# Patient Record
Sex: FEMALE | Race: WHITE | NOT HISPANIC OR LATINO | Employment: PART TIME | URBAN - METROPOLITAN AREA
[De-identification: names, ages, dates, MRNs, and addresses within clinical notes are randomized per-mention and may not be internally consistent; named-entity substitution may affect disease eponyms.]

---

## 2017-02-02 ENCOUNTER — GENERIC CONVERSION - ENCOUNTER (OUTPATIENT)
Dept: OTHER | Facility: OTHER | Age: 57
End: 2017-02-02

## 2017-02-10 ENCOUNTER — GENERIC CONVERSION - ENCOUNTER (OUTPATIENT)
Dept: OTHER | Facility: OTHER | Age: 57
End: 2017-02-10

## 2017-02-21 ENCOUNTER — ALLSCRIPTS OFFICE VISIT (OUTPATIENT)
Dept: OTHER | Facility: OTHER | Age: 57
End: 2017-02-21

## 2017-02-26 ENCOUNTER — HOSPITAL ENCOUNTER (EMERGENCY)
Facility: HOSPITAL | Age: 57
Discharge: HOME | End: 2017-02-26
Attending: EMERGENCY MEDICINE
Payer: COMMERCIAL

## 2017-02-26 VITALS
BODY MASS INDEX: 28.35 KG/M2 | HEART RATE: 74 BPM | HEIGHT: 63 IN | RESPIRATION RATE: 16 BRPM | WEIGHT: 160 LBS | SYSTOLIC BLOOD PRESSURE: 116 MMHG | TEMPERATURE: 98.1 F | DIASTOLIC BLOOD PRESSURE: 70 MMHG

## 2017-02-26 DIAGNOSIS — S39.012A STRAIN OF LUMBAR REGION, INITIAL ENCOUNTER: Primary | ICD-10-CM

## 2017-02-26 DIAGNOSIS — M62.838 SPASM OF RIGHT PIRIFORMIS MUSCLE: ICD-10-CM

## 2017-02-26 PROCEDURE — 99283 EMERGENCY DEPT VISIT LOW MDM: CPT

## 2017-02-26 PROCEDURE — 96374 THER/PROPH/DIAG INJ IV PUSH: CPT

## 2017-02-26 RX ORDER — METAXALONE 800 MG/1
800 TABLET ORAL 3 TIMES DAILY
Status: DISCONTINUED | OUTPATIENT
Start: 2017-02-26 | End: 2017-02-26 | Stop reason: HOSPADM

## 2017-02-26 RX ORDER — LANOLIN ALCOHOL/MO/W.PET/CERES
CREAM (GRAM) TOPICAL
COMMUNITY
End: 2021-07-02

## 2017-02-26 RX ORDER — NICOTINE POLACRILEX 2 MG
GUM BUCCAL
COMMUNITY
End: 2018-02-01 | Stop reason: CLARIF

## 2017-02-26 RX ORDER — KETOROLAC TROMETHAMINE 30 MG/ML
60 INJECTION, SOLUTION INTRAMUSCULAR; INTRAVENOUS ONCE
Status: COMPLETED | OUTPATIENT
Start: 2017-02-26 | End: 2017-02-26

## 2017-02-26 RX ORDER — METAXALONE 800 MG/1
800 TABLET ORAL 3 TIMES DAILY
Qty: 20 TABLET | Refills: 0 | Status: SHIPPED | OUTPATIENT
Start: 2017-02-26 | End: 2018-02-01 | Stop reason: CLARIF

## 2017-02-26 RX ORDER — CYCLOBENZAPRINE HCL 10 MG
TABLET ORAL
COMMUNITY
End: 2018-02-01 | Stop reason: CLARIF

## 2017-02-26 RX ORDER — TRAMADOL HYDROCHLORIDE 50 MG/1
50 TABLET ORAL EVERY 6 HOURS PRN
Qty: 30 TABLET | Refills: 0 | Status: SHIPPED | OUTPATIENT
Start: 2017-02-26 | End: 2017-03-08

## 2017-02-26 RX ORDER — TRAMADOL HYDROCHLORIDE 50 MG/1
TABLET ORAL
COMMUNITY
End: 2018-02-01 | Stop reason: CLARIF

## 2017-02-26 RX ORDER — LEVOTHYROXINE SODIUM 0.2 MG/1
TABLET ORAL
COMMUNITY
End: 2018-02-01 | Stop reason: CLARIF

## 2017-02-26 RX ADMIN — KETOROLAC TROMETHAMINE 60 MG: 30 INJECTION, SOLUTION INTRAMUSCULAR at 13:04

## 2017-02-26 RX ADMIN — METAXALONE 800 MG: 800 TABLET ORAL at 13:06

## 2017-05-15 ENCOUNTER — GENERIC CONVERSION - ENCOUNTER (OUTPATIENT)
Dept: OTHER | Facility: OTHER | Age: 57
End: 2017-05-15

## 2017-06-06 ENCOUNTER — ALLSCRIPTS OFFICE VISIT (OUTPATIENT)
Dept: OTHER | Facility: OTHER | Age: 57
End: 2017-06-06

## 2017-07-25 ENCOUNTER — ALLSCRIPTS OFFICE VISIT (OUTPATIENT)
Dept: OTHER | Facility: OTHER | Age: 57
End: 2017-07-25

## 2017-10-07 ENCOUNTER — GENERIC CONVERSION - ENCOUNTER (OUTPATIENT)
Dept: OTHER | Facility: OTHER | Age: 57
End: 2017-10-07

## 2017-10-08 LAB
A/G RATIO (HISTORICAL): 1.8 (ref 1.2–2.2)
ALBUMIN SERPL BCP-MCNC: 4.6 G/DL (ref 3.5–5.5)
ALP SERPL-CCNC: 59 IU/L (ref 39–117)
ALT SERPL W P-5'-P-CCNC: 21 IU/L (ref 0–32)
AST SERPL W P-5'-P-CCNC: 21 IU/L (ref 0–40)
BILIRUB SERPL-MCNC: 0.9 MG/DL (ref 0–1.2)
BUN SERPL-MCNC: 23 MG/DL (ref 6–24)
BUN/CREA RATIO (HISTORICAL): 33 (ref 9–23)
CALCIUM SERPL-MCNC: 9.9 MG/DL (ref 8.7–10.2)
CHLORIDE SERPL-SCNC: 101 MMOL/L (ref 96–106)
CHOLEST SERPL-MCNC: 218 MG/DL (ref 100–199)
CO2 SERPL-SCNC: 27 MMOL/L (ref 18–29)
CREAT SERPL-MCNC: 0.69 MG/DL (ref 0.57–1)
DEPRECATED RDW RBC AUTO: 13.3 % (ref 12.3–15.4)
EGFR AFRICAN AMERICAN (HISTORICAL): 110 ML/MIN/1.73
EGFR-AMERICAN CALC (HISTORICAL): 96 ML/MIN/1.73
FERRITIN SERPL-MCNC: 192 NG/ML (ref 15–150)
FOLATE SERPL-MCNC: >20 NG/ML
GLUCOSE SERPL-MCNC: 85 MG/DL (ref 65–99)
HCT VFR BLD AUTO: 41.6 % (ref 34–46.6)
HDLC SERPL-MCNC: 62 MG/DL
HGB BLD-MCNC: 14.2 G/DL (ref 11.1–15.9)
IRON SATN MFR SERPL: 39 % (ref 15–55)
IRON SERPL-MCNC: 128 UG/DL (ref 27–159)
LDLC SERPL CALC-MCNC: 122 MG/DL (ref 0–99)
MCH RBC QN AUTO: 29.1 PG (ref 26.6–33)
MCHC RBC AUTO-ENTMCNC: 34.1 G/DL (ref 31.5–35.7)
MCV RBC AUTO: 85 FL (ref 79–97)
PLATELET # BLD AUTO: 265 X10E3/UL (ref 150–379)
POTASSIUM SERPL-SCNC: 4.7 MMOL/L (ref 3.5–5.2)
PTH-INTACT SERPL-MCNC: 33 PG/ML (ref 15–65)
RBC (HISTORICAL): 4.88 X10E6/UL (ref 3.77–5.28)
SODIUM SERPL-SCNC: 143 MMOL/L (ref 134–144)
TIBC SERPL-MCNC: 326 UG/DL (ref 250–450)
TOT. GLOBULIN, SERUM (HISTORICAL): 2.5 G/DL (ref 1.5–4.5)
TOTAL PROTEIN (HISTORICAL): 7.1 G/DL (ref 6–8.5)
TRIGL SERPL-MCNC: 171 MG/DL (ref 0–149)
UIBC (HISTORICAL): 198 UG/DL (ref 131–425)
VIT B12 SERPL-MCNC: 1953 PG/ML (ref 211–946)
VLDLC SERPL CALC-MCNC: 34 MG/DL (ref 5–40)
WBC # BLD AUTO: 6.3 X10E3/UL (ref 3.4–10.8)

## 2017-10-09 LAB — 25(OH)D3 SERPL-MCNC: 61.2 NG/ML (ref 30–100)

## 2017-10-10 LAB — VITAMIN A LEVEL (HISTORICAL): 76 UG/DL (ref 20–65)

## 2017-10-11 LAB — VITAMIN B1, WHOLE BLOOD (HISTORICAL): 165.8 NMOL/L (ref 66.5–200)

## 2017-10-13 ENCOUNTER — TRANSCRIBE ORDERS (OUTPATIENT)
Dept: ADMINISTRATIVE | Facility: HOSPITAL | Age: 57
End: 2017-10-13

## 2017-10-13 DIAGNOSIS — Z12.31 ENCOUNTER FOR MAMMOGRAM TO ESTABLISH BASELINE MAMMOGRAM: Primary | ICD-10-CM

## 2017-10-19 ENCOUNTER — HOSPITAL ENCOUNTER (OUTPATIENT)
Dept: BONE DENSITY | Facility: IMAGING CENTER | Age: 57
Discharge: HOME | End: 2017-10-19
Payer: COMMERCIAL

## 2017-10-19 DIAGNOSIS — Z12.31 ENCOUNTER FOR MAMMOGRAM TO ESTABLISH BASELINE MAMMOGRAM: ICD-10-CM

## 2017-10-19 PROCEDURE — G0202 SCR MAMMO BI INCL CAD: HCPCS

## 2017-10-23 ENCOUNTER — HOSPITAL ENCOUNTER (OUTPATIENT)
Dept: RADIOLOGY | Facility: HOSPITAL | Age: 57
Discharge: HOME | End: 2017-10-23
Payer: COMMERCIAL

## 2017-10-23 ENCOUNTER — TRANSCRIBE ORDERS (OUTPATIENT)
Dept: ADMINISTRATIVE | Facility: HOSPITAL | Age: 57
End: 2017-10-23

## 2017-10-23 DIAGNOSIS — M79.671 RIGHT FOOT PAIN: Primary | ICD-10-CM

## 2017-10-23 DIAGNOSIS — M79.671 RIGHT FOOT PAIN: ICD-10-CM

## 2017-10-23 PROCEDURE — 73630 X-RAY EXAM OF FOOT: CPT

## 2017-10-26 ENCOUNTER — ALLSCRIPTS OFFICE VISIT (OUTPATIENT)
Dept: OTHER | Facility: OTHER | Age: 57
End: 2017-10-26

## 2017-10-26 ENCOUNTER — TRANSCRIBE ORDERS (OUTPATIENT)
Dept: ADMINISTRATIVE | Facility: HOSPITAL | Age: 57
End: 2017-10-26

## 2017-10-26 DIAGNOSIS — M79.671 FOOT PAIN, RIGHT: Primary | ICD-10-CM

## 2017-10-27 ENCOUNTER — GENERIC CONVERSION - ENCOUNTER (OUTPATIENT)
Dept: OTHER | Facility: OTHER | Age: 57
End: 2017-10-27

## 2017-11-03 ENCOUNTER — HOSPITAL ENCOUNTER (OUTPATIENT)
Dept: MRI IMAGING | Facility: HOSPITAL | Age: 57
Discharge: HOME | End: 2017-11-03
Attending: PODIATRIST
Payer: COMMERCIAL

## 2017-11-03 DIAGNOSIS — M79.671 FOOT PAIN, RIGHT: ICD-10-CM

## 2017-11-03 PROCEDURE — 73718 MRI LOWER EXTREMITY W/O DYE: CPT

## 2017-11-16 ENCOUNTER — GENERIC CONVERSION - ENCOUNTER (OUTPATIENT)
Dept: OTHER | Facility: OTHER | Age: 57
End: 2017-11-16

## 2017-12-07 ENCOUNTER — ALLSCRIPTS OFFICE VISIT (OUTPATIENT)
Dept: OTHER | Facility: OTHER | Age: 57
End: 2017-12-07

## 2017-12-08 ENCOUNTER — ALLSCRIPTS OFFICE VISIT (OUTPATIENT)
Dept: OTHER | Facility: OTHER | Age: 57
End: 2017-12-08

## 2017-12-08 NOTE — PROGRESS NOTES
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 407)    Plan  Anxiety, Depression    · Venlafaxine HCl  MG Oral Capsule Extended Release 24 Hour; TAKE ONE CAPSULEBY MOUTH DAILY    Discussion/Summary  Discussion Summary:   Anx/Dep - better with Effexor XR and able to tolerate med, not yet at goal so will increase to 150 mg 1 tab PO q day and re-eval in 6-8 wks, urged to call with SE or with worsening mood, urged not stopping med on own or missing doses  post tibialis tear - s/p CAM boot and now doing well with Orthotics, is following with podiatry and doing much better  on BW  on mammo and PAP  on [place]  Counseling Documentation With Imm: The patient was counseled regarding instructions for management,-- prognosis,-- patient and family education,-- impressions,-- risks and benefits of treatment options,-- importance of compliance with treatment  Medication SE Review and Pt Understands Tx: Possible side effects of new medications were reviewed with the patient/guardian today  The treatment plan was reviewed with the patient/guardian  The patient/guardian understands and agrees with the treatment plan   Self Referrals:   Self Referrals: No      Chief Complaint  Chief Complaint Chronic Condition [facility]: Patient is here today for follow up of chronic conditions described in HPI  History of Present Illness  HPI: Pt here for follow up depression/anxiety  Last visit she was noting uncontrolled mood and had no benefit with Sertraline or Wellbutrin  Her insurance would not cover Branded Zoloft and we tried a trial of Effexor last visit  She has been taking the med w/o noted SE  She has noted mood is better  She notes less tearfulness and is less irritable and is yelling at her  less  She has not felt anxious and is not biting her nails or grinding her teeth   She notes still having stress as her and her  are contemplating divorce and work has been crazy  She was out of work for 3 wks with foot issues  She does note a 50:50 good to bad day   had her MRI since last appt  She was noted to have torn post tibialis insertion at the navicular bone  She was using a boot for about 6 wks  She is now using orthotics and pain has improved  She is not having to take any additional pain meds other then her Tramadol which she really only uses when her back goes out/pain  Review of Systems  Complete-Female:  Constitutional: recent weight loss, but-- no fever-- and-- no chills  Eyes: no eyesight problems  ENT: no sore throat-- and-- no nasal discharge  Cardiovascular: no chest pain-- and-- no palpitations  Respiratory: no shortness of breath,-- no cough-- and-- no wheezing  Gastrointestinal: no abdominal pain,-- no constipation-- and-- no diarrhea  Genitourinary: no dysuria  Musculoskeletal: joint stiffness, but-- as noted in HPI-- and-- no joint swelling  Integumentary: no rashes  Neurological: no headache-- and-- no dizziness  Psychiatric: as noted in HPI  Endocrine: no muscle weakness  Hematologic/Lymphatic: no tendency for easy bleeding  Active Problems  1  Anxiety (300 00) (F41 9)   2  Arthritis (716 90) (M19 90)   3  Atrophy of vagina (627 3) (N95 2)   4  Breast disorder (611 9) (N64 9)   5  Colon cancer screening (V76 51) (Z12 11)   6  Depression (311) (F32 9)   7  Disc degeneration, lumbar (722 52) (M51 36)   8  Disturbance of memory (780 93) (R41 3)   9  Dyslipidemia (272 4) (E78 5)   10  Encounter for cervical Pap smear with pelvic exam (V76 2,V72 31) (Z01 419)   11  Encounter for screening mammogram for malignant neoplasm of breast (V76 12) (Z12 31)   12  Globus sensation (306 4) (F45 8)   13  History of folliculitis (H30 8) (N83 7)   14  History of self breast exam   15  Hypothyroidism (244 9) (E03 9)   16  Lumbar pain (724 2) (M54 5)   17  Lumbar radiculopathy (724 4) (M54 16)   18  Multiple joint pain (719 49) (M25 50)   19  Osteoarthritis (715 90) (M19 90)   20  Postgastrectomy malabsorption (579 3) (K91 2,Z90 3)   21  Sacroiliitis (720 2) (M46 1)   22  Status post bariatric surgery (V45 86) (P32 49)    Past Medical History  1  History of Acute laryngitis (464 00) (J04 0)   2  History of Adenomyosis (617 0) (N80 0)   3  Denied: History of Alcohol abuse   4  History of Atypical chest pain (786 59) (R07 89)   5  Depression (311) (F32 9)   6  Disturbance of memory (780 93) (R41 3)   7  History of Encounter for cervical Pap smear with pelvic exam (V76 2,V72 31) (Z01 419)   8  History of  8   9  History of Hirsutism (704 1) (L68 0)   10  History of acute sinusitis (V12 69) (Z87 09)   11  History of endometriosis (V13 29) (Z87 42)   12  History of folliculitis (B28 8) (P63 0)   13  History of fracture of foot (V15 51) (Z87 81)   14  History of spontaneous  (V13 29) (Z87 59)   15  Denied: History of substance abuse   16  History of upper respiratory infection (V12 09) (Z87 09)   17  History of uterine leiomyoma (V13 29) (Z86 018)   18  History of Menometrorrhagia (626 2) (N92 1)   19  History of Morbid obesity (278 01) (E66 01)   20  History of Preop cardiovascular exam (V72 81) (Z01 810)   21  History of Sclerosing adenosis of breast (610 2) (N60 29)   22  History of Symptomatic menopausal or female climacteric states (627 2) (N95 1)  Active Problems And Past Medical History Reviewed: The active problems and past medical history were reviewed and updated today  Surgical History    1  History of Appendectomy   2  History of Biopsy Breast Percutaneous Needle Core   3  History of Cervix Cryosurgery   4  History of Complete Colonoscopy   5  History of Dilation And Curettage   6  History of Gastric Surgery For Morbid Obesity Laparoscopic Longitudinal Gastrectomy   7  History of Hysterectomy   8  History of Laparoscopy With Total Hysterectomy   9   History of Oral Surgery Tooth Extraction   10  History of Salpingo-oophorectomy Left Side   11  History of Salpingo-oophorectomy Right Side   12  History of Surgical Treatment For    13  History of Tonsillectomy With Adenoidectomy  Surgical History Reviewed: The surgical history was reviewed and updated today  Family History  Mother    1  Denied: Family history of Alcohol abuse   2  Family history of    3  Family history of Alzheimer's disease (V17 2) (Z82 0)   4  Family history of cerebrovascular accident (V17 1) (Z82 3)   5  Family history of dementia (V17 2) (Z81 8)   6  Denied: Family history of depression   7  Denied: Family history of substance abuse  Father    8  Denied: Family history of Alcohol abuse   9  Denied: Family history of depression   10  Family history of diabetes mellitus (V18 0) (Z83 3)   11  Denied: Family history of substance abuse   12  Family history of Prostate cancer  Sibling    15  Denied: Family history of Alcohol abuse   14  Denied: Family history of depression   15  Denied: Family history of substance abuse  Sister    12  Family history of colonic polyps (V18 51) (Z83 71)  Family History Reviewed: The family history was reviewed and updated today  Social History     · Always uses seat belt   · Caffeine use (V49 89) (F15 90)   · [employer]   · Denied: History of Drug use   · Former smoker (T83 34) (A68 807)   ·    · No alcohol use   · One child  Social History Reviewed: The social history was reviewed and updated today  Current Meds   1  Biotin 5000 MCG Oral Capsule; Therapy: (Recorded:2015) to Recorded   2  Calcium Citrate CAPS; Therapy: (Recorded:2015) to Recorded   3  Centrum Silver TABS; Therapy: (Recorded:2015) to Recorded   4  Levothyroxine Sodium 150 MCG Oral Tablet; TAKE 1 TABLET DAILY except 1 5 tab PO on  ONLY as directed by endo; Therapy: (Recorded:2017) to Recorded   5   Metaxalone 800 MG Oral Tablet; take 1 tablet by mouth every 8 hours if needed for muscle spasm; Therapy: 65GTL8736 to (Evaluate:13Gat1952)  Requested for: 70DCN9133; Last Rx:93Vck1200 Ordered   6  Omeprazole 20 MG Oral Capsule Delayed Release; take 1 capsule by mouth once daily; Therapy: 77UNN2735 to (Froilan Rushford)  Requested for: 37Iub4913 Recorded   7  Tramadol-Acetaminophen 37 5-325 MG Oral Tablet; Take 1 tablet every 6 hours as needed for pain; Therapy: 25EOY3465 to (Florence Caicedo)  Requested for: 23Oct2017; Last SI:10STS8738 Ordered   8  Venlafaxine HCl ER 75 MG Oral Capsule Extended Release 24 Hour; One tablet po daily; Therapy: 65EBS1416 to (DZTQYDWU:01KKD8548)  Requested for: 26Oct2017; Last Rx:26Oct2017 Ordered   9  Vitamin B-12 SUBL; Therapy: (Recorded:13Mar2015) to Recorded   10  Vitamin D3 2000 UNIT Oral Tablet; Therapy: (Recorded:13Mar2015) to Recorded  Medication List Reviewed: The medication list was reviewed and updated today  Allergies  1  No Known Drug Allergies    Vitals  Vital Signs    Recorded: 07Dec2017 09:52AM   Temperature 98 F   Heart Rate 64   Respiration 12   Systolic 853   Diastolic 62   Height 5 ft 3 in   Weight 174 lb 12 8 oz   BMI Calculated 30 96   BSA Calculated 1 83       Physical Exam   Constitutional  General appearance: No acute distress, well appearing and well nourished  Pulmonary  Respiratory effort: No increased work of breathing or signs of respiratory distress  Auscultation of lungs: Clear to auscultation  Cardiovascular  Auscultation of heart: Normal rate and rhythm, normal S1 and S2, without murmurs  Examination of extremities for edema and/or varicosities: Normal    Abdomen  Abdomen: Non-tender, no masses     Musculoskeletal  Gait and station: Normal    Psychiatric  Mood and affect: Normal          Results/Data  PHQ-9 Adult Depression Screening 01Fof0058 09:52AM User, Ahs     Test Name Result Flag Reference   PHQ-9 Adult Depression Score 5       Over the last two weeks, how often have you been bothered by any of the following problems? Little interest or pleasure in doing things: Not at all - 0 Feeling down, depressed, or hopeless: Not at all - 0 Trouble falling or staying asleep, or sleeping too much: Several days - 1 Feeling tired or having little energy: Nearly every day - 3 Poor appetite or over eating: Several days - 1 Feeling bad about yourself - or that you are a failure or have let yourself or your family down: Not at all - 0 Trouble concentrating on things, such as reading the newspaper or watching television: Not at all - 0 Moving or speaking so slowly that other people could have noticed  Or the opposite -  being so fidgety or restless that you have been moving around a lot more than usual: Not at all - 0 Thoughts that you would be better off dead, or of hurting yourself in some way: Not at all - 0   PHQ-9 Adult Depression Screening Negative     PHQ-9 Difficulty Level Not difficult at all     PHQ-9 Severity Mild Depression         Future Appointments    Date/Time Provider Specialty Site   12/08/2017 10:30 AM Sharyle Grieves, M D   General Surgery [facility]   02/06/2018 11:00 AM Wesley Sawyer DO Internal Medicine [facility] MD       Signatures   Electronically signed by : John Paul Andino DO; Dec  7 2017  9:56AM EST                       (Author)    Electronically signed by : John Paul Andino DO; Dec  7 2017 10:08AM EST                       (Author)    Electronically signed by : John Paul Andino DO; Dec  7 2017 10:13AM EST                       (Author)

## 2017-12-09 NOTE — PROGRESS NOTES
Assessment    1  Status post bariatric surgery (V45 86) (Z98 84)    Discussion/Summary    Patient is a 70-year-old woman 3 years status post laparoscopic sleeve gastrectomy with an initial excess weight loss in the high 80s and has since bounced back to 61%  She presents for annual follow-up today with no complaints  Is tolerating a regular diet  Is compliant with all of her medications  She does desire to lose a little bit more weight and become a bit more active but has been recently hampered by mild chronic back pain and a sprained ankle that required a boot for 2 months  On review of her bariatric labs, her lipid panel is slightly elevated ( she is following up with her primary care doctor regarding this), and her B12, ferritin and folic acid are slightly elevated as well  overall she is doing very well  We will refer her to see the dietitian and possibly the medical weight management MD in the future  She will follow in bariatric surgery clinic again in 1 year  Self Referrals: No      Chief Complaint  Patient is in the office for an annual visit and for a review of blood work  Patient admits to be compliant with daily exercise, vitamin, and protein intake  Post-Op  Post-Op Bariatric Surgery:  [facility] is status post lap sleeve procedure--   3 years ago; EWL 61%  HPI: The patient reports decreased hunger,-- no loss of appetite,-- not experiencing hunger,-- no nausea,-- no vomiting,-- no dyspnea,-- no fever,-- no constipation,-- no diarrhea,-- no chest pain,-- no abdominal pain,-- no excessive pain,-- no swelling-- and-- no abnormal bleeding  Diet and Exercise: Diet history reviewed and discussed with the patient  Weight loss/gains to date discussed with the patient  Supplements: multivitamins  PE:  Abdominal exam: soft,-- nontender,-- normal bowel sounds,-- no rebound tenderness,-- no guarding,-- no incisional hernia-- and-- no palpable mass   The surgical incision site is clean, dry and intact,-- not warm,-- not indurated,-- not erythematous,-- not ecchymotic,-- not swollen-- and-- not tender  Assessment:  Post-op, the patient is doing well,-- has excellent pain control-- and-- is showing no signs of infection  Plan: Activity restrictions: None  Instructions / Recommendations: dietary counseling recommended,-- recommended a daily protein intake of  grams,-- vitamin supplement(s) recommended,-- mineral supplement(s) recommended-- and-- recommended exercising at least 150 minutes per week  The patient was instructed to follow up in 12 months,-- will see dietician today  Active Problems    1  Anxiety (300 00) (F41 9)   2  Arthritis (716 90) (M19 90)   3  Atrophy of vagina (627 3) (N95 2)   4  Breast disorder (611 9) (N64 9)   5  Colon cancer screening (V76 51) (Z12 11)   6  Depression (311) (F32 9)   7  Disc degeneration, lumbar (722 52) (M51 36)   8  Disturbance of memory (780 93) (R41 3)   9  Dyslipidemia (272 4) (E78 5)   10  Encounter for cervical Pap smear with pelvic exam (V76 2,V72 31) (Z01 419)   11  Encounter for screening mammogram for malignant neoplasm of breast (V76 12)  (Z12 31)   12  Globus sensation (306 4) (F45 8)   13  History of folliculitis (R68 0) (I65 6)   14  History of self breast exam   15  Hypothyroidism (244 9) (E03 9)   16  Lumbar pain (724 2) (M54 5)   17  Lumbar radiculopathy (724 4) (M54 16)   18  Multiple joint pain (719 49) (M25 50)   19  Nontraumatic rupture of right posterior tibial tendon (727 68) (M66 871)   20  Osteoarthritis (715 90) (M19 90)   21  Postgastrectomy malabsorption (579 3) (K91 2,Z90 3)   22  Sacroiliitis (720 2) (M46 1)   23  Status post bariatric surgery (V45 86) (Z98 84)    Social History     · Always uses seat belt   · Caffeine use (V49 89) (F15 90)   · [employer]   · Denied: History of Drug use   · Former smoker (K08 82) (A04 572)   ·    · No alcohol use   · One child    Current Meds   1  Biotin 5000 MCG Oral Capsule; Therapy: (Recorded:13Mar2015) to Recorded   2  Calcium Citrate CAPS; Therapy: (Recorded:13Mar2015) to Recorded   3  Centrum Silver TABS; Therapy: (Recorded:13Mar2015) to Recorded   4  Levothyroxine Sodium 150 MCG Oral Tablet; TAKE 1 TABLET DAILY except 1 5 tab PO on Sunday ONLY as directed by endo; Therapy: (Recorded:06Jun2017) to Recorded   5  Metaxalone 800 MG Oral Tablet; take 1 tablet by mouth every 8 hours if needed for muscle spasm; Therapy: 52CCU6087 to (Evaluate:12Ltk9033)  Requested for: 48HKD8160; Last Rx:33Ufo2594 Ordered   6  Omeprazole 20 MG Oral Capsule Delayed Release; take 1 capsule by mouth once daily; Therapy: 03AMU1639 to (Daryn Holley)  Requested for: 45Olx5270 Recorded   7  Tramadol-Acetaminophen 37 5-325 MG Oral Tablet; Take 1 tablet every 6 hours as needed for pain; Therapy: 58FGJ2999 to (Brigida Thornton)  Requested for: 23Oct2017; Last FZ:68YUT3654 Ordered   8  Venlafaxine HCl  MG Oral Capsule Extended Release 24 Hour; TAKE ONE CAPSULE BY MOUTH DAILY; Therapy: 83UMH9112 to (Josieapurva Naranjo)  Requested for: 28YUD2059; Last Rx:98Muj7787 Ordered   9  Vitamin B-12 SUBL; Therapy: (Recorded:13Mar2015) to Recorded   10  Vitamin D3 2000 UNIT Oral Tablet; Therapy: (Recorded:13Mar2015) to Recorded    Allergies  1  No Known Drug Allergies    Vitals   Recorded: 52KWZ1603 10:45AM   Temperature 98 4 F   Heart Rate 64   Respiration 18   Systolic 508   Diastolic 80   Height 5 ft 3 5 in   Weight 174 lb    BMI Calculated 30 34   BSA Calculated 1 83     Future Appointments    Date/Time Provider Specialty Site   01/18/2018 10:00 AM Yuliana Middleton DO Internal Medicine [facility] MD   02/06/2018 11:00 AM Yuliana Middleton DO Internal Medicine [facility] MD       Signatures   Electronically signed by :  ANGELA Montaño ; Dec  8 2017 12:54PM EST                       (Author)

## 2018-01-09 NOTE — MISCELLANEOUS
Message   Recorded as Task   Date: 02/01/2016 12:36 PM, Created By: Akira Hand   Task Name: Go to Result   Assigned To: Agustinamiranda Jeremy   Regarding Patient: Aria Barnhart, Status: Active   CommentArbreann Goncalves - 01 Feb 2016 12:36 PM     TASK CREATED  Pt needs follow up breast US in 6 months as rec by radiology    Slip mailed  Active Problems    1  Arthritis (716 90) (M19 90)   2  Atrophy of vagina (627 3) (N95 2)   3  Breast disorder (611 9) (N64 9)   4  Colon cancer screening (V76 51) (Z12 11)   5  Depression (311) (F32 9)   6  Disc degeneration, lumbar (722 52) (M51 36)   7  Dyslipidemia (272 4) (E78 5)   8  Encounter for cervical Pap smear with pelvic exam (V76 2,V72 31) (Z12 4)   9  Encounter for screening mammogram for malignant neoplasm of breast (V76 12)   (Z12 31)   10  Globus sensation (306 4) (F45 8)   11  History of self breast exam   12  Hypothyroidism (244 9) (E03 9)   13  Lumbar pain (724 2) (M54 5)   14  Lumbar radiculopathy (724 4) (M54 16)   15  Multiple joint pain (719 49) (M25 50)   16  Osteoarthritis (715 90) (M19 90)   17  Postgastrectomy malabsorption (579 3) (K91 2)   18  Sacroiliitis (720 2) (M46 1)   19  Status post bariatric surgery (V45 86) (Z98 84)    Current Meds   1  Biotin 5000 MCG Oral Capsule; Therapy: (Recorded:13Mar2015) to Recorded   2  Calcium Citrate CAPS; Therapy: (Recorded:13Mar2015) to Recorded   3  Centrum Silver TABS; Therapy: (Recorded:13Mar2015) to Recorded   4  Cyclobenzaprine HCl - 10 MG Oral Tablet; TAKE 1/2 OR 1 TABLET EVERY 8 HOURS IF   NEEDED FOR MUSCLE SPASM; Therapy: 21ZPM5200 to (Evaluate:85Sfa9089)  Requested for: 98Fmh2043; Last   Rx:79Qog0781 Ordered   5  Levothyroxine Sodium 150 MCG Oral Tablet; TAKE 1 TABLET DAILY as directed by   endo; Therapy: (Recorded:01Feb2016) to Recorded   6  Omeprazole 20 MG Oral Capsule Delayed Release; TAKE 1 CAPSULE DAILY; Therapy: 56UIV9367 to (Majo Lopez)  Requested for: 00ONI7751;  Last Rx:17Eci4893 Ordered   7  Sertraline HCl - 100 MG Oral Tablet (Zoloft); Take 1 1/2  tablet daily  Requested for:   60ZVD4984; Last Rx:20Nap5877 Ordered   8  Tramadol-Acetaminophen 37 5-325 MG Oral Tablet; Take 1 tablet every 6 hours as   needed for pain; Therapy: 74BPH8745 to (Evaluate:27Mvi1125)  Requested for: 05Xjb1787; Last   Rx:61Ntb8712 Ordered   9  Vitamin B-12 SUBL; Therapy: (Recorded:13Mar2015) to Recorded   10  Vitamin D3 2000 UNIT Oral Tablet; Therapy: (Recorded:13Mar2015) to Recorded    Allergies    1  No Known Drug Allergies    Plan  Breast disorder    · US BREAST LEFT LIMITED; Status:Hold For - Scheduling,Retrospective Authorization;   Requested for:95Ine4623;     Signatures   Electronically signed by : Amy Espinoza, ; Feb 2 2016  7:50AM EST                       (Author)

## 2018-01-11 NOTE — PROGRESS NOTES
Assessment    1  Depression (311) (F32 9)   2  Anxiety (300 00) (F41 9)    Plan  Anxiety, Depression    · Venlafaxine HCl ER 75 MG Oral Capsule Extended Release 24 Hour; One tablet po  daily    Discussion/Summary    Dep/Anx - no benefit with Sertraline/Wellbutrin/Lexapro, has benefit with branded Zoloft but cannot afford it, will try trial of Effexor and re-eval in 4-6 wks, call with SE, do not stop med abruptly on her own    Having MRI of R foot for poss ligament/tendon injury as per podiatry  The patient was counseled regarding instructions for management, prognosis, patient and family education, impressions, risks and benefits of treatment options, importance of compliance with treatment  Possible side effects of new medications were reviewed with the patient/guardian today  The treatment plan was reviewed with the patient/guardian  The patient/guardian understands and agrees with the treatment plan     Self Referrals: No      Chief Complaint  continued depression      History of Present Illness  HPI: Pt here with continued depression symptoms  She has tried Sertraline, Wellbutrin, Lexapro and they did not work  She notes the branded Zoloft works but she cannot afford it as it is over $300  She was started on Trintellix and it did help a little but she still does have anxiety and depression  She notes no SI/HI/panic attacks  She is not sleeping well d/t her foot pain - tripped on  and poss tendon/ligament injury - having MRI coming up  Review of Systems    Constitutional: no fever and no chills  Cardiovascular: no chest pain and no palpitations  Respiratory: no shortness of breath and no cough  Musculoskeletal: arthralgias and joint swelling, but as noted in HPI  Neurological: no headache and no dizziness  Active Problems    1  Arthritis (716 90) (M19 90)   2  Atrophy of vagina (627 3) (N95 2)   3  Breast disorder (611 9) (N64 9)   4  Colon cancer screening (V76 51) (Z12 11)   5  Depression (311) (F32 9)   6  Disc degeneration, lumbar (722 52) (M51 36)   7  Disturbance of memory (780 93) (R41 3)   8  Dyslipidemia (272 4) (E78 5)   9  Encounter for cervical Pap smear with pelvic exam (V76 2,V72 31) (Z01 419)   10  Encounter for screening mammogram for malignant neoplasm of breast (V76 12)    (Z12 31)   11  Globus sensation (306 4) (F45 8)   12  History of folliculitis (N96 8) (N22 7)   13  History of self breast exam   14  Hypothyroidism (244 9) (E03 9)   15  Lumbar pain (724 2) (M54 5)   16  Lumbar radiculopathy (724 4) (M54 16)   17  Multiple joint pain (719 49) (M25 50)   18  Osteoarthritis (715 90) (M19 90)   19  Postgastrectomy malabsorption (579 3) (K91 2,Z90 3)   20  Sacroiliitis (720 2) (M46 1)   21  Status post bariatric surgery (V45 86) (Z98 84)    Past Medical History  Active Problems And Past Medical History Reviewed: The active problems and past medical history were reviewed and updated today  Surgical History  Surgical History Reviewed: The surgical history was reviewed and updated today  Social History    · Always uses seat belt   · Caffeine use (V49 89) (F15 90)   · [employer]   · Denied: History of Drug use   · Former smoker (K04 73) (Y10 041)   ·    · No alcohol use   · One child  The social history was reviewed and updated today  Family History  Family History Reviewed: The family history was reviewed and updated today  Current Meds   1  Biotin 5000 MCG Oral Capsule; Therapy: (Recorded:13Mar2015) to Recorded   2  Calcium Citrate CAPS; Therapy: (Recorded:13Mar2015) to Recorded   3  Centrum Silver TABS; Therapy: (Recorded:13Mar2015) to Recorded   4  Levothyroxine Sodium 150 MCG Oral Tablet; TAKE 1 TABLET DAILY except 1 5 tab PO on   Sunday ONLY as directed by endo; Therapy: (Recorded:06Jun2017) to Recorded   5   Metaxalone 800 MG Oral Tablet; take 1 tablet by mouth every 8 hours if needed for   muscle spasm; Therapy: 36QQY0033 to (Evaluate:14Twr2464)  Requested for: 45IHG2104; Last   Rx:58Qgr3636 Ordered   6  Omeprazole 20 MG Oral Capsule Delayed Release; take 1 capsule by mouth once daily; Therapy: 92VGD9956 to (53 [phone])  Requested for: 08Axr5899 Recorded   7  Tramadol-Acetaminophen 37 5-325 MG Oral Tablet; Take 1 tablet every 6 hours as   needed for pain; Therapy: 26FXS4076 to (Jennie Revel)  Requested for: 23Oct2017; Last   CX:98CLS7713 Ordered   8  Vitamin B-12 SUBL; Therapy: (Recorded:13Mar2015) to Recorded   9  Vitamin D3 2000 UNIT Oral Tablet; Therapy: (Recorded:13Mar2015) to Recorded    The medication list was reviewed and updated today  Allergies    1  No Known Drug Allergies    Vitals   Recorded: 05RTK7422 02:18PM   Temperature 97 6 F   Heart Rate 70   Systolic 063   Diastolic 68   Height 5 ft 3 in   Weight 178 lb    BMI Calculated 31 53   BSA Calculated 1 84     Physical Exam    Constitutional   General appearance: No acute distress, well appearing and well nourished  Psychiatric   Mood and affect: Normal          Future Appointments    Date/Time Provider Specialty Site   12/08/2017 10:30 AM ANGELA Duque   General Surgery [facility]   02/06/2018 11:00 AM Zacarias Parham DO Internal Medicine Madi Zamorano MD     Signatures   Electronically signed by : Nilay Patel DO; Oct 26 2017  2:37PM EST                       (Author)

## 2018-01-12 VITALS
DIASTOLIC BLOOD PRESSURE: 68 MMHG | TEMPERATURE: 98.1 F | SYSTOLIC BLOOD PRESSURE: 110 MMHG | BODY MASS INDEX: 31.54 KG/M2 | WEIGHT: 178 LBS | HEIGHT: 63 IN | HEART RATE: 70 BPM

## 2018-01-12 NOTE — MISCELLANEOUS
Message   Recorded as Task   Date: 08/01/2016 12:43 PM, Created By: Noé Platt   Task Name: Go to Result   Assigned To: Tova Range   Regarding Patient: Merlin Fraise, Status: Active   CommentMilizeth Dasen - 01 Aug 2016 12:43 PM     TASK CREATED  Mammogram normal but significant breast density noted along with Meghann Alas risk of greater than 30%  Recommend follow-up with breast specialist to consider options  Pt informed  Will call Dr Brian Whitley  Active Problems    1  Arthritis (716 90) (M19 90)   2  Atrophy of vagina (627 3) (N95 2)   3  Atypical chest pain (786 59) (R07 89)   4  Breast disorder (611 9) (N64 9)   5  Colon cancer screening (V76 51) (Z12 11)   6  Depression (311) (F32 9)   7  Disc degeneration, lumbar (722 52) (M51 36)   8  Disturbance of memory (780 93) (R41 3)   9  Dyslipidemia (272 4) (E78 5)   10  Encounter for cervical Pap smear with pelvic exam (V76 2,V72 31) (Z01 419)   11  Encounter for screening mammogram for malignant neoplasm of breast (V76 12)    (Z12 31)   12  Folliculitis (753 9) (R43 9)   13  Globus sensation (306 4) (F45 8)   14  History of self breast exam   15  Hypothyroidism (244 9) (E03 9)   16  Lumbar pain (724 2) (M54 5)   17  Lumbar radiculopathy (724 4) (M54 16)   18  Multiple joint pain (719 49) (M25 50)   19  Osteoarthritis (715 90) (M19 90)   20  Postgastrectomy malabsorption (579 3) (K91 2,Z90 3)   21  Sacroiliitis (720 2) (M46 1)   22  Status post bariatric surgery (V45 86) (Z98 84)    Current Meds   1  Biotin 5000 MCG Oral Capsule; Therapy: (Recorded:13Mar2015) to Recorded   2  Calcium Citrate CAPS; Therapy: (Recorded:13Mar2015) to Recorded   3  Centrum Silver TABS; Therapy: (Recorded:13Mar2015) to Recorded   4  Cyclobenzaprine HCl - 10 MG Oral Tablet; TAKE 1/2 OR 1 TABLET EVERY 8 HOURS IF   NEEDED FOR MUSCLE SPASM; Therapy: 85YBV2860 to (Evaluate:98Mbd3063)  Requested for: 21Dec2014; Last   Rx:21Dec2014 Ordered   5   Levothyroxine Sodium 150 MCG Oral Tablet; TAKE 1 TABLET DAILY as directed by   endo; Therapy: (Recorded:01Feb2016) to Recorded   6  Omeprazole 40 MG Oral Capsule Delayed Release; TAKE 1 CAPSULE DAILY; Therapy: 75TCF5632 to (Evaluate:10Aug2016)  Requested for: 12Apr2016; Last   Rx:12Apr2016 Ordered   7  Sertraline HCl - 100 MG Oral Tablet (Zoloft); Take 1 1/2  tablet daily  Requested for:   24GUX5867; Last Rx:01Feb2016 Ordered   8  Tramadol-Acetaminophen 37 5-325 MG Oral Tablet; Take 1 tablet every 6 hours as   needed for pain; Therapy: 96MMY9336 to (Evaluate:43Dnd8485)  Requested for: 22Qqq8436; Last   Rx:98Seb4651 Ordered   9  Vitamin B-12 SUBL; Therapy: (Recorded:13Mar2015) to Recorded   10  Vitamin D3 2000 UNIT Oral Tablet; Therapy: (Recorded:13Mar2015) to Recorded    Allergies    1   No Known Drug Allergies    Signatures   Electronically signed by : Miguel Avitia, ; Aug  2 2016  8:51AM EST                       (Author)

## 2018-01-14 VITALS
HEART RATE: 72 BPM | TEMPERATURE: 97.6 F | DIASTOLIC BLOOD PRESSURE: 62 MMHG | BODY MASS INDEX: 31.36 KG/M2 | SYSTOLIC BLOOD PRESSURE: 112 MMHG | HEIGHT: 63 IN | WEIGHT: 177 LBS

## 2018-01-14 VITALS
TEMPERATURE: 98.7 F | DIASTOLIC BLOOD PRESSURE: 68 MMHG | BODY MASS INDEX: 30.65 KG/M2 | WEIGHT: 173 LBS | HEIGHT: 63 IN | SYSTOLIC BLOOD PRESSURE: 100 MMHG | HEART RATE: 68 BPM

## 2018-01-14 VITALS
TEMPERATURE: 97.6 F | HEIGHT: 63 IN | WEIGHT: 178 LBS | BODY MASS INDEX: 31.54 KG/M2 | DIASTOLIC BLOOD PRESSURE: 68 MMHG | HEART RATE: 70 BPM | SYSTOLIC BLOOD PRESSURE: 118 MMHG

## 2018-01-14 NOTE — MISCELLANEOUS
Message   Recorded as Task   Date: 04/14/2016 04:21 PM, Created By: LikeWhere   Task Name: Medical Complaint Callback   Assigned To: [facility],Team   Regarding Patient: Roxanna Macias, Status: Active   Comment:    Gladys Pruett - 14 Apr 2016 4:21 PM     TASK CREATED  Caller: Self; [phone] (Home)  SHE IS ASKING ABOUT GETTING AN ORDER OR SCRIPT TO BE TESTED TO DETERMINE IF SHE HAS A PREDISPOSITION TO [facility]  Abbey Henderson - 14 Apr 2016 4:34 PM     TASK REASSIGNED: Previously Assigned To [address] [place] [address]  Please advise  Lucia Lord - 14 Apr 2016 5:10 PM     TASK REPLIED TO: Previously Assigned To Lucia Lord  no current test available for this that is approved by insurance to my knowledge   [facility] - 14 Apr 2016 6:43 PM     TASK REASSIGNED: Previously Assigned To [address] [place] [address]  Would like a order to see [facility] about this  Lucia Lord - 15 Apr 2016 7:33 AM     TASK REPLIED TO: Previously Assigned To Lucia Lord  sure that is a reasonable next step if she wishes - will put in referral for  Neuro - she can call and make appt at her Vallery Goodell - 15 Apr 2016 8:56 AM     TASK EDITED  Patient aware        Active Problems    1  Arthritis (716 90) (M19 90)   2  Atrophy of vagina (627 3) (N95 2)   3  Atypical chest pain (786 59) (R07 89)   4  Breast disorder (611 9) (N64 9)   5  Colon cancer screening (V76 51) (Z12 11)   6  Depression (311) (F32 9)   7  Disc degeneration, lumbar (722 52) (M51 36)   8  Dyslipidemia (272 4) (E78 5)   9  Encounter for cervical Pap smear with pelvic exam (V76 2,V72 31) (Z01 419)   10  Encounter for screening mammogram for malignant neoplasm of breast (V76 12)    (Z12 31)   11  Globus sensation (306 4) (F45 8)   12  History of self breast exam   13  Hypothyroidism (244 9) (E03 9)   14  Lumbar pain (724 2) (M54 5)   15  Lumbar radiculopathy (724 4) (M54 16)   16   Multiple joint pain (719 49) (M25 50)   17  Osteoarthritis (715 90) (M19 90)   18  Postgastrectomy malabsorption (579 3) (K91 2,Z90 3)   19  Sacroiliitis (720 2) (M46 1)   20  Status post bariatric surgery (V45 86) (Z98 84)    Current Meds   1  Biotin 5000 MCG Oral Capsule; Therapy: (Recorded:13Mar2015) to Recorded   2  Calcium Citrate CAPS; Therapy: (Recorded:13Mar2015) to Recorded   3  Centrum Silver TABS; Therapy: (Recorded:13Mar2015) to Recorded   4  Cyclobenzaprine HCl - 10 MG Oral Tablet; TAKE 1/2 OR 1 TABLET EVERY 8 HOURS IF   NEEDED FOR MUSCLE SPASM; Therapy: 89KCS6159 to (Evaluate:17Kwp6860)  Requested for: 19Plo0183; Last   Rx:08Tea0352 Ordered   5  Levothyroxine Sodium 150 MCG Oral Tablet; TAKE 1 TABLET DAILY as directed by   endo; Therapy: (Recorded:01Feb2016) to Recorded   6  Omeprazole 40 MG Oral Capsule Delayed Release; TAKE 1 CAPSULE DAILY; Therapy: 74ISL2019 to (Evaluate:10Aug2016)  Requested for: 97Pwf5345; Last   Rx:35Kfb2009 Ordered   7  Sertraline HCl - 100 MG Oral Tablet; Take 1 1/2  tablet daily  Requested for: 54OZT1344;   Last Rx:08Osj2194 Ordered   8  Tramadol-Acetaminophen 37 5-325 MG Oral Tablet; Take 1 tablet every 6 hours as   needed for pain; Therapy: 36CDG4092 to (Evaluate:49Ocu3821)  Requested for: 29Kmp6739; Last   Rx:10Yia4658 Ordered   9  Vitamin B-12 SUBL; Therapy: (Recorded:13Mar2015) to Recorded   10  Vitamin D3 2000 UNIT Oral Tablet; Therapy: (Recorded:13Mar2015) to Recorded    Allergies    1  No Known Drug Allergies    Signatures   Electronically signed by : Manuel Adams DO;  Apr 15 2016 11:18AM EST                       (Author)

## 2018-01-14 NOTE — PROGRESS NOTES
Assessment    1  Atypical chest pain (786 59) (R07 71)    Plan  Atypical chest pain    · EKG/ECG- POC; Status:Complete;   Done: 12Apr2016 03:56PM   · NM MYOCARDIAL PERFUSION SPECT (RX STRESS AND/OR REST); Status:Need  Information - Financial Authorization; Requested for:12Apr2016;   PMH: Morbid obesity    · Omeprazole 40 MG Oral Capsule Delayed Release; TAKE 1 CAPSULE DAILY    Discussion/Summary    Atypical CP - pt with some RF (age, dyslipidemia), typical cardiac symptoms d/w pt and as was that females can present in atypical ways; will check exercise myoview and follow up as needed; advised to increase omeprazole to 40 mg to cover GI/GERD/gastritis source; told to call/go to ER with any new/worsening symptoms; also in d/dx is muscular/pulm/stress-anxiety/Pulm - including PE - no h/o DVT/PE recent immobilization or DVT symptoms - reviewed with pt and urged to call if they occur  The patient was counseled regarding diagnostic results, instructions for management, impressions  The treatment plan was reviewed with the patient/guardian  The patient/guardian understands and agrees with the treatment plan      Chief Complaint  chest pain      History of Present Illness  HPI: Pt here with L substernal CP since Thursday (5 days)  She notes no new activities/meds/stressors  She states the pain is L sided and does not radiate and is intermittent  She cannot identify a specific trigger or relieving factor  She states it can occur with rest or activity  The pain is "dull but stabbing" and at its worst it is a 5/10  She notes no associated SOB/palp/diaphoresis/N/dizzy  She has not tried anything for the pain  She notes no heart burn and is taking omeprazole daily  She notes no recent immobilization or h/o DVT/PE or family history      Review of Systems    Constitutional: no fever, no chills and no recent weight loss  ENT: no sore throat and no nasal discharge     Cardiovascular: chest pain, but no palpitations and no lower extremity edema  Respiratory: no cough, no orthopnea and no PND  Gastrointestinal: no nausea and no vomiting  Genitourinary: no dysuria  Integumentary: no rashes  Neurological: no headache and no dizziness  Active Problems    1  Arthritis (716 90) (M19 90)   2  Atrophy of vagina (627 3) (N95 2)   3  Breast disorder (611 9) (N64 9)   4  Colon cancer screening (V76 51) (Z12 11)   5  Depression (311) (F32 9)   6  Disc degeneration, lumbar (722 52) (M51 36)   7  Dyslipidemia (272 4) (E78 5)   8  Encounter for cervical Pap smear with pelvic exam (V76 2,V72 31) (Z01 419)   9  Encounter for screening mammogram for malignant neoplasm of breast (V76 12)   (Z12 31)   10  Globus sensation (306 4) (F45 8)   11  History of self breast exam   12  Hypothyroidism (244 9) (E03 9)   13  Lumbar pain (724 2) (M54 5)   14  Lumbar radiculopathy (724 4) (M54 16)   15  Multiple joint pain (719 49) (M25 50)   16  Osteoarthritis (715 90) (M19 90)   17  Postgastrectomy malabsorption (579 3) (K91 2,Z90 3)   18  Sacroiliitis (720 2) (M46 1)   19  Status post bariatric surgery (V45 86) (X55 69)    Surgical History  Surgical History Reviewed: The surgical history was reviewed and updated today  Social History    · Always uses seat belt   · Caffeine use (V49 89) (F15 90)   · [employer] ([employer]   · Denied: History of Drug use   · Former smoker (O31 25) (Q01 200)   ·    · No alcohol use   · One child  The social history was reviewed and is unchanged  Family History  Family History Reviewed: The family history was reviewed and updated today  Current Meds   1  Biotin 5000 MCG Oral Capsule; Therapy: (Recorded:13Mar2015) to Recorded   2  Calcium Citrate CAPS; Therapy: (Recorded:13Mar2015) to Recorded   3  Centrum Silver TABS; Therapy: (Recorded:13Mar2015) to Recorded   4   Cyclobenzaprine HCl - 10 MG Oral Tablet; TAKE 1/2 OR 1 TABLET EVERY 8 HOURS IF   NEEDED FOR MUSCLE SPASM; Therapy: 52ZBI2274 to (Evaluate:53Uag9400)  Requested for: 92Lor3169; Last   Rx:24Uog4652 Ordered   5  Levothyroxine Sodium 150 MCG Oral Tablet; TAKE 1 TABLET DAILY as directed by endo; Therapy: (Recorded:13Ouv5045) to Recorded   6  Omeprazole 20 MG Oral Capsule Delayed Release; TAKE 1 CAPSULE DAILY; Therapy: 21NVJ4475 to (Federico Members)  Requested for: 18PGY4933; Last   Rx:85Ytg3883 Ordered   7  Sertraline HCl - 100 MG Oral Tablet; Take 1 1/2  tablet daily  Requested for: 23JLW3437;   Last Rx:78Vfz1298 Ordered   8  Tramadol-Acetaminophen 37 5-325 MG Oral Tablet; Take 1 tablet every 6 hours as   needed for pain; Therapy: 34LWU8056 to (Evaluate:10Mpl0782)  Requested for: 84Lxz2749; Last   Rx:90Gou5566 Ordered   9  Vitamin B-12 SUBL; Therapy: (Recorded:13Mar2015) to Recorded   10  Vitamin D3 2000 UNIT Oral Tablet; Therapy: (Recorded:13Mar2015) to Recorded    The medication list was reviewed and updated today  Allergies    1  No Known Drug Allergies    Vitals   Recorded: 12Apr2016 03:12PM   Temperature 99 F   Heart Rate 72   Systolic 441   Diastolic 70   Height 5 ft 3 in   Weight 156 lb    BMI Calculated 27 63   BSA Calculated 1 74     Physical Exam    Constitutional   General appearance: No acute distress, well appearing and well nourished  Pulmonary   Respiratory effort: No increased work of breathing or signs of respiratory distress  Auscultation of lungs: Clear to auscultation  Cardiovascular   Palpation of heart: Normal PMI, no thrills  no pain reporducible with palptation  Auscultation of heart: Normal rate and rhythm, normal S1 and S2, without murmurs  Examination of extremities for edema and/or varicosities: Normal     Abdomen   Abdomen: Non-tender, no masses  Lymphatic   Palpation of lymph nodes in neck: No lymphadenopathy      Musculoskeletal   Gait and station: Normal     Psychiatric   Mood and affect: Normal          Results/Data  A 12 lead ECG was performed and was normal  No acute ischemia  Rhythm and rate: @ 60 bpm   normal sinus rhythm  Axis: the QRS axis is normal    QRS: the QRS is normal   ST segment: QTc interval: 428 the ST segments are normal    T waves:   normal    Comparison to prior ECGs: 9/5/14 no interval change  Future Appointments    Date/Time Provider Specialty Site   12/02/2016 09:00 AM ANGELA Moore  General Surgery [facility]     Signatures   Electronically signed by : Erick Jorge DO;  Apr 12 2016  3:59PM EST                       (Author)

## 2018-01-15 NOTE — MISCELLANEOUS
Message   Recorded as Task   Date: 02/10/2017 11:20 AM, Created By: Alejandro Sorensen   Task Name: Medical Complaint Callback   Assigned To: Lissette Cardenas   Regarding Patient: Krista Briones, Status: Active   Comment:    Lissette Cardenas - 10 Feb 2017 11:20 AM     TASK CREATED  Caller: Self; Medical Complaint; [phone] (Home); [phone] (Work)  Was not able to  the Zoloft d/t the huge deductible  She is currently in withdrawal & doesn't know what to do  Wants to know if we have any sample of a medication that will hold her until her appt w/you on 2/21/17  PCB to advise at [phone]  Lucia Lord - 10 Feb 2017 12:21 PM     TASK REPLIED TO: Previously Assigned To Lucia Lord                      we have no samples of SSRI's as they are all generic now   Lissette Cardenas - 10 Feb 2017 1:50 PM     TASK REPLIED TO: Previously Assigned To [employer] she has been taking her daughter's Bupropion that she had leftover from scripts that over lapped  Wants to know if she should increase the dose? She thinks the dose is 100mg or 150mg  PCB @ [phone] to advise  Lucia Lord - 10 Feb 2017 2:03 PM     TASK REPLIED TO: Previously Assigned To Lucia Lord                      any mood med takes 4-6 wks to kick in - am not sure if she is taking Wellbutrin or Zoloft at 100 or 150 mg but if she just started them she has to stay on starting dose for 4-6 wks and come back for eval scheduled for 2/21/17 - if not able to afford these meds we have to check with insurance to see what is covered/in network that we can change her to  Lissette Ceballos - 10 Feb 2017 2:21 PM     TASK REPLIED TO: Previously Assigned To Lissette Cardenas  Pt states that she started on the Bupropion 100mg about 2 weeks ago  Because she wasn't feeling any different she bumped it up  She is now taking 200mg qd  I explained that it takes 4-6 weeks to feel a difference  Wants to know what she should do     Naomy Flair - 10 Feb 2017 3:07 PM     TASK REPLIED TO: Previously Assigned To RisaLucia                      take 200 mg 1 tab PO q day and stay on that till she sees me in 2 wks   CardenasLissette cohen - 10 Feb 2017 4:53 PM     TASK EDITED  Pt aware  Active Problems    1  Arthritis (716 90) (M19 90)   2  Atrophy of vagina (627 3) (N95 2)   3  Atypical chest pain (786 59) (R07 89)   4  Breast disorder (611 9) (N64 9)   5  Colon cancer screening (V76 51) (Z12 11)   6  Depression (311) (F32 9)   7  Disc degeneration, lumbar (722 52) (M51 36)   8  Disturbance of memory (780 93) (R41 3)   9  Dyslipidemia (272 4) (E78 5)   10  Encounter for cervical Pap smear with pelvic exam (V76 2,V72 31) (Z01 419)   11  Encounter for screening mammogram for malignant neoplasm of breast (V76 12)    (Z12 31)   12  Folliculitis (861 1) (P96 6)   13  Globus sensation (306 4) (F45 8)   14  History of self breast exam   15  Hypothyroidism (244 9) (E03 9)   16  Lumbar pain (724 2) (M54 5)   17  Lumbar radiculopathy (724 4) (M54 16)   18  Multiple joint pain (719 49) (M25 50)   19  Osteoarthritis (715 90) (M19 90)   20  Postgastrectomy malabsorption (579 3) (K91 2,Z90 3)   21  Sacroiliitis (720 2) (M46 1)   22  Status post bariatric surgery (V45 86) (Z98 84)    Current Meds   1  Biotin 5000 MCG Oral Capsule; Therapy: (Recorded:13Mar2015) to Recorded   2  Calcium Citrate CAPS; Therapy: (Recorded:13Mar2015) to Recorded   3  Centrum Silver TABS; Therapy: (Recorded:13Mar2015) to Recorded   4  Cyclobenzaprine HCl - 10 MG Oral Tablet; TAKE 1/2 OR 1 TABLET EVERY 8 HOURS IF   NEEDED FOR MUSCLE SPASM; Therapy: 03RDU0060 to (Evaluate:67Puj1052)  Requested for: 29Wie0446; Last   Rx:47Tjy3412 Ordered   5  Levothyroxine Sodium 150 MCG Oral Tablet; TAKE 1 TABLET DAILY as directed by   endo; Therapy: (Recorded:54Qdx8165) to Recorded   6  Omeprazole 20 MG Oral Capsule Delayed Release; TAKE 1 CAPSULE TWICE DAILY;    Therapy: 49JBE9991 to (Evaluate:08Mar2017)  Requested for: 61LXL7341; Last   Rx:08Nov2016 Ordered   7  Tramadol-Acetaminophen 37 5-325 MG Oral Tablet; Take 1 tablet every 6 hours as   needed for pain; Therapy: 13DUJ5488 to (Evaluate:42Inl0965)  Requested for: 72Oce5114; Last   Rx:28Uoq3756 Ordered   8  Vitamin B-12 SUBL; Therapy: (Recorded:13Mar2015) to Recorded   9  Vitamin D3 2000 UNIT Oral Tablet; Therapy: (Recorded:13Mar2015) to Recorded   10  Zoloft 100 MG Oral Tablet (Sertraline HCl); TAKE 1 AND 1/2 TABLETS DAILY  Requested    for: 29DYY6621; Last Rx:81Hlj5311 Ordered    Allergies    1  No Known Drug Allergies    Signatures   Electronically signed by :  Priscila Membreno, ; Feb 10 2017  4:54PM EST                       (Author)

## 2018-01-16 NOTE — MISCELLANEOUS
Message   Recorded as Task   Date: 02/02/2017 12:18 PM, Created By: July Martinez   Task Name: Follow Up   Assigned To: [facility]   Regarding Patient: Matthew Andujar, Status: Active   CommentColan Shanique - 02 Feb 2017 12:18 PM     TASK CREATED  please notify pt that her Zoloft was refilled for 30 days - last med check/routine f/u was Feb 2016 - she needs to make apt for annual med check to con't getting meds  Nichol Ar - 92 Feb 2017 12:46 PM     TASK REASSIGNED: Previously Assigned To [address] - 02 Feb 2017 1:16 PM     TASK REASSIGNED: Previously Assigned To [address]  Left message   JacquelynJes - 02 Feb 2017 1:27 PM     TASK EDITED  Patient aware        Active Problems    1  Arthritis (716 90) (M19 90)   2  Atrophy of vagina (627 3) (N95 2)   3  Atypical chest pain (786 59) (R07 89)   4  Breast disorder (611 9) (N64 9)   5  Colon cancer screening (V76 51) (Z12 11)   6  Depression (311) (F32 9)   7  Disc degeneration, lumbar (722 52) (M51 36)   8  Disturbance of memory (780 93) (R41 3)   9  Dyslipidemia (272 4) (E78 5)   10  Encounter for cervical Pap smear with pelvic exam (V76 2,V72 31) (Z01 419)   11  Encounter for screening mammogram for malignant neoplasm of breast (V76 12)    (Z12 31)   12  Folliculitis (156 2) (K99 6)   13  Globus sensation (306 4) (F45 8)   14  History of self breast exam   15  Hypothyroidism (244 9) (E03 9)   16  Lumbar pain (724 2) (M54 5)   17  Lumbar radiculopathy (724 4) (M54 16)   18  Multiple joint pain (719 49) (M25 50)   19  Osteoarthritis (715 90) (M19 90)   20  Postgastrectomy malabsorption (579 3) (K91 2,Z90 3)   21  Sacroiliitis (720 2) (M46 1)   22  Status post bariatric surgery (V45 86) (Z98 84)    Current Meds   1  Biotin 5000 MCG Oral Capsule; Therapy: (Recorded:13Mar2015) to Recorded   2  Calcium Citrate CAPS; Therapy: (Recorded:13Mar2015) to Recorded   3   Centrum Silver TABS; Therapy: (Recorded:13Mar2015) to Recorded   4  Cyclobenzaprine HCl - 10 MG Oral Tablet; TAKE 1/2 OR 1 TABLET EVERY 8 HOURS IF   NEEDED FOR MUSCLE SPASM; Therapy: 16GGM4752 to (Evaluate:50Uom3491)  Requested for: 23Nyj5293; Last   Rx:82Hml0450 Ordered   5  Levothyroxine Sodium 150 MCG Oral Tablet; TAKE 1 TABLET DAILY as directed by   endo; Therapy: (Recorded:20Ekk4038) to Recorded   6  Omeprazole 20 MG Oral Capsule Delayed Release; TAKE 1 CAPSULE TWICE DAILY; Therapy: 70WAV9109 to (Dave Loser)  Requested for: 64TXH7983; Last   Rx:08Nov2016 Ordered   7  Tramadol-Acetaminophen 37 5-325 MG Oral Tablet; Take 1 tablet every 6 hours as   needed for pain; Therapy: 14YYF0551 to (Evaluate:96Pff0534)  Requested for: 50Mnd4153; Last   Rx:68Ycv1350 Ordered   8  Vitamin B-12 SUBL; Therapy: (Recorded:13Mar2015) to Recorded   9  Vitamin D3 2000 UNIT Oral Tablet; Therapy: (Recorded:13Mar2015) to Recorded   10  Zoloft 100 MG Oral Tablet; TAKE 1 AND 1/2 TABLETS DAILY  Requested for: 09QSD6046;    Last Rx:95Oqy6547 Ordered    Allergies    1   No Known Drug Allergies    Signatures   Electronically signed by : Erasto Villalta DO; Feb 2 2017  1:41PM EST                       (Author)

## 2018-01-17 NOTE — MISCELLANEOUS
Message   Recorded as Task   Date: 10/27/2017 11:36 AM, Created By: Azucena Cedeño   Task Name: Med Renewal Request   Assigned To: Burton Vail   Regarding Patient: Vickie Peacock, Status: Active   Comment:    Abbey Henderson - 27 Oct 2017 11:36 AM     TASK CREATED  Caller: Representtive/Ins; Renew Medication  Insurance Co wants pt to sign an opoid agreement with pt when they come in next  thanks     Burton Vail - 27 Oct 2017 11:37 AM     TASK EDITED  will get at appt in Dec        Signatures   Electronically signed by : David Mcrae DO; Oct 27 2017 11:38AM EST                       (Author)

## 2018-01-18 ENCOUNTER — ALLSCRIPTS OFFICE VISIT (OUTPATIENT)
Dept: OTHER | Facility: OTHER | Age: 58
End: 2018-01-18

## 2018-01-18 NOTE — PROGRESS NOTES
Assessment    1  Depression (311) (F32 9)   2  Postgastrectomy malabsorption (579 3) (K91 2)   3  Status post bariatric surgery (V45 86) (Z98 84)   4  Hypothyroidism (244 9) (E03 9)    Plan  Depression    · Sertraline HCl - 100 MG Oral Tablet (Zoloft); Take 1 1/2  tablet daily    Discussion/Summary  Discussion Summary:   Depression - mood at goal - con't current meds; d/w pt that once a year appt is needed to get refills; call with worsening mood/SI    S/p gastric sleeve with malabsorption - she is taking her supplements as directed and most recent BW shows all Vitamin levels wnl! Hypothyroidism - has had some recent thyroid med adjustment - med list updated    Abnormal mammo - has had f/u imaging - being followed by Dr Rogerio Rinne    Is UTD on Dexa/mammo/colo    Deferring flu vaccine  Counseling Documentation With Imm: The patient was counseled regarding diagnostic results, instructions for management, risk factor reductions, impressions  Medication SE Review and Pt Understands Tx: The treatment plan was reviewed with the patient/guardian  The patient/guardian understands and agrees with the treatment plan      Chief Complaint  Chief Complaint Chronic Condition  Sarah Glez: Patient is here today for follow up of chronic conditions described in HPI  History of Present Illness  HPI: She had her gastric sleeve done in Nov 2014 and has lost bout 80 lbs  She feels great and has more energy  She has gone up about 5 lbs since last bariatric appt  She is taking her Vit and supplements  She is taking the omeprazole still as when she stopped taking it she had some GERD symptoms  She had Bw done in Nov and results were d/w pt in detail  She is taking her Zoloft 100 mg 1 1/2 tab a day  She notes no issues with mood currently  She is happy with current regimen and feels no changes are needed       She is UTD On mammo and DEXA and colo - did have abnormal mammo in July and has had f/u US - ordered by Dr Rogerio Rinne Review of Systems  Complete-Female:   Constitutional: recent weight loss, but as noted in HPI, no fever and no chills  Eyes: no eyesight problems  ENT: no sore throat and no nasal discharge  Cardiovascular: no chest pain and no palpitations  Respiratory: no shortness of breath and no cough  Gastrointestinal: no abdominal pain, no constipation and no diarrhea  Genitourinary: no dysuria  Musculoskeletal: no joint swelling and no myalgias  Integumentary: no rashes  Neurological: no headache and no dizziness  Psychiatric: as noted in HPI  Endocrine: no muscle weakness  Hematologic/Lymphatic: no tendency for easy bleeding and no tendency for easy bruising  Active Problems    1  Arthritis (716 90) (M19 90)   2  Atrophy of vagina (627 3) (N95 2)   3  Breast disorder (611 9) (N64 9)   4  Colon cancer screening (V76 51) (Z12 11)   5  Depression (311) (F32 9)   6  Disc degeneration, lumbar (722 52) (M51 36)   7  Dyslipidemia (272 4) (E78 5)   8  Encounter for cervical Pap smear with pelvic exam (V76 2,V72 31) (Z12 4)   9  Encounter for screening mammogram for malignant neoplasm of breast (V76 12) (Z12 31)   10  Globus sensation (306 4) (F45 8)   11  History of self breast exam   12  Hypothyroidism (244 9) (E03 9)   13  Lumbar pain (724 2) (M54 5)   14  Lumbar radiculopathy (724 4) (M54 16)   15  Multiple joint pain (719 49) (M25 50)   16  Osteoarthritis (715 90) (M19 90)   17  Postgastrectomy malabsorption (579 3) (K91 2)   18  Sacroiliitis (720 2) (M46 1)   19  Status post bariatric surgery (V45 86) (M66 72)    Past Medical History    1  History of Adenomyosis (617 0) (N80 0)   2  History of Encounter for cervical Pap smear with pelvic exam (V76 2,V72 31) (Z12 4)   3  History of  8   4  History of Hirsutism (704 1) (L68 0)   5  History of acute sinusitis (V12 69) (Z87 09)   6  History of endometriosis (V13 29) (Z87 42)   7  History of fracture of foot (V15 51) (Z87 81)   8   History of spontaneous  (V13 29) (Z87 59)   9  History of upper respiratory infection (V12 09) (Z87 09)   10  History of uterine leiomyoma (V13 29) (Z86 018)   11  History of Menometrorrhagia (626 2) (N92 1)   12  History of Morbid obesity (278 01) (E66 01)   13  History of Preop cardiovascular exam (V72 81) (Z01 810)   14  History of Sclerosing adenosis of breast (610 2) (N60 29)   15  History of Symptomatic menopausal or female climacteric states (627 2) (N95 1)    Surgical History    1  History of Appendectomy   2  History of Biopsy Breast Percutaneous Needle Core   3  History of Cervix Cryosurgery   4  History of Complete Colonoscopy   5  History of Dilation And Curettage   6  History of Gastric Surgery For Morbid Obesity Laparoscopic Longitudinal Gastrectomy   7  History of Hysterectomy   8  History of Laparoscopy With Total Hysterectomy   9  History of Oral Surgery Tooth Extraction   10  History of Salpingo-oophorectomy Left Side   11  History of Salpingo-oophorectomy Right Side   12  History of Surgical Treatment For    13  History of Tonsillectomy With Adenoidectomy  Surgical History Reviewed: The surgical history was reviewed and updated today  Family History    1  Family history of    2  Family history of Alzheimer's disease (V17 2) (Z82 0)   3  Family history of cerebrovascular accident (V17 1) (Z82 3)   4  Family history of dementia (V17 2) (Z81 8)    5  Family history of diabetes mellitus (V18 0) (Z83 3)   6  Family history of Prostate cancer    7  Family history of colonic polyps (V18 51) (Z83 71)  Family History Reviewed: The family history was reviewed and updated today  Social History    · Always uses seat belt   · Caffeine use (V49 89) (F15 90)   · [employer]   · Denied: History of Drug use   · Former smoker (J43 53) (D22 305)   ·    · No alcohol use   · One child  Social History Reviewed:  The social history was reviewed and is unchanged  Current Meds   1  Biotin 5000 MCG Oral Capsule; Therapy: (Recorded:13Mar2015) to Recorded   2  Calcium Citrate CAPS; Therapy: (Recorded:13Mar2015) to Recorded   3  Centrum Silver TABS; Therapy: (Recorded:13Mar2015) to Recorded   4  Cyclobenzaprine HCl - 10 MG Oral Tablet; TAKE 1/2 OR 1 TABLET EVERY 8 HOURS IF NEEDED FOR   MUSCLE SPASM; Therapy: 65RIP4677 to (Evaluate:75Bbc5281)  Requested for: 68Brp1766; Last Rx:71Rpw1904   Ordered   5  Levothyroxine Sodium 150 MCG Oral Tablet; TAKE 1 TABLET DAILY as directed by endo; Therapy: (Recorded:66Bcu3255) to Recorded   6  Omeprazole 20 MG Oral Capsule Delayed Release; TAKE 1 CAPSULE DAILY; Therapy: 49ZCO4302 to (Diaz Colmenares)  Requested for: 77JQW9245; Last Rx:29Sos4766   Ordered   7  Sertraline HCl - 100 MG Oral Tablet; take 1 & 1/2 tablets every day  Requested for: 21Apr2015; Last   Rx:21Apr2015 Ordered   8  Tramadol-Acetaminophen 37 5-325 MG Oral Tablet; Take 1 tablet every 6 hours as needed for pain; Therapy: 97SIF8985 to (Evaluate:71Edy6826)  Requested for: 98Iry2594; Last Rx:31Eoz4151   Ordered   9  Vitamin B-12 SUBL; Therapy: (Recorded:13Mar2015) to Recorded   10  Vitamin D3 2000 UNIT Oral Tablet; Therapy: (Recorded:13Mar2015) to Recorded  Medication List Reviewed: The medication list was reviewed and updated today  Allergies    1  No Known Drug Allergies    Vitals  Vital Signs [Data Includes: Current Encounter]    Recorded: 41UKA9687 10:12AM   Temperature 98 F   Heart Rate 68   Systolic 515   Diastolic 68   Height 5 ft 3 in   Weight 154 lb    BMI Calculated 27 28   BSA Calculated 1 74     Physical Exam    Constitutional   General appearance: No acute distress, well appearing and well nourished  Pulmonary   Respiratory effort: No increased work of breathing or signs of respiratory distress  Auscultation of lungs: Clear to auscultation      Cardiovascular   Auscultation of heart: Normal rate and rhythm, normal S1 and S2, without murmurs  Examination of extremities for edema and/or varicosities: Normal     Abdomen   Abdomen: Non-tender, no masses  Skin   Skin and subcutaneous tissue: Normal without rashes or lesions  Psychiatric   Mood and affect: Normal          Future Appointments    Date/Time Provider Specialty Site   12/02/2016 09:00 AM ANGELA Fernando   General Surgery [facility]     Signatures   Electronically signed by : Verenice Desai DO; Feb 1 2016 10:36AM EST                       (Author)

## 2018-01-18 NOTE — RESULT NOTES
Dear Juarez Keyes,   Your test results have returned and are listed below:      Discussion/Summary  We have received the results of your blood work drawn on 10/7/2017  Your results show some abnormalities  Your ferritin level was elevated  Ferritin is the body's major iron storage protein  This could be related to inflammation seen in obesity  Please follow-up with your primary care physician (PCP) regarding this results  Your lipid profile was abnormal  Enclosed is further information regarding your results  It is recommended that you follow up with your primary care provider (PCP) regarding these results and maintain routine follow up  I have enclosed handouts regarding a heart healthy diet  Your vitamin B12 level is elevated  This is often related over supplementation with vitamin b12  The recommended daily supplement of vitamin B12 for post bariatric surgery patients is 350-500mcg daily  Please check any vitamin or mineral supplements for vitamin B12 content as well as things like vitamin rodriguez  Your vitamin A level is elevated, which can lead to toxicity  Symptoms of having a high vitamin A include: nausea, vomiting, blurred vision, headache and vertigo  If you are experiencing these symptoms, please make an appointment to see your primary care provider  Discontinue all vitamin/mineral supplements that contain vitamin A and liver consumption  A lab slip is enclosed to recheck your level again in one month  Please keep your regularly scheduled follow-up appointment  If you do not have a follow-up scheduled, please call the office to schedule a follow-up visit  If you have any questions, please don't hesitate to call the office  Sincerely,      Signatures   Electronically signed by : PENELOPE Nash; Nov 16 2017  2:26PM EST                       (Author)    Electronically signed by :  ANGELA Dolan ; Dec  4 2017  7:49AM EST

## 2018-01-19 NOTE — PROGRESS NOTES
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)   4  Hypothyroidism (244 9) (E03 9)    Plan   Anxiety, Depression    · Venlafaxine HCl  MG Oral Capsule Extended Release 24 Hour; TAKE ONE CAPSULE    BY MOUTH DAILY    Discussion/Summary   Discussion Summary:    Anxiety and depression - at goal since increase in Effexor, con't current regimen - recheck in 3mo and if stable go to q 6mos, encouraged not to stop med on her own and avoid missing dose, call with worsening mood   rupture of posterior tibial tendon - has been out of boot for a while and doing well, minimal pain, con't f/u with ortho as needed   - clinically euthyroid, has Bw and meds as per Endo - encouraged to f/u as directed   on mammo/PAP   on [place]   flu vaccine  Counseling Documentation With Imm: The patient was counseled regarding instructions for management,-- risk factor reductions,-- prognosis,-- patient and family education,-- impressions,-- risks and benefits of treatment options,-- importance of compliance with treatment  Medication SE Review and Pt Understands Tx: Possible side effects of new medications were reviewed with the patient/guardian today  The treatment plan was reviewed with the patient/guardian  The patient/guardian understands and agrees with the treatment plan    Self Referrals:    Self Referrals: No      Chief Complaint   Chief Complaint Chronic Condition [facility]: Patient is here today for follow up of chronic conditions described in HPI  History of Present Illness   HPI: Pt here for 6 wk follow up appt   visit pt was noting improvement in mood with Effexor XR but it was not yet at goal  We subsequently increased her dose to 150 mg 1 tab PO q day  Pt notes no SE with the higher dose of the med  She states she is doing great  She notes no anxiety at all and feels less likely to cry   She notes no current down/depressed mood  She is happy with current dose   states her foot is doing much better  She is now out of the boot and ambulating w/o difficulty  She has minimal pain and only feels twinges every now and then  con't to follow with Endo for her hypothyroidism  She states they order her Bw  She notes no fatigue/tremor/palp/C/D/hair loss or skin changes  is UTD on mammo and PAP   is UTD on [place]   is deferring flu vaccine      Review of Systems   Complete-Female:      Constitutional: no fever-- and-- no chills  Eyes: no eyesight problems  ENT: no sore throat-- and-- no nasal discharge  Cardiovascular: no chest pain-- and-- no palpitations  Respiratory: no shortness of breath-- and-- no cough  Gastrointestinal: no nausea,-- no vomiting-- and-- no diarrhea  Genitourinary: no dysuria  Musculoskeletal: joint stiffness  Integumentary: no rashes  Neurological: no headache-- and-- no dizziness  Psychiatric: as noted in HPI,-- no anxiety-- and-- no depression  Endocrine: no muscle weakness  Hematologic/Lymphatic: no tendency for easy bleeding-- and-- no tendency for easy bruising  Active Problems   1  Anxiety (300 00) (F41 9)   2  Arthritis (716 90) (M19 90)   3  Atrophy of vagina (627 3) (N95 2)   4  Colon cancer screening (V76 51) (Z12 11)   5  Depression (311) (F32 9)   6  Disc degeneration, lumbar (722 52) (M51 36)   7  Disturbance of memory (780 93) (R41 3)   8  Dyslipidemia (272 4) (E78 5)   9  Encounter for cervical Pap smear with pelvic exam (V76 2,V72 31) (Z01 419)   10  Encounter for screening mammogram for malignant neoplasm of breast (V76 12) (Z12 31)   11  Hypothyroidism (244 9) (E03 9)   12  Lumbar pain (724 2) (M54 5)   13  Lumbar radiculopathy (724 4) (M54 16)   14  Osteoarthritis (715 90) (M19 90)   15  Postgastrectomy malabsorption (579 3) (K91 2,Z90 3)   16  Sacroiliitis (720 2) (M46 1)   17   Status post bariatric surgery (V45 86) (D81 83)    Past Medical History   1  History of Acute laryngitis (464 00) (J04 0)   2  History of Adenomyosis (617 0) (N80 0)   3  Denied: History of Alcohol abuse   4  History of Atypical chest pain (786 59) (R07 89)   5  Depression (311) (F32 9)   6  Disturbance of memory (780 93) (R41 3)   7  History of Encounter for cervical Pap smear with pelvic exam (V76 2,V72 31) (Z01 419)   8  History of Globus sensation (306 4) (F45 8)   9  History of  8   10  History of Hirsutism (704 1) (L68 0)   11  History of acute sinusitis (V12 69) (Z87 09)   12  History of breast disorder (V13 89) (W48 839)   13  History of endometriosis (V13 29) (Z87 42)   14  History of fracture of foot (V15 51) (Z87 81)   15  History of spontaneous  (V13 29) (Z87 59)   16  Denied: History of substance abuse   17  History of upper respiratory infection (V12 09) (Z87 09)   18  History of uterine leiomyoma (V13 29) (Z86 018)   19  History of Menometrorrhagia (626 2) (N92 1)   20  History of Morbid obesity (278 01) (E66 01)   21  History of Multiple joint pain (719 49) (M25 50)   22  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)   23  History of Preop cardiovascular exam (V72 81) (Z01 810)   24  History of Sclerosing adenosis of breast (610 2) (N60 29)   25  History of Symptomatic menopausal or female climacteric states (627 2) (N95 1)  Active Problems And Past Medical History Reviewed: The active problems and past medical history were reviewed and updated today  Surgical History   1  History of Appendectomy   2  History of Biopsy Breast Percutaneous Needle Core   3  History of Cervix Cryosurgery   4  History of Complete Colonoscopy   5  History of Dilation And Curettage   6  History of Gastric Surgery For Morbid Obesity Laparoscopic Longitudinal Gastrectomy   7  History of Hysterectomy   8  History of Laparoscopy With Total Hysterectomy   9  History of Oral Surgery Tooth Extraction   10  History of Salpingo-oophorectomy Left Side   11  History of Salpingo-oophorectomy Right Side   12  History of Surgical Treatment For    13  History of Tonsillectomy With Adenoidectomy  Surgical History Reviewed: The surgical history was reviewed and updated today  Family History   Mother    1  Denied: Family history of Alcohol abuse   2  Family history of    3  Family history of Alzheimer's disease (V17 2) (Z82 0)   4  Family history of cerebrovascular accident (V17 1) (Z82 3)   5  Family history of dementia (V17 2) (Z81 8)   6  Denied: Family history of depression   7  Denied: Family history of substance abuse  Father    8  Denied: Family history of Alcohol abuse   9  Denied: Family history of depression   10  Family history of diabetes mellitus (V18 0) (Z83 3)   11  Denied: Family history of substance abuse   12  Family history of Prostate cancer  Sibling    15  Denied: Family history of Alcohol abuse   14  Denied: Family history of depression   15  Denied: Family history of substance abuse  Sister    12  Family history of colonic polyps (V18 51) (Z83 71)  Family History Reviewed: The family history was reviewed and updated today  Social History    · Always uses seat belt   · Caffeine use (V49 89) (F15 90)   · [employer] ([employer]   · Denied: History of Drug use   · Former smoker (U21 91) (X19 845)   ·    · No alcohol use   · One child  Social History Reviewed: The social history was reviewed and updated today  Current Meds    1  Biotin 5000 MCG Oral Capsule; Therapy: (Recorded:2015) to Recorded   2  Calcium Citrate CAPS; Therapy: (Recorded:2015) to Recorded   3  Centrum Silver TABS; Therapy: (Recorded:2015) to Recorded   4  Levothyroxine Sodium 150 MCG Oral Tablet; TAKE 1 TABLET DAILY except 1 5 tab PO on      ONLY as directed by endo; Therapy: (Recorded:2017) to Recorded   5   Metaxalone 800 MG Oral Tablet; take 1 tablet by mouth every 8 hours if needed for muscle spasm; Therapy: 90BCF1295 to (Evaluate:82Ybm2415)  Requested for: 72TUX1930; Last Rx:92Nvd0272     Ordered   6  Omeprazole 20 MG Oral Capsule Delayed Release; take 1 capsule by mouth once daily; Therapy: 98HFI2313 to (26 859322)  Requested for: 44Nxh2793 Recorded   7  Tramadol-Acetaminophen 37 5-325 MG Oral Tablet; Take 1 tablet every 6 hours as needed for pain; Therapy: 46ZPQ1570 to (Guillermina Bui)  Requested for: 23Oct2017; Last WB:37VJS2538     Ordered   8  Venlafaxine HCl  MG Oral Capsule Extended Release 24 Hour; TAKE ONE CAPSULE BY     MOUTH DAILY; Therapy: 17MFO5862 to (Dunia Horne)  Requested for: 94PPT0221; Last Rx:75Upq3562     Ordered   9  Vitamin B-12 SUBL; Therapy: (Recorded:13Mar2015) to Recorded   10  Vitamin D3 2000 UNIT Oral Tablet; Therapy: (Recorded:13Mar2015) to Recorded  Medication List Reviewed: The medication list was reviewed and updated today  Allergies   1  No Known Drug Allergies    Vitals   Vital Signs    Recorded: 14DNG6224 09:57AM   Temperature 98 3 F   Heart Rate 72   Systolic 619   Diastolic 68   Height 5 ft 3 5 in   Weight 176 lb    BMI Calculated 30 69   BSA Calculated 1 84     Physical Exam        Constitutional      General appearance: No acute distress, well appearing and well nourished  Pulmonary      Respiratory effort: No increased work of breathing or signs of respiratory distress  Auscultation of lungs: Clear to auscultation  Cardiovascular      Auscultation of heart: Normal rate and rhythm, normal S1 and S2, without murmurs  Examination of extremities for edema and/or varicosities: Normal        Abdomen      Abdomen: Non-tender, no masses         Musculoskeletal      Gait and station: Normal        Psychiatric      Mood and affect: Normal           Future Appointments      Date/Time Provider Specialty Site   02/06/2018 11:00 AM Michelle Gabriel DO Internal Medicine Ernie Henrandes MD     Signatures    Electronically signed by : Flores Cody DO; Jan 18 2018 10:02AM EST                       (Author)     Electronically signed by : Flores Cody DO; Jan 18 2018 10:09AM EST                       (Author)     Electronically signed by : Flores Cody DO; Jan 18 2018 10:16AM EST                       (Author)

## 2018-01-22 VITALS
WEIGHT: 174 LBS | HEIGHT: 64 IN | BODY MASS INDEX: 29.71 KG/M2 | HEART RATE: 64 BPM | DIASTOLIC BLOOD PRESSURE: 80 MMHG | RESPIRATION RATE: 18 BRPM | SYSTOLIC BLOOD PRESSURE: 110 MMHG | TEMPERATURE: 98.4 F

## 2018-01-23 VITALS
SYSTOLIC BLOOD PRESSURE: 112 MMHG | HEIGHT: 63 IN | DIASTOLIC BLOOD PRESSURE: 62 MMHG | WEIGHT: 174.8 LBS | RESPIRATION RATE: 12 BRPM | HEART RATE: 64 BPM | TEMPERATURE: 98 F | BODY MASS INDEX: 30.97 KG/M2

## 2018-01-23 VITALS
SYSTOLIC BLOOD PRESSURE: 100 MMHG | DIASTOLIC BLOOD PRESSURE: 68 MMHG | HEART RATE: 72 BPM | HEIGHT: 64 IN | BODY MASS INDEX: 30.05 KG/M2 | TEMPERATURE: 98.3 F | WEIGHT: 176 LBS

## 2018-02-01 DIAGNOSIS — R52 PAIN: Primary | ICD-10-CM

## 2018-02-01 PROBLEM — F41.9 ANXIETY: Status: ACTIVE | Noted: 2017-10-26

## 2018-02-01 RX ORDER — OMEPRAZOLE 20 MG/1
1 CAPSULE, DELAYED RELEASE ORAL DAILY
COMMUNITY
Start: 2014-12-05 | End: 2019-11-14 | Stop reason: SDUPTHER

## 2018-02-01 RX ORDER — CHOLECALCIFEROL (VITAMIN D3) 125 MCG
1 CAPSULE ORAL DAILY
COMMUNITY

## 2018-02-01 RX ORDER — MAGNESIUM 200 MG
1 TABLET ORAL DAILY
COMMUNITY

## 2018-02-01 RX ORDER — VENLAFAXINE HYDROCHLORIDE 150 MG/1
1 CAPSULE, EXTENDED RELEASE ORAL DAILY
COMMUNITY
Start: 2017-10-26 | End: 2018-04-23 | Stop reason: SDUPTHER

## 2018-02-01 RX ORDER — LEVOTHYROXINE SODIUM 0.15 MG/1
1 TABLET ORAL DAILY
COMMUNITY
End: 2018-05-15 | Stop reason: SDUPTHER

## 2018-02-01 RX ORDER — METAXALONE 800 MG/1
800 TABLET ORAL 3 TIMES DAILY
Qty: 20 TABLET | Refills: 0 | Status: SHIPPED | OUTPATIENT
Start: 2018-02-01 | End: 2019-01-10 | Stop reason: SDUPTHER

## 2018-02-01 RX ORDER — MULTIVIT WITH MINERALS/LUTEIN
1 TABLET ORAL DAILY
COMMUNITY
End: 2021-07-02

## 2018-02-13 ENCOUNTER — TELEPHONE (OUTPATIENT)
Dept: FAMILY MEDICINE CLINIC | Facility: HOSPITAL | Age: 58
End: 2018-02-13

## 2018-02-13 DIAGNOSIS — Z20.828 EXPOSURE TO INFLUENZA: Primary | ICD-10-CM

## 2018-02-13 RX ORDER — OSELTAMIVIR PHOSPHATE 75 MG/1
75 CAPSULE ORAL DAILY
Qty: 10 CAPSULE | Refills: 0 | Status: SHIPPED | OUTPATIENT
Start: 2018-02-13 | End: 2018-02-23

## 2018-02-13 NOTE — TELEPHONE ENCOUNTER
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

## 2018-04-23 ENCOUNTER — OFFICE VISIT (OUTPATIENT)
Dept: FAMILY MEDICINE CLINIC | Facility: HOSPITAL | Age: 58
End: 2018-04-23
Payer: COMMERCIAL

## 2018-04-23 VITALS
DIASTOLIC BLOOD PRESSURE: 72 MMHG | HEART RATE: 72 BPM | WEIGHT: 178 LBS | BODY MASS INDEX: 31.54 KG/M2 | SYSTOLIC BLOOD PRESSURE: 120 MMHG | TEMPERATURE: 98.2 F | HEIGHT: 63 IN

## 2018-04-23 DIAGNOSIS — Z12.31 ENCOUNTER FOR SCREENING MAMMOGRAM FOR BREAST CANCER: ICD-10-CM

## 2018-04-23 DIAGNOSIS — F41.9 ANXIETY: Primary | ICD-10-CM

## 2018-04-23 DIAGNOSIS — F32.A DEPRESSION, UNSPECIFIED DEPRESSION TYPE: ICD-10-CM

## 2018-04-23 DIAGNOSIS — Z13.820 SCREENING FOR OSTEOPOROSIS: ICD-10-CM

## 2018-04-23 DIAGNOSIS — B37.2 CANDIDIASIS, CUTANEOUS: ICD-10-CM

## 2018-04-23 PROCEDURE — 99213 OFFICE O/P EST LOW 20 MIN: CPT | Performed by: INTERNAL MEDICINE

## 2018-04-23 RX ORDER — VENLAFAXINE HYDROCHLORIDE 150 MG/1
150 CAPSULE, EXTENDED RELEASE ORAL DAILY
Qty: 90 CAPSULE | Refills: 1 | Status: SHIPPED | OUTPATIENT
Start: 2018-04-23 | End: 2018-10-29 | Stop reason: SDUPTHER

## 2018-04-23 RX ORDER — VENLAFAXINE HYDROCHLORIDE 75 MG/1
75 CAPSULE, EXTENDED RELEASE ORAL DAILY
Refills: 2 | COMMUNITY
Start: 2018-02-14 | End: 2018-04-23 | Stop reason: DRUGHIGH

## 2018-04-23 NOTE — ASSESSMENT & PLAN NOTE
Mood controlled with current Effexor - con't current regimen, call with worsening mood, re-eval q 6mos

## 2018-04-23 NOTE — ASSESSMENT & PLAN NOTE
Mood well controlled on current Effexor - wishes for no changes in meds - con't current regimen and recheck in 6 mos

## 2018-04-23 NOTE — PROGRESS NOTES
Assessment/Plan:    Depression  Mood well controlled on current Effexor - wishes for no changes in meds - con't current regimen and recheck in 6 mos    Anxiety  Mood controlled with current Effexor - con't current regimen, call with worsening mood, re-eval q 6mos       Diagnoses and all orders for this visit:    Anxiety  -     venlafaxine (EFFEXOR-XR) 150 mg 24 hr capsule; Take 1 capsule (150 mg total) by mouth daily for 90 days    Depression, unspecified depression type  -     venlafaxine (EFFEXOR-XR) 150 mg 24 hr capsule; Take 1 capsule (150 mg total) by mouth daily for 90 days    Candidiasis, cutaneous  Comments:  No current rash but recurrent intermittent problem with large, pendulous breasts, prevention reviewed with preventing skin on skin and change of bra with sweating, call when rash occurs and will send rx for Nystatin pwdr, con't OTC pwdr for now, wishing to have breast lift in future to help with this    Screening for osteoporosis  -     DXA bone density spine hip and pelvis; Future    Encounter for screening mammogram for breast cancer  -     Mammo screening bilateral w cad; Future      [place] 2016 - 5 yr follow up    Mammo due in Oct - order given    Order for baseline Dexa given    PAP 2015    Subjective:      Patient ID: Sammie Gordon is a 59 y o  female  HPI Pt here for routine follow up appt  She con't to do well on Effexor 150 mg 1 tab PO q day  She notes her anxiety and depression have been well controlled  She is sleeping well  Has had some issues with irritation/itching under breasts B/L - marshal after sweating  She is using OTC pwd with zinc oxide which has helped  She wishes to have breast lift in future that would help this issue  Mammo done Oct 2017    Dexa - no baseline yet    [place] 2016 - 5 yr follow up     Review of Systems   Constitutional: Negative for chills, fever and unexpected weight change  HENT: Negative for congestion and sore throat      Eyes: Negative for pain and discharge  Respiratory: Negative for cough, shortness of breath and wheezing  Cardiovascular: Negative for chest pain, palpitations and leg swelling  Gastrointestinal: Negative for abdominal pain, constipation, diarrhea and nausea  Endocrine: Negative for polydipsia and polyuria  Genitourinary: Negative for difficulty urinating and dysuria  Musculoskeletal: Negative for back pain and neck pain  Skin: Negative for rash and wound  No current rash but see above HPI   Neurological: Negative for dizziness, syncope, light-headedness and headaches  Hematological: Negative for adenopathy  Psychiatric/Behavioral: Negative for behavioral problems and confusion  The patient is not nervous/anxious  Objective:    /72   Pulse 72   Temp 98 2 °F (36 8 °C)   Ht 5' 3" (1 6 m)   Wt 80 7 kg (178 lb)   BMI 31 53 kg/m²      Physical Exam   Constitutional: She appears well-developed and well-nourished  No distress  HENT:   Head: Normocephalic and atraumatic  Eyes: Conjunctivae are normal  Right eye exhibits no discharge  Left eye exhibits no discharge  Neck: Neck supple  No tracheal deviation present  Cardiovascular: Normal rate, regular rhythm and normal heart sounds  Exam reveals no friction rub  No murmur heard  Pulmonary/Chest: Effort normal and breath sounds normal  No respiratory distress  She has no wheezes  She has no rales  Musculoskeletal: She exhibits no edema  Neurological: She is alert  She exhibits normal muscle tone  Skin: Skin is warm  No rash noted  Psychiatric: She has a normal mood and affect  Her behavior is normal    Nursing note and vitals reviewed

## 2018-05-15 DIAGNOSIS — E03.9 HYPOTHYROIDISM, UNSPECIFIED TYPE: Primary | ICD-10-CM

## 2018-05-16 RX ORDER — LEVOTHYROXINE SODIUM 0.15 MG/1
150 TABLET ORAL DAILY
Qty: 30 TABLET | Refills: 0 | Status: SHIPPED | OUTPATIENT
Start: 2018-05-16 | End: 2018-06-15 | Stop reason: CLARIF

## 2018-05-23 DIAGNOSIS — Z98.890 STATUS POST GASTRIC SURGERY: ICD-10-CM

## 2018-05-23 DIAGNOSIS — E03.9 HYPOTHYROIDISM, UNSPECIFIED TYPE: Primary | ICD-10-CM

## 2018-06-15 DIAGNOSIS — E03.9 HYPOTHYROIDISM, UNSPECIFIED TYPE: Primary | ICD-10-CM

## 2018-06-15 RX ORDER — LEVOTHYROXINE SODIUM 150 UG/1
TABLET ORAL
Qty: 32 TABLET | Refills: 3 | Status: SHIPPED | OUTPATIENT
Start: 2018-06-15 | End: 2018-09-20 | Stop reason: SDUPTHER

## 2018-06-15 NOTE — TELEPHONE ENCOUNTER
Patient request synthroid 150mcg, one tablet daily Mon-Sat, one and a half tablets on Sunday  Has September appt

## 2018-09-06 LAB
ALBUMIN SERPL-MCNC: 4.5 G/DL (ref 3.6–4.8)
ALBUMIN/GLOB SERPL: 2 {RATIO} (ref 1.2–2.2)
ALP SERPL-CCNC: 61 IU/L (ref 39–117)
ALT SERPL-CCNC: 21 IU/L (ref 0–32)
AST SERPL-CCNC: 25 IU/L (ref 0–40)
BILIRUB SERPL-MCNC: 0.6 MG/DL (ref 0–1.2)
BUN SERPL-MCNC: 22 MG/DL (ref 8–27)
BUN/CREAT SERPL: 35 (ref 12–28)
CALCIUM SERPL-MCNC: 9.6 MG/DL (ref 8.7–10.3)
CHLORIDE SERPL-SCNC: 100 MMOL/L (ref 96–106)
CO2 SERPL-SCNC: 28 MMOL/L (ref 20–29)
CREAT SERPL-MCNC: 0.63 MG/DL (ref 0.57–1)
GLOBULIN SER-MCNC: 2.3 G/DL (ref 1.5–4.5)
GLUCOSE SERPL-MCNC: 83 MG/DL (ref 65–99)
POTASSIUM SERPL-SCNC: 4.7 MMOL/L (ref 3.5–5.2)
PROT SERPL-MCNC: 6.8 G/DL (ref 6–8.5)
SL AMB EGFR AFRICAN AMERICAN: 113 ML/MIN/1.73
SL AMB EGFR NON AFRICAN AMERICAN: 98 ML/MIN/1.73
SODIUM SERPL-SCNC: 142 MMOL/L (ref 134–144)
T4 FREE SERPL-MCNC: 1.37 NG/DL (ref 0.82–1.77)
TSH SERPL DL<=0.005 MIU/L-ACNC: 0.13 UIU/ML (ref 0.45–4.5)

## 2018-09-20 ENCOUNTER — OFFICE VISIT (OUTPATIENT)
Dept: ENDOCRINOLOGY | Facility: HOSPITAL | Age: 58
End: 2018-09-20
Payer: COMMERCIAL

## 2018-09-20 VITALS
WEIGHT: 181 LBS | HEIGHT: 63 IN | HEART RATE: 70 BPM | BODY MASS INDEX: 32.07 KG/M2 | DIASTOLIC BLOOD PRESSURE: 70 MMHG | SYSTOLIC BLOOD PRESSURE: 110 MMHG

## 2018-09-20 DIAGNOSIS — E06.3 HYPOTHYROIDISM DUE TO HASHIMOTO'S THYROIDITIS: Primary | ICD-10-CM

## 2018-09-20 DIAGNOSIS — E03.8 HYPOTHYROIDISM DUE TO HASHIMOTO'S THYROIDITIS: Primary | ICD-10-CM

## 2018-09-20 DIAGNOSIS — E03.9 HYPOTHYROIDISM, UNSPECIFIED TYPE: ICD-10-CM

## 2018-09-20 DIAGNOSIS — Z83.3 FAMILY HISTORY OF DIABETES MELLITUS: ICD-10-CM

## 2018-09-20 DIAGNOSIS — Z13.1 DIABETES MELLITUS SCREENING: ICD-10-CM

## 2018-09-20 PROCEDURE — 99204 OFFICE O/P NEW MOD 45 MIN: CPT | Performed by: INTERNAL MEDICINE

## 2018-09-20 RX ORDER — LEVOTHYROXINE SODIUM 150 UG/1
TABLET ORAL
Qty: 32 TABLET | Refills: 11 | Status: SHIPPED | OUTPATIENT
Start: 2018-09-20 | End: 2018-09-26 | Stop reason: SDUPTHER

## 2018-09-20 NOTE — LETTER
September 20, 2018     Theresa Wade, [address]  [address] [facility]  [address] [address] [address]    Patient: Jose Cadet   YOB: 1960   Date of Visit: 9/20/2018       Dear Dr Efren Burton: Thank you for referring Jose Cadet to me for evaluation  Below are my notes for this consultation  If you have questions, please do not hesitate to call me  I look forward to following your patient along with you  Sincerely,        Jose Aponte DO        CC: No Recipients  Jose Aponte DO  9/20/2018 10:50 AM  Sign at close encounter  9/20/2018    Assessment/Plan      Diagnoses and all orders for this visit:    Hypothyroidism due to Hashimoto's thyroiditis  -     TSH, 3rd generation Lab Collect; Future  -     T4, free Lab Collect; Future  -     TSH, 3rd generation Lab Collect  -     T4, free Lab Collect  -     TSH, 3rd generation Lab Collect; Future  -     T4, free Lab Collect; Future  -     TSH, 3rd generation Lab Collect  -     T4, free Lab Collect    Hypothyroidism, unspecified type  -     SYNTHROID 150 MCG tablet; Take one tablet Monday-Saturday, take one and a half tablets on Sunday  BRAND NECESSARY  Family history of diabetes mellitus  -     Comprehensive metabolic panel Lab Collect; Future  -     HEMOGLOBIN A1C W/ EAG ESTIMATION Lab Collect; Future  -     Comprehensive metabolic panel Lab Collect  -     HEMOGLOBIN A1C W/ EAG ESTIMATION Lab Collect    Diabetes mellitus screening  -     Comprehensive metabolic panel Lab Collect; Future  -     HEMOGLOBIN A1C W/ EAG ESTIMATION Lab Collect; Future  -     Comprehensive metabolic panel Lab Collect  -     HEMOGLOBIN A1C W/ EAG ESTIMATION Lab Collect        Assessment/Plan:  1  Hypothyroidism due to Hashimoto's thyroiditis:  Her recent TSH was low  To rule out interference from biotin, I have asked her to hold biotin about 3-4 days before rechecking TSH and free T4  We will call her with these results    If we do need to decrease her dose, I would ask her to take Synthroid brand 150 mcg tablets 1 tablet daily all 7 days of the week  We would need to repeat thyroid function tests about 6 weeks after that, but we will be in touch with her this is necessary  We will plan to see her back in 1 year with labs just prior  2   Family history of diabetes: Will screen with fasting sugar and A1c next year  Recent fasting sugar looks stable  CC:   Hypothyroidism    History of Present Illness     HPI: Mica Gonzalez is a 59y o  year old female with history of hypothyroidism due to Hashimoto's thyroiditis, history of gastric sleeve surgery, hyperlipidemia who presents to [facility] care  Previous patient doctor Angulo  She states since she was last seen for her thyroid, she has gained weight  She has difficulty in terms of curving appetite  She will be working harder on cutting down sweets and focusing on healthier diet to 8 and weight loss  She presents today on Synthroid brand 150 mcg tablets, 1 tablet 6 days a week and 1 5 tablets on Sundays  She feels well and denies any changes in symptoms other than some weight gain  She denies energy problems or temperature intolerance  She is on biotin over-the-counter in the setting of her history of sleeve gastrectomy and associated hair loss and brittle nails  She denies any other symptoms of hyperthyroidism  Review of Systems   Constitutional: Positive for unexpected weight change  Negative for fatigue  HENT: Negative for trouble swallowing and voice change  Eyes: Negative for visual disturbance  Respiratory: Negative for shortness of breath  Cardiovascular: Negative for palpitations and leg swelling  Gastrointestinal: Negative for abdominal pain, nausea and vomiting  Endocrine: Negative for cold intolerance, heat intolerance, polydipsia and polyuria  Musculoskeletal: Negative for arthralgias and myalgias  Skin: Negative for rash     Neurological: Negative for dizziness, tremors and weakness  Hematological: Negative for adenopathy  Psychiatric/Behavioral: Negative for agitation and confusion         Historical Information   Past Medical History:   Diagnosis Date    Adenomyosis     Atypical chest pain     last assessed: 2016     Breast disorder     last assessed: Aug 19, 2015     Depression     last assessed: 2018     Disturbance of memory     last assessed: 2016     Endometriosis     Hirsutism     History of sleeve gastrectomy     Menometrorrhagia     Morbid obesity ([facility] [address] )     last assessed: Dec 5, 2014     Sclerosing adenosis of breast     Symptomatic menopausal or female climacteric states     Uterine leiomyoma      Past Surgical History:   Procedure Laterality Date    APPENDECTOMY      BREAST BIOPSY      COLONOSCOPY      complete - 7 year repeat recommended - Resolved:      DILATION AND CURETTAGE OF UTERUS      GYNECOLOGIC CRYOSURGERY      Cervix - s for abnormal paps     HYSTERECTOMY      INDUCED       x3    LAPAROSCOPIC SALPINGOOPHERECTOMY Right     LAPAROSCOPIC TOTAL HYSTERECTOMY      06, Laparoscopic hysterectomy with LSO with vaginal closure of the vaginal cuff     SALPINGOOPHORECTOMY Left     STOMACH SURGERY  2014    for morbid obesity - Managed by: Merissa Rubalcava [address] EXTRACTION       Social History   History   Alcohol Use No     History   Drug Use No     History   Smoking Status    Never Smoker   Smokeless Tobacco    Never Used     Comment: former smoker noted in "allscripts"      Family History:   Family History   Problem Relation Age of Onset    Alzheimer's disease Mother     Stroke Mother     Dementia Mother     Diabetes Father         mellitus     Prostate cancer Father     Rectal cancer Father     Colon polyps Sister        Meds/Allergies   Current Outpatient Prescriptions   Medication Sig Dispense Refill    Biotin 5000 MCG CAPS Take 1 capsule by mouth daily      calcium citrate-vitamin D (CALCIUM CITRATE +) 315-200 MG-UNIT per tablet Calcium Citrate CAPS   Refills: 0    Active      Cholecalciferol (VITAMIN D3) 2000 units TABS Take 1 tablet by mouth daily      Cyanocobalamin (VITAMIN B-12) 1000 MCG SUBL Place 1 Dose under the tongue daily      metaxalone (SKELAXIN) 800 mg tablet Take 1 tablet (800 mg total) by mouth 3 (three) times a day for 20 doses 20 tablet 0    Multiple Vitamins-Minerals (CENTRUM SILVER) tablet Take 1 tablet by mouth daily      SYNTHROID 150 MCG tablet Take one tablet Monday-Saturday, take one and a half tablets on Sunday  BRAND NECESSARY  32 tablet 11    traMADol-acetaminophen (ULTRACET) 37 5-325 mg per tablet Take 1 tablet by mouth every 6 (six) hours as needed      venlafaxine (EFFEXOR-XR) 150 mg 24 hr capsule Take 1 capsule (150 mg total) by mouth daily for 90 days 90 capsule 1    omeprazole (PriLOSEC) 20 mg delayed release capsule Take 1 capsule by mouth daily       No current facility-administered medications for this visit  No Known Allergies    Objective   Vitals: Blood pressure 110/70, pulse 70, height 5' 3" (1 6 m), weight 82 1 kg (181 lb)  Invasive Devices          No matching active lines, drains, or airways          Physical Exam   Constitutional: She is oriented to person, place, and time  She appears well-developed and well-nourished  No distress  HENT:   Head: Normocephalic and atraumatic  Eyes: Conjunctivae are normal  Pupils are equal, round, and reactive to light  Neck: Normal range of motion  Neck supple  No thyromegaly present  Cardiovascular: Normal rate and regular rhythm  No murmur heard  Pulmonary/Chest: Effort normal and breath sounds normal  No respiratory distress  Abdominal: Soft  Bowel sounds are normal  She exhibits no distension  Musculoskeletal: Normal range of motion  She exhibits no edema  Neurological: She is alert and oriented to person, place, and time   She exhibits normal muscle tone  Skin: Skin is warm and dry  No rash noted  She is not diaphoretic  Psychiatric: She has a normal mood and affect  Her behavior is normal    Vitals reviewed  The history was obtained from the review of the chart and from the patient  Lab Results:      Recent Results (from the past 60429 hour(s))   LIPID PANEL (HISTORICAL)    Collection Time: 10/07/17  9:46 AM   Result Value Ref Range    Cholesterol 218 (H) 100 - 199 mg/dL    Triglycerides 171 (H) 0 - 149 mg/dL    HDL 62 >39 mg/dL    VLDL Cholesterol R[place] 34 5 - 40 mg/dL    LDL Calculated 122 (H) 0 - 99 mg/dL   VITAMIN D 25 HYDROXY (HISTORICAL)    Collection Time: 10/07/17  9:46 AM   Result Value Ref Range    Vit D, 25-Hydroxy 61 2 30 0 - 100 0 ng/mL   FERRITIN (HISTORICAL)    Collection Time: 10/07/17  9:46 AM   Result Value Ref Range    Ferritin 192 (H) 15 - 150 ng/mL   VITAMIN B1, WHOLE BLOOD (HISTORICAL)    Collection Time: 10/07/17  9:46 AM   Result Value Ref Range    VITAMIN B1, WHOLE BLOOD 165 8 66 5 - 200 0 nmol/L   COMPREHENSIVE METABOLIC PANEL (HISTORICAL)    Collection Time: 10/07/17  9:46 AM   Result Value Ref Range    Glucose 85 65 - 99 mg/dL    BUN 23 6 - 24 mg/dL    Creatinine 0 69 0 57 - 1 00 mg/dL    BUN/CREA Ratio 33 (H) 9 - 23    Sodium 143 134 - 144 mmol/L    Potassium 4 7 3 5 - 5 2 mmol/L    Chloride 101 96 - 106 mmol/L    CO2 27 18 - 29 mmol/L    Calcium 9 9 8 7 - 10 2 mg/dL    Total Protein 7 1 6 0 - 8 5 g/dL    Albumin 4 6 3 5 - 5 5 g/dL    Tot   Globulin, Serum 2 5 1 5 - 4 5 g/dL    A/G RATIO 1 8 1 2 - 2 2    Total Bilirubin 0 9 0 0 - 1 2 mg/dL    Alkaline Phosphatase 59 39 - 117 IU/L    AST 21 0 - 40 IU/L    ALT 21 0 - 32 IU/L    EGFR-AMERICAN CALC 96 >59 mL/min/1 73    eGFR  110 >59 mL/min/1 73   IRON SATURATION (HISTORICAL)    Collection Time: 10/07/17  9:46 AM   Result Value Ref Range    Iron 128 27 - 159 ug/dL    TIBC 326 250 - 450 ug/dL    UIBC (HISTORICAL) 198 131 - 425 ug/dL Iron Saturation 39 15 - 55 %   FOLATE (HISTORICAL)    Collection Time: 10/07/17  9:46 AM   Result Value Ref Range    Folate >20 0 >3 0 ng/mL   VITAMIN A (HISTORICAL)    Collection Time: 10/07/17  9:46 AM   Result Value Ref Range    VITAMIN A LEVEL 76 (H) 20 - 65 ug/dL   CBC AND PLATELET (HISTORICAL)    Collection Time: 10/07/17  9:46 AM   Result Value Ref Range    WBC 6 3 3 4 - 10 8 x10E3/uL    RBC 4 88 3 77 - 5 28 x10E6/uL    Hemoglobin 14 2 11 1 - 15 9 g/dL    Hematocrit 41 6 34 0 - 46 6 %    MCV 85 79 - 97 fL    MCH 29 1 26 6 - 33 0 pg    MCHC 34 1 31 5 - 35 7 g/dL    RDW 13 3 12 3 - 15 4 %    Platelets 333 376 - 053 x10E3/uL   VITAMIN B12 (HISTORICAL)    Collection Time: 10/07/17  9:46 AM   Result Value Ref Range    Vitamin B-12 1953 (H) 211 - 946 pg/mL   PTH, INTACT (HISTORICAL)    Collection Time: 10/07/17  9:46 AM   Result Value Ref Range    PTH 33 15 - 65 pg/mL   TSH, 3rd generation Lab Collect    Collection Time: 09/05/18  9:12 AM   Result Value Ref Range    TSH 0 132 (L) 0 450 - 4 500 uIU/mL   T4, free Lab Collect    Collection Time: 09/05/18  9:12 AM   Result Value Ref Range    Free t4 1 37 0 82 - 1 77 ng/dL   Comprehensive metabolic panel Lab Collect    Collection Time: 09/05/18  9:12 AM   Result Value Ref Range    Glucose 83 65 - 99 mg/dL    BUN 22 8 - 27 mg/dL    Creatinine 0 63 0 57 - 1 00 mg/dL    eGFR Non  98 >59 mL/min/1 73    SL AMB EGFR AFRICAN AMERICAN 113 >59 mL/min/1 73    SL AMB BUN/CREATININE RATIO 35 (H) 12 - 28    Sodium 142 134 - 144 mmol/L    SL AMB POTASSIUM 4 7 3 5 - 5 2 mmol/L    Chloride 100 96 - 106 mmol/L    CO2 28 20 - 29 mmol/L    CALCIUM 9 6 8 7 - 10 3 mg/dL    SL AMB PROTEIN, TOTAL 6 8 6 0 - 8 5 g/dL    Albumin 4 5 3 6 - 4 8 g/dL    Globulin, Total 2 3 1 5 - 4 5 g/dL    SL AMB ALBUMIN/GLOBULIN RATIO 2 0 1 2 - 2 2    TOTAL BILIRUBIN 0 6 0 0 - 1 2 mg/dL    Alk Phos Isoenzymes 61 39 - 117 IU/L    SL AMB AST 25 0 - 40 IU/L    SL AMB ALT 21 0 - 32 IU/L         Future Appointments  Date Time Provider Red Renteria   10/22/2018 9:30 AM QU D[facility] 1 [facility] [place] [facility] [place]   10/22/2018 10:00 AM [facility] D[facility] [place] Dexa QU [facility]   10/29/2018 9:40 AM DO DR BRIDGET [facility]

## 2018-09-20 NOTE — PROGRESS NOTES
9/20/2018    Assessment/Plan      Diagnoses and all orders for this visit:    Hypothyroidism due to Hashimoto's thyroiditis  -     TSH, 3rd generation Lab Collect; Future  -     T4, free Lab Collect; Future  -     TSH, 3rd generation Lab Collect  -     T4, free Lab Collect  -     TSH, 3rd generation Lab Collect; Future  -     T4, free Lab Collect; Future  -     TSH, 3rd generation Lab Collect  -     T4, free Lab Collect    Hypothyroidism, unspecified type  -     SYNTHROID 150 MCG tablet; Take one tablet Monday-Saturday, take one and a half tablets on Sunday  BRAND NECESSARY  Family history of diabetes mellitus  -     Comprehensive metabolic panel Lab Collect; Future  -     HEMOGLOBIN A1C W/ EAG ESTIMATION Lab Collect; Future  -     Comprehensive metabolic panel Lab Collect  -     HEMOGLOBIN A1C W/ EAG ESTIMATION Lab Collect    Diabetes mellitus screening  -     Comprehensive metabolic panel Lab Collect; Future  -     HEMOGLOBIN A1C W/ EAG ESTIMATION Lab Collect; Future  -     Comprehensive metabolic panel Lab Collect  -     HEMOGLOBIN A1C W/ EAG ESTIMATION Lab Collect        Assessment/Plan:  1  Hypothyroidism due to Hashimoto's thyroiditis:  Her recent TSH was low  To rule out interference from biotin, I have asked her to hold biotin about 3-4 days before rechecking TSH and free T4  We will call her with these results  If we do need to decrease her dose, I would ask her to take Synthroid brand 150 mcg tablets 1 tablet daily all 7 days of the week  We would need to repeat thyroid function tests about 6 weeks after that, but we will be in touch with her this is necessary  We will plan to see her back in 1 year with labs just prior  2   Family history of diabetes: Will screen with fasting sugar and A1c next year  Recent fasting sugar looks stable        CC:   Hypothyroidism    History of Present Illness     HPI: Tanika River is a 59y o  year old female with history of hypothyroidism due to Hashimoto's thyroiditis, history of gastric sleeve surgery, hyperlipidemia who presents to establish care  Previous patient doctor Kev  She states since she was last seen for her thyroid, she has gained weight  She has difficulty in terms of curving appetite  She will be working harder on cutting down sweets and focusing on healthier diet to 8 and weight loss  She presents today on Synthroid brand 150 mcg tablets, 1 tablet 6 days a week and 1 5 tablets on Sundays  She feels well and denies any changes in symptoms other than some weight gain  She denies energy problems or temperature intolerance  She is on biotin over-the-counter in the setting of her history of sleeve gastrectomy and associated hair loss and brittle nails  She denies any other symptoms of hyperthyroidism  Review of Systems   Constitutional: Positive for unexpected weight change  Negative for fatigue  HENT: Negative for trouble swallowing and voice change  Eyes: Negative for visual disturbance  Respiratory: Negative for shortness of breath  Cardiovascular: Negative for palpitations and leg swelling  Gastrointestinal: Negative for abdominal pain, nausea and vomiting  Endocrine: Negative for cold intolerance, heat intolerance, polydipsia and polyuria  Musculoskeletal: Negative for arthralgias and myalgias  Skin: Negative for rash  Neurological: Negative for dizziness, tremors and weakness  Hematological: Negative for adenopathy  Psychiatric/Behavioral: Negative for agitation and confusion         Historical Information   Past Medical History:   Diagnosis Date    Adenomyosis     Atypical chest pain     last assessed: April 12, 2016     Breast disorder     last assessed: Aug 19, 2015     Depression     last assessed: Jan 18, 2018     Disturbance of memory     last assessed: June 23, 2016     Endometriosis     Hirsutism     History of sleeve gastrectomy     Menometrorrhagia     Morbid obesity ([facility] [address] )     last assessed: Dec 5, 2014     Sclerosing adenosis of breast     Symptomatic menopausal or female climacteric states     Uterine leiomyoma      Past Surgical History:   Procedure Laterality Date    APPENDECTOMY      BREAST BIOPSY      COLONOSCOPY      complete - 7 year repeat recommended - Resolved:      DILATION AND CURETTAGE OF UTERUS      GYNECOLOGIC CRYOSURGERY      Cervix - s for abnormal paps     HYSTERECTOMY      INDUCED       x3    LAPAROSCOPIC SALPINGOOPHERECTOMY Right     LAPAROSCOPIC TOTAL HYSTERECTOMY      06, Laparoscopic hysterectomy with LSO with vaginal closure of the vaginal cuff     SALPINGOOPHORECTOMY Left     STOMACH SURGERY  2014    for morbid obesity - Managed by: Merissa Campos [address] EXTRACTION       Social History   History   Alcohol Use No     History   Drug Use No     History   Smoking Status    Never Smoker   Smokeless Tobacco    Never Used     Comment: former smoker noted in "allscripts"      Family History:   Family History   Problem Relation Age of Onset   Natalie Martines Alzheimer's disease Mother     Stroke Mother     Dementia Mother     Diabetes Father         mellitus     Prostate cancer Father     Rectal cancer Father     Colon polyps Sister        Meds/Allergies   Current Outpatient Prescriptions   Medication Sig Dispense Refill    Biotin 5000 MCG CAPS Take 1 capsule by mouth daily      calcium citrate-vitamin D (CALCIUM CITRATE +) 315-200 MG-UNIT per tablet Calcium Citrate CAPS   Refills: 0    Active      Cholecalciferol (VITAMIN D3) 2000 units TABS Take 1 tablet by mouth daily      Cyanocobalamin (VITAMIN B-12) 1000 MCG SUBL Place 1 Dose under the tongue daily      metaxalone (SKELAXIN) 800 mg tablet Take 1 tablet (800 mg total) by mouth 3 (three) times a day for 20 doses 20 tablet 0    Multiple Vitamins-Minerals (CENTRUM SILVER) tablet Take 1 tablet by mouth daily      SYNTHROID 150 MCG tablet Take one tablet Monday-Saturday, take one and a half tablets on Sunday  BRAND NECESSARY  32 tablet 11    traMADol-acetaminophen (ULTRACET) 37 5-325 mg per tablet Take 1 tablet by mouth every 6 (six) hours as needed      venlafaxine (EFFEXOR-XR) 150 mg 24 hr capsule Take 1 capsule (150 mg total) by mouth daily for 90 days 90 capsule 1    omeprazole (PriLOSEC) 20 mg delayed release capsule Take 1 capsule by mouth daily       No current facility-administered medications for this visit  No Known Allergies    Objective   Vitals: Blood pressure 110/70, pulse 70, height 5' 3" (1 6 m), weight 82 1 kg (181 lb)  Invasive Devices          No matching active lines, drains, or airways          Physical Exam   Constitutional: She is oriented to person, place, and time  She appears well-developed and well-nourished  No distress  HENT:   Head: Normocephalic and atraumatic  Eyes: Conjunctivae are normal  Pupils are equal, round, and reactive to light  Neck: Normal range of motion  Neck supple  No thyromegaly present  Cardiovascular: Normal rate and regular rhythm  No murmur heard  Pulmonary/Chest: Effort normal and breath sounds normal  No respiratory distress  Abdominal: Soft  Bowel sounds are normal  She exhibits no distension  Musculoskeletal: Normal range of motion  She exhibits no edema  Neurological: She is alert and oriented to person, place, and time  She exhibits normal muscle tone  Skin: Skin is warm and dry  No rash noted  She is not diaphoretic  Psychiatric: She has a normal mood and affect  Her behavior is normal    Vitals reviewed  The history was obtained from the review of the chart and from the patient      Lab Results:      Recent Results (from the past 25733 hour(s))   LIPID PANEL (HISTORICAL)    Collection Time: 10/07/17  9:46 AM   Result Value Ref Range    Cholesterol 218 (H) 100 - 199 mg/dL    Triglycerides 171 (H) 0 - 149 mg/dL    HDL 62 >39 mg/dL    VLDL Cholesterol Rayshawn 34 5 - 40 mg/dL    LDL Calculated 122 (H) 0 - 99 mg/dL   VITAMIN D 25 HYDROXY (HISTORICAL)    Collection Time: 10/07/17  9:46 AM   Result Value Ref Range    Vit D, 25-Hydroxy 61 2 30 0 - 100 0 ng/mL   FERRITIN (HISTORICAL)    Collection Time: 10/07/17  9:46 AM   Result Value Ref Range    Ferritin 192 (H) 15 - 150 ng/mL   VITAMIN B1, WHOLE BLOOD (HISTORICAL)    Collection Time: 10/07/17  9:46 AM   Result Value Ref Range    VITAMIN B1, WHOLE BLOOD 165 8 66 5 - 200 0 nmol/L   COMPREHENSIVE METABOLIC PANEL (HISTORICAL)    Collection Time: 10/07/17  9:46 AM   Result Value Ref Range    Glucose 85 65 - 99 mg/dL    BUN 23 6 - 24 mg/dL    Creatinine 0 69 0 57 - 1 00 mg/dL    BUN/CREA Ratio 33 (H) 9 - 23    Sodium 143 134 - 144 mmol/L    Potassium 4 7 3 5 - 5 2 mmol/L    Chloride 101 96 - 106 mmol/L    CO2 27 18 - 29 mmol/L    Calcium 9 9 8 7 - 10 2 mg/dL    Total Protein 7 1 6 0 - 8 5 g/dL    Albumin 4 6 3 5 - 5 5 g/dL    Tot   Globulin, Serum 2 5 1 5 - 4 5 g/dL    A/G RATIO 1 8 1 2 - 2 2    Total Bilirubin 0 9 0 0 - 1 2 mg/dL    Alkaline Phosphatase 59 39 - 117 IU/L    AST 21 0 - 40 IU/L    ALT 21 0 - 32 IU/L    EGFR-AMERICAN CALC 96 >59 mL/min/1 73    eGFR  110 >59 mL/min/1 73   IRON SATURATION (HISTORICAL)    Collection Time: 10/07/17  9:46 AM   Result Value Ref Range    Iron 128 27 - 159 ug/dL    TIBC 326 250 - 450 ug/dL    UIBC (HISTORICAL) 198 131 - 425 ug/dL    Iron Saturation 39 15 - 55 %   FOLATE (HISTORICAL)    Collection Time: 10/07/17  9:46 AM   Result Value Ref Range    Folate >20 0 >3 0 ng/mL   VITAMIN A (HISTORICAL)    Collection Time: 10/07/17  9:46 AM   Result Value Ref Range    VITAMIN A LEVEL 76 (H) 20 - 65 ug/dL   CBC AND PLATELET (HISTORICAL)    Collection Time: 10/07/17  9:46 AM   Result Value Ref Range    WBC 6 3 3 4 - 10 8 x10E3/uL    RBC 4 88 3 77 - 5 28 x10E6/uL    Hemoglobin 14 2 11 1 - 15 9 g/dL    Hematocrit 41 6 34 0 - 46 6 %    MCV 85 79 - 97 fL    MCH 29 1 26 6 - 33 0 pg    MCHC 34 1 31 5 - 35 7 g/dL    RDW 13 3 12 3 - 15 4 %    Platelets 497 222 - 129 x10E3/uL   VITAMIN B12 (HISTORICAL)    Collection Time: 10/07/17  9:46 AM   Result Value Ref Range    Vitamin B-12 1953 (H) 211 - 946 pg/mL   PTH, INTACT (HISTORICAL)    Collection Time: 10/07/17  9:46 AM   Result Value Ref Range    PTH 33 15 - 65 pg/mL   TSH, 3rd generation Lab Collect    Collection Time: 09/05/18  9:12 AM   Result Value Ref Range    TSH 0 132 (L) 0 450 - 4 500 uIU/mL   T4, free Lab Collect    Collection Time: 09/05/18  9:12 AM   Result Value Ref Range    Free t4 1 37 0 82 - 1 77 ng/dL   Comprehensive metabolic panel Lab Collect    Collection Time: 09/05/18  9:12 AM   Result Value Ref Range    Glucose 83 65 - 99 mg/dL    BUN 22 8 - 27 mg/dL    Creatinine 0 63 0 57 - 1 00 mg/dL    eGFR Non  98 >59 mL/min/1 73    SL AMB EGFR AFRICAN AMERICAN 113 >59 mL/min/1 73    SL AMB BUN/CREATININE RATIO 35 (H) 12 - 28    Sodium 142 134 - 144 mmol/L    SL AMB POTASSIUM 4 7 3 5 - 5 2 mmol/L    Chloride 100 96 - 106 mmol/L    CO2 28 20 - 29 mmol/L    CALCIUM 9 6 8 7 - 10 3 mg/dL    SL AMB PROTEIN, TOTAL 6 8 6 0 - 8 5 g/dL    Albumin 4 5 3 6 - 4 8 g/dL    Globulin, Total 2 3 1 5 - 4 5 g/dL    SL AMB ALBUMIN/GLOBULIN RATIO 2 0 1 2 - 2 2    TOTAL BILIRUBIN 0 6 0 0 - 1 2 mg/dL    Alk Phos Isoenzymes 61 39 - 117 IU/L    SL AMB AST 25 0 - 40 IU/L    SL AMB ALT 21 0 - 32 IU/L         Future Appointments  Date Time Provider Red Renteria   10/22/2018 9:30 AM QU D[facility] 1 QU [facility]   10/22/2018 10:00 AM QU DEXA [facility] 1 QU WIC Dexa [facility]   10/29/2018 9:40 AM DO DR BRIDGET Colmenares [facility]

## 2018-09-26 DIAGNOSIS — E03.9 HYPOTHYROIDISM, UNSPECIFIED TYPE: ICD-10-CM

## 2018-09-26 LAB
T4 FREE SERPL-MCNC: 1.44 NG/DL (ref 0.82–1.77)
TSH SERPL DL<=0.005 MIU/L-ACNC: 0.17 UIU/ML (ref 0.45–4.5)

## 2018-09-26 RX ORDER — LEVOTHYROXINE SODIUM 150 UG/1
TABLET ORAL
Qty: 32 TABLET | Refills: 0
Start: 2018-09-26 | End: 2018-11-11

## 2018-10-11 DIAGNOSIS — E03.9 HYPOTHYROIDISM, UNSPECIFIED TYPE: ICD-10-CM

## 2018-10-11 RX ORDER — LEVOTHYROXINE SODIUM 150 UG/1
TABLET ORAL
Qty: 32 TABLET | Refills: 3 | Status: SHIPPED | OUTPATIENT
Start: 2018-10-11 | End: 2018-11-11 | Stop reason: SDUPTHER

## 2018-10-22 ENCOUNTER — HOSPITAL ENCOUNTER (OUTPATIENT)
Dept: BONE DENSITY | Facility: IMAGING CENTER | Age: 58
Discharge: HOME | End: 2018-10-22
Payer: COMMERCIAL

## 2018-10-22 ENCOUNTER — APPOINTMENT (OUTPATIENT)
Dept: BONE DENSITY | Facility: IMAGING CENTER | Age: 58
End: 2018-10-22
Payer: COMMERCIAL

## 2018-10-22 DIAGNOSIS — Z12.31 ENCOUNTER FOR SCREENING MAMMOGRAM FOR BREAST CANCER: ICD-10-CM

## 2018-10-22 PROCEDURE — 77067 SCR MAMMO BI INCL CAD: CPT

## 2018-10-24 ENCOUNTER — HOSPITAL ENCOUNTER (OUTPATIENT)
Dept: BONE DENSITY | Facility: IMAGING CENTER | Age: 58
Discharge: HOME | End: 2018-10-24
Payer: COMMERCIAL

## 2018-10-24 DIAGNOSIS — Z13.820 SCREENING FOR OSTEOPOROSIS: ICD-10-CM

## 2018-10-24 PROCEDURE — 77080 DXA BONE DENSITY AXIAL: CPT

## 2018-10-29 ENCOUNTER — OFFICE VISIT (OUTPATIENT)
Dept: FAMILY MEDICINE CLINIC | Facility: HOSPITAL | Age: 58
End: 2018-10-29
Payer: COMMERCIAL

## 2018-10-29 VITALS
WEIGHT: 182 LBS | TEMPERATURE: 99 F | SYSTOLIC BLOOD PRESSURE: 118 MMHG | DIASTOLIC BLOOD PRESSURE: 72 MMHG | BODY MASS INDEX: 32.25 KG/M2 | HEART RATE: 70 BPM | HEIGHT: 63 IN

## 2018-10-29 DIAGNOSIS — F32.A DEPRESSION, UNSPECIFIED DEPRESSION TYPE: ICD-10-CM

## 2018-10-29 DIAGNOSIS — F41.9 ANXIETY: Primary | ICD-10-CM

## 2018-10-29 DIAGNOSIS — E06.3 HYPOTHYROIDISM DUE TO HASHIMOTO'S THYROIDITIS: ICD-10-CM

## 2018-10-29 DIAGNOSIS — Z23 NEED FOR INFLUENZA VACCINATION: ICD-10-CM

## 2018-10-29 DIAGNOSIS — E03.8 HYPOTHYROIDISM DUE TO HASHIMOTO'S THYROIDITIS: ICD-10-CM

## 2018-10-29 DIAGNOSIS — E78.5 DYSLIPIDEMIA: ICD-10-CM

## 2018-10-29 PROBLEM — Z90.3 HISTORY OF SLEEVE GASTRECTOMY: Status: ACTIVE | Noted: 2018-10-29

## 2018-10-29 PROBLEM — D25.9 UTERINE LEIOMYOMA: Status: RESOLVED | Noted: 2018-10-29 | Resolved: 2018-10-29

## 2018-10-29 PROBLEM — N80.9 ENDOMETRIOSIS: Status: RESOLVED | Noted: 2018-10-29 | Resolved: 2018-10-29

## 2018-10-29 PROBLEM — L68.0 HIRSUTISM: Status: RESOLVED | Noted: 2018-10-29 | Resolved: 2018-10-29

## 2018-10-29 PROBLEM — E66.01 MORBID OBESITY (HCC): Status: RESOLVED | Noted: 2018-10-29 | Resolved: 2018-10-29

## 2018-10-29 PROBLEM — N95.1 SYMPTOMATIC MENOPAUSAL OR FEMALE CLIMACTERIC STATES: Status: ACTIVE | Noted: 2018-10-29

## 2018-10-29 PROBLEM — N60.29 SCLEROSING ADENOSIS OF BREAST: Status: RESOLVED | Noted: 2018-10-29 | Resolved: 2018-10-29

## 2018-10-29 PROBLEM — N92.1 MENOMETRORRHAGIA: Status: RESOLVED | Noted: 2018-10-29 | Resolved: 2018-10-29

## 2018-10-29 PROCEDURE — 90682 RIV4 VACC RECOMBINANT DNA IM: CPT

## 2018-10-29 PROCEDURE — 1036F TOBACCO NON-USER: CPT | Performed by: INTERNAL MEDICINE

## 2018-10-29 PROCEDURE — 90471 IMMUNIZATION ADMIN: CPT

## 2018-10-29 PROCEDURE — 99214 OFFICE O/P EST MOD 30 MIN: CPT | Performed by: INTERNAL MEDICINE

## 2018-10-29 PROCEDURE — 3008F BODY MASS INDEX DOCD: CPT | Performed by: INTERNAL MEDICINE

## 2018-10-29 RX ORDER — VENLAFAXINE HYDROCHLORIDE 150 MG/1
150 CAPSULE, EXTENDED RELEASE ORAL DAILY
Qty: 90 CAPSULE | Refills: 1 | Status: SHIPPED | OUTPATIENT
Start: 2018-10-29 | End: 2019-01-10 | Stop reason: SDUPTHER

## 2018-10-29 NOTE — ASSESSMENT & PLAN NOTE
Admits to intermittent anxiety but does not feel med changes are needed- con't current regimen, call with worsening mood/panic attacks

## 2018-10-29 NOTE — PROGRESS NOTES
Assessment/Plan:    Depression  Depression controlled with Effexor - con't current meds, call with worsening mood/SI    Anxiety  Admits to intermittent anxiety but does not feel med changes are needed- con't current regimen, call with worsening mood/panic attacks    Hypothyroidism  Just had TFT's and had Synthroid adjusted by Endo - con't meds/labs and f/u as per Endo    Dyslipidemia  FLP borderline elevated Oct 2017 - recheck d/t wgt con't to go up - order given       Diagnoses and all orders for this visit:    Anxiety  -     venlafaxine (EFFEXOR-XR) 150 mg 24 hr capsule; Take 1 capsule (150 mg total) by mouth daily for 90 days    Depression, unspecified depression type  -     venlafaxine (EFFEXOR-XR) 150 mg 24 hr capsule; Take 1 capsule (150 mg total) by mouth daily for 90 days    Hypothyroidism due to Hashimoto's thyroiditis    Dyslipidemia  -     Lipid panel    Need for influenza vaccination  -     influenza vaccine, [id number], quadrivalent, recombinant, PF, 0 5 mL, for patients 18 yr+ (FLUBLOK)      BW- fasting- 10/17    Mammo 10/18    Dexa 10/18 - nml    PAP - no longer needed as she is s/p hysterectomy    [place] 2016 - 3 or 5 yr follow up - will have Carline call GI and try and obtain path results, father just diagnosed with rectal CA but is doing well    Pt is agreeable to flu vaccine- given today    Subjective:      Patient ID: Dana Duncan is a 59 y o  female  HPI Pt here for follow up appt    Pt con't to take her Effexor daily as directed  She notes no significant depression but has had some anxiety  She wishes for no changes in her meds at this time  She notes no SE with the medication  Pt had TFT's for Endo in Sept and had her Synthroid decreased to 150 mcg all 7 days  She was given order to repeat BW in approx 6 wks  She notes no changes in energy or C/D/tremor/palp with the med changes  She con't to use her Omeprazole as needed only    She notes no recent GERD symptoms and is aware of her triggers (garlic/greasy foods)  She notes no abd pain/N/V/dark stools  BW- fasting- 10/17    Mammo 10/18    Dexa 10/18 - nml    PAP - no longer needed as she is s/p hysterectomy    [place] 2016 - 3 or 5 yr follow up    Pt is agreeable to flu vaccine    Review of Systems   Constitutional: Positive for unexpected weight change  Negative for chills, fatigue and fever  HENT: Negative for congestion and sore throat  Eyes: Negative for pain and visual disturbance  Respiratory: Negative for cough, shortness of breath and wheezing  Cardiovascular: Negative for chest pain, palpitations and leg swelling  Gastrointestinal: Negative for abdominal pain, blood in stool, constipation, diarrhea and nausea  Endocrine: Negative for polydipsia and polyuria  Genitourinary: Negative for difficulty urinating and dysuria  Musculoskeletal: Positive for arthralgias  Negative for back pain and neck pain  Skin: Negative for rash and wound  Neurological: Positive for headaches  Negative for dizziness and syncope  Hematological: Negative for adenopathy  Psychiatric/Behavioral: Negative for behavioral problems, confusion and dysphoric mood  The patient is nervous/anxious  Objective:    /72   Pulse 70   Temp 99 °F (37 2 °C)   Ht 5' 3" (1 6 m)   Wt 82 6 kg (182 lb)   BMI 32 24 kg/m²      Physical Exam   Constitutional: She appears well-developed and well-nourished  No distress  HENT:   Head: Normocephalic and atraumatic  Eyes: Conjunctivae are normal  Right eye exhibits no discharge  Left eye exhibits no discharge  Neck: Neck supple  No tracheal deviation present  Cardiovascular: Normal rate, regular rhythm and normal heart sounds  Exam reveals no friction rub  No murmur heard  Pulmonary/Chest: Effort normal and breath sounds normal  No respiratory distress  She has no wheezes  She has no rales  Abdominal: Soft  She exhibits no distension  There is no tenderness  There is no guarding  Musculoskeletal: She exhibits no edema  Neurological: She is alert  She exhibits normal muscle tone  Skin: Skin is warm and dry  No rash noted  Psychiatric: She has a normal mood and affect  Her behavior is normal    Nursing note and vitals reviewed

## 2018-11-09 LAB
CHOLEST SERPL-MCNC: 217 MG/DL (ref 100–199)
CHOLEST/HDLC SERPL: 3.6 RATIO (ref 0–4.4)
HDLC SERPL-MCNC: 60 MG/DL
LDLC SERPL CALC-MCNC: 125 MG/DL (ref 0–99)
SL AMB VLDL CHOLESTEROL CALC: 32 MG/DL (ref 5–40)
T4 FREE SERPL-MCNC: 1.21 NG/DL (ref 0.82–1.77)
TRIGL SERPL-MCNC: 160 MG/DL (ref 0–149)
TSH SERPL DL<=0.005 MIU/L-ACNC: 0.35 UIU/ML (ref 0.45–4.5)

## 2018-11-11 DIAGNOSIS — E03.9 HYPOTHYROIDISM, UNSPECIFIED TYPE: ICD-10-CM

## 2018-11-11 RX ORDER — LEVOTHYROXINE SODIUM 150 UG/1
TABLET ORAL
Qty: 32 TABLET | Refills: 0
Start: 2018-11-11 | End: 2019-09-27 | Stop reason: SDUPTHER

## 2018-12-14 ENCOUNTER — OFFICE VISIT (OUTPATIENT)
Dept: BARIATRICS | Facility: CLINIC | Age: 58
End: 2018-12-14

## 2018-12-14 ENCOUNTER — TELEPHONE (OUTPATIENT)
Dept: BARIATRICS | Facility: CLINIC | Age: 58
End: 2018-12-14

## 2018-12-14 VITALS
HEIGHT: 64 IN | DIASTOLIC BLOOD PRESSURE: 74 MMHG | HEART RATE: 70 BPM | TEMPERATURE: 98.9 F | WEIGHT: 182 LBS | SYSTOLIC BLOOD PRESSURE: 112 MMHG | BODY MASS INDEX: 31.07 KG/M2

## 2018-12-14 DIAGNOSIS — E66.9 OBESITY, CLASS I, BMI 30-34.9: ICD-10-CM

## 2018-12-14 DIAGNOSIS — Z90.3 POSTGASTRECTOMY MALABSORPTION: ICD-10-CM

## 2018-12-14 DIAGNOSIS — Z98.84 STATUS POST GASTRIC BYPASS FOR OBESITY: ICD-10-CM

## 2018-12-14 DIAGNOSIS — K91.2 POSTGASTRECTOMY MALABSORPTION: ICD-10-CM

## 2018-12-14 DIAGNOSIS — K91.2 POSTSURGICAL MALABSORPTION: Primary | ICD-10-CM

## 2018-12-14 DIAGNOSIS — K91.2 POSTSURGICAL MALABSORPTION: ICD-10-CM

## 2018-12-14 DIAGNOSIS — Z98.84 BARIATRIC SURGERY STATUS: Primary | ICD-10-CM

## 2018-12-14 PROCEDURE — 99214 OFFICE O/P EST MOD 30 MIN: CPT | Performed by: PHYSICIAN ASSISTANT

## 2018-12-14 NOTE — PROGRESS NOTES
Assessment/Plan:    59year old female presenting to office today for annual exam  No complaints today  -s/p sleeve gastrectomy performed by Dr Siobhan Eason 11/24/2014  · Tolerating a regular diet-yes  · Eating at least 60 grams of protein per day-yes  · Following 30/60 minute rule with liquids-yes  · Drinking at least 64 ounces of fluid per day-yes  · Drinking carbonated beverages-no  · Sufficient exercise-Very little  · Using NSAIDs regularly-no  · Using nicotine-no  · Using alcohol-Socially, very rarely  · Supplements: Multivitamins and Calcium     -postsurgical malabsorption    -Labs are not in system  Will call Shira Members for results and send letter  · Ulcer Risk assessment:  · The patient is avoiding NSAIDS, denies smoking, but reports drinking occasional alcohol  Discussion about strict AVOIDANCE of alcohol, NSAIDS, and nicotine of all forms   · Limited structured exercise  Discussion about increasing exercise and physical activity as tolerated  · Supplements: Discussed ordering [employer] vitamins for convenience and price  Subjective:      Patient ID: Erich Ormond is a 59 y o  female  -s/p Vertical Sleeve Gastrectomy with Dr Siobhan Eason on 11/24/2018  Presents to the office today for routine follow up  Tolerating diet without issues; denies N/V, dysphagia, reflux  Overall doing Well  Initial: Not available  Current: 182 (Up from 174 last year)  EWL: 52%  Charly: 144  Current BMI is Body mass index is 31 73 kg/m²  The following portions of the patient's history were reviewed and updated as appropriate: allergies, current medications, past family history, past medical history, past social history, past surgical history and problem list     Review of Systems   Constitutional: Negative for chills, fatigue and fever  HENT: Negative for trouble swallowing  Cardiovascular: Negative for chest pain     Gastrointestinal: Negative for abdominal pain, blood in stool, constipation, diarrhea, nausea and vomiting  Musculoskeletal: Positive for arthralgias  Psychiatric/Behavioral: The patient is nervous/anxious  Objective:    /74 (BP Location: Left arm, Patient Position: Sitting, Cuff Size: Adult)   Pulse 70   Temp 98 9 °F (37 2 °C) (Tympanic)   Ht 5' 3 5" (1 613 m)   Wt 82 6 kg (182 lb)   BMI 31 73 kg/m²        Physical Exam   Constitutional: She is oriented to person, place, and time  She appears well-developed and well-nourished  HENT:   Head: Normocephalic and atraumatic  Eyes: Conjunctivae and EOM are normal    Neck: Normal range of motion  Neck supple  Cardiovascular: Normal rate and normal heart sounds  Pulmonary/Chest: Effort normal and breath sounds normal    Abdominal: Soft  Bowel sounds are normal  There is no tenderness  Musculoskeletal: Normal range of motion  Neurological: She is alert and oriented to person, place, and time  Skin: Skin is warm and dry  Psychiatric: She has a normal mood and affect  BARRIERS: none identified    GOALS:   · Continued/Maintain healthy weight loss with good nutrition intakes  · Adequate hydration with at least 64oz  fluid intake  · Normal vitamin and mineral levels  · Exercise as tolerated  · Follow-up in 1 year  We kindly ask that your arrive 15 minutes before your scheduled appointment time with your provider to allow our staff to room you, get your vital signs and update your chart  · Follow diet as discussed  · Get lab work done for next annual exam  If you need a script please call the office  · Follow vitamin and mineral recommendations as reviewed with you  · Call our office if you have any problems with abdominal pain especially associated with fever, chills, nausea, vomiting or any other concerns  · All  Post-bariatric surgery patients should be aware that very small quantities of any alcohol can cause impairment and it is very possible not to feel the effect   The effect can be in the system for several hours  It is also a stomach irritant  · It is advised to AVOID alcohol, Nonsteroidal antiinflammatory drugs (NSAIDS) and nicotine of all forms   Any of these can cause stomach irritation/pain  Keep up the good work!

## 2018-12-22 LAB
T4 FREE SERPL-MCNC: 1.32 NG/DL (ref 0.82–1.77)
TSH SERPL DL<=0.005 MIU/L-ACNC: 0.68 UIU/ML (ref 0.45–4.5)

## 2019-01-10 DIAGNOSIS — R52 PAIN: ICD-10-CM

## 2019-01-10 DIAGNOSIS — F41.9 ANXIETY: ICD-10-CM

## 2019-01-10 DIAGNOSIS — F32.A DEPRESSION, UNSPECIFIED DEPRESSION TYPE: ICD-10-CM

## 2019-01-10 RX ORDER — METAXALONE 800 MG/1
800 TABLET ORAL 3 TIMES DAILY
Qty: 20 TABLET | Refills: 0 | Status: SHIPPED | OUTPATIENT
Start: 2019-01-10 | End: 2019-09-16 | Stop reason: SDUPTHER

## 2019-01-10 RX ORDER — METAXALONE 800 MG/1
TABLET ORAL
Qty: 30 TABLET | Refills: 0 | OUTPATIENT
Start: 2019-01-10

## 2019-01-10 RX ORDER — VENLAFAXINE HYDROCHLORIDE 150 MG/1
CAPSULE, EXTENDED RELEASE ORAL
Qty: 90 CAPSULE | Refills: 1 | Status: SHIPPED | OUTPATIENT
Start: 2019-01-10 | End: 2019-07-11 | Stop reason: SDUPTHER

## 2019-02-25 ENCOUNTER — OFFICE VISIT (OUTPATIENT)
Dept: FAMILY MEDICINE CLINIC | Facility: HOSPITAL | Age: 59
End: 2019-02-25
Payer: COMMERCIAL

## 2019-02-25 VITALS
HEIGHT: 64 IN | DIASTOLIC BLOOD PRESSURE: 70 MMHG | HEART RATE: 68 BPM | WEIGHT: 185.4 LBS | SYSTOLIC BLOOD PRESSURE: 122 MMHG | BODY MASS INDEX: 31.65 KG/M2 | TEMPERATURE: 98.1 F

## 2019-02-25 DIAGNOSIS — M25.561 ACUTE PAIN OF RIGHT KNEE: Primary | ICD-10-CM

## 2019-02-25 DIAGNOSIS — M79.652 LEFT THIGH PAIN: ICD-10-CM

## 2019-02-25 PROCEDURE — 1036F TOBACCO NON-USER: CPT | Performed by: NURSE PRACTITIONER

## 2019-02-25 PROCEDURE — 3008F BODY MASS INDEX DOCD: CPT | Performed by: NURSE PRACTITIONER

## 2019-02-25 PROCEDURE — 99213 OFFICE O/P EST LOW 20 MIN: CPT | Performed by: NURSE PRACTITIONER

## 2019-02-25 NOTE — PROGRESS NOTES
Assessment/Plan:     Right knee pain  Possible meniscus involvement  Refer to ortho  Left thigh pain may be r/t change in gait favoring right thigh  Diagnoses and all orders for this visit:    Acute pain of right knee  -     Ambulatory referral to Orthopedic Surgery; Future    Left thigh pain  -     Ambulatory referral to Orthopedic Surgery; Future          Subjective:     Patient ID: Mary Davison is a 59 y o  female  2 weeks ago slipped off curb and felt pain in right knee  Right ankle had twisted  Has not gotten better  Denies redness and swelling  Pain is dull constantly  Twisting makes pain worse  Yesterday developed pain in left upper anterior thigh  H/o right hip bursitis and this is different  More in front  Left hip bursitis was bothering her last week  Pain is not constant  Gets intermittent stabbing pain only with movement  Going up and down steps is ok  Just walking  No pain with sitting  Has h/o herniated lumbar disc  Review of Systems   Musculoskeletal: Positive for arthralgias  Negative for back pain and joint swelling  Neurological: Negative for weakness and numbness  The following portions of the patient's history were reviewed and updated as appropriate: allergies, current medications, past family history, past medical history, past social history, past surgical history and problem list     Objective:  Vitals:    02/25/19 1615   BP: 122/70   Pulse: 68   Temp: 98 1 °F (36 7 °C)      Physical Exam   Constitutional: She is oriented to person, place, and time  She appears well-developed and well-nourished  Cardiovascular: Normal rate, regular rhythm and normal heart sounds  Pulmonary/Chest: Effort normal and breath sounds normal    Musculoskeletal:        Right knee: She exhibits normal range of motion, no swelling, no effusion, no ecchymosis, no deformity, no erythema, no LCL laxity, normal patellar mobility and no MCL laxity   No medial joint line and no lateral joint line tenderness noted  Right prepatellar tenderness  Tenderness noted lateral upper anterior thigh  Neurological: She is alert and oriented to person, place, and time  Skin: Skin is warm and dry  Psychiatric: She has a normal mood and affect

## 2019-03-20 ENCOUNTER — APPOINTMENT (OUTPATIENT)
Dept: RADIOLOGY | Facility: CLINIC | Age: 59
End: 2019-03-20
Payer: COMMERCIAL

## 2019-03-20 ENCOUNTER — OFFICE VISIT (OUTPATIENT)
Dept: OBGYN CLINIC | Facility: CLINIC | Age: 59
End: 2019-03-20
Payer: COMMERCIAL

## 2019-03-20 VITALS
SYSTOLIC BLOOD PRESSURE: 121 MMHG | DIASTOLIC BLOOD PRESSURE: 62 MMHG | HEIGHT: 63 IN | BODY MASS INDEX: 33.03 KG/M2 | WEIGHT: 186.4 LBS

## 2019-03-20 DIAGNOSIS — S80.02XA CONTUSION OF LEFT KNEE, INITIAL ENCOUNTER: ICD-10-CM

## 2019-03-20 DIAGNOSIS — M25.552 PAIN IN LEFT HIP: ICD-10-CM

## 2019-03-20 DIAGNOSIS — M79.652 LEFT THIGH PAIN: ICD-10-CM

## 2019-03-20 DIAGNOSIS — M25.561 ACUTE PAIN OF RIGHT KNEE: ICD-10-CM

## 2019-03-20 DIAGNOSIS — S33.8XXA SPRAIN OF GROIN, INITIAL ENCOUNTER: Primary | ICD-10-CM

## 2019-03-20 PROCEDURE — 73503 X-RAY EXAM HIP UNI 4/> VIEWS: CPT

## 2019-03-20 PROCEDURE — 99203 OFFICE O/P NEW LOW 30 MIN: CPT | Performed by: ORTHOPAEDIC SURGERY

## 2019-03-20 PROCEDURE — 73564 X-RAY EXAM KNEE 4 OR MORE: CPT

## 2019-03-20 NOTE — ASSESSMENT & PLAN NOTE
55-year-old female with left knee contusion  She shows no signs of meniscal tear or ligament injury today  Recommended gentle, gradual increase in activity  Recommended physical therapy for her  She was offered a cortisone injection as she may have stirred up some underlying osteoarthritis which is mild, but she was not interested today  She has no restrictions  She was counseled on icing  She should take Tylenol for pain  No brace indicated at this time  Follow-up as needed

## 2019-03-20 NOTE — ASSESSMENT & PLAN NOTE
15-year-old female with a sprain of her groin, primarily her adductor muscle  I recommended gentle stretching, progressive activity, she was given a script for physical therapy  Follow-up as needed

## 2019-03-20 NOTE — PROGRESS NOTES
Assessment:     1  Sprain of groin, initial encounter    2  Contusion of left knee, initial encounter    3  Left thigh pain          Plan:     Problem List Items Addressed This Visit        Musculoskeletal and Integument    Sprain of groin - Primary     40-year-old female with a sprain of her groin, primarily her adductor muscle  I recommended gentle stretching, progressive activity, she was given a script for physical therapy  Follow-up as needed  Relevant Orders    XR hip/pelv 4+ vw left if performed    Ambulatory referral to Physical Therapy       Other    Contusion of left knee     40-year-old female with left knee contusion  She shows no signs of meniscal tear or ligament injury today  Recommended gentle, gradual increase in activity  Recommended physical therapy for her  She was offered a cortisone injection as she may have stirred up some underlying osteoarthritis which is mild, but she was not interested today  She has no restrictions  She was counseled on icing  She should take Tylenol for pain  No brace indicated at this time  Follow-up as needed  Relevant Orders    XR knee 4+ vw right injury    Ambulatory referral to Physical Therapy      Other Visit Diagnoses     Left thigh pain               Patient ID: Win Craven is a 62 y o  female  Chief Complaint:  Right knee, left hip pain    HPI:  40-year-old female here today for evaluation of her right knee and her left groin  On February 13, 2019 she was out walking some dogs when it had snowed  She was unable to see where the edge of the sidewalk was with this no, and came down, falling off a sidewalk, injuring her right knee in her left hip  She has pain primarily in the groin area of the left hip  She has had problems with bursitis in the past, but this is in a different location  The knee bothers her mostly if she is walking uphill  Also if driving    If she is on steps are going down hill she does not have as much trouble  She has aching when she sleeping at night  In general the knee has been feeling gradually better despite some achiness  The hip is not significantly changed since the incident  She is unable to take anti-inflammatories because of a gastric sleeve in the past   She has not been icing the knee  She does not wear brace  She has not done any therapy  She does not note significant swelling  Patient's medical intake was reviewed        Allergy:  No Known Allergies    Medications:  all current active meds have been reviewed    Past Medical History:  Past Medical History:   Diagnosis Date    Bariatric surgery status     Depression     last assessed: 2018     Disturbance of memory     last assessed: 2016     Endometriosis     Hirsutism     History of sleeve gastrectomy     Menometrorrhagia     Morbid obesity ([facility] [address] )     last assessed: Dec 5, 2014     Postgastrectomy malabsorption     Sclerosing adenosis of breast     Symptomatic menopausal or female climacteric states     Uterine leiomyoma        Past Surgical History:  Past Surgical History:   Procedure Laterality Date    APPENDECTOMY      BREAST BIOPSY      COLONOSCOPY      complete - 7 year repeat recommended - Resolved:      DILATION AND CURETTAGE OF UTERUS      GASTRECTOMY SLEEVE LAPAROSCOPIC  2018    GYNECOLOGIC CRYOSURGERY      Cervix - 's for abnormal paps     HYSTERECTOMY      INDUCED       x3    LAPAROSCOPIC SALPINGOOPHERECTOMY Right     LAPAROSCOPIC TOTAL HYSTERECTOMY      06, Laparoscopic hysterectomy with LSO with vaginal closure of the vaginal cuff     SALPINGOOPHORECTOMY Left     STOMACH SURGERY  2014    for morbid obesity - Managed by: [address] ADENOIDECTOMY      TOOTH EXTRACTION         Family History:  Family History   Problem Relation Age of Onset    Alzheimer's disease Mother     Stroke Mother     Dementia Mother     Diabetes Father mellitus     Prostate cancer Father     Rectal cancer Father     Colon polyps Sister        Social History:  Social History     Substance and Sexual Activity   Alcohol Use Yes    Comment: occasional     Social History     Substance and Sexual Activity   Drug Use No     Social History     Tobacco Use   Smoking Status Former Smoker    Last attempt to quit: Carline Collins Years since quittin 2   Smokeless Tobacco Never Used           ROS:  Review of Systems   Constitutional: Negative  HENT: Negative  Eyes: Negative  Respiratory: Negative  Cardiovascular: Negative  Gastrointestinal: Negative  Endocrine: Negative  Genitourinary: Negative  Musculoskeletal: Positive for arthralgias  Skin: Negative  Allergic/Immunologic: Negative  Neurological: Negative  Hematological: Negative  Psychiatric/Behavioral: Negative  Objective:  BP Readings from Last 1 Encounters:   19 121/62      Wt Readings from Last 1 Encounters:   19 84 6 kg (186 lb 6 4 oz)        BMI:   Estimated body mass index is 33 02 kg/m² as calculated from the following:    Height as of this encounter: 5' 3" (1 6 m)  Weight as of this encounter: 84 6 kg (186 lb 6 4 oz)  EXAM:   Physical Exam   Constitutional: She appears well-developed and well-nourished  No distress  HENT:   Head: Normocephalic and atraumatic  Eyes: Right eye exhibits no discharge  Left eye exhibits no discharge  Neck: Normal range of motion  Neck supple  No tracheal deviation present  Cardiovascular: Normal rate and regular rhythm  Pulmonary/Chest: Effort normal and breath sounds normal  No respiratory distress  She exhibits no tenderness  Abdominal: Soft  She exhibits no distension  There is no tenderness  Musculoskeletal:        Right knee: She exhibits no effusion  Neurological: She is alert  Skin: Skin is warm and dry  She is not diaphoretic  No erythema  Psychiatric: She has a normal mood and affect  Her behavior is normal    Vitals reviewed  Right Knee Exam     Muscle Strength   The patient has normal right knee strength  Tenderness   The patient is experiencing tenderness in the medial joint line, patellar tendon and patella  Range of Motion   The patient has normal right knee ROM  Tests   Nhi:  Medial - negative Lateral - negative  Varus: negative Valgus: negative  Lachman:  Anterior - negative      Drawer:  Posterior - negative  Pivot shift: negative  Patellar apprehension: negative    Other   Erythema: absent  Sensation: normal  Pulse: present  Swelling: none  Effusion: no effusion present    Comments:  Good patellar tracking, mild crepitus on knee range of motion, negative patellar grind      Left Hip Exam     Tenderness   The patient is experiencing tenderness in the greater trochanter  Range of Motion   The patient has normal left hip ROM  Muscle Strength   The patient has normal left hip strength  Tests   CHAUNCEY: negative    Other   Erythema: absent  Sensation: normal  Pulse: present    Comments:  No pain with resisted hip flexion or extension, significant pain with resisted hip adduction, no pain with logroll of the hip or with internal and external rotation              Radiographs:  I have personally reviewed pertinent films in PACS and my interpretation is X-rays of the right knee are reviewed which shows some mild medial and patellofemoral osteoarthritis  X-rays of the left hip are reviewed which show no significant arthritis with well-maintained joint spaces

## 2019-05-28 ENCOUNTER — OFFICE VISIT (OUTPATIENT)
Dept: FAMILY MEDICINE CLINIC | Facility: HOSPITAL | Age: 59
End: 2019-05-28
Payer: COMMERCIAL

## 2019-05-28 VITALS
DIASTOLIC BLOOD PRESSURE: 74 MMHG | HEIGHT: 64 IN | SYSTOLIC BLOOD PRESSURE: 112 MMHG | HEART RATE: 75 BPM | OXYGEN SATURATION: 95 % | BODY MASS INDEX: 31.21 KG/M2 | TEMPERATURE: 98.8 F | WEIGHT: 182.8 LBS

## 2019-05-28 DIAGNOSIS — L25.5 RHUS DERMATITIS: Primary | ICD-10-CM

## 2019-05-28 DIAGNOSIS — M51.36 DISC DEGENERATION, LUMBAR: Primary | ICD-10-CM

## 2019-05-28 PROCEDURE — 99213 OFFICE O/P EST LOW 20 MIN: CPT | Performed by: NURSE PRACTITIONER

## 2019-05-28 PROCEDURE — 96372 THER/PROPH/DIAG INJ SC/IM: CPT | Performed by: NURSE PRACTITIONER

## 2019-05-28 RX ORDER — METHYLPREDNISOLONE 4 MG/1
TABLET ORAL
Qty: 21 TABLET | Refills: 0 | Status: SHIPPED | OUTPATIENT
Start: 2019-05-28 | End: 2019-09-27 | Stop reason: ALTCHOICE

## 2019-05-28 RX ORDER — METHYLPREDNISOLONE ACETATE 80 MG/ML
80 INJECTION, SUSPENSION INTRA-ARTICULAR; INTRALESIONAL; INTRAMUSCULAR; SOFT TISSUE ONCE
Status: COMPLETED | OUTPATIENT
Start: 2019-05-28 | End: 2019-05-28

## 2019-05-28 RX ADMIN — METHYLPREDNISOLONE ACETATE 80 MG: 80 INJECTION, SUSPENSION INTRA-ARTICULAR; INTRALESIONAL; INTRAMUSCULAR; SOFT TISSUE at 10:53

## 2019-05-31 ENCOUNTER — APPOINTMENT (EMERGENCY)
Dept: CT IMAGING | Facility: HOSPITAL | Age: 59
End: 2019-05-31
Payer: COMMERCIAL

## 2019-05-31 ENCOUNTER — TELEPHONE (OUTPATIENT)
Dept: FAMILY MEDICINE CLINIC | Facility: HOSPITAL | Age: 59
End: 2019-05-31

## 2019-05-31 ENCOUNTER — HOSPITAL ENCOUNTER (EMERGENCY)
Facility: HOSPITAL | Age: 59
Discharge: HOME | End: 2019-05-31
Attending: EMERGENCY MEDICINE
Payer: COMMERCIAL

## 2019-05-31 VITALS
HEART RATE: 55 BPM | BODY MASS INDEX: 31.89 KG/M2 | RESPIRATION RATE: 17 BRPM | WEIGHT: 180 LBS | TEMPERATURE: 98.1 F | HEIGHT: 63 IN | SYSTOLIC BLOOD PRESSURE: 116 MMHG | DIASTOLIC BLOOD PRESSURE: 64 MMHG | OXYGEN SATURATION: 94 %

## 2019-05-31 DIAGNOSIS — R51.9 HEADACHE: Primary | ICD-10-CM

## 2019-05-31 LAB
ALBUMIN SERPL BCP-MCNC: 4 G/DL (ref 3.5–5)
ALP SERPL-CCNC: 55 U/L (ref 46–116)
ALT SERPL W P-5'-P-CCNC: 37 U/L (ref 12–78)
ANION GAP SERPL CALCULATED.3IONS-SCNC: 11 MMOL/L (ref 4–13)
AST SERPL W P-5'-P-CCNC: 26 U/L (ref 5–45)
BASOPHILS # BLD AUTO: 0.03 THOUSANDS/ΜL (ref 0–0.1)
BASOPHILS NFR BLD AUTO: 0 % (ref 0–1)
BILIRUB SERPL-MCNC: 0.5 MG/DL (ref 0.2–1)
BUN SERPL-MCNC: 25 MG/DL (ref 5–25)
CALCIUM SERPL-MCNC: 9.9 MG/DL (ref 8.3–10.1)
CHLORIDE SERPL-SCNC: 102 MMOL/L (ref 100–108)
CO2 SERPL-SCNC: 28 MMOL/L (ref 21–32)
CREAT SERPL-MCNC: 0.84 MG/DL (ref 0.6–1.3)
EOSINOPHIL # BLD AUTO: 0.01 THOUSAND/ΜL (ref 0–0.61)
EOSINOPHIL NFR BLD AUTO: 0 % (ref 0–6)
ERYTHROCYTE [DISTWIDTH] IN BLOOD BY AUTOMATED COUNT: 12.3 % (ref 11.6–15.1)
GFR SERPL CREATININE-BSD FRML MDRD: 75 ML/MIN/1.73SQ M
GLUCOSE SERPL-MCNC: 88 MG/DL (ref 65–140)
HCT VFR BLD AUTO: 43.4 % (ref 34.8–46.1)
HGB BLD-MCNC: 14.6 G/DL (ref 11.5–15.4)
IMM GRANULOCYTES # BLD AUTO: 0.1 THOUSAND/UL (ref 0–0.2)
IMM GRANULOCYTES NFR BLD AUTO: 1 % (ref 0–2)
LYMPHOCYTES # BLD AUTO: 3.05 THOUSANDS/ΜL (ref 0.6–4.47)
LYMPHOCYTES NFR BLD AUTO: 16 % (ref 14–44)
MCH RBC QN AUTO: 29.5 PG (ref 26.8–34.3)
MCHC RBC AUTO-ENTMCNC: 33.6 G/DL (ref 31.4–37.4)
MCV RBC AUTO: 88 FL (ref 82–98)
MONOCYTES # BLD AUTO: 1.4 THOUSAND/ΜL (ref 0.17–1.22)
MONOCYTES NFR BLD AUTO: 8 % (ref 4–12)
NEUTROPHILS # BLD AUTO: 14.1 THOUSANDS/ΜL (ref 1.85–7.62)
NEUTS SEG NFR BLD AUTO: 75 % (ref 43–75)
NRBC BLD AUTO-RTO: 0 /100 WBCS
PLATELET # BLD AUTO: 338 THOUSANDS/UL (ref 149–390)
PMV BLD AUTO: 10 FL (ref 8.9–12.7)
POTASSIUM SERPL-SCNC: 3.9 MMOL/L (ref 3.5–5.3)
PROT SERPL-MCNC: 8 G/DL (ref 6.4–8.2)
RBC # BLD AUTO: 4.95 MILLION/UL (ref 3.81–5.12)
SODIUM SERPL-SCNC: 141 MMOL/L (ref 136–145)
WBC # BLD AUTO: 18.69 THOUSAND/UL (ref 4.31–10.16)

## 2019-05-31 PROCEDURE — 70450 CT HEAD/BRAIN W/O DYE: CPT

## 2019-05-31 PROCEDURE — 99284 EMERGENCY DEPT VISIT MOD MDM: CPT | Performed by: PHYSICIAN ASSISTANT

## 2019-05-31 PROCEDURE — 85025 COMPLETE CBC W/AUTO DIFF WBC: CPT | Performed by: PHYSICIAN ASSISTANT

## 2019-05-31 PROCEDURE — 36415 COLL VENOUS BLD VENIPUNCTURE: CPT | Performed by: PHYSICIAN ASSISTANT

## 2019-05-31 PROCEDURE — 80053 COMPREHEN METABOLIC PANEL: CPT | Performed by: PHYSICIAN ASSISTANT

## 2019-05-31 PROCEDURE — 99284 EMERGENCY DEPT VISIT MOD MDM: CPT

## 2019-05-31 RX ORDER — ACETAMINOPHEN 325 MG/1
650 TABLET ORAL ONCE
Status: COMPLETED | OUTPATIENT
Start: 2019-05-31 | End: 2019-05-31

## 2019-05-31 RX ADMIN — ACETAMINOPHEN 650 MG: 325 TABLET, FILM COATED ORAL at 11:57

## 2019-06-03 LAB
ATRIAL RATE: 61 BPM
P AXIS: 53 DEGREES
PR INTERVAL: 152 MS
QRS AXIS: 6 DEGREES
QRSD INTERVAL: 94 MS
QT INTERVAL: 430 MS
QTC INTERVAL: 432 MS
T WAVE AXIS: -1 DEGREES
VENTRICULAR RATE: 61 BPM

## 2019-06-03 PROCEDURE — 93010 ELECTROCARDIOGRAM REPORT: CPT | Performed by: INTERNAL MEDICINE

## 2019-06-04 ENCOUNTER — TELEPHONE (OUTPATIENT)
Dept: FAMILY MEDICINE CLINIC | Facility: HOSPITAL | Age: 59
End: 2019-06-04

## 2019-06-06 ENCOUNTER — OFFICE VISIT (OUTPATIENT)
Dept: FAMILY MEDICINE CLINIC | Facility: HOSPITAL | Age: 59
End: 2019-06-06
Payer: COMMERCIAL

## 2019-06-06 VITALS
WEIGHT: 178 LBS | HEART RATE: 61 BPM | TEMPERATURE: 98.1 F | HEIGHT: 63 IN | SYSTOLIC BLOOD PRESSURE: 132 MMHG | BODY MASS INDEX: 31.54 KG/M2 | DIASTOLIC BLOOD PRESSURE: 80 MMHG

## 2019-06-06 DIAGNOSIS — L23.7 CONTACT DERMATITIS DUE TO POISON IVY: ICD-10-CM

## 2019-06-06 DIAGNOSIS — G44.85 PRIMARY STABBING HEADACHE: Primary | ICD-10-CM

## 2019-06-06 PROCEDURE — 99214 OFFICE O/P EST MOD 30 MIN: CPT | Performed by: INTERNAL MEDICINE

## 2019-06-06 PROCEDURE — 3008F BODY MASS INDEX DOCD: CPT | Performed by: INTERNAL MEDICINE

## 2019-06-06 PROCEDURE — 1036F TOBACCO NON-USER: CPT | Performed by: INTERNAL MEDICINE

## 2019-06-06 RX ORDER — LORAZEPAM 0.5 MG/1
TABLET ORAL
Qty: 2 TABLET | Refills: 0 | Status: SHIPPED | OUTPATIENT
Start: 2019-06-06 | End: 2019-09-27 | Stop reason: ALTCHOICE

## 2019-06-06 RX ORDER — TRIAMCINOLONE ACETONIDE 5 MG/G
CREAM TOPICAL
Qty: 30 G | Refills: 0 | Status: SHIPPED | OUTPATIENT
Start: 2019-06-06 | End: 2019-09-27 | Stop reason: ALTCHOICE

## 2019-07-03 ENCOUNTER — TELEPHONE (OUTPATIENT)
Dept: FAMILY MEDICINE CLINIC | Facility: HOSPITAL | Age: 59
End: 2019-07-03

## 2019-07-03 NOTE — TELEPHONE ENCOUNTER
FYI - pt only going to MRI on Friday  Unable to get MRA approved in time, if it will even be approved  It is currently sitting in clinical review (I did send in clinical info)  Pt aware and advised we can always put new order and have her go for MRA separately once/if that gets approved

## 2019-07-05 ENCOUNTER — HOSPITAL ENCOUNTER (OUTPATIENT)
Dept: MRI IMAGING | Facility: HOSPITAL | Age: 59
Discharge: HOME | End: 2019-07-05
Payer: COMMERCIAL

## 2019-07-05 DIAGNOSIS — G44.85 PRIMARY STABBING HEADACHE: ICD-10-CM

## 2019-07-05 PROCEDURE — 70553 MRI BRAIN STEM W/O & W/DYE: CPT

## 2019-07-05 PROCEDURE — A9585 GADOBUTROL INJECTION: HCPCS | Performed by: INTERNAL MEDICINE

## 2019-07-05 RX ADMIN — GADOBUTROL 8 ML: 604.72 INJECTION INTRAVENOUS at 09:50

## 2019-07-05 NOTE — TELEPHONE ENCOUNTER
I called patient to give her the results of her MRI  She is askign what the status is on the precert for her MRA  Do you know anything?

## 2019-07-11 DIAGNOSIS — F41.9 ANXIETY: ICD-10-CM

## 2019-07-11 DIAGNOSIS — F32.A DEPRESSION, UNSPECIFIED DEPRESSION TYPE: ICD-10-CM

## 2019-07-12 ENCOUNTER — TELEPHONE (OUTPATIENT)
Dept: FAMILY MEDICINE CLINIC | Facility: HOSPITAL | Age: 59
End: 2019-07-12

## 2019-07-14 RX ORDER — VENLAFAXINE HYDROCHLORIDE 150 MG/1
CAPSULE, EXTENDED RELEASE ORAL
Qty: 30 CAPSULE | Refills: 5 | Status: SHIPPED | OUTPATIENT
Start: 2019-07-14 | End: 2020-01-16

## 2019-08-16 ENCOUNTER — TELEPHONE (OUTPATIENT)
Dept: NEUROLOGY | Facility: CLINIC | Age: 59
End: 2019-08-16

## 2019-08-21 ENCOUNTER — TELEPHONE (OUTPATIENT)
Dept: NEUROLOGY | Facility: CLINIC | Age: 59
End: 2019-08-21

## 2019-08-21 NOTE — TELEPHONE ENCOUNTER
Left message that appt was cancelled in error, needed to reschedule appt due to Dr Robert León attending [facility] conference  We should not have told pt  She was not accepting her as NP, this was an error and I apologized  I instructed appt on hold tomorrow for 10:30am in [facility] location please cb to confirm   Call Monique Razo at [phone]

## 2019-08-21 NOTE — TELEPHONE ENCOUNTER
Patient stated she is upset that she waited months for the appt with Dr Etta June and then was told she is no longer accepting new patients  She wanted to speak to someone to voice her grievance  Per Christy, transfer patient to [place]  Transferred to [facility]

## 2019-08-22 ENCOUNTER — OFFICE VISIT (OUTPATIENT)
Dept: NEUROLOGY | Facility: CLINIC | Age: 59
End: 2019-08-22
Payer: COMMERCIAL

## 2019-08-22 ENCOUNTER — TRANSCRIBE ORDERS (OUTPATIENT)
Dept: NEUROLOGY | Facility: CLINIC | Age: 59
End: 2019-08-22

## 2019-08-22 VITALS
HEART RATE: 61 BPM | WEIGHT: 180.8 LBS | SYSTOLIC BLOOD PRESSURE: 112 MMHG | BODY MASS INDEX: 32.04 KG/M2 | HEIGHT: 63 IN | DIASTOLIC BLOOD PRESSURE: 68 MMHG

## 2019-08-22 DIAGNOSIS — R41.3 DISTURBANCE OF MEMORY: ICD-10-CM

## 2019-08-22 DIAGNOSIS — G44.53 PRIMARY THUNDERCLAP HEADACHE: Primary | ICD-10-CM

## 2019-08-22 DIAGNOSIS — R41.3 OTHER AMNESIA: ICD-10-CM

## 2019-08-22 PROCEDURE — 99244 OFF/OP CNSLTJ NEW/EST MOD 40: CPT | Performed by: PSYCHIATRY & NEUROLOGY

## 2019-08-22 NOTE — PROGRESS NOTES
Patient ID: Marcie Barbosa is a 62 y o  female  Assessment/Plan:    Primary thunderclap headache  Pt here today for initial neuro consultation  Pt with 2 discrete acute episodes of severe headache during bowel movement  by 2 days  Both episodes were reproducible and associated with valsalva movement  Pt had episodes in end of May  On second event pt went to er as well  Pt only had a ct head done as part of er workup with cardiac eval negative  Of note, due to the severity of the ha, pt did have profuse sweating and feeling of near syncope with very warm at time of events  No other focal sxs at time of severity but allen was of acute onset and worst headache of her life 10/10  Pt never had headache in past like either of these 2 events  Of note, no history of migraine  occ sinus headaches  Pt strongly recommended to have a CTA head to eval for any structural pathology ie aneurysm or vascular malformation due to the reproducibility and setting of both events with increased abdominal pressure  It is of medical necessity for pt to have a vascular study as not done to date and actually denied by insurance on pcp orders  Pt to have done asap  No iodine or shellfish allergies         Diagnoses and all orders for this visit:    Primary thunderclap headache  -     CTA head w wo contrast; Future  -     Comprehensive metabolic panel; Future  -     Comprehensive metabolic panel  -     Sedimentation rate, automated; Future  -     Sedimentation rate, automated    Disturbance of memory  -     Ambulatory referral to Neurology; Future           Subjective:    HPI    Pt here today for initial neuro consultation  Pt is a 61 yo f with pmh of hypothyroidism on synthroid, hypercholesterolemia and arthritis who presents today due to  2 discrete acute episodes of severe headache during bowel movement  by 2 days  Pt notes sxs started at the end of may    Pt recalls going to bathroom to have a bowel movement with minimal straining, and all of a sudden had the worst headache of her life  Pt was so concerned she thought her family would find her if she did not contact someone quickly  Pt became very diaphortetic and diffuse sweating  Pt notes sxs started right with the valsalva  Pt notes sxs subsided after about one hour  Pt needed to cool down but recalls being able to speak with spouse and relay how she was feeling  Pt also with feeling of near syncope  No change in vision  No loss of vsion  No change in speech or swallowing  Pt had a repeat nearly identical episode about 2 days later  On second event pt did go to er  Pt had cardiac eval which was neg  Pt had ct head no acute abn  Of note, due to the severity of the ha, pt did have profuse sweating and feeling of near syncope with very warm at time of events on both occassions  No other focal sxs at time of severity but allen was of acute onset and worst headache of her life 10/10  Pt has only had sinus ha in past  Pt was then seen by pcp and had mri head with some nonspecific wm changes seen on mri 7/5/19  IMPRESSION:White matter changes suggestive of chronic microangiopathy  No acute intracranial pathology  No acute ich  Rev wm chagnes on mri  Pt with h/o gastric sleeve and cannot take asa  No loc  No sz  No other focal abn  Pt did not have a vascular study  Due to type of presentation, again with the thunderclap presentation and severity of the ha, cta head needed for further eval   Pt has colonoscopy on hold as cannot do bowel prep without clearance  Pt with family history of colon cancer in her dad  Ppt has history of polyps  Pt has never had ha like either of the 2 she had in may  No recurrence  No fever or chills  No clear photophobia  Maternal history of strokes and alz disease  Pt also requesting referral for memory eval due to her family history  Total time spent today 45 minutes   Greater than 50% of total time was spent with the patient and / or family counseling and / or coordination of care     The following portions of the patient's history were reviewed and updated as appropriate: allergies, current medications, past family history, past medical history, past social history, past surgical history and problem list and ros rev  Objective:    Blood pressure 112/68, pulse 61, height 5' 3" (1 6 m), weight 82 kg (180 lb 12 8 oz)  Physical Exam   Constitutional: She appears well-developed and well-nourished  Eyes: Pupils are equal, round, and reactive to light  Lids are normal    Cardiovascular: Intact distal pulses  Neurological: She has normal strength and normal reflexes  Psychiatric: Her speech is normal        Neurological Exam  Mental Status  Awake, alert and oriented to person, place and time  Recent and remote memory are intact  Speech is normal  Language is fluent with no aphasia  Attention and concentration are normal  Fund of knowledge is appropriate for level of education  Cranial Nerves  CN II: Visual acuity is normal  Visual fields full to confrontation  Right funduscopic exam: disc intact  Left funduscopic exam: disc intact  CN III, IV, VI: Extraocular movements intact bilaterally  Normal lids and orbits bilaterally  Pupils equal round and reactive to light bilaterally  CN V: Facial sensation is normal   CN VII: Full and symmetric facial movement  CN VIII: Hearing is normal   CN IX, X: Palate elevates symmetrically  Normal gag reflex  CN XI: Shoulder shrug strength is normal   CN XII: Tongue midline without atrophy or fasciculations  Motor  Normal muscle bulk throughout  Normal muscle tone  No abnormal involuntary movements  Strength is 5/5 throughout all four extremities  Sensory  Sensation is intact to light touch, pinprick, vibration and proprioception in all four extremities      Reflexes  Deep tendon reflexes are 2+ and symmetric in all four extremities with downgoing toes bilaterally  Coordination  Right: Finger-to-nose normal  Rapid alternating movement normal   Left: Finger-to-nose normal  Rapid alternating movement normal     Gait  Casual gait is normal including stance, stride, and arm swing  ROS:    Review of Systems   Constitutional: Positive for fatigue  Negative for appetite change and fever  HENT: Negative  Negative for hearing loss, tinnitus, trouble swallowing and voice change  Eyes: Negative  Negative for photophobia and pain  Respiratory: Negative  Negative for shortness of breath  Cardiovascular: Negative  Negative for palpitations  Gastrointestinal: Negative  Negative for nausea and vomiting  Endocrine: Negative  Negative for cold intolerance and heat intolerance  Genitourinary: Negative  Negative for dysuria, frequency and urgency  Musculoskeletal: Positive for arthralgias, back pain, gait problem and myalgias  Negative for neck pain  Pain while walking   Skin: Negative  Negative for rash  Neurological: Positive for dizziness, light-headedness and headaches  Negative for tremors, seizures, syncope, facial asymmetry, speech difficulty, weakness and numbness  Memory problems   Hematological: Negative  Does not bruise/bleed easily  Psychiatric/Behavioral: Positive for sleep disturbance  Negative for confusion and hallucinations  The patient is nervous/anxious           Depression

## 2019-08-22 NOTE — ASSESSMENT & PLAN NOTE
Pt here today for initial neuro consultation  Pt with 2 discrete acute episodes of severe headache during bowel movement  by 2 days  Both episodes were reproducible and associated with valsalva movement  Pt had episodes in end of May  On second event pt went to er as well  Pt only had a ct head done as part of er workup with cardiac eval negative  Of note, due to the severity of the ha, pt did have profuse sweating and feeling of near syncope with very warm at time of events  No other focal sxs at time of severity but allen was of acute onset and worst headache of her life 10/10  Pt never had headache in past like either of these 2 events  Of note, no history of migraine  occ sinus headaches  Pt strongly recommended to have a CTA head to eval for any structural pathology ie aneurysm or vascular malformation due to the reproducibility and setting of both events with increased abdominal pressure  It is of medical necessity for pt to have a vascular study as not done to date and actually denied by insurance on pcp orders  Pt to have done asap  No iodine or shellfish allergies

## 2019-08-22 NOTE — PATIENT INSTRUCTIONS
Primary thunderclap headache  Pt here today for initial neuro consultation  Pt with 2 discrete acute episodes of severe headache during bowel movement  by 2 days  Both episodes were reproducible and associated with valsalva movement  Pt had episodes in end of May  On second event pt went to er as well  Pt only had a ct head done as part of er workup with cardiac eval negative  Of note, due to the severity of the ha, pt did have profuse sweating and feeling of near syncope with very warm at time of events  No other focal sxs at time of severity but allen was of acute onset and worst headache of her life 10/10  Pt never had headache in past like either of these 2 events  Of note, no history of migraine  occ sinus headaches  Pt strongly recommended to have a CTA head to eval for any structural pathology ie aneurysm or vascular malformation due to the reproducibility and setting of both events with increased abdominal pressure  It is of medical necessity for pt to have a vascular study as not done to date and actually denied by insurance on pcp orders  Pt to have done asap  No iodine or shellfish allergies

## 2019-08-25 LAB
ALBUMIN SERPL-MCNC: 4.5 G/DL (ref 3.6–4.8)
ALBUMIN/GLOB SERPL: 2 {RATIO} (ref 1.2–2.2)
ALP SERPL-CCNC: 52 IU/L (ref 39–117)
ALT SERPL-CCNC: 15 IU/L (ref 0–32)
AST SERPL-CCNC: 17 IU/L (ref 0–40)
BILIRUB SERPL-MCNC: 0.6 MG/DL (ref 0–1.2)
BUN SERPL-MCNC: 21 MG/DL (ref 8–27)
BUN/CREAT SERPL: 28 (ref 12–28)
CALCIUM SERPL-MCNC: 9.7 MG/DL (ref 8.7–10.3)
CHLORIDE SERPL-SCNC: 103 MMOL/L (ref 96–106)
CO2 SERPL-SCNC: 29 MMOL/L (ref 20–29)
CREAT SERPL-MCNC: 0.74 MG/DL (ref 0.57–1)
ERYTHROCYTE [SEDIMENTATION RATE] IN BLOOD BY WESTERGREN METHOD: 2 MM/HR (ref 0–40)
GLOBULIN SER-MCNC: 2.2 G/DL (ref 1.5–4.5)
GLUCOSE SERPL-MCNC: 89 MG/DL (ref 65–99)
POTASSIUM SERPL-SCNC: 4.7 MMOL/L (ref 3.5–5.2)
PROT SERPL-MCNC: 6.7 G/DL (ref 6–8.5)
SL AMB EGFR AFRICAN AMERICAN: 101 ML/MIN/1.73
SL AMB EGFR NON AFRICAN AMERICAN: 88 ML/MIN/1.73
SODIUM SERPL-SCNC: 144 MMOL/L (ref 134–144)

## 2019-08-30 ENCOUNTER — TELEPHONE (OUTPATIENT)
Dept: NEUROLOGY | Facility: CLINIC | Age: 59
End: 2019-08-30

## 2019-08-30 NOTE — TELEPHONE ENCOUNTER
CTA head w/wo was approved  I did not have to speak to physician reviewer  I spoke with Jose G Huffman    Approval #[id number]  Valid 8/30- 10/28/19

## 2019-09-07 ENCOUNTER — HOSPITAL ENCOUNTER (OUTPATIENT)
Dept: CT IMAGING | Facility: HOSPITAL | Age: 59
Discharge: HOME | End: 2019-09-07
Attending: PSYCHIATRY & NEUROLOGY
Payer: COMMERCIAL

## 2019-09-07 DIAGNOSIS — G44.53 PRIMARY THUNDERCLAP HEADACHE: ICD-10-CM

## 2019-09-07 PROCEDURE — 70496 CT ANGIOGRAPHY HEAD: CPT

## 2019-09-07 RX ADMIN — IOHEXOL 90 ML: 350 INJECTION, SOLUTION INTRAVENOUS at 15:00

## 2019-09-11 ENCOUNTER — TELEPHONE (OUTPATIENT)
Dept: NEUROLOGY | Facility: CLINIC | Age: 59
End: 2019-09-11

## 2019-09-11 NOTE — TELEPHONE ENCOUNTER
----- Message from Gladys Rosario RN sent at 9/11/2019  3:10 PM EDT -----      ----- Message -----  From: Bri Aguilar MD  Sent: 9/10/2019   9:58 AM EDT  To: Neurology [facility]    Let pt know cta head normal

## 2019-09-16 DIAGNOSIS — R52 PAIN: ICD-10-CM

## 2019-09-16 RX ORDER — METAXALONE 800 MG/1
800 TABLET ORAL 3 TIMES DAILY
Qty: 90 TABLET | Refills: 1 | Status: SHIPPED | OUTPATIENT
Start: 2019-09-16 | End: 2019-10-10

## 2019-09-20 LAB
ALBUMIN SERPL-MCNC: 4.6 G/DL (ref 3.6–4.8)
ALBUMIN/GLOB SERPL: 2.1 {RATIO} (ref 1.2–2.2)
ALP SERPL-CCNC: 55 IU/L (ref 39–117)
ALT SERPL-CCNC: 18 IU/L (ref 0–32)
AST SERPL-CCNC: 21 IU/L (ref 0–40)
BILIRUB SERPL-MCNC: 0.6 MG/DL (ref 0–1.2)
BUN SERPL-MCNC: 18 MG/DL (ref 8–27)
BUN/CREAT SERPL: 23 (ref 12–28)
CALCIUM SERPL-MCNC: 10 MG/DL (ref 8.7–10.3)
CHLORIDE SERPL-SCNC: 101 MMOL/L (ref 96–106)
CO2 SERPL-SCNC: 27 MMOL/L (ref 20–29)
CREAT SERPL-MCNC: 0.77 MG/DL (ref 0.57–1)
EST. AVERAGE GLUCOSE BLD GHB EST-MCNC: 100 MG/DL
GLOBULIN SER-MCNC: 2.2 G/DL (ref 1.5–4.5)
GLUCOSE SERPL-MCNC: 87 MG/DL (ref 65–99)
HBA1C MFR BLD: 5.1 % (ref 4.8–5.6)
POTASSIUM SERPL-SCNC: 4.4 MMOL/L (ref 3.5–5.2)
PROT SERPL-MCNC: 6.8 G/DL (ref 6–8.5)
SL AMB EGFR AFRICAN AMERICAN: 96 ML/MIN/1.73
SL AMB EGFR NON AFRICAN AMERICAN: 84 ML/MIN/1.73
SODIUM SERPL-SCNC: 144 MMOL/L (ref 134–144)
T4 FREE SERPL-MCNC: 1.03 NG/DL (ref 0.82–1.77)
TSH SERPL DL<=0.005 MIU/L-ACNC: 1.32 UIU/ML (ref 0.45–4.5)

## 2019-09-24 DIAGNOSIS — M51.36 DISC DEGENERATION, LUMBAR: ICD-10-CM

## 2019-09-27 ENCOUNTER — OFFICE VISIT (OUTPATIENT)
Dept: ENDOCRINOLOGY | Facility: HOSPITAL | Age: 59
End: 2019-09-27
Payer: COMMERCIAL

## 2019-09-27 VITALS
SYSTOLIC BLOOD PRESSURE: 112 MMHG | DIASTOLIC BLOOD PRESSURE: 70 MMHG | HEIGHT: 63 IN | HEART RATE: 64 BPM | BODY MASS INDEX: 31.93 KG/M2 | WEIGHT: 180.2 LBS

## 2019-09-27 DIAGNOSIS — E03.9 HYPOTHYROIDISM, UNSPECIFIED TYPE: ICD-10-CM

## 2019-09-27 DIAGNOSIS — E06.3 HYPOTHYROIDISM DUE TO HASHIMOTO'S THYROIDITIS: Primary | ICD-10-CM

## 2019-09-27 DIAGNOSIS — E03.8 HYPOTHYROIDISM DUE TO HASHIMOTO'S THYROIDITIS: Primary | ICD-10-CM

## 2019-09-27 DIAGNOSIS — Z13.1 DIABETES MELLITUS SCREENING: ICD-10-CM

## 2019-09-27 PROCEDURE — 99214 OFFICE O/P EST MOD 30 MIN: CPT | Performed by: INTERNAL MEDICINE

## 2019-09-27 RX ORDER — LEVOTHYROXINE SODIUM 150 UG/1
TABLET ORAL
Qty: 90 TABLET | Refills: 3 | Status: SHIPPED | OUTPATIENT
Start: 2019-09-27 | End: 2020-09-10

## 2019-09-27 NOTE — PROGRESS NOTES
9/27/2019    Assessment/Plan      Diagnoses and all orders for this visit:    Hypothyroidism due to Hashimoto's thyroiditis        Assessment/Plan:  25-year-old female presents for follow-up of hypothyroidism  Clinically and biochemically doing well on current regimen  I will continue her current regimen and see her back in 1 year with labs as ordered above just prior to that appointment  She can call sooner with any questions or concerns  Diabetes screening:  Fasting sugar and A1c were both normal   In the setting of a family history we will continue discrete annually  CC:  Follow-up    History of Present Illness     HPI: Scott Duque is a 62y o  year old female with history of hypothyroidism due to Hashimoto's thyroiditis, history of gastric sleeve surgery, hyperlipidemia presents for follow-up appointment  She is maintained on Synthroid brand 150 mcg tablets and takes 1 tablet all 7 days of the week  She presents today overall feeling well  She does note some persistent fatigue as well as some difficulty sleeping  She states she had a sleep study prior to her gastric sleeve surgery that reportedly showed mild sleep apnea but nothing more severe  Review of Systems   Constitutional: Positive for fatigue  HENT: Negative for trouble swallowing and voice change  Eyes: Negative for visual disturbance  Respiratory: Negative for shortness of breath  Cardiovascular: Negative for palpitations and leg swelling  Gastrointestinal: Negative for abdominal pain, nausea and vomiting  Endocrine: Negative for polydipsia and polyuria  Musculoskeletal: Negative for arthralgias and myalgias  Skin: Negative for rash  Neurological: Negative for dizziness, tremors and weakness  Hematological: Negative for adenopathy  Psychiatric/Behavioral: Negative for agitation and confusion         Historical Information   Past Medical History:   Diagnosis Date    Bariatric surgery status     Depression last assessed: 2018     Disturbance of memory     last assessed: 2016     Endometriosis     Hirsutism     History of sleeve gastrectomy     Menometrorrhagia     Morbid obesity ([address] )     last assessed: Dec 5, 2014     Postgastrectomy malabsorption     Sclerosing adenosis of breast     Symptomatic menopausal or female climacteric states     Uterine leiomyoma      Past Surgical History:   Procedure Laterality Date    APPENDECTOMY      BREAST BIOPSY      COLONOSCOPY      complete - 7 year repeat recommended - Resolved:      DILATION AND CURETTAGE OF UTERUS      GASTRECTOMY SLEEVE LAPAROSCOPIC  2018    GYNECOLOGIC CRYOSURGERY      Cervix - 's for abnormal paps     HYSTERECTOMY      INDUCED       x3    LAPAROSCOPIC SALPINGOOPHERECTOMY Right     LAPAROSCOPIC TOTAL HYSTERECTOMY      06, Laparoscopic hysterectomy with LSO with vaginal closure of the vaginal cuff     SALPINGOOPHORECTOMY Left     STOMACH SURGERY  2014    for morbid obesity - Managed by: Merissa [address] EXTRACTION       Social History   Social History     Substance and Sexual Activity   Alcohol Use Yes    Comment: occasional     Social History     Substance and Sexual Activity   Drug Use No     Social History     Tobacco Use   Smoking Status Former Smoker    Last attempt to quit: Marbella Burroughs Years since quittin 7   Smokeless Tobacco Former User     Family History:   Family History   Problem Relation Age of Onset    Alzheimer's disease Mother     Stroke Mother     Dementia Mother     Diabetes Father         mellitus     Prostate cancer Father     Rectal cancer Father     Lung cancer Father     Liver cancer Father     Heart attack Father     Colon polyps Sister        Meds/Allergies   Current Outpatient Medications   Medication Sig Dispense Refill    Biotin 5000 MCG CAPS Take 1 capsule by mouth daily      calcium citrate-vitamin D (CALCIUM CITRATE +) 315-200 MG-UNIT per tablet Calcium Citrate CAPS   Refills: 0    Active      Cholecalciferol (VITAMIN D3) 2000 units TABS Take 1 tablet by mouth daily      Cyanocobalamin (VITAMIN B-12) 1000 MCG SUBL Place 1 Dose under the tongue daily      LORazepam (ATIVAN) 0 5 mg tablet 1 tab PO 30 min prior to procedure , may repeat x 1 (Patient not taking: Reported on 8/22/2019) 2 tablet 0    metaxalone (SKELAXIN) 800 mg tablet Take 1 tablet (800 mg total) by mouth 3 (three) times a day 90 tablet 1    methylPREDNISolone 4 MG tablet therapy pack Use as directed on package (Patient not taking: Reported on 8/22/2019) 21 tablet 0    Multiple Vitamins-Minerals (CENTRUM SILVER) tablet Take 1 tablet by mouth daily      omeprazole (PriLOSEC) 20 mg delayed release capsule Take 1 capsule by mouth daily      SYNTHROID 150 MCG tablet 1 tab daily  Brand necessary  32 tablet 0    traMADol-acetaminophen (ULTRACET) 37 5-325 mg per tablet TAKE 1 TABLET BY MOUTH EVERY 6 (SIX) HOURS AS NEEDED FOR MODERATE PAIN 30 tablet 0    triamcinolone (KENALOG) 0 5 % cream Apply to rash as needed bid x 10 days (Patient not taking: Reported on 8/22/2019) 30 g 0    venlafaxine (EFFEXOR-XR) 150 mg 24 hr capsule TAKE 1 CAPSULE BY MOUTH EVERY DAY 30 capsule 5     No current facility-administered medications for this visit  No Known Allergies    Objective   Vitals: There were no vitals taken for this visit  Invasive Devices     None                 Physical Exam   Constitutional: She is oriented to person, place, and time  She appears well-developed and well-nourished  No distress  HENT:   Head: Normocephalic and atraumatic  Neck: Normal range of motion  Neck supple  No thyromegaly present  Cardiovascular: Normal rate and regular rhythm  Pulmonary/Chest: Effort normal and breath sounds normal  No respiratory distress  Abdominal: Soft  Bowel sounds are normal    Musculoskeletal: Normal range of motion  She exhibits no edema  Neurological: She is alert and oriented to person, place, and time  She exhibits normal muscle tone  Skin: Skin is warm and dry  No rash noted  She is not diaphoretic  Psychiatric: She has a normal mood and affect  Her behavior is normal    Vitals reviewed  The history was obtained from the review of the chart and from the patient      Lab Results:      Recent Results (from the past [id number] hour(s))   TSH, 3rd generation Lab Collect    Collection Time: 09/05/18  9:12 AM   Result Value Ref Range    TSH 0 132 (L) 0 450 - 4 500 uIU/mL   T4, free Lab Collect    Collection Time: 09/05/18  9:12 AM   Result Value Ref Range    Free t4 1 37 0 82 - 1 77 ng/dL   Comprehensive metabolic panel Lab Collect    Collection Time: 09/05/18  9:12 AM   Result Value Ref Range    Glucose, Random 83 65 - 99 mg/dL    BUN 22 8 - 27 mg/dL    Creatinine 0 63 0 57 - 1 00 mg/dL    eGFR Non  98 >59 mL/min/1 73    eGFR  113 >59 mL/min/1 73    SL AMB BUN/CREATININE RATIO 35 (H) 12 - 28    Sodium 142 134 - 144 mmol/L    Potassium 4 7 3 5 - 5 2 mmol/L    Chloride 100 96 - 106 mmol/L    CO2 28 20 - 29 mmol/L    CALCIUM 9 6 8 7 - 10 3 mg/dL    Protein, Total 6 8 6 0 - 8 5 g/dL    Albumin 4 5 3 6 - 4 8 g/dL    Globulin, Total 2 3 1 5 - 4 5 g/dL    Albumin/Globulin Ratio 2 0 1 2 - 2 2    TOTAL BILIRUBIN 0 6 0 0 - 1 2 mg/dL    Alk Phos Isoenzymes 61 39 - 117 IU/L    AST 25 0 - 40 IU/L    ALT 21 0 - 32 IU/L   TSH, 3rd generation Lab Collect    Collection Time: 09/25/18  9:06 AM   Result Value Ref Range    TSH 0 171 (L) 0 450 - 4 500 uIU/mL   T4, free Lab Collect    Collection Time: 09/25/18  9:06 AM   Result Value Ref Range    Free t4 1 44 0 82 - 1 77 ng/dL   TSH, 3rd generation Lab Collect    Collection Time: 11/08/18  9:06 AM   Result Value Ref Range    TSH 0 346 (L) 0 450 - 4 500 uIU/mL   T4, free Lab Collect    Collection Time: 11/08/18  9:06 AM   Result Value Ref Range    Free t4 1 21 0 82 - 1 77 ng/dL   Lipid panel    Collection Time: 11/08/18  9:07 AM   Result Value Ref Range    Cholesterol, Total 217 (H) 100 - 199 mg/dL    Triglycerides 160 (H) 0 - 149 mg/dL    HDL 60 >39 mg/dL    VLDL Cholesterol Calculated 32 5 - 40 mg/dL    LDL Direct 125 (H) 0 - 99 mg/dL    T   Chol/HDL Ratio 3 6 0 0 - 4 4 ratio   TSH, 3rd generation Lab Collect    Collection Time: 12/21/18 10:39 AM   Result Value Ref Range    TSH 0 682 0 450 - 4 500 uIU/mL   T4, free Lab Collect    Collection Time: 12/21/18 10:39 AM   Result Value Ref Range    Free t4 1 32 0 82 - 1 77 ng/dL   ECG 12 lead    Collection Time: 05/31/19 11:02 AM   Result Value Ref Range    Ventricular Rate 61 BPM    Atrial Rate 61 BPM    OH Interval 152 ms    QRSD Interval 94 ms    QT Interval 430 ms    QTC Interval 432 ms    P Eagle 53 degrees    QRS Axis 6 degrees    T Wave Axis -1 degrees   CBC and differential    Collection Time: 05/31/19 11:57 AM   Result Value Ref Range    WBC 18 69 (H) 4 31 - 10 16 Thousand/uL    RBC 4 95 3 81 - 5 12 Million/uL    Hemoglobin 14 6 11 5 - 15 4 g/dL    Hematocrit 43 4 34 8 - 46 1 %    MCV 88 82 - 98 fL    MCH 29 5 26 8 - 34 3 pg    MCHC 33 6 31 4 - 37 4 g/dL    RDW 12 3 11 6 - 15 1 %    MPV 10 0 8 9 - 12 7 fL    Platelets 966 732 - 435 Thousands/uL    nRBC 0 /100 WBCs    Neutrophils Relative 75 43 - 75 %    Immat GRANS % 1 0 - 2 %    Lymphocytes Relative 16 14 - 44 %    Monocytes Relative 8 4 - 12 %    Eosinophils Relative 0 0 - 6 %    Basophils Relative 0 0 - 1 %    Neutrophils Absolute 14 10 (H) 1 85 - 7 62 Thousands/µL    Immature Grans Absolute 0 10 0 00 - 0 20 Thousand/uL    Lymphocytes Absolute 3 05 0 60 - 4 47 Thousands/µL    Monocytes Absolute 1 40 (H) 0 17 - 1 22 Thousand/µL    Eosinophils Absolute 0 01 0 00 - 0 61 Thousand/µL    Basophils Absolute 0 03 0 00 - 0 10 Thousands/µL   Comprehensive metabolic panel    Collection Time: 05/31/19 11:57 AM   Result Value Ref Range    Sodium 141 136 - 145 mmol/L    Potassium 3 9 3 5 - 5 3 mmol/L    Chloride 102 100 - 108 mmol/L    CO2 28 21 - 32 mmol/L    ANION GAP 11 4 - 13 mmol/L    BUN 25 5 - 25 mg/dL    Creatinine 0 84 0 60 - 1 30 mg/dL    Glucose 88 65 - 140 mg/dL    Calcium 9 9 8 3 - 10 1 mg/dL    AST 26 5 - 45 U/L    ALT 37 12 - 78 U/L    Alkaline Phosphatase 55 46 - 116 U/L    Total Protein 8 0 6 4 - 8 2 g/dL    Albumin 4 0 3 5 - 5 0 g/dL    Total Bilirubin 0 50 0 20 - 1 00 mg/dL    eGFR 75 ml/min/1 73sq m   Comprehensive metabolic panel    Collection Time: 08/24/19  8:28 AM   Result Value Ref Range    Glucose, Random 89 65 - 99 mg/dL    BUN 21 8 - 27 mg/dL    Creatinine 0 74 0 57 - 1 00 mg/dL    eGFR Non  88 >59 mL/min/1 73    eGFR  101 >59 mL/min/1 73    SL AMB BUN/CREATININE RATIO 28 12 - 28    Sodium 144 134 - 144 mmol/L    Potassium 4 7 3 5 - 5 2 mmol/L    Chloride 103 96 - 106 mmol/L    CO2 29 20 - 29 mmol/L    CALCIUM 9 7 8 7 - 10 3 mg/dL    Protein, Total 6 7 6 0 - 8 5 g/dL    Albumin 4 5 3 6 - 4 8 g/dL    Globulin, Total 2 2 1 5 - 4 5 g/dL    Albumin/Globulin Ratio 2 0 1 2 - 2 2    TOTAL BILIRUBIN 0 6 0 0 - 1 2 mg/dL    Alk Phos Isoenzymes 52 39 - 117 IU/L    AST 17 0 - 40 IU/L    ALT 15 0 - 32 IU/L   Sedimentation rate, automated    Collection Time: 08/24/19  8:29 AM   Result Value Ref Range    Sedimentation Rate 2 0 - 40 mm/hr   Comprehensive metabolic panel Lab Collect    Collection Time: 09/19/19  9:15 AM   Result Value Ref Range    Glucose, Random 87 65 - 99 mg/dL    BUN 18 8 - 27 mg/dL    Creatinine 0 77 0 57 - 1 00 mg/dL    eGFR Non  84 >59 mL/min/1 73    eGFR  96 >59 mL/min/1 73    SL AMB BUN/CREATININE RATIO 23 12 - 28    Sodium 144 134 - 144 mmol/L    Potassium 4 4 3 5 - 5 2 mmol/L    Chloride 101 96 - 106 mmol/L    CO2 27 20 - 29 mmol/L    CALCIUM 10 0 8 7 - 10 3 mg/dL    Protein, Total 6 8 6 0 - 8 5 g/dL    Albumin 4 6 3 6 - 4 8 g/dL    Globulin, Total 2 2 1 5 - 4 5 g/dL    Albumin/Globulin Ratio 2  1 1 2 - 2 2    TOTAL BILIRUBIN 0 6 0 0 - 1 2 mg/dL    Alk Phos Isoenzymes 55 39 - 117 IU/L    AST 21 0 - 40 IU/L    ALT 18 0 - 32 IU/L   HEMOGLOBIN A1C W/ EAG ESTIMATION Lab Collect    Collection Time: 09/19/19  9:15 AM   Result Value Ref Range    Hemoglobin A1C 5 1 4 8 - 5 6 %    Estimated Average Glucose 100 mg/dL   T4, free    Collection Time: 09/19/19  9:15 AM   Result Value Ref Range    Free t4 1 03 0 82 - 1 77 ng/dL   TSH, 3rd generation    Collection Time: 09/19/19  9:15 AM   Result Value Ref Range    TSH 1 320 0 450 - 4 500 uIU/mL         Future Appointments   Date Time Provider Red Renteria   12/12/2019 10:30 AM Viraj Hwang MD [facility]   12/16/2019 11:00 AM Clement Rivero MD [facility]   9/30/2020 11:00 AM Miracle Cannon PA-C [address] [facility]       Portions of the record may have been created with voice recognition software  Occasional wrong word or "sound a like" substitutions may have occurred due to the inherent limitations of voice recognition software  Read the chart carefully and recognize, using context, where substitutions have occurred

## 2019-10-02 ENCOUNTER — TELEPHONE (OUTPATIENT)
Dept: FAMILY MEDICINE CLINIC | Facility: HOSPITAL | Age: 59
End: 2019-10-02

## 2019-10-02 DIAGNOSIS — E03.9 HYPOTHYROIDISM, UNSPECIFIED TYPE: ICD-10-CM

## 2019-10-02 RX ORDER — LEVOTHYROXINE SODIUM 150 UG/1
TABLET ORAL
Qty: 32 TABLET | Refills: 11 | OUTPATIENT
Start: 2019-10-02

## 2019-10-02 NOTE — TELEPHONE ENCOUNTER
Insurance co denied refill of Metaxalone  Pt asking what else can be done, Tramadol alone is not helping   PCB

## 2019-10-03 ENCOUNTER — TELEPHONE (OUTPATIENT)
Dept: ENDOCRINOLOGY | Facility: HOSPITAL | Age: 59
End: 2019-10-03

## 2019-10-03 NOTE — TELEPHONE ENCOUNTER
Received voicemail from patient  She was questioning out her last lipid panel was  The last record we have of a lipid panel was 11/8/18  Left message for patient informing her of this

## 2019-10-03 NOTE — TELEPHONE ENCOUNTER
Why did insurance deny the muscle relaxer?  Is there a formulary list of muscle relaxer's they will cover

## 2019-10-04 NOTE — TELEPHONE ENCOUNTER
Medication needs a prior auth, no preferred medication listed with pharmacy   [facility] is initiating the prior auth

## 2019-10-10 DIAGNOSIS — M51.36 DISC DEGENERATION, LUMBAR: Primary | ICD-10-CM

## 2019-10-10 RX ORDER — CYCLOBENZAPRINE HCL 5 MG
5 TABLET ORAL 3 TIMES DAILY PRN
Qty: 30 TABLET | Refills: 0 | Status: SHIPPED | OUTPATIENT
Start: 2019-10-10 | End: 2020-03-26

## 2019-11-14 DIAGNOSIS — K21.9 GASTROESOPHAGEAL REFLUX DISEASE, ESOPHAGITIS PRESENCE NOT SPECIFIED: Primary | ICD-10-CM

## 2019-11-14 RX ORDER — OMEPRAZOLE 20 MG/1
20 CAPSULE, DELAYED RELEASE ORAL 2 TIMES DAILY
Qty: 60 CAPSULE | Refills: 2 | Status: SHIPPED | OUTPATIENT
Start: 2019-11-14 | End: 2020-12-20

## 2019-12-11 ENCOUNTER — TRANSCRIBE ORDERS (OUTPATIENT)
Dept: NEUROLOGY | Facility: CLINIC | Age: 59
End: 2019-12-11

## 2019-12-11 DIAGNOSIS — R41.3 OTHER AMNESIA: ICD-10-CM

## 2019-12-11 DIAGNOSIS — G44.53 PRIMARY THUNDERCLAP HEADACHE: Primary | ICD-10-CM

## 2019-12-12 ENCOUNTER — TELEPHONE (OUTPATIENT)
Dept: NEUROLOGY | Facility: CLINIC | Age: 59
End: 2019-12-12

## 2019-12-12 ENCOUNTER — OFFICE VISIT (OUTPATIENT)
Dept: NEUROLOGY | Facility: CLINIC | Age: 59
End: 2019-12-12
Payer: COMMERCIAL

## 2019-12-12 VITALS
DIASTOLIC BLOOD PRESSURE: 62 MMHG | HEART RATE: 66 BPM | BODY MASS INDEX: 33.2 KG/M2 | SYSTOLIC BLOOD PRESSURE: 118 MMHG | WEIGHT: 187.4 LBS

## 2019-12-12 DIAGNOSIS — G44.53 PRIMARY THUNDERCLAP HEADACHE: ICD-10-CM

## 2019-12-12 DIAGNOSIS — R41.3 OTHER AMNESIA: ICD-10-CM

## 2019-12-12 PROCEDURE — 99213 OFFICE O/P EST LOW 20 MIN: CPT | Performed by: PSYCHIATRY & NEUROLOGY

## 2019-12-12 NOTE — PATIENT INSTRUCTIONS
Primary thunderclap headache  Pt here for neuro follow  Overall doing well  No recurrent episodes with severity of ha  Pt notes no new sxs  No recent hospitalizations  Pt exam is stable and non focal  Pt did have updated cta head to eval for any underlying structural pathology  CTA head normal  Rev studies with pt  Pt to go to er if ever an acute or severe ha or change in sxs

## 2019-12-12 NOTE — TELEPHONE ENCOUNTER
Patient calling to see if you could sign off for her to receive anesthesia for her colonoscopy  States that she forgot to ask during her appointment today  The colonoscopy is out of network

## 2019-12-12 NOTE — ASSESSMENT & PLAN NOTE
Pt here for neuro follow  Overall doing well  No recurrent episodes with severity of ha  Pt notes no new sxs  No recent hospitalizations  Pt exam is stable and non focal  Pt did have updated cta head to eval for any underlying structural pathology  CTA head normal  Rev studies with pt  Pt to go to er if ever an acute or severe ha or change in sxs

## 2019-12-12 NOTE — PROGRESS NOTES
Patient ID: Basim Og is a 62 y o  female  Assessment/Plan:    Primary thunderclap headache  Pt here for neuro follow  Overall doing well  No recurrent episodes with severity of ha  Pt notes no new sxs  No recent hospitalizations  Pt exam is stable and non focal  Pt did have updated cta head to eval for any underlying structural pathology  CTA head normal  Rev studies with pt  Pt to go to er if ever an acute or severe ha or change in sxs       Diagnoses and all orders for this visit:    Primary thunderclap headache  -     Ambulatory referral to Neurology    Other amnesia  -     Ambulatory referral to Neurology           Subjective:    HPI  Pt here today for follow up neuro consultation  Pt is a 61 yo f with pmh of hypothyroidism on synthroid, hypercholesterolemia and arthritis who presents today due to  2 discrete acute episodes of severe headache during bowel movement  by 2 days  Pt was last seen on 8/22/19 by me  Per my last note "  Pt notes sxs started at the end of may  Pt recalls going to bathroom to have a bowel movement with minimal straining, and all of a sudden had the worst headache of her life  Pt was so concerned she thought her family would find her if she did not contact someone quickly  Pt became very diaphortetic and diffuse sweating  Pt notes sxs started right with the valsalva  Pt notes sxs subsided after about one hour  Pt needed to cool down but recalls being able to speak with spouse and relay how she was feeling  Pt also with feeling of near syncope  No change in vision  No loss of vsion  No change in speech or swallowing  Pt had a repeat nearly identical episode about 2 days later  On second event pt did go to er  Pt had cardiac eval which was neg  Pt had ct head no acute abn    Of note, due to the severity of the ha, pt did have profuse sweating and feeling of near syncope with very warm at time of events on both occassions  "  Kept above paragraph due to the transient nature of both episodes and complexities of pt case  Pt here today for neuro follow up  Overall pt doing well,  No new sxs  No recurrence of headache to date  No further episodes  No loc  No sz  No change in bowel or bladder  No change in vision  No loss of vision  No change in speech or swallowing  No n or v   No dizziness or vertigo  No diplopia  Pt notes no recent hospitalizations since last visit  No recent infections  Pt had cta head to help complete work up due to the severity and intensity of the headaches  cta head negative  Study done on 9/7/19  IMPRESSION:CT brain: Normal CT angiography of the head and neck: Normal   Pt notes history of gastric sleeve  Pt cannot take asa  Pt notes history of sinus issues in the past   Pt notes fall season particularly bad with her sinus due to molds and pollen  Pt also had sed rate done due to presentation and mri head and sed rate was normal at 2  Pt denies any other new sxs  Pt with maternal history of cva and alz disease  Pt is seeing dr wright for eval based on her family history  No active memory issues or diff with concentration at this time      pt aware if ever worst headache or her life or severe ha, she should return to er  The following portions of the patient's history were reviewed and updated as appropriate: allergies, current medications, past family history, past medical history, past social history, past surgical history and problem list and ros rev  Objective:    Blood pressure 118/62, pulse 66, weight 85 kg (187 lb 6 4 oz)  Physical Exam   Constitutional: She appears well-developed and well-nourished  Eyes: Pupils are equal, round, and reactive to light  Lids are normal    Cardiovascular: Intact distal pulses  Neurological: She has normal strength and normal reflexes  Psychiatric: Her speech is normal        Neurological Exam  Mental Status  Awake, alert and oriented to person, place and time  Recent and remote memory are intact  Speech is normal  Language is fluent with no aphasia  Attention and concentration are normal  Fund of knowledge is appropriate for level of education  Cranial Nerves  CN II: Visual acuity is normal  Visual fields full to confrontation  Right funduscopic exam: disc intact  Left funduscopic exam: disc intact  CN III, IV, VI: Extraocular movements intact bilaterally  Normal lids and orbits bilaterally  Pupils equal round and reactive to light bilaterally  CN V: Facial sensation is normal   CN VII: Full and symmetric facial movement  CN VIII: Hearing is normal   CN IX, X: Palate elevates symmetrically  Normal gag reflex  CN XI: Shoulder shrug strength is normal   CN XII: Tongue midline without atrophy or fasciculations  Motor  Normal muscle bulk throughout  Normal muscle tone  No abnormal involuntary movements  Strength is 5/5 throughout all four extremities  Sensory  Sensation is intact to light touch, pinprick, vibration and proprioception in all four extremities  Reflexes  Deep tendon reflexes are 2+ and symmetric in all four extremities with downgoing toes bilaterally  Coordination  Right: Finger-to-nose normal  Rapid alternating movement normal   Left: Finger-to-nose normal  Rapid alternating movement normal     Gait  Normal casual, toe, heel and tandem gait  ROS:    Review of Systems   Constitutional: Positive for fatigue  Negative for appetite change and fever  HENT: Negative  Negative for hearing loss, tinnitus, trouble swallowing and voice change  Sinus problems   Eyes: Negative  Negative for photophobia and pain  Respiratory: Positive for cough  Negative for shortness of breath  Cardiovascular: Negative  Negative for palpitations  Gastrointestinal: Negative  Negative for nausea and vomiting  Endocrine: Negative  Negative for cold intolerance and heat intolerance  Genitourinary: Negative    Negative for dysuria, frequency and urgency  Musculoskeletal: Positive for arthralgias, back pain and gait problem  Negative for myalgias and neck pain  Pain while walking   Skin: Negative  Negative for rash  Neurological: Negative for dizziness, tremors, seizures, syncope, facial asymmetry, speech difficulty, weakness, light-headedness, numbness and headaches  Memory problems   Hematological: Negative  Does not bruise/bleed easily  Psychiatric/Behavioral: Positive for sleep disturbance  Negative for confusion and hallucinations  The patient is nervous/anxious           Depression

## 2019-12-13 ENCOUNTER — TELEPHONE (OUTPATIENT)
Dept: FAMILY MEDICINE CLINIC | Facility: HOSPITAL | Age: 59
End: 2019-12-13

## 2019-12-13 NOTE — TELEPHONE ENCOUNTER
Pt is having a colonoscopy sometime in February (not scheduled yet) and the doc Micha Meyers needs her cleared for anesthesia before she will do the colonoscopy because of her headaches  She sees Dr Geremias Figueroa a neurologist for her headaches   Please advise

## 2019-12-13 NOTE — TELEPHONE ENCOUNTER
I can see her and clear her but if Dr Pretty Varghese will just write her a letter stating she is cleared that is fine - which ever pt wishes

## 2019-12-13 NOTE — TELEPHONE ENCOUNTER
GI won't do colonoscopy w/o clearance from you - assuming this is just a general pre-op? She doesn't have any forms  Not scheduled yet - waiting for clearance to be scheduled  She's thinking sometime in January or February

## 2019-12-13 NOTE — TELEPHONE ENCOUNTER
Dr Igor Kirkpatrick had told her that we supposedly had a form that needs to be filled out  I told her it normally comes from the physician doing the procedure  She is going to contact her GI and see if they can send a form to Dr Dali Barajas to fill out  If she needs a pre-op from you she will call back

## 2019-12-13 NOTE — TELEPHONE ENCOUNTER
Patient made aware to contact PCP regarding authorization for anesthesia for colonoscopy  Forwarding to PCP

## 2019-12-13 NOTE — TELEPHONE ENCOUNTER
Dr Lavon Leon just sent a note to us that preoperative needs to be done by PCP - this is usually how it works and we clear the pt    However I have never been asked to clear someone for a colonoscopy d/t a migraine history that is the only reason I said to ask Neuro - Neuro is deferring so pt needs to make appt

## 2019-12-16 ENCOUNTER — OFFICE VISIT (OUTPATIENT)
Dept: NEUROLOGY | Facility: CLINIC | Age: 59
End: 2019-12-16
Payer: COMMERCIAL

## 2019-12-16 VITALS
DIASTOLIC BLOOD PRESSURE: 74 MMHG | HEIGHT: 63 IN | BODY MASS INDEX: 33.13 KG/M2 | WEIGHT: 187 LBS | SYSTOLIC BLOOD PRESSURE: 100 MMHG

## 2019-12-16 DIAGNOSIS — R41.3 DISTURBANCE OF MEMORY: Primary | ICD-10-CM

## 2019-12-16 DIAGNOSIS — G44.53 PRIMARY THUNDERCLAP HEADACHE: ICD-10-CM

## 2019-12-16 DIAGNOSIS — G47.00 INSOMNIA, UNSPECIFIED TYPE: ICD-10-CM

## 2019-12-16 DIAGNOSIS — F32.A DEPRESSION, UNSPECIFIED DEPRESSION TYPE: ICD-10-CM

## 2019-12-16 DIAGNOSIS — R41.840 INATTENTION: ICD-10-CM

## 2019-12-16 PROCEDURE — 99215 OFFICE O/P EST HI 40 MIN: CPT | Performed by: PSYCHIATRY & NEUROLOGY

## 2019-12-16 NOTE — PROGRESS NOTES
Assessment/Plan:    Disturbance of memory  24-year-old woman with 2 prior thunderclap headaches presents for evaluation memory troubles and a family history of Alzheimer's disease  Patient multi describes some inattentive memory lapses and word-finding troubles  These seem commensurate with normal cognitive aging in the setting active depression, anxiety and poor sleep  She does well on bedside testing scoring a 28/30 on the MoCA without any semantic poverty  We discussed the diagnosis and contribution from her mood and sleep troubles  Discussed lifestyle modifications to promote healthy cognition  Discussed advanced testing which could be done including lumbar puncture or amyloid PET scan which the patient is not interested in at this time  - may benefit from mental health services   - sleep hygiene     I reviewed the patient's testing to date and prior MRI scan  Medial temporal lobe structures look normal   All of this is very reassuring  She has had no further thunderclap headaches  Workup for this is been unremarkable  No need for ongoing neurology follow-up at this time  I have spent 60 minutes with Patient  today in which greater than 50% of this time was spent in counseling/coordination of care regarding Diagnostic results, Prognosis, Intructions for management, Patient and family education and Impressions  Diagnoses and all orders for this visit:    Disturbance of memory    Depression, unspecified depression type    Insomnia, unspecified type    Inattention    Primary thunderclap headache        Subjective:     Patient ID: Lorie Peterson is a 62 y o  female  I had the pleasure of seeing your patient, Lorie Peterson in the [facility] at the [address] [place] [address] Neuroscience  The patient is a 24-year-old woman previously followed by Dr Alfonso Antony for 2 episodes of thunderclap headache which have not recurred    She underwent an MRI of the brain and CTA of the head which were largely unremarkable  I reviewed both today  The MRI does show very subtle white matter disease  At this point the patient complains primarily of word-finding difficulty  This bothers the patient quite a lot she does enjoy words in often plays word games and is an avid reader  Words always come back to her but after some delay  She has noticed these changes over the last year but has gotten worse over the past 6 months  She also describes some inattentive lapses such as dialing somewhat on her land line then thinking we did I just call  Were driving in her car and momentarily forgetting where she was going  Daughter is a ariella in college  Navigation: No getting lost  Never great at direction  Can always self correct  Story: no issues  Sometimes I find that I'm not listening as well as I should be  Repeating: no     Mother with AD, strokes  MGM with strokes and dementia  Mother  in her mid [de-identified], 11 years of illness   professionally  Not sure if she trusts herself with babies anymore  Education through associates degree  Average student  Driving: no issues  Living situation: at home, with a cat   at home  Some stress at home  Conflicts with her   Daily activities: walks the dogs but not much else  Sleep: Wakes up around 2am and can't get back to sleep  Not well rested in the AM  Tired all the time  Falls asleep in the chair  Mood: ok  Less patient that she used to be  Depression and anxiety history  Feels anxious  Picking at her fingers  Started post-partum  zoloft helped in the past but not covered anymore  More anxious over the past couple of years  1    Manages the house hold independently  Help with finances: don't a certain set without issues  Pays some bills     Help with taking medication: no   Help preparing meals: no   Help with eating: no   Help with house hold tasks: no   Help with bathing and showering: no  Help with dressing: no Help with toileting: no   Hallucination, delusions, paranoia: no   Wandering/safety concerns: no       The following portions of the patient's history were reviewed and updated as appropriate: allergies, current medications, past family history, past medical history, past social history, past surgical history and problem list       Objective:  /74 (BP Location: Right arm, Patient Position: Sitting, Cuff Size: Standard)   Ht 5' 3" (1 6 m)   Wt 84 8 kg (187 lb)   BMI 33 13 kg/m²     Physical Exam    Neurological Exam     GENERAL MEDICAL EXAMINATION:  General appearance: alert, in no apparent distress  Appropriately dressed and groomed  Conversing and interacting appropriately  Eyes: Sclera are non-injected  Ears, nose, Mouth, Throat: Mucous membranes are moist    Resp: Breathing comfortably on RA   Musculoskeletal: No evidence of deformities  No contractures  No Edema  Skin: No visible rashes  Warm and well perfused  Psych: normal and appropriate affect     Mental Status:  Alert and oriented to person place and time  Able to relate history without difficulty  Attentive to conversation  Language is fluent and appropriate with normal prosody  There were no paraphasic errors  Speech was not dysarthric  Able to follow both midline and appendicular commands  MoCA: 29/30  Visuospatial/executive: 5/5  Naming: 3/3  Attention: 6/6  Language: 3/3  Abstraction: 1/2   Memory: 4/5 (1 additional with category; x with list)   Orientation: 6/6    Phonemic verbal fluency: 14 F words   Semantic verbal Fluency: 32 animals       General Neurology examination:   PERRL, EOMI, Face activates symmetrically  Normal bulk and tone throughout  Patient moves all 4 extremities equally and antigravity  No pronator drift  There were no adventitious movements noted  Normal sensation to light touch and temperature in all 4 extremities  Finger to nose without dysmetria bilaterally  No intention tremor   Fine HILDA were accurate and symmetric with regard to donaldo and speed  Gait:   Able to rise from a chair without the use of arms  Posture was normal  Narrow-base and stable stance  Normal initiation  Normal stride length, step height, arm swing  Turn completed in 2 steps  Romberg is negative  No truncal ataxia  Tandem gait is stable  Review of Systems   Constitutional: Positive for unexpected weight change  Negative for appetite change and fever  HENT: Negative  Negative for hearing loss, tinnitus, trouble swallowing and voice change  Eyes: Negative  Negative for photophobia and pain  Respiratory: Negative  Negative for shortness of breath  Cardiovascular: Negative  Negative for palpitations  Gastrointestinal: Negative  Negative for nausea and vomiting  Endocrine: Negative  Negative for cold intolerance and heat intolerance  Hair loss     Genitourinary: Negative  Negative for dysuria, frequency and urgency  Musculoskeletal: Positive for back pain, gait problem (pain while walking, falls, and balance problems) and joint swelling  Negative for myalgias and neck pain  Skin: Negative  Negative for rash  Neurological: Negative for dizziness, tremors, seizures, syncope, facial asymmetry, speech difficulty, weakness, light-headedness, numbness and headaches  Memory problems   Hematological: Negative  Does not bruise/bleed easily  Psychiatric/Behavioral: Positive for confusion and sleep disturbance (increased sleepiness, waking up at night, and trouble falling asleep)  Negative for hallucinations  The patient is nervous/anxious  Depression   The above ROS was reviewed and updated       Padma Quinn MD  Medical Director   [facility]  [facility]       Current Outpatient Medications on File Prior to Visit   Medication Sig Dispense Refill    Biotin 5000 MCG CAPS Take 1 capsule by mouth daily      calcium citrate-vitamin D (CALCIUM CITRATE +) 315-200 MG-UNIT per tablet Calcium Citrate CAPS   Refills: 0    Active      Cholecalciferol (VITAMIN D3) 2000 units TABS Take 1 tablet by mouth daily      Cyanocobalamin (VITAMIN B-12) 1000 MCG SUBL Place 1 Dose under the tongue daily      cyclobenzaprine (FLEXERIL) 5 mg tablet Take 1 tablet (5 mg total) by mouth 3 (three) times a day as needed for muscle spasms 30 tablet 0    Multiple Vitamins-Minerals (CENTRUM SILVER) tablet Take 1 tablet by mouth daily      omeprazole (PriLOSEC) 20 mg delayed release capsule Take 1 capsule (20 mg total) by mouth 2 (two) times a day (Patient taking differently: Take 20 mg by mouth as needed ) 60 capsule 2    SYNTHROID 150 MCG tablet 1 tab daily  Brand necessary  90 tablet 3    traMADol-acetaminophen (ULTRACET) 37 5-325 mg per tablet TAKE 1 TABLET BY MOUTH EVERY 6 (SIX) HOURS AS NEEDED FOR MODERATE PAIN 30 tablet 0    venlafaxine (EFFEXOR-XR) 150 mg 24 hr capsule TAKE 1 CAPSULE BY MOUTH EVERY DAY 30 capsule 5     No current facility-administered medications on file prior to visit

## 2019-12-16 NOTE — PATIENT INSTRUCTIONS
Things that we know are helpful for thinking and memory   1) Exercise program: gradually increase your physical activity over time  Start small and be patient  Aerobic (cardio) activity is best but incorporate balance, strength and flexibility training as well    a  Try to get at least 30min 3 times per week   2) Diet: Mediterranean diet (colorful fruits and vegetables, olive oil, fish, whole grains, very little red meet is any), MIND diet, anti-inflammatory diet  a  Stay well hydrated: drink 6-8 glasses of water per day   b  What's good for your heart is good for your brain  c  Avoid high-salt foods   3) Sleep: aim for at least 7-8 hours per night   a  Avoid alcohol and medications like Benadryl (Tylenol PM) or other sedating drugs  4) Stress management/mindfulness practice:   a  Talk with our social workers about finding a cognitive behavioral therapists  b  Try a smart phone diego like [employer] or [employer] for beginners   i  Try curable for pain    c  Take a course in Mindfulness Based Stress Reduction (MBSR)   i  Emily weston  Read or listen to an audiobook about it:  i  Mindfulness for beginners   ii  10% happier  iii  The happiness advantage   5) Social engagement:   a  Stay in touch with family and friends   b  Plan a few specific activities for your social health every week   c  Pilar sosa local support group   d  Volunteer! www [email or web address] or call [phone]    MIND diet score:  1 point for each component      - Green leafy vegetables: at least 6 per week  - Other vegetable: at least 1 per day   - Berries: at least 2 per week  - Red meat: fewer than 4 per week   - Fish: at least 1 per week   - Poultry: at least 2 per week  - Beans: at least 3 per week  - Nuts: at least 5 per week  - Fast or fried food: less than 1 per week  - Olive oil   - Butter less: less than 1 table-spoon per day   - Cheese: less than 1 serving per week   - Pastries/sweets: less than 5 servings per week  - Alcohol: no more than 1 serving/ day

## 2019-12-16 NOTE — ASSESSMENT & PLAN NOTE
18-year-old woman with 2 prior thunderclap headaches presents for evaluation memory troubles and a family history of Alzheimer's disease  Patient multi describes some inattentive memory lapses and word-finding troubles  These seem commensurate with normal cognitive aging in the setting active depression, anxiety and poor sleep  She does well on bedside testing scoring a 28/30 on the MoCA without any semantic poverty  We discussed the diagnosis and contribution from her mood and sleep troubles  Discussed lifestyle modifications to promote healthy cognition  Discussed advanced testing which could be done including lumbar puncture or amyloid PET scan which the patient is not interested in at this time  - may benefit from mental health services   - sleep hygiene     I reviewed the patient's testing to date and prior MRI scan  Medial temporal lobe structures look normal   All of this is very reassuring  She has had no further thunderclap headaches  Workup for this is been unremarkable  No need for ongoing neurology follow-up at this time

## 2020-01-14 ENCOUNTER — OFFICE VISIT (OUTPATIENT)
Dept: FAMILY MEDICINE CLINIC | Facility: HOSPITAL | Age: 60
End: 2020-01-14
Payer: COMMERCIAL

## 2020-01-14 VITALS
HEIGHT: 63 IN | TEMPERATURE: 98.5 F | HEART RATE: 70 BPM | BODY MASS INDEX: 32.78 KG/M2 | WEIGHT: 185 LBS | DIASTOLIC BLOOD PRESSURE: 78 MMHG | SYSTOLIC BLOOD PRESSURE: 105 MMHG

## 2020-01-14 DIAGNOSIS — F32.A DEPRESSION, UNSPECIFIED DEPRESSION TYPE: ICD-10-CM

## 2020-01-14 DIAGNOSIS — Z12.39 SCREENING FOR MALIGNANT NEOPLASM OF BREAST: ICD-10-CM

## 2020-01-14 DIAGNOSIS — Z23 NEED FOR INFLUENZA VACCINATION: ICD-10-CM

## 2020-01-14 DIAGNOSIS — Z01.818 PREOPERATIVE CLEARANCE: Primary | ICD-10-CM

## 2020-01-14 DIAGNOSIS — G44.53 PRIMARY THUNDERCLAP HEADACHE: ICD-10-CM

## 2020-01-14 DIAGNOSIS — F41.9 ANXIETY: ICD-10-CM

## 2020-01-14 DIAGNOSIS — E66.9 OBESITY (BMI 30.0-34.9): ICD-10-CM

## 2020-01-14 DIAGNOSIS — K63.5 POLYP OF COLON, UNSPECIFIED PART OF COLON, UNSPECIFIED TYPE: ICD-10-CM

## 2020-01-14 PROBLEM — S80.02XA CONTUSION OF LEFT KNEE: Status: RESOLVED | Noted: 2019-03-20 | Resolved: 2020-01-14

## 2020-01-14 PROBLEM — S33.8XXA SPRAIN OF GROIN: Status: RESOLVED | Noted: 2019-03-20 | Resolved: 2020-01-14

## 2020-01-14 PROCEDURE — 99214 OFFICE O/P EST MOD 30 MIN: CPT | Performed by: INTERNAL MEDICINE

## 2020-01-14 PROCEDURE — 3008F BODY MASS INDEX DOCD: CPT | Performed by: INTERNAL MEDICINE

## 2020-01-14 PROCEDURE — 90682 RIV4 VACC RECOMBINANT DNA IM: CPT | Performed by: INTERNAL MEDICINE

## 2020-01-14 PROCEDURE — 1036F TOBACCO NON-USER: CPT | Performed by: INTERNAL MEDICINE

## 2020-01-14 PROCEDURE — 90471 IMMUNIZATION ADMIN: CPT | Performed by: INTERNAL MEDICINE

## 2020-01-14 RX ORDER — CLONAZEPAM 0.5 MG/1
0.5 TABLET ORAL 2 TIMES DAILY PRN
Qty: 30 TABLET | Refills: 1 | Status: SHIPPED | OUTPATIENT
Start: 2020-01-14 | End: 2020-03-12

## 2020-01-14 NOTE — ASSESSMENT & PLAN NOTE
Not controlled, pt notes no great improvement with Effexor, will try low dose Clonazepam - SE and risk of habit forming/addiction reviewed, PDMP RX website accessed and no red flags noted, will re-eval in 2 mos, encouraged exercise and call with new/worse mood

## 2020-01-14 NOTE — PATIENT INSTRUCTIONS
Obesity   AMBULATORY CARE:   Obesity  is when your body mass index (BMI) is greater than 30  Your healthcare provider will use your height and weight to measure your BMI  The risks of obesity include  many health problems, such as injuries or physical disability  You may need tests to check for the following:  · Diabetes     · High blood pressure or high cholesterol     · Heart disease     · Gallbladder or liver disease     · Cancer of the colon, breast, prostate, liver, or kidney     · Sleep apnea     · Arthritis or gout  Seek care immediately if:   · You have a severe headache, confusion, or difficulty speaking  · You have weakness on one side of your body  · You have chest pain, sweating, or shortness of breath  Contact your healthcare provider if:   · You have symptoms of gallbladder or liver disease, such as pain in your upper abdomen  · You have knee or hip pain and discomfort while walking  · You have symptoms of diabetes, such as intense hunger and thirst, and frequent urination  · You have symptoms of sleep apnea, such as snoring or daytime sleepiness  · You have questions or concerns about your condition or care  Treatment for obesity  focuses on helping you lose weight to improve your health  Even a small decrease in BMI can reduce the risk for many health problems  Your healthcare provider will help you set a weight-loss goal   · Lifestyle changes  are the first step in treating obesity  These include making healthy food choices and getting regular physical activity  Your healthcare provider may suggest a weight-loss program that involves coaching, education, and therapy  · Medicine  may help you lose weight when it is used with a healthy diet and physical activity  · Surgery  can help you lose weight if you are very obese and have other health problems  There are several types of weight-loss surgery  Ask your healthcare provider for more information    Be successful losing weight:   · Set small, realistic goals  An example of a small goal is to walk for 20 minutes 5 days a week  Anther goal is to lose 5% of your body weight  · Tell friends, family members, and coworkers about your goals  and ask for their support  Ask a friend to lose weight with you, or join a weight-loss support group  · Identify foods or triggers that may cause you to overeat , and find ways to avoid them  Remove tempting high-calorie foods from your home and workplace  Place a bowl of fresh fruit on your kitchen counter  If stress causes you to eat, then find other ways to cope with stress  · Keep a diary to track what you eat and drink  Also write down how many minutes of physical activity you do each day  Weigh yourself once a week and record it in your diary  Eating changes: You will need to eat 500 to 1,000 fewer calories each day than you currently eat to lose 1 to 2 pounds a week  The following changes will help you cut calories:  · Eat smaller portions  Use small plates, no larger than 9 inches in diameter  Fill your plate half full of fruits and vegetables  Measure your food using measuring cups until you know what a serving size looks like  · Eat 3 meals and 1 or 2 snacks each day  Plan your meals in advance  Altaf Goldsmith and eat at home most of the time  Eat slowly  · Eat fruits and vegetables at every meal   They are low in calories and high in fiber, which makes you feel full  Do not add butter, margarine, or cream sauce to vegetables  Use herbs to season steamed vegetables  · Eat less fat and fewer fried foods  Eat more baked or grilled chicken and fish  These protein sources are lower in calories and fat than red meat  Limit fast food  Dress your salads with olive oil and vinegar instead of bottled dressing  · Limit the amount of sugar you eat  Do not drink sugary beverages  Limit alcohol  Activity changes:  Physical activity is good for your body in many ways   It helps you burn calories and build strong muscles  It decreases stress and depression, and improves your mood  It can also help you sleep better  Talk to your healthcare provider before you begin an exercise program   · Exercise for at least 30 minutes 5 days a week  Start slowly  Set aside time each day for physical activity that you enjoy and that is convenient for you  It is best to do both weight training and an activity that increases your heart rate, such as walking, bicycling, or swimming  · Find ways to be more active  Do yard work and housecleaning  Walk up the stairs instead of using elevators  Spend your leisure time going to events that require walking, such as outdoor festivals or fairs  This extra physical activity can help you lose weight and keep it off  Follow up with your healthcare provider as directed: You may need to meet with a dietitian  Write down your questions so you remember to ask them during your visits  © 2017 [address] Information is for End User's use only and may not be sold, redistributed or otherwise used for commercial purposes  All illustrations and images included in CareNotes® are the copyrighted property of [employer] , [email or web address]  or Leonel Duran  The above information is an  only  It is not intended as medical advice for individual conditions or treatments  Talk to your doctor, nurse or pharmacist before following any medical regimen to see if it is safe and effective for you  Weight Management   AMBULATORY CARE:   Why it is important to manage your weight:  Being overweight increases your risk of health conditions such as heart disease, high blood pressure, type 2 diabetes, and certain types of cancer  It can also increase your risk for osteoarthritis, sleep apnea, and other respiratory problems  Aim for a slow, steady weight loss  Even a small amount of weight loss can lower your risk of health problems    How to lose weight safely:  A safe and healthy way to lose weight is to eat fewer calories and get regular exercise  You can lose up about 1 pound a week by decreasing the number of calories you eat by 500 calories each day  You can decrease calories by eating smaller portion sizes or by cutting out high-calorie foods  Read labels to find out how many calories are in the foods you eat  You can also burn calories with exercise such as walking, swimming, or biking  You will be more likely to keep weight off if you make these changes part of your lifestyle  Healthy meal plan for weight management:  A healthy meal plan includes a variety of foods, contains fewer calories, and helps you stay healthy  A healthy meal plan includes the following:  · Eat whole-grain foods more often  A healthy meal plan should contain fiber  Fiber is the part of grains, fruits, and vegetables that is not broken down by your body  Whole-grain foods are healthy and provide extra fiber in your diet  Some examples of whole-grain foods are whole-wheat breads and pastas, oatmeal, brown rice, and bulgur  · Eat a variety of vegetables every day  Include dark, leafy greens such as spinach, kale, lior greens, and mustard greens  Eat yellow and orange vegetables such as carrots, sweet potatoes, and winter squash  · Eat a variety of fruits every day  Choose fresh or canned fruit (canned in its own juice or light syrup) instead of juice  Fruit juice has very little or no fiber  · Eat low-fat dairy foods  Drink fat-free (skim) milk or 1% milk  Eat fat-free yogurt and low-fat cottage cheese  Try low-fat cheeses such as mozzarella and other reduced-fat cheeses  · Choose meat and other protein foods that are low in fat  Choose beans or other legumes such as split peas or lentils  Choose fish, skinless poultry (chicken or turkey), or lean cuts of red meat (beef or pork)  Before you cook meat or poultry, cut off any visible fat  · Use less fat and oil  Try baking foods instead of frying them  Add less fat, such as margarine, sour cream, regular salad dressing and mayonnaise to foods  Eat fewer high-fat foods  Some examples of high-fat foods include french fries, doughnuts, ice cream, and cakes  · Eat fewer sweets  Limit foods and drinks that are high in sugar  This includes candy, cookies, regular soda, and sweetened drinks  Ways to decrease calories:   · Eat smaller portions  ¨ Use a small plate with smaller servings  ¨ Do not eat second helpings  ¨ When you eat at a restaurant, ask for a box and place half of your meal in the box before you eat  ¨ Share an entrée with someone else  · Replace high-calorie snacks with healthy, low-calorie snacks  ¨ Choose fresh fruit, vegetables, fat-free rice cakes, or air-popped popcorn instead of potato chips, nuts, or chocolate  ¨ Choose water or calorie-free drinks instead of soda or sweetened drinks  · Eat regular meals  Skipping meals can lead to overeating later in the day  Eat a healthy snack in place of a meal if you do not have time to eat a regular meal      · Do not shop for groceries when you are hungry  You may be more likely to make unhealthy food choices  Take a grocery list of healthy foods and shop after you have eaten  Exercise:  Exercise at least 30 minutes per day on most days of the week  Some examples of exercise include walking, biking, dancing, and swimming  You can also fit in more physical activity by taking the stairs instead of the elevator or parking farther away from stores  Ask your healthcare provider about the best exercise plan for you  Other things to consider as you try to lose weight:   · Be aware of situations that may give you the urge to overeat, such as eating while watching television  Find ways to avoid these situations  For example, read a book, go for a walk, or do crafts      · Meet with a weight loss support group or friends who are also trying to lose weight  This may help you stay motivated to continue working on your weight loss goals  © 2017 [address] Information is for End User's use only and may not be sold, redistributed or otherwise used for commercial purposes  All illustrations and images included in CareNotes® are the copyrighted property of [employer]  or Leonel Duran  The above information is an  only  It is not intended as medical advice for individual conditions or treatments  Talk to your doctor, nurse or pharmacist before following any medical regimen to see if it is safe and effective for you  Heart Healthy Diet   AMBULATORY CARE:   A heart healthy diet  is an eating plan low in total fat, unhealthy fats, and sodium (salt)  A heart healthy diet helps decrease your risk for heart disease and stroke  Limit the amount of fat you eat to 25% to 35% of your total daily calories  Limit sodium to less than 2,300 mg each day  Healthy fats:  Healthy fats can help improve cholesterol levels  The risk for heart disease is decreased when cholesterol levels are normal  Choose healthy fats, such as the following:  · Unsaturated fat  is found in foods such as soybean, canola, olive, corn, and safflower oils  It is also found in soft tub margarine that is made with liquid vegetable oil  · Omega-3 fat  is found in certain fish, such as salmon, tuna, and trout, and in walnuts and flaxseed  Unhealthy fats:  Unhealthy fats can cause unhealthy cholesterol levels in your blood and increase your risk of heart disease  Limit unhealthy fats, such as the following:  · Cholesterol  is found in animal foods, such as eggs and lobster, and in dairy products made from whole milk  Limit cholesterol to less than 300 milligrams (mg) each day  You may need to limit cholesterol to 200 mg each day if you have heart disease  · Saturated fat  is found in meats, such as mendoza and hamburger   It is also found in chicken or turkey skin, whole milk, and butter  Limit saturated fat to less than 7% of your total daily calories  Limit saturated fat to less than 6% if you have heart disease or are at increased risk for it  · Trans fat  is found in packaged foods, such as potato chips and cookies  It is also in hard margarine, some fried foods, and shortening  Avoid trans fats as much as possible    Heart healthy foods and drinks to include:  Ask your dietitian or healthcare provider how many servings to have from each of the following food groups:  · Grains:      ¨ Whole-wheat breads, cereals, and pastas, and brown rice    ¨ Low-fat, low-sodium crackers and chips    · Vegetables:      ¨ Broccoli, green beans, green peas, and spinach    ¨ Collards, kale, and lima beans    ¨ Carrots, sweet potatoes, tomatoes, and peppers    ¨ Canned vegetables with no salt added    · Fruits:      ¨ Bananas, peaches, pears, and pineapple    ¨ Grapes, raisins, and dates    ¨ Oranges, tangerines, grapefruit, orange juice, and grapefruit juice    ¨ Apricots, mangoes, melons, and papaya    ¨ Raspberries and strawberries    ¨ Canned fruit with no added sugar    · Low-fat dairy products:      ¨ Nonfat (skim) milk, 1% milk, and low-fat almond, cashew, or soy milks fortified with calcium    ¨ Low-fat cheese, regular or frozen yogurt, and cottage cheese    · Meats and proteins , such as lean cuts of beef and pork (loin, leg, round), skinless chicken and turkey, legumes, soy products, egg whites, and nuts  Foods and drinks to limit or avoid:  Ask your dietitian or healthcare provider about these and other foods that are high in unhealthy fat, sodium, and sugar:  · Snack or packaged foods , such as frozen dinners, cookies, macaroni and cheese, and cereals with more than 300 mg of sodium per serving    · Canned or dry mixes  for cakes, soups, sauces, or gravies    · Vegetables with added sodium , such as instant potatoes, vegetables with added sauces, or regular canned vegetables    · Other foods high in sodium , such as ketchup, barbecue sauce, salad dressing, pickles, olives, soy sauce, and miso    · High-fat dairy foods  such as whole or 2% milk, cream cheese, or sour cream, and cheeses     · High-fat protein foods  such as high-fat cuts of beef (T-bone steaks, ribs), chicken or turkey with skin, and organ meats, such as liver    · Cured or smoked meats , such as hot dogs, mendoza, and sausage    · Unhealthy fats and oils , such as butter, stick margarine, shortening, and cooking oils such as coconut or palm oil    · Food and drinks high in sugar , such as soft drinks (soda), sports drinks, sweetened tea, candy, cake, cookies, pies, and doughnuts  Other diet guidelines to follow:   · Eat more foods containing omega-3 fats  Eat fish high in omega-3 fats at least 2 times a week  · Limit alcohol  Too much alcohol can damage your heart and raise your blood pressure  Women should limit alcohol to 1 drink a day  Men should limit alcohol to 2 drinks a day  A drink of alcohol is 12 ounces of beer, 5 ounces of wine, or 1½ ounces of liquor  · Choose low-sodium foods  High-sodium foods can lead to high blood pressure  Add little or no salt to food you prepare  Use herbs and spices in place of salt  · Eat more fiber  to help lower cholesterol levels  Eat at least 5 servings of fruits and vegetables each day  Eat 3 ounces of whole-grain foods each day  Legumes (beans) are also a good source of fiber  Lifestyle guidelines:   · Do not smoke  Nicotine and other chemicals in cigarettes and cigars can cause lung and heart damage  Ask your healthcare provider for information if you currently smoke and need help to quit  E-cigarettes or smokeless tobacco still contain nicotine  Talk to your healthcare provider before you use these products  · Exercise regularly  to help you maintain a healthy weight and improve your blood pressure and cholesterol levels   Ask your healthcare provider about the best exercise plan for you  Do not start an exercise program without asking your healthcare provider  Follow up with your healthcare provider as directed:  Write down your questions so you remember to ask them during your visits  © 2017 [facility] Information is for End User's use only and may not be sold, redistributed or otherwise used for commercial purposes  All illustrations and images included in CareNotes® are the copyrighted property of [employer]  or Leonel Duran  The above information is an  only  It is not intended as medical advice for individual conditions or treatments  Talk to your doctor, nurse or pharmacist before following any medical regimen to see if it is safe and effective for you

## 2020-01-14 NOTE — ASSESSMENT & PLAN NOTE
Resolved, CTA and MRI wnl, saw neuro, advised to keep hydrated and get adequate sleep, call with new/worse symptoms

## 2020-01-14 NOTE — PROGRESS NOTES
Subjective:     Eddi Szymanski is a 62 y o  female who presents to the office today for a preoperative consultation at the request of surgeon Dr Galdino Ruiz who plans on performing colonoscopy with poss polypectomy - date to be determined  Prior anesthesia adverse reactions - None    Exercise capacity - Able to walk 4 blocks or climb 2 flights of stairs without symptoms - Yes    Easy bleeding/bruising - No    Chest pain/palpitation/edema - No    Dyspnea/wheezing/cough - No    Sleep apnea/asthma/COPD - No    HPI Pt with family h/o rectal cancer (father) and personal h/o polyps  Her last colonoscopy was Jan 2016  Pt had recent issues with "thunder clap headaches"  She mentioned this to GI and she was told she needed to have preoperative clearance  She saw Neuro and has had CTA and MRI  She was encouraged to increase her sleep  She has had no repeat thunder clap headaches  She has had intermittent tension/stress headaches at the temporal region  She is keeping hydrated but admits sleep is still not good  She does not take anything other then Tylenol when the HA's occur  She notes no slurred speech/word finding difficulty/double vision or loss of vision/focal weakness or numbness or tingling  Pt is taking her Effexor daily  She con't to note some issues with anxiety  She has had a lot of stressors with her father being ill and fighting with her sister  She also states "my  is an ass"  She notes no significant down/depressed/sad mood  She notes no SI/panic attacks  She notes no SE with the Effexor  Wgt up 7 lbs from June 2019  She is watching her diet more now that the holidays are over - marshal portion size    She is not doing any formal 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    Past Medical History:   Diagnosis Date    Bariatric surgery status     Depression     last assessed: Jan 18, 2018     Disturbance of memory     last assessed: June 23, 2016     Endometriosis     Hirsutism     History of sleeve gastrectomy     Menometrorrhagia     Morbid obesity ([facility] [address] )     last assessed: Dec 5, 2014     Postgastrectomy malabsorption     Sclerosing adenosis of breast     Symptomatic menopausal or female climacteric states     Uterine leiomyoma    s   Past Surgical History:   Procedure Laterality Date    APPENDECTOMY      BREAST BIOPSY      COLONOSCOPY      complete - 7 year repeat recommended - Resolved:      DILATION AND CURETTAGE OF UTERUS      GASTRECTOMY SLEEVE LAPAROSCOPIC  2018    GYNECOLOGIC CRYOSURGERY      Cervix - 's for abnormal paps     HYSTERECTOMY      INDUCED       x3    LAPAROSCOPIC SALPINGOOPHERECTOMY Right     LAPAROSCOPIC TOTAL HYSTERECTOMY      06, Laparoscopic hysterectomy with LSO with vaginal closure of the vaginal cuff     SALPINGOOPHORECTOMY Left     STOMACH SURGERY  2014    for morbid obesity - Managed by: Jessica Pringle, [facility]      TOOTH EXTRACTION     le   Family History   Problem Relation Age of Onset    Alzheimer's disease Mother     Stroke Mother     Dementia Mother     Diabetes Father         mellitus     Prostate cancer Father     Rectal cancer Father     Lung cancer Father     Liver cancer Father     Heart attack Father     Colon polyps Sister    (PriLOSEC) 20 mg delayed release capsule Take 1 capsule (20 mg total) by mouth 2 (two) times a day (Patient taking differently: Take 20 mg by mouth as needed ) 60 capsule 2    SYNTHROID 150 MCG tablet 1 tab daily  Brand necessary  90 tablet 3    traMADol-acetaminophen (ULTRACET) 37 5-325 mg per tablet TAKE 1 TABLET BY MOUTH EVERY 6 (SIX) HOURS AS NEEDED FOR MODERATE PAIN 30 tablet 0    venlafaxine (EFFEXOR-XR) 150 mg 24 hr capsule TAKE 1 CAPSULE BY MOUTH EVERY DAY 30 capsule 5     No current facility-administered medications for this visit  Patient has no known allergies        Review of Systems  Review of Systems Constitutional: Positive for unexpected weight change  Negative for chills and fever  HENT: Negative for congestion and sore throat  Eyes: Negative for pain and visual disturbance  Respiratory: Negative for cough, shortness of breath and wheezing  Cardiovascular: Negative for chest pain, palpitations and leg swelling  Gastrointestinal: Negative for abdominal pain, blood in stool, constipation, diarrhea, nausea and vomiting  Endocrine: Negative for polydipsia and polyuria  Genitourinary: Negative for difficulty urinating, dysuria and hematuria  Musculoskeletal: Positive for arthralgias  Negative for back pain and neck pain  Skin: Negative for rash and wound  Neurological: Positive for headaches  Negative for dizziness, syncope and light-headedness  Hematological: Negative for adenopathy  Psychiatric/Behavioral: Positive for sleep disturbance  Negative for behavioral problems, confusion and suicidal ideas  The patient is nervous/anxious  Objective:    /78 (BP Location: Right arm, Patient Position: Sitting, Cuff Size: Standard)   Pulse 70   Temp 98 5 °F (36 9 °C)   Ht 5' 3" (1 6 m)   Wt 83 9 kg (185 lb)   BMI 32 77 kg/m²      Physical Exam   Constitutional: She appears well-developed and well-nourished  No distress  HENT:   Head: Normocephalic and atraumatic  Right Ear: External ear normal    Left Ear: External ear normal    Mouth/Throat: Oropharynx is clear and moist    Eyes: Conjunctivae are normal  Right eye exhibits no discharge  Left eye exhibits no discharge  Neck: Neck supple  No tracheal deviation present  Cardiovascular: Normal rate, regular rhythm and normal heart sounds  No murmur heard  Neg carotid bruits B/L   Pulmonary/Chest: Effort normal and breath sounds normal  No respiratory distress  She has no wheezes  She has no rales  Abdominal: Soft  Bowel sounds are normal  There is no tenderness  There is no guarding     Musculoskeletal: Normal range of motion  She exhibits no deformity  Lymphadenopathy:     She has no cervical adenopathy  Neurological: She is alert  She exhibits normal muscle tone  Skin: Skin is warm and dry  No rash noted  Psychiatric: She has a normal mood and affect  Her behavior is normal  Thought content normal    Nursing note and vitals reviewed          Cardiographics  ECG: not indicated    Imaging  Chest x-ray: not indicated    Lab Review   Recent labs:  9/19 reviewed - no acute abnormality noted with TSH/FT4/CMP/ESR    Assessment:    Patient Active Problem List   Diagnosis    Anxiety    Atrophy of vagina    Depression    Disc degeneration, lumbar    Disturbance of memory    Dyslipidemia    Hypothyroidism    Osteoarthritis    Postgastrectomy malabsorption    Arthritis    Symptomatic menopausal or female climacteric states    History of sleeve gastrectomy    Primary thunderclap headache        Plan:     Surgical Clearance - Cleared    Risk - Pt low risk for low risk surgery    Discussion/Summary:   (1) Preoperative Clearance - pt low risk for low risk surgery,  9/19 reviewed as was recent CTA and MRI of brain, no further w/u is needed, pt may proceed with colonoscopy, call with any new medical complaints btw now and procedure    (2) Colon polyp in past and father with rectal CA - for colonoscpy as per GI    (3) Primary thunderclap HA - CTA and MRI of brain neg, had eval with Neuro, symptoms resolved, no further w/u needed, call with relapse in symptoms or with any new Neuro symptoms    (4) Anxiety - Not controlled, pt notes no great improvement with Effexor, will try low dose Clonazepam - SE and risk of habit forming/addiction reviewed, PDMP RX website accessed and no red flags noted, will re-eval in 2 mos, encouraged exercise and call with new/worse mood    (5) Depression - Depression pretty well controlled with Effexor, con't current meds    (6) Obesity - BMI reviewed, diet/exercise/wgt loss encouraged     [place] 1/16    Mammo 10/18 - order given    Dexa 10/18 - nml    PAP -  No longer needed as she is s/p hysterectomy    Flu vaccine given today      Alice Mazariegos, DO    BMI Counseling: Body mass index is 32 77 kg/m²  The BMI is above normal  Nutrition recommendations include reducing portion sizes, 3-5 servings of fruits/vegetables daily, consuming healthier snacks, decreasing soda and/or juice intake, moderation in carbohydrate intake, increasing intake of lean protein and reducing intake of saturated fat and trans fat  Exercise recommendations include moderate aerobic physical activity for 150 minutes/week and exercising 3-5 times per week

## 2020-01-16 DIAGNOSIS — F41.9 ANXIETY: ICD-10-CM

## 2020-01-16 DIAGNOSIS — F32.A DEPRESSION, UNSPECIFIED DEPRESSION TYPE: ICD-10-CM

## 2020-01-16 RX ORDER — VENLAFAXINE HYDROCHLORIDE 150 MG/1
CAPSULE, EXTENDED RELEASE ORAL
Qty: 30 CAPSULE | Refills: 5 | Status: SHIPPED | OUTPATIENT
Start: 2020-01-16 | End: 2020-07-19

## 2020-02-27 ENCOUNTER — HOSPITAL ENCOUNTER (OUTPATIENT)
Dept: MAMMOGRAPHY | Facility: CLINIC | Age: 60
Discharge: HOME | End: 2020-02-27
Payer: COMMERCIAL

## 2020-02-27 VITALS — WEIGHT: 185 LBS | HEIGHT: 63 IN | BODY MASS INDEX: 32.78 KG/M2

## 2020-02-27 DIAGNOSIS — Z12.39 SCREENING FOR MALIGNANT NEOPLASM OF BREAST: ICD-10-CM

## 2020-02-27 PROCEDURE — 77067 SCR MAMMO BI INCL CAD: CPT

## 2020-02-27 PROCEDURE — 77063 BREAST TOMOSYNTHESIS BI: CPT

## 2020-03-12 ENCOUNTER — OFFICE VISIT (OUTPATIENT)
Dept: FAMILY MEDICINE CLINIC | Facility: HOSPITAL | Age: 60
End: 2020-03-12
Payer: COMMERCIAL

## 2020-03-12 VITALS
DIASTOLIC BLOOD PRESSURE: 78 MMHG | HEART RATE: 62 BPM | TEMPERATURE: 97.8 F | SYSTOLIC BLOOD PRESSURE: 100 MMHG | WEIGHT: 185 LBS | HEIGHT: 63 IN | BODY MASS INDEX: 32.78 KG/M2

## 2020-03-12 DIAGNOSIS — G44.53 PRIMARY THUNDERCLAP HEADACHE: ICD-10-CM

## 2020-03-12 DIAGNOSIS — E78.5 DYSLIPIDEMIA: ICD-10-CM

## 2020-03-12 DIAGNOSIS — F32.A DEPRESSION, UNSPECIFIED DEPRESSION TYPE: Primary | ICD-10-CM

## 2020-03-12 DIAGNOSIS — F41.9 ANXIETY: ICD-10-CM

## 2020-03-12 PROCEDURE — 1036F TOBACCO NON-USER: CPT | Performed by: INTERNAL MEDICINE

## 2020-03-12 PROCEDURE — 3008F BODY MASS INDEX DOCD: CPT | Performed by: INTERNAL MEDICINE

## 2020-03-12 PROCEDURE — 99214 OFFICE O/P EST MOD 30 MIN: CPT | Performed by: INTERNAL MEDICINE

## 2020-03-12 NOTE — ASSESSMENT & PLAN NOTE
Pt UTT Clonazepam as it made her too drowsy, deferring further med changes at this time, did discuss she could try cutting Clonazepam in half if she wants to try that, therapy discussed but pt deferring as well, re-eval in 6 mos or so - call with new/worse mood

## 2020-03-12 NOTE — PROGRESS NOTES
Assessment/Plan:    Anxiety  Pt UTT Clonazepam as it made her too drowsy, deferring further med changes at this time, did discuss she could try cutting Clonazepam in half if she wants to try that, therapy discussed but pt deferring as well, re-eval in 6 mos or so - call with new/worse mood    Depression  Controlled with Effexor, wishes for no med changes, call with new/worse symptoms    Primary thunderclap headache  Had imaging and saw Neuro - symptoms resolved, call with relapse in symptoms    Dyslipidemia  FLP due in Aug-Sept - order given, diet/exercise/wgt loss encouraged       Diagnoses and all orders for this visit:    Depression, unspecified depression type    Dyslipidemia  -     Lipid panel    Anxiety    Primary thunderclap headache      Colonoscopy 1/16 - 5 yrs - has it scheduled for tomorrow    Mammo 2/20    Dexa 10/18 - nml    PAP - s/p hysterectomy    BW 9/19      Subjective:      Patient ID: Joni Alvarez is a 56 y o  female  HPI Pt here for follow up appt    Last visit in Jan pt was noting uncontrolled anxiety symptoms  We subsequently started her on Clonazepam and she is here today for med/mood check  She took the medication but she did not like it as it made her feel foggy  She stopped the med shortly after it was started  When asked how mood was doing she states "its OK"  She is "trying to change her mind set"  She states in general she is not as stressed  She is still taking her Effexor as directed  She is not interested in going up on the dose  She does not see a therapist and is not really sure if she wants to  She has had no HA's since last appt  She notes no dizziness/double vision/slurred speech  Her FLP has not been checked since 2018  She is not on a statin  Diet/exercise reviewed       Colonoscopy 1/16 - 5 yrs - has it scheduled for tomorrow    Mammo 2/20    Dexa 10/18 - nml    PAP - s/p hysterectomy    BW 9/19      Review of Systems   Constitutional: Negative for chills and fever    HENT: Negative for congestion and sore throat  Eyes: Negative for pain and visual disturbance  Respiratory: Negative for cough, shortness of breath and wheezing  Cardiovascular: Negative for chest pain, palpitations and leg swelling  Gastrointestinal: Negative for abdominal pain, blood in stool, constipation, diarrhea, nausea and vomiting  Endocrine: Negative for polydipsia and polyuria  Genitourinary: Negative for difficulty urinating and dysuria  Musculoskeletal: Negative for back pain and neck pain  Skin: Negative for rash and wound  Neurological: Negative for dizziness, light-headedness and headaches  Hematological: Negative for adenopathy  Psychiatric/Behavioral: Positive for dysphoric mood  Negative for behavioral problems and confusion  The patient is nervous/anxious  Objective:    /78 (BP Location: Right arm, Patient Position: Sitting, Cuff Size: Standard)   Pulse 62   Temp 97 8 °F (36 6 °C)   Ht 5' 3" (1 6 m)   Wt 83 9 kg (185 lb)   BMI 32 77 kg/m²      Physical Exam   Constitutional: She appears well-developed and well-nourished  No distress  HENT:   Head: Normocephalic and atraumatic  Eyes: Conjunctivae are normal  Right eye exhibits no discharge  Left eye exhibits no discharge  Neck: Neck supple  No tracheal deviation present  Cardiovascular: Normal rate, regular rhythm and normal heart sounds  Exam reveals no friction rub  No murmur heard  Pulmonary/Chest: Effort normal and breath sounds normal  No respiratory distress  She has no wheezes  She has no rales  Abdominal: Soft  She exhibits no distension  There is no tenderness  There is no rebound and no guarding  Musculoskeletal: She exhibits no edema  Neurological: She is alert  She exhibits normal muscle tone  Skin: Skin is warm and dry  No rash noted  Psychiatric: She has a normal mood and affect  Her behavior is normal    Nursing note and vitals reviewed

## 2020-03-20 DIAGNOSIS — M51.36 DISC DEGENERATION, LUMBAR: ICD-10-CM

## 2020-03-25 ENCOUNTER — TELEPHONE (OUTPATIENT)
Dept: FAMILY MEDICINE CLINIC | Facility: HOSPITAL | Age: 60
End: 2020-03-25

## 2020-03-26 DIAGNOSIS — R52 PAIN: Primary | ICD-10-CM

## 2020-03-26 RX ORDER — METAXALONE 400 MG/1
400 TABLET ORAL 3 TIMES DAILY PRN
Qty: 30 TABLET | Refills: 0 | Status: SHIPPED | OUTPATIENT
Start: 2020-03-26 | End: 2020-10-22

## 2020-03-26 NOTE — TELEPHONE ENCOUNTER
Patient aware  Could she go back on the Skelaxin  She felt better on this  She will pay cash for it, because her insurance won't cover it

## 2020-03-26 NOTE — TELEPHONE ENCOUNTER
All muscle relaxer's RELAX muscles and have fatigue as a SE, if she want to try another one I can but they all have fatigue as a SE

## 2020-03-27 NOTE — TELEPHONE ENCOUNTER
Skelaxin 400 mg sent - that is a lower dose then before - poss cause less sedation - looks like it may be on formulary for insurance as well

## 2020-03-30 ENCOUNTER — TELEPHONE (OUTPATIENT)
Dept: FAMILY MEDICINE CLINIC | Facility: HOSPITAL | Age: 60
End: 2020-03-30

## 2020-03-30 DIAGNOSIS — M51.36 DISC DEGENERATION, LUMBAR: ICD-10-CM

## 2020-03-30 RX ORDER — GABAPENTIN 100 MG/1
CAPSULE ORAL
Qty: 90 CAPSULE | Refills: 1 | Status: SHIPPED | OUTPATIENT
Start: 2020-03-30 | End: 2020-05-14 | Stop reason: SDUPTHER

## 2020-03-30 NOTE — TELEPHONE ENCOUNTER
Patient aware and agreeable to trying gabapentin  She will also need tramadol refill  She said she only has 2 pills left

## 2020-03-30 NOTE — TELEPHONE ENCOUNTER
PATIENT ASKING FOR SOMETHING STRONGER THAN TRAMADOL - JUST BARELY TAKING THE EDGE OFF HER SCIATIC PAIN - SHE HASN'T TAKEN THE MUSCLE RELAXER AS SHE DOESN'T FEEL ITS A MUSCULAR PAIN BUT RATHER A NERVE PAIN - PLEASE CALL BACK

## 2020-03-30 NOTE — TELEPHONE ENCOUNTER
I can add Gabapentin- nerve pain medication if she wishes - be aware it does take time for medication to take full affect - sometime up to 4 wks and it does have to be taken daily to prevent the pain, would not increase Tramadol further as it is a NSAID and can increase BP as well as elevate kidney tests and increase risk for heart attacks and strokes, let me know if she wants to try Gabapentin and I can send to pharmacy, con't Tramadol prn/heat/massage/stretches and exercises she remembers from PT, if not better she has to be seen

## 2020-05-14 ENCOUNTER — OFFICE VISIT (OUTPATIENT)
Dept: FAMILY MEDICINE CLINIC | Facility: HOSPITAL | Age: 60
End: 2020-05-14
Payer: COMMERCIAL

## 2020-05-14 VITALS
HEART RATE: 68 BPM | HEIGHT: 63 IN | WEIGHT: 186 LBS | TEMPERATURE: 97.8 F | BODY MASS INDEX: 32.96 KG/M2 | SYSTOLIC BLOOD PRESSURE: 134 MMHG | DIASTOLIC BLOOD PRESSURE: 80 MMHG

## 2020-05-14 DIAGNOSIS — G89.29 CHRONIC LOW BACK PAIN WITH SCIATICA, SCIATICA LATERALITY UNSPECIFIED, UNSPECIFIED BACK PAIN LATERALITY: Primary | ICD-10-CM

## 2020-05-14 DIAGNOSIS — K21.00 GASTROESOPHAGEAL REFLUX DISEASE WITH ESOPHAGITIS: ICD-10-CM

## 2020-05-14 DIAGNOSIS — M54.40 CHRONIC LOW BACK PAIN WITH SCIATICA, SCIATICA LATERALITY UNSPECIFIED, UNSPECIFIED BACK PAIN LATERALITY: Primary | ICD-10-CM

## 2020-05-14 DIAGNOSIS — R07.9 CHEST PAIN, UNSPECIFIED TYPE: ICD-10-CM

## 2020-05-14 DIAGNOSIS — F32.A DEPRESSION, UNSPECIFIED DEPRESSION TYPE: ICD-10-CM

## 2020-05-14 DIAGNOSIS — F41.9 ANXIETY: ICD-10-CM

## 2020-05-14 DIAGNOSIS — M51.36 DISC DEGENERATION, LUMBAR: ICD-10-CM

## 2020-05-14 PROCEDURE — 93000 ELECTROCARDIOGRAM COMPLETE: CPT | Performed by: INTERNAL MEDICINE

## 2020-05-14 PROCEDURE — 1036F TOBACCO NON-USER: CPT | Performed by: INTERNAL MEDICINE

## 2020-05-14 PROCEDURE — 99215 OFFICE O/P EST HI 40 MIN: CPT | Performed by: INTERNAL MEDICINE

## 2020-05-14 PROCEDURE — 3008F BODY MASS INDEX DOCD: CPT | Performed by: INTERNAL MEDICINE

## 2020-05-14 RX ORDER — VENLAFAXINE HYDROCHLORIDE 75 MG/1
75 CAPSULE, EXTENDED RELEASE ORAL
Qty: 30 CAPSULE | Refills: 2 | Status: SHIPPED | OUTPATIENT
Start: 2020-05-14 | End: 2020-05-14 | Stop reason: SDUPTHER

## 2020-05-14 RX ORDER — VENLAFAXINE HYDROCHLORIDE 75 MG/1
75 CAPSULE, EXTENDED RELEASE ORAL
Qty: 30 CAPSULE | Refills: 2 | Status: SHIPPED | OUTPATIENT
Start: 2020-05-14 | End: 2020-10-08

## 2020-05-14 RX ORDER — GABAPENTIN 300 MG/1
CAPSULE ORAL
Qty: 90 CAPSULE | Refills: 2 | Status: SHIPPED | OUTPATIENT
Start: 2020-05-14 | End: 2020-11-07

## 2020-06-04 DIAGNOSIS — M51.36 DISC DEGENERATION, LUMBAR: ICD-10-CM

## 2020-06-04 RX ORDER — GABAPENTIN 100 MG/1
CAPSULE ORAL
Qty: 90 CAPSULE | Refills: 1 | OUTPATIENT
Start: 2020-06-04

## 2020-06-10 ENCOUNTER — TELEPHONE (OUTPATIENT)
Dept: FAMILY MEDICINE CLINIC | Facility: HOSPITAL | Age: 60
End: 2020-06-10

## 2020-06-16 ENCOUNTER — OFFICE VISIT (OUTPATIENT)
Dept: OBGYN CLINIC | Facility: CLINIC | Age: 60
End: 2020-06-16
Payer: COMMERCIAL

## 2020-06-16 ENCOUNTER — APPOINTMENT (OUTPATIENT)
Dept: RADIOLOGY | Facility: CLINIC | Age: 60
End: 2020-06-16
Payer: COMMERCIAL

## 2020-06-16 VITALS
SYSTOLIC BLOOD PRESSURE: 122 MMHG | BODY MASS INDEX: 32.96 KG/M2 | WEIGHT: 186 LBS | HEART RATE: 70 BPM | DIASTOLIC BLOOD PRESSURE: 80 MMHG | TEMPERATURE: 98 F | HEIGHT: 63 IN

## 2020-06-16 DIAGNOSIS — M54.50 MIDLINE LOW BACK PAIN WITHOUT SCIATICA, UNSPECIFIED CHRONICITY: ICD-10-CM

## 2020-06-16 DIAGNOSIS — M54.16 RADICULOPATHY, LUMBAR REGION: ICD-10-CM

## 2020-06-16 DIAGNOSIS — R52 PAIN: ICD-10-CM

## 2020-06-16 DIAGNOSIS — M51.36 DISC DEGENERATION, LUMBAR: Primary | ICD-10-CM

## 2020-06-16 PROCEDURE — 3008F BODY MASS INDEX DOCD: CPT | Performed by: FAMILY MEDICINE

## 2020-06-16 PROCEDURE — 72100 X-RAY EXAM L-S SPINE 2/3 VWS: CPT

## 2020-06-16 PROCEDURE — 99214 OFFICE O/P EST MOD 30 MIN: CPT | Performed by: FAMILY MEDICINE

## 2020-06-16 PROCEDURE — 1036F TOBACCO NON-USER: CPT | Performed by: FAMILY MEDICINE

## 2020-06-16 RX ORDER — CYCLOBENZAPRINE HCL 10 MG
10 TABLET ORAL 3 TIMES DAILY PRN
Qty: 30 TABLET | Refills: 0 | Status: SHIPPED | OUTPATIENT
Start: 2020-06-16 | End: 2020-08-25

## 2020-07-19 DIAGNOSIS — F41.9 ANXIETY: ICD-10-CM

## 2020-07-19 DIAGNOSIS — F32.A DEPRESSION, UNSPECIFIED DEPRESSION TYPE: ICD-10-CM

## 2020-07-19 RX ORDER — VENLAFAXINE HYDROCHLORIDE 150 MG/1
CAPSULE, EXTENDED RELEASE ORAL
Qty: 30 CAPSULE | Refills: 1 | Status: SHIPPED | OUTPATIENT
Start: 2020-07-19 | End: 2020-09-17

## 2020-08-12 ENCOUNTER — TELEPHONE (OUTPATIENT)
Dept: OBGYN CLINIC | Facility: CLINIC | Age: 60
End: 2020-08-12

## 2020-08-12 NOTE — TELEPHONE ENCOUNTER
Pt contacted Call Center requested refill of their medication  Medication Name: tramadol      Dosage of Med: 50mg      Frequency of Med: Take 1 tablet by mouth every 6 (six) hours as needed for moderate pain      Remaining Medication: none       Pharmacy and Location: [facility] pharmacy         Pt  Preferred Callback Phone Number: [phone]      Thank you  * PLEASE ADVISE PATIENTS:    REFILL REQUESTS WILL BE PROCESSED WITHIN 24-48 HOURS  *

## 2020-08-14 NOTE — TELEPHONE ENCOUNTER
Patient is calling back to check on the status of this  Can her script please be refilled by another physician since Dr Bea Hamman is no longer with our office?

## 2020-08-18 NOTE — TELEPHONE ENCOUNTER
Patient called again and stated she has been out of her pain killers now for two weeks  And she would like this addressed ASAP   Best call back number [phone]

## 2020-08-18 NOTE — TELEPHONE ENCOUNTER
Called and left a message for the patient to schedule an appointment with Dr Holger Rosales or contact her family doctor for the medication

## 2020-08-25 ENCOUNTER — HOSPITAL ENCOUNTER (OUTPATIENT)
Dept: RADIOLOGY | Facility: HOSPITAL | Age: 60
Discharge: HOME | End: 2020-08-25
Payer: COMMERCIAL

## 2020-08-25 ENCOUNTER — OFFICE VISIT (OUTPATIENT)
Dept: FAMILY MEDICINE CLINIC | Facility: HOSPITAL | Age: 60
End: 2020-08-25
Payer: COMMERCIAL

## 2020-08-25 VITALS
DIASTOLIC BLOOD PRESSURE: 76 MMHG | SYSTOLIC BLOOD PRESSURE: 110 MMHG | TEMPERATURE: 97.1 F | WEIGHT: 182 LBS | BODY MASS INDEX: 32.25 KG/M2 | HEIGHT: 63 IN | HEART RATE: 72 BPM

## 2020-08-25 DIAGNOSIS — F41.9 ANXIETY: ICD-10-CM

## 2020-08-25 DIAGNOSIS — M51.36 DISC DEGENERATION, LUMBAR: Primary | ICD-10-CM

## 2020-08-25 DIAGNOSIS — M79.641 PAIN OF RIGHT HAND: ICD-10-CM

## 2020-08-25 DIAGNOSIS — K21.9 GASTROESOPHAGEAL REFLUX DISEASE WITHOUT ESOPHAGITIS: ICD-10-CM

## 2020-08-25 DIAGNOSIS — F32.A DEPRESSION, UNSPECIFIED DEPRESSION TYPE: ICD-10-CM

## 2020-08-25 PROCEDURE — 3008F BODY MASS INDEX DOCD: CPT | Performed by: INTERNAL MEDICINE

## 2020-08-25 PROCEDURE — 99214 OFFICE O/P EST MOD 30 MIN: CPT | Performed by: INTERNAL MEDICINE

## 2020-08-25 PROCEDURE — 1036F TOBACCO NON-USER: CPT | Performed by: INTERNAL MEDICINE

## 2020-08-25 PROCEDURE — 73130 X-RAY EXAM OF HAND: CPT

## 2020-08-25 RX ORDER — METHOCARBAMOL 500 MG/1
500 TABLET, FILM COATED ORAL 3 TIMES DAILY PRN
Qty: 90 TABLET | Refills: 1 | Status: SHIPPED | OUTPATIENT
Start: 2020-08-25 | End: 2021-01-05

## 2020-08-25 NOTE — ASSESSMENT & PLAN NOTE
Con't current PPI prn - advised to use at lowest dose/least frequency possible d/t SE of long term use, encouraged to f/u with bariatric program as she was notified she should have an EGD as well

## 2020-08-25 NOTE — ASSESSMENT & PLAN NOTE
Anxiety better but still intermittent, wishes for no changes in Effexor, con't current regimen, call with new/worse mood/panic attack

## 2020-08-25 NOTE — PROGRESS NOTES
Assessment/Plan:    Disc degeneration, lumbar  Has benefit with once daily Ultracet and muscle relaxer - Flexeril does make her sleepy and she has not tried Robaxin in past,  Skelaxin did work well but insurance would not cover, trial of Robaxin, re-eval in 3 mos, con't titrating Gabapentin up to 300 mg tid, call with SE    Depression  Better with increase in Effexor, no SE noted, con't current regimen, call with new or worse mood/SI    Anxiety  Anxiety better but still intermittent, wishes for no changes in Effexor, con't current regimen, call with new/worse mood/panic attack    Gastroesophageal reflux disease without esophagitis  Con't current PPI prn - advised to use at lowest dose/least frequency possible d/t SE of long term use, encouraged to f/u with bariatric program as she was notified she should have an EGD as well       Diagnoses and all orders for this visit:    Disc degeneration, lumbar  -     methocarbamol (ROBAXIN) 500 mg tablet; Take 1 tablet (500 mg total) by mouth 3 (three) times a day as needed for muscle spasms  -     traMADol-acetaminophen (ULTRACET) 37 5-325 mg per tablet; Take 1 tablet by mouth every 8 (eight) hours as needed for moderate pain    Pain of right hand  Comments:  unlikely fracture but d/t chronicity of symptoms it is reasonable to check Xray - order given, ice/elevate/avoid repetitive activies encouraged  Orders:  -     XR hand 3+ vw right; Future    Depression, unspecified depression type    Anxiety    Gastroesophageal reflux disease without esophagitis      Colonoscopy 3/20     Mammo 2/20     Dexa 10/18 - nml     PAP s/p hysterectomy    BW 9/19      Subjective:      Patient ID: Mariela Marina is a 56 y o  female  HPI Pt here for f/u appt    She con't to follow Ortho for her back pain  She has been receiving Ultracet 37 5/325 1 tab once daily - sometime only every other day  She notes benefit with her back pain with her muscle relaxer    She has been having a lot of issues feeling foggy with Flexeril  She noted no issues with the [facility] but her insurance would not cover it  She thinks she has not tried Robaxin in the past  She is asking for us to now prescribe her Ultracet  PDMP Rx website reviewed  No red flag use noted  She is taking her Gabapentin 300 mg 1 tab daily - she never increased up to the tid dosing  She has had intermittent "stinging sensations" in her legs or arms  She hit her R  Hand on side of pool 3 mos ago  Had some swelling/bruising  Has had persistent pain - marshal after repetitive activity with hand  Feels just like when she had a bone chip/fx in her past   Requesting Xray  Last visit in May we increased her Effexor to 75 mg and 150 mg to equal 225 mg  She is taking both doses as directed w/o SE  She admits she is not crying as much and feels a bit better  She still gets anxious at times and bites her nails  She is happy with current regimen  She notes no SI/panic attacks  She has been using her Omeprazole more frequently d/t GERD - burning sensation/heart burn  She did receive letter from bariatric program she needs a scope  Colonoscopy 3/20     Mammo 2/20     Dexa 10/18 - nml     PAP s/p hysterectomy    BW 9/19        Review of Systems   Constitutional: Negative for chills and fever  HENT: Negative for congestion and sore throat  Eyes: Negative for pain and visual disturbance  Respiratory: Negative for cough, shortness of breath and wheezing  Cardiovascular: Negative for chest pain and palpitations  Gastrointestinal: Negative for abdominal pain, diarrhea and nausea  Endocrine: Negative for polydipsia and polyuria  Genitourinary: Negative for difficulty urinating and dysuria  Musculoskeletal: Positive for arthralgias, back pain and joint swelling  Negative for neck pain  Skin: Negative for rash and wound  Neurological: Negative for dizziness, light-headedness and headaches  Hematological: Negative for adenopathy  Psychiatric/Behavioral: Negative for behavioral problems, confusion and dysphoric mood  The patient is nervous/anxious  Objective:    /76   Pulse 72   Temp (!) 97 1 °F (36 2 °C)   Ht 5' 3" (1 6 m)   Wt 82 6 kg (182 lb)   BMI 32 24 kg/m²      Physical Exam  Vitals signs and nursing note reviewed  Constitutional:       General: She is not in acute distress  Appearance: Normal appearance  She is well-developed  She is not ill-appearing  HENT:      Head: Normocephalic and atraumatic  Eyes:      General:         Right eye: No discharge  Left eye: No discharge  Conjunctiva/sclera: Conjunctivae normal    Neck:      Musculoskeletal: Neck supple  Trachea: No tracheal deviation  Cardiovascular:      Rate and Rhythm: Normal rate and regular rhythm  Heart sounds: Normal heart sounds  No murmur  No friction rub  Pulmonary:      Effort: Pulmonary effort is normal  No respiratory distress  Breath sounds: Normal breath sounds  No wheezing, rhonchi or rales  Abdominal:      General: There is no distension  Palpations: Abdomen is soft  Tenderness: There is no abdominal tenderness  There is no guarding or rebound  Musculoskeletal:      Comments: R base of 2nd digit with mild tenderness and bony prominence, no ecchymosis/erythema/warmth noted   Skin:     General: Skin is warm  Coloration: Skin is not pale  Findings: No rash  Neurological:      General: No focal deficit present  Mental Status: She is alert  Motor: No abnormal muscle tone  Gait: Gait normal    Psychiatric:         Mood and Affect: Mood normal          Behavior: Behavior normal          Thought Content:  Thought content normal          Judgment: Judgment normal

## 2020-08-25 NOTE — ASSESSMENT & PLAN NOTE
Has benefit with once daily Ultracet and muscle relaxer - Flexeril does make her sleepy and she has not tried Robaxin in past,  Skelaxin did work well but insurance would not cover, trial of Robaxin, re-eval in 3 mos, con't titrating Gabapentin up to 300 mg tid, call with SE

## 2020-09-10 DIAGNOSIS — E03.9 HYPOTHYROIDISM, UNSPECIFIED TYPE: ICD-10-CM

## 2020-09-10 RX ORDER — LEVOTHYROXINE SODIUM 150 UG/1
TABLET ORAL
Qty: 30 TABLET | Refills: 5 | Status: SHIPPED | OUTPATIENT
Start: 2020-09-10 | End: 2020-10-13 | Stop reason: SDUPTHER

## 2020-09-17 DIAGNOSIS — F32.A DEPRESSION, UNSPECIFIED DEPRESSION TYPE: ICD-10-CM

## 2020-09-17 DIAGNOSIS — F41.9 ANXIETY: ICD-10-CM

## 2020-09-17 RX ORDER — VENLAFAXINE HYDROCHLORIDE 150 MG/1
CAPSULE, EXTENDED RELEASE ORAL
Qty: 30 CAPSULE | Refills: 1 | Status: SHIPPED | OUTPATIENT
Start: 2020-09-17 | End: 2020-11-15

## 2020-09-29 LAB
CHOLEST SERPL-MCNC: 228 MG/DL (ref 100–199)
CHOLEST/HDLC SERPL: 4.1 RATIO (ref 0–4.4)
HDLC SERPL-MCNC: 55 MG/DL
LDLC SERPL CALC-MCNC: 146 MG/DL (ref 0–99)
SL AMB VLDL CHOLESTEROL CALC: 27 MG/DL (ref 5–40)
TRIGL SERPL-MCNC: 153 MG/DL (ref 0–149)

## 2020-09-30 ENCOUNTER — TELEPHONE (OUTPATIENT)
Dept: BARIATRICS | Facility: CLINIC | Age: 60
End: 2020-09-30

## 2020-09-30 LAB
ALBUMIN SERPL-MCNC: 4.5 G/DL (ref 3.8–4.8)
ALBUMIN/GLOB SERPL: 1.7 {RATIO} (ref 1.2–2.2)
ALP SERPL-CCNC: 67 IU/L (ref 39–117)
ALT SERPL-CCNC: 18 IU/L (ref 0–32)
AST SERPL-CCNC: 19 IU/L (ref 0–40)
BILIRUB SERPL-MCNC: 0.7 MG/DL (ref 0–1.2)
BUN SERPL-MCNC: 24 MG/DL (ref 8–27)
BUN/CREAT SERPL: 29 (ref 12–28)
CALCIUM SERPL-MCNC: 9.9 MG/DL (ref 8.7–10.3)
CHLORIDE SERPL-SCNC: 103 MMOL/L (ref 96–106)
CO2 SERPL-SCNC: 28 MMOL/L (ref 20–29)
CREAT SERPL-MCNC: 0.84 MG/DL (ref 0.57–1)
EST. AVERAGE GLUCOSE BLD GHB EST-MCNC: 105 MG/DL
GLOBULIN SER-MCNC: 2.6 G/DL (ref 1.5–4.5)
GLUCOSE SERPL-MCNC: 89 MG/DL (ref 65–99)
HBA1C MFR BLD: 5.3 % (ref 4.8–5.6)
POTASSIUM SERPL-SCNC: 4.5 MMOL/L (ref 3.5–5.2)
PROT SERPL-MCNC: 7.1 G/DL (ref 6–8.5)
SL AMB EGFR AFRICAN AMERICAN: 86 ML/MIN/1.73
SL AMB EGFR NON AFRICAN AMERICAN: 75 ML/MIN/1.73
SODIUM SERPL-SCNC: 143 MMOL/L (ref 134–144)
T4 FREE SERPL-MCNC: 1.13 NG/DL (ref 0.82–1.77)
TSH SERPL DL<=0.005 MIU/L-ACNC: 0.55 UIU/ML (ref 0.45–4.5)

## 2020-09-30 NOTE — TELEPHONE ENCOUNTER
Patient left us VM she is having some reflux issues  Patient has not been seen since 2018 and will now require and appointment  I attempted to reach patient no answer at this time I left her a VM to return our call back

## 2020-10-04 DIAGNOSIS — M51.36 DISC DEGENERATION, LUMBAR: ICD-10-CM

## 2020-10-04 RX ORDER — GABAPENTIN 100 MG/1
CAPSULE ORAL
Qty: 90 CAPSULE | Refills: 1 | Status: SHIPPED | OUTPATIENT
Start: 2020-10-04 | End: 2020-10-22

## 2020-10-08 DIAGNOSIS — F41.9 ANXIETY: ICD-10-CM

## 2020-10-08 DIAGNOSIS — F32.A DEPRESSION, UNSPECIFIED DEPRESSION TYPE: ICD-10-CM

## 2020-10-08 RX ORDER — VENLAFAXINE HYDROCHLORIDE 75 MG/1
CAPSULE, EXTENDED RELEASE ORAL
Qty: 30 CAPSULE | Refills: 2 | Status: SHIPPED | OUTPATIENT
Start: 2020-10-08 | End: 2021-01-14

## 2020-10-12 ENCOUNTER — TELEPHONE (OUTPATIENT)
Dept: OTHER | Facility: OTHER | Age: 60
End: 2020-10-12

## 2020-10-13 ENCOUNTER — OFFICE VISIT (OUTPATIENT)
Dept: ENDOCRINOLOGY | Facility: HOSPITAL | Age: 60
End: 2020-10-13
Payer: COMMERCIAL

## 2020-10-13 VITALS
WEIGHT: 181.6 LBS | HEART RATE: 72 BPM | BODY MASS INDEX: 32.18 KG/M2 | TEMPERATURE: 98.2 F | DIASTOLIC BLOOD PRESSURE: 84 MMHG | HEIGHT: 63 IN | SYSTOLIC BLOOD PRESSURE: 122 MMHG

## 2020-10-13 DIAGNOSIS — E03.9 HYPOTHYROIDISM, UNSPECIFIED TYPE: Primary | ICD-10-CM

## 2020-10-13 DIAGNOSIS — Z90.3 POSTGASTRECTOMY MALABSORPTION: ICD-10-CM

## 2020-10-13 DIAGNOSIS — K91.2 POSTGASTRECTOMY MALABSORPTION: ICD-10-CM

## 2020-10-13 DIAGNOSIS — Z13.1 DIABETES MELLITUS SCREENING: ICD-10-CM

## 2020-10-13 PROCEDURE — 99213 OFFICE O/P EST LOW 20 MIN: CPT | Performed by: INTERNAL MEDICINE

## 2020-10-13 PROCEDURE — 1036F TOBACCO NON-USER: CPT | Performed by: INTERNAL MEDICINE

## 2020-10-13 RX ORDER — LEVOTHYROXINE SODIUM 150 UG/1
TABLET ORAL
Qty: 30 TABLET | Refills: 11 | Status: SHIPPED | OUTPATIENT
Start: 2020-10-13 | End: 2021-10-20 | Stop reason: SDUPTHER

## 2020-10-21 PROBLEM — E66.811 OBESITY, CLASS I, BMI 30-34.9: Status: ACTIVE | Noted: 2020-10-21

## 2020-10-21 PROBLEM — Z48.815 ENCOUNTER FOR SURGICAL AFTERCARE FOLLOWING SURGERY OF DIGESTIVE SYSTEM: Status: ACTIVE | Noted: 2020-10-21

## 2020-10-21 PROBLEM — E66.9 OBESITY, CLASS I, BMI 30-34.9: Status: ACTIVE | Noted: 2020-10-21

## 2020-10-22 ENCOUNTER — OFFICE VISIT (OUTPATIENT)
Dept: BARIATRICS | Facility: CLINIC | Age: 60
End: 2020-10-22
Payer: COMMERCIAL

## 2020-10-22 VITALS
BODY MASS INDEX: 31.07 KG/M2 | SYSTOLIC BLOOD PRESSURE: 124 MMHG | HEIGHT: 64 IN | WEIGHT: 182 LBS | TEMPERATURE: 97.2 F | HEART RATE: 65 BPM | DIASTOLIC BLOOD PRESSURE: 70 MMHG

## 2020-10-22 DIAGNOSIS — E03.8 HYPOTHYROIDISM DUE TO HASHIMOTO'S THYROIDITIS: ICD-10-CM

## 2020-10-22 DIAGNOSIS — Z90.3 POSTGASTRECTOMY MALABSORPTION: ICD-10-CM

## 2020-10-22 DIAGNOSIS — E78.5 DYSLIPIDEMIA: ICD-10-CM

## 2020-10-22 DIAGNOSIS — E06.3 HYPOTHYROIDISM DUE TO HASHIMOTO'S THYROIDITIS: ICD-10-CM

## 2020-10-22 DIAGNOSIS — E66.9 OBESITY, CLASS I, BMI 30-34.9: ICD-10-CM

## 2020-10-22 DIAGNOSIS — Z90.3 HISTORY OF SLEEVE GASTRECTOMY: ICD-10-CM

## 2020-10-22 DIAGNOSIS — Z48.815 ENCOUNTER FOR SURGICAL AFTERCARE FOLLOWING SURGERY OF DIGESTIVE SYSTEM: ICD-10-CM

## 2020-10-22 DIAGNOSIS — K91.2 POSTGASTRECTOMY MALABSORPTION: ICD-10-CM

## 2020-10-22 DIAGNOSIS — K21.9 GASTROESOPHAGEAL REFLUX DISEASE WITHOUT ESOPHAGITIS: Primary | ICD-10-CM

## 2020-10-22 PROCEDURE — 99214 OFFICE O/P EST MOD 30 MIN: CPT | Performed by: PHYSICIAN ASSISTANT

## 2020-11-07 DIAGNOSIS — M51.36 DISC DEGENERATION, LUMBAR: ICD-10-CM

## 2020-11-07 RX ORDER — GABAPENTIN 300 MG/1
CAPSULE ORAL
Qty: 90 CAPSULE | Refills: 2 | Status: SHIPPED | OUTPATIENT
Start: 2020-11-07 | End: 2021-02-13

## 2020-11-15 DIAGNOSIS — F32.A DEPRESSION, UNSPECIFIED DEPRESSION TYPE: ICD-10-CM

## 2020-11-15 DIAGNOSIS — F41.9 ANXIETY: ICD-10-CM

## 2020-11-15 RX ORDER — VENLAFAXINE HYDROCHLORIDE 150 MG/1
CAPSULE, EXTENDED RELEASE ORAL
Qty: 30 CAPSULE | Refills: 1 | Status: SHIPPED | OUTPATIENT
Start: 2020-11-15 | End: 2021-01-14

## 2020-11-16 ENCOUNTER — HOSPITAL ENCOUNTER (OUTPATIENT)
Dept: RADIOLOGY | Facility: HOSPITAL | Age: 60
Discharge: HOME | End: 2020-11-16
Payer: COMMERCIAL

## 2020-11-16 DIAGNOSIS — K21.9 GASTROESOPHAGEAL REFLUX DISEASE WITHOUT ESOPHAGITIS: ICD-10-CM

## 2020-11-16 DIAGNOSIS — Z90.3 HISTORY OF SLEEVE GASTRECTOMY: ICD-10-CM

## 2020-11-16 DIAGNOSIS — E66.9 OBESITY, CLASS I, BMI 30-34.9: ICD-10-CM

## 2020-11-16 PROCEDURE — 74240 X-RAY XM UPR GI TRC 1CNTRST: CPT

## 2020-11-17 ENCOUNTER — TELEPHONE (OUTPATIENT)
Dept: BARIATRICS | Facility: CLINIC | Age: 60
End: 2020-11-17

## 2020-11-18 LAB
IRON SATN MFR SERPL: 35 % (ref 15–55)
IRON SERPL-MCNC: 116 UG/DL (ref 27–139)
TIBC SERPL-MCNC: 329 UG/DL (ref 250–450)
UIBC SERPL-MCNC: 213 UG/DL (ref 118–369)

## 2020-11-23 LAB
25(OH)D3+25(OH)D2 SERPL-MCNC: 60 NG/ML (ref 30–100)
COPPER SERPL-MCNC: 109 UG/DL (ref 72–166)
ERYTHROCYTE [DISTWIDTH] IN BLOOD BY AUTOMATED COUNT: 12.1 % (ref 11.7–15.4)
FERRITIN SERPL-MCNC: 88 NG/ML (ref 15–150)
FOLATE SERPL-MCNC: >20 NG/ML
HCT VFR BLD AUTO: 40.5 % (ref 34–46.6)
HGB BLD-MCNC: 13.5 G/DL (ref 11.1–15.9)
MCH RBC QN AUTO: 29.1 PG (ref 26.6–33)
MCHC RBC AUTO-ENTMCNC: 33.3 G/DL (ref 31.5–35.7)
MCV RBC AUTO: 87 FL (ref 79–97)
PLATELET # BLD AUTO: 273 X10E3/UL (ref 150–450)
PTH-INTACT SERPL-MCNC: 30 PG/ML (ref 15–65)
RBC # BLD AUTO: 4.64 X10E6/UL (ref 3.77–5.28)
VIT A SERPL-MCNC: 59.8 UG/DL (ref 22–69.5)
VIT B1 BLD-SCNC: 124.5 NMOL/L (ref 66.5–200)
VIT B12 SERPL-MCNC: 1004 PG/ML (ref 232–1245)
WBC # BLD AUTO: 5.8 X10E3/UL (ref 3.4–10.8)
ZINC SERPL-MCNC: 86 UG/DL (ref 56–134)

## 2020-11-24 ENCOUNTER — OFFICE VISIT (OUTPATIENT)
Dept: FAMILY MEDICINE CLINIC | Facility: HOSPITAL | Age: 60
End: 2020-11-24
Payer: COMMERCIAL

## 2020-11-24 VITALS
TEMPERATURE: 97.6 F | BODY MASS INDEX: 31.82 KG/M2 | SYSTOLIC BLOOD PRESSURE: 104 MMHG | HEART RATE: 74 BPM | DIASTOLIC BLOOD PRESSURE: 76 MMHG | WEIGHT: 186.4 LBS | HEIGHT: 64 IN

## 2020-11-24 DIAGNOSIS — M54.40 CHRONIC LOW BACK PAIN WITH SCIATICA, SCIATICA LATERALITY UNSPECIFIED, UNSPECIFIED BACK PAIN LATERALITY: ICD-10-CM

## 2020-11-24 DIAGNOSIS — E28.39 MENOPAUSE OVARIAN FAILURE: ICD-10-CM

## 2020-11-24 DIAGNOSIS — E03.8 HYPOTHYROIDISM DUE TO HASHIMOTO'S THYROIDITIS: ICD-10-CM

## 2020-11-24 DIAGNOSIS — Z12.31 ENCOUNTER FOR SCREENING MAMMOGRAM FOR BREAST CANCER: ICD-10-CM

## 2020-11-24 DIAGNOSIS — Z23 ENCOUNTER FOR IMMUNIZATION: ICD-10-CM

## 2020-11-24 DIAGNOSIS — E06.3 HYPOTHYROIDISM DUE TO HASHIMOTO'S THYROIDITIS: ICD-10-CM

## 2020-11-24 DIAGNOSIS — G89.29 CHRONIC LOW BACK PAIN WITH SCIATICA, SCIATICA LATERALITY UNSPECIFIED, UNSPECIFIED BACK PAIN LATERALITY: ICD-10-CM

## 2020-11-24 DIAGNOSIS — K21.9 GASTROESOPHAGEAL REFLUX DISEASE WITHOUT ESOPHAGITIS: Primary | ICD-10-CM

## 2020-11-24 DIAGNOSIS — F41.9 ANXIETY: ICD-10-CM

## 2020-11-24 PROCEDURE — 3725F SCREEN DEPRESSION PERFORMED: CPT | Performed by: INTERNAL MEDICINE

## 2020-11-24 PROCEDURE — 90715 TDAP VACCINE 7 YRS/> IM: CPT | Performed by: INTERNAL MEDICINE

## 2020-11-24 PROCEDURE — 3008F BODY MASS INDEX DOCD: CPT | Performed by: INTERNAL MEDICINE

## 2020-11-24 PROCEDURE — 90682 RIV4 VACC RECOMBINANT DNA IM: CPT | Performed by: INTERNAL MEDICINE

## 2020-11-24 PROCEDURE — 1036F TOBACCO NON-USER: CPT | Performed by: INTERNAL MEDICINE

## 2020-11-24 PROCEDURE — 99214 OFFICE O/P EST MOD 30 MIN: CPT | Performed by: INTERNAL MEDICINE

## 2020-11-24 PROCEDURE — 90471 IMMUNIZATION ADMIN: CPT | Performed by: INTERNAL MEDICINE

## 2020-11-24 PROCEDURE — 90472 IMMUNIZATION ADMIN EACH ADD: CPT | Performed by: INTERNAL MEDICINE

## 2020-11-24 RX ORDER — BUSPIRONE HYDROCHLORIDE 5 MG/1
5 TABLET ORAL 2 TIMES DAILY
Qty: 60 TABLET | Refills: 5 | Status: SHIPPED | OUTPATIENT
Start: 2020-11-24 | End: 2021-01-05 | Stop reason: SDUPTHER

## 2020-12-02 ENCOUNTER — CONSULT (OUTPATIENT)
Dept: PAIN MEDICINE | Facility: CLINIC | Age: 60
End: 2020-12-02
Payer: COMMERCIAL

## 2020-12-02 VITALS
WEIGHT: 184.6 LBS | TEMPERATURE: 98.1 F | SYSTOLIC BLOOD PRESSURE: 122 MMHG | DIASTOLIC BLOOD PRESSURE: 70 MMHG | HEART RATE: 72 BPM | HEIGHT: 64 IN | BODY MASS INDEX: 31.51 KG/M2

## 2020-12-02 DIAGNOSIS — G89.29 CHRONIC LOW BACK PAIN WITH SCIATICA, SCIATICA LATERALITY UNSPECIFIED, UNSPECIFIED BACK PAIN LATERALITY: ICD-10-CM

## 2020-12-02 DIAGNOSIS — M47.816 LUMBAR SPONDYLOSIS: Primary | ICD-10-CM

## 2020-12-02 DIAGNOSIS — M54.50 CHRONIC BILATERAL LOW BACK PAIN WITHOUT SCIATICA: ICD-10-CM

## 2020-12-02 DIAGNOSIS — G89.4 CHRONIC PAIN SYNDROME: ICD-10-CM

## 2020-12-02 DIAGNOSIS — G89.29 CHRONIC BILATERAL LOW BACK PAIN WITHOUT SCIATICA: ICD-10-CM

## 2020-12-02 DIAGNOSIS — M54.40 CHRONIC LOW BACK PAIN WITH SCIATICA, SCIATICA LATERALITY UNSPECIFIED, UNSPECIFIED BACK PAIN LATERALITY: ICD-10-CM

## 2020-12-02 PROCEDURE — 3008F BODY MASS INDEX DOCD: CPT | Performed by: ANESTHESIOLOGY

## 2020-12-02 PROCEDURE — 99244 OFF/OP CNSLTJ NEW/EST MOD 40: CPT | Performed by: ANESTHESIOLOGY

## 2020-12-02 PROCEDURE — 1036F TOBACCO NON-USER: CPT | Performed by: ANESTHESIOLOGY

## 2020-12-14 ENCOUNTER — HOSPITAL ENCOUNTER (OUTPATIENT)
Dept: RADIOLOGY | Facility: CLINIC | Age: 60
Discharge: HOME | End: 2020-12-14
Attending: ANESTHESIOLOGY | Admitting: ANESTHESIOLOGY
Payer: COMMERCIAL

## 2020-12-14 VITALS
DIASTOLIC BLOOD PRESSURE: 61 MMHG | HEART RATE: 77 BPM | RESPIRATION RATE: 20 BRPM | OXYGEN SATURATION: 97 % | SYSTOLIC BLOOD PRESSURE: 106 MMHG | TEMPERATURE: 97.5 F

## 2020-12-14 DIAGNOSIS — M54.50 CHRONIC BILATERAL LOW BACK PAIN WITHOUT SCIATICA: ICD-10-CM

## 2020-12-14 DIAGNOSIS — M47.816 LUMBAR SPONDYLOSIS: ICD-10-CM

## 2020-12-14 DIAGNOSIS — G89.29 CHRONIC BILATERAL LOW BACK PAIN WITHOUT SCIATICA: ICD-10-CM

## 2020-12-14 PROCEDURE — 64493 INJ PARAVERT F JNT L/S 1 LEV: CPT | Performed by: ANESTHESIOLOGY

## 2020-12-14 PROCEDURE — 64494 INJ PARAVERT F JNT L/S 2 LEV: CPT | Performed by: ANESTHESIOLOGY

## 2020-12-14 RX ORDER — BUPIVACAINE HCL/PF 2.5 MG/ML
10 VIAL (ML) INJECTION ONCE
Status: COMPLETED | OUTPATIENT
Start: 2020-12-14 | End: 2020-12-14

## 2020-12-14 RX ADMIN — BUPIVACAINE HYDROCHLORIDE 6 ML: 2.5 INJECTION, SOLUTION EPIDURAL; INFILTRATION; INTRACAUDAL at 10:58

## 2020-12-16 DIAGNOSIS — M47.816 LUMBAR SPONDYLOSIS: Primary | ICD-10-CM

## 2020-12-16 DIAGNOSIS — M54.50 CHRONIC BILATERAL LOW BACK PAIN WITHOUT SCIATICA: ICD-10-CM

## 2020-12-16 DIAGNOSIS — G89.29 CHRONIC BILATERAL LOW BACK PAIN WITHOUT SCIATICA: ICD-10-CM

## 2020-12-19 DIAGNOSIS — K21.9 GASTROESOPHAGEAL REFLUX DISEASE: ICD-10-CM

## 2020-12-20 RX ORDER — OMEPRAZOLE 20 MG/1
CAPSULE, DELAYED RELEASE ORAL
Qty: 60 CAPSULE | Refills: 2 | Status: SHIPPED | OUTPATIENT
Start: 2020-12-20 | End: 2021-09-14

## 2020-12-29 ENCOUNTER — HOSPITAL ENCOUNTER (OUTPATIENT)
Dept: RADIOLOGY | Facility: CLINIC | Age: 60
Discharge: HOME | End: 2020-12-29
Attending: ANESTHESIOLOGY | Admitting: ANESTHESIOLOGY
Payer: COMMERCIAL

## 2020-12-29 VITALS
SYSTOLIC BLOOD PRESSURE: 125 MMHG | RESPIRATION RATE: 20 BRPM | DIASTOLIC BLOOD PRESSURE: 78 MMHG | TEMPERATURE: 98.8 F | HEART RATE: 68 BPM | OXYGEN SATURATION: 96 %

## 2020-12-29 DIAGNOSIS — M47.816 LUMBAR SPONDYLOSIS: ICD-10-CM

## 2020-12-29 DIAGNOSIS — G89.29 CHRONIC BILATERAL LOW BACK PAIN WITHOUT SCIATICA: ICD-10-CM

## 2020-12-29 DIAGNOSIS — M54.50 CHRONIC BILATERAL LOW BACK PAIN WITHOUT SCIATICA: ICD-10-CM

## 2020-12-29 PROCEDURE — 64494 INJ PARAVERT F JNT L/S 2 LEV: CPT | Performed by: ANESTHESIOLOGY

## 2020-12-29 PROCEDURE — 64493 INJ PARAVERT F JNT L/S 1 LEV: CPT | Performed by: ANESTHESIOLOGY

## 2020-12-29 RX ORDER — BUPIVACAINE HYDROCHLORIDE 7.5 MG/ML
10 INJECTION, SOLUTION EPIDURAL; RETROBULBAR ONCE
Status: COMPLETED | OUTPATIENT
Start: 2020-12-29 | End: 2020-12-29

## 2020-12-29 RX ADMIN — BUPIVACAINE HYDROCHLORIDE 6 ML: 7.5 INJECTION, SOLUTION EPIDURAL; RETROBULBAR at 15:54

## 2021-01-04 DIAGNOSIS — M47.816 LUMBAR SPONDYLOSIS: Primary | ICD-10-CM

## 2021-01-04 DIAGNOSIS — M54.50 LOW BACK PAIN, UNSPECIFIED BACK PAIN LATERALITY, UNSPECIFIED CHRONICITY, UNSPECIFIED WHETHER SCIATICA PRESENT: ICD-10-CM

## 2021-01-05 ENCOUNTER — OFFICE VISIT (OUTPATIENT)
Dept: FAMILY MEDICINE CLINIC | Facility: HOSPITAL | Age: 61
End: 2021-01-05
Payer: COMMERCIAL

## 2021-01-05 VITALS
SYSTOLIC BLOOD PRESSURE: 108 MMHG | BODY MASS INDEX: 31.86 KG/M2 | WEIGHT: 186.6 LBS | DIASTOLIC BLOOD PRESSURE: 72 MMHG | TEMPERATURE: 97.5 F | HEART RATE: 74 BPM | HEIGHT: 64 IN

## 2021-01-05 DIAGNOSIS — M25.561 CHRONIC PAIN OF RIGHT KNEE: ICD-10-CM

## 2021-01-05 DIAGNOSIS — E66.9 OBESITY (BMI 30.0-34.9): ICD-10-CM

## 2021-01-05 DIAGNOSIS — G89.29 CHRONIC PAIN OF RIGHT KNEE: ICD-10-CM

## 2021-01-05 DIAGNOSIS — F41.9 ANXIETY: Primary | ICD-10-CM

## 2021-01-05 DIAGNOSIS — M51.36 DISC DEGENERATION, LUMBAR: ICD-10-CM

## 2021-01-05 PROCEDURE — 1036F TOBACCO NON-USER: CPT | Performed by: INTERNAL MEDICINE

## 2021-01-05 PROCEDURE — 99214 OFFICE O/P EST MOD 30 MIN: CPT | Performed by: INTERNAL MEDICINE

## 2021-01-05 PROCEDURE — 3008F BODY MASS INDEX DOCD: CPT | Performed by: INTERNAL MEDICINE

## 2021-01-05 RX ORDER — BUSPIRONE HYDROCHLORIDE 7.5 MG/1
7.5 TABLET ORAL 2 TIMES DAILY
Qty: 60 TABLET | Refills: 1 | Status: SHIPPED | OUTPATIENT
Start: 2021-01-05 | End: 2021-02-09 | Stop reason: SDUPTHER

## 2021-01-05 RX ORDER — METAXALONE 400 MG/1
400 TABLET ORAL 3 TIMES DAILY PRN
Qty: 90 TABLET | Refills: 0 | Status: SHIPPED | OUTPATIENT
Start: 2021-01-05 | End: 2021-03-12

## 2021-01-05 NOTE — PATIENT INSTRUCTIONS
Obesity   AMBULATORY CARE:   Obesity  is when your body mass index (BMI) is greater than 30  Your healthcare provider will use your height and weight to measure your BMI  The risks of obesity include  many health problems, such as injuries or physical disability  You may need tests to check for the following:  · Diabetes    · High blood pressure or high cholesterol    · Heart disease    · Gallbladder or liver disease    · Cancer of the colon, breast, prostate, liver, or kidney    · Sleep apnea    · Arthritis or gout    Seek care immediately if:   · You have a severe headache, confusion, or difficulty speaking  · You have weakness on one side of your body  · You have chest pain, sweating, or shortness of breath  Contact your healthcare provider if:   · You have symptoms of gallbladder or liver disease, such as pain in your upper abdomen  · You have knee or hip pain and discomfort while walking  · You have symptoms of diabetes, such as intense hunger and thirst, and frequent urination  · You have symptoms of sleep apnea, such as snoring or daytime sleepiness  · You have questions or concerns about your condition or care  Treatment for obesity  focuses on helping you lose weight to improve your health  Even a small decrease in BMI can reduce the risk for many health problems  Your healthcare provider will help you set a weight-loss goal   · Lifestyle changes  are the first step in treating obesity  These include making healthy food choices and getting regular physical activity  Your healthcare provider may suggest a weight-loss program that involves coaching, education, and therapy  · Medicine  may help you lose weight when it is used with a healthy diet and physical activity  · Surgery  can help you lose weight if you are very obese and have other health problems  There are several types of weight-loss surgery  Ask your healthcare provider for more information      Be successful losing weight:   · Set small, realistic goals  An example of a small goal is to walk for 20 minutes 5 days a week  Anther goal is to lose 5% of your body weight  · Tell friends, family members, and coworkers about your goals  and ask for their support  Ask a friend to lose weight with you, or join a weight-loss support group  · Identify foods or triggers that may cause you to overeat , and find ways to avoid them  Remove tempting high-calorie foods from your home and workplace  Place a bowl of fresh fruit on your kitchen counter  If stress causes you to eat, then find other ways to cope with stress  · Keep a diary to track what you eat and drink  Also write down how many minutes of physical activity you do each day  Weigh yourself once a week and record it in your diary  Eating changes: You will need to eat 500 to 1,000 fewer calories each day than you currently eat to lose 1 to 2 pounds a week  The following changes will help you cut calories:  · Eat smaller portions  Use small plates, no larger than 9 inches in diameter  Fill your plate half full of fruits and vegetables  Measure your food using measuring cups until you know what a serving size looks like  · Eat 3 meals and 1 or 2 snacks each day  Plan your meals in advance  [place]  and eat at home most of the time  Eat slowly  Do not skip meals  Skipping meals can lead to overeating later in the day  This can make it harder for you to lose weight  Talk with a dietitian to help you make a meal plan and schedule that is right for you  · Eat fruits and vegetables at every meal   They are low in calories and high in fiber, which makes you feel full  Do not add butter, margarine, or cream sauce to vegetables  Use herbs to season steamed vegetables  · Eat less fat and fewer fried foods  Eat more baked or grilled chicken and fish  These protein sources are lower in calories and fat than red meat  Limit fast food   Dress your salads with olive oil and vinegar instead of bottled dressing  · Limit the amount of sugar you eat  Do not drink sugary beverages  Limit alcohol  Activity changes:  Physical activity is good for your body in many ways  It helps you burn calories and build strong muscles  It decreases stress and depression, and improves your mood  It can also help you sleep better  Talk to your healthcare provider before you begin an exercise program   · Exercise for at least 30 minutes 5 days a week  Start slowly  Set aside time each day for physical activity that you enjoy and that is convenient for you  It is best to do both weight training and an activity that increases your heart rate, such as walking, bicycling, or swimming  · Find ways to be more active  Do yard work and housecleaning  Walk up the stairs instead of using elevators  Spend your leisure time going to events that require walking, such as outdoor festivals or fairs  This extra physical activity can help you lose weight and keep it off  Follow up with your healthcare provider as directed: You may need to meet with a dietitian  Write down your questions so you remember to ask them during your visits  © Copyright [address] [place] [address] Drive Information is for End User's use only and may not be sold, redistributed or otherwise used for commercial purposes  All illustrations and images included in CareNotes® are the copyrighted property of A D A M [employer]  or [address] [place] [address]  The above information is an  only  It is not intended as medical advice for individual conditions or treatments  Talk to your doctor, nurse or pharmacist before following any medical regimen to see if it is safe and effective for you  Heart Healthy Diet   AMBULATORY CARE:   A heart healthy diet  is an eating plan low in unhealthy fats and sodium (salt)  The plan is high in healthy fats and fiber   A heart healthy diet helps improve your cholesterol levels and lowers your risk for heart disease and stroke  A dietitian will teach you how to read and understand food labels  Heart healthy diet guidelines to follow:   · Choose foods that contain healthy fats  ? Unsaturated fats  include monounsaturated and polyunsaturated fats  Unsaturated fat is found in foods such as soybean, canola, olive, corn, and safflower oils  It is also found in soft tub margarine that is made with liquid vegetable oil  ? Omega-3 fat  is found in certain fish, such as salmon, tuna, and trout, and in walnuts and flaxseed  Eat fish high in omega-3 fats at least 2 times a week  · Get 20 to 30 grams of fiber each day  Fruits, vegetables, whole-grain foods, and legumes (cooked beans) are good sources of fiber  · Limit or do not have unhealthy fats  ? Cholesterol  is found in animal foods, such as eggs and lobster, and in dairy products made from whole milk  Limit cholesterol to less than 200 mg each day  ? Saturated fat  is found in meats, such as mendoaz and hamburger  It is also found in chicken or turkey skin, whole milk, and butter  Limit saturated fat to less than 7% of your total daily calories  ? Trans fat  is found in packaged foods, such as potato chips and cookies  It is also in hard margarine, some fried foods, and shortening  Do not eat foods that contain trans fats  · Limit sodium as directed  You may be told to limit sodium to 2,000 to 2,300 mg each day  Choose low-sodium or no-salt-added foods  Add little or no salt to food you prepare  Use herbs and spices in place of salt  Include the following in your heart healthy plan:  Ask your dietitian or healthcare provider how many servings to have from each of the following food groups:  · Grains:      ? Whole-wheat breads, cereals, and pastas, and brown rice    ? Low-fat, low-sodium crackers and chips    · Vegetables:      ? Broccoli, green beans, green peas, and spinach    ? Collards, kale, and lima beans    ?  Carrots, sweet potatoes, tomatoes, and peppers    ? Canned vegetables with no salt added    · Fruits:      ? Bananas, peaches, pears, and pineapple    ? Grapes, raisins, and dates    ? Oranges, tangerines, grapefruit, orange juice, and grapefruit juice    ? Apricots, mangoes, melons, and papaya    ? Raspberries and strawberries    ? Canned fruit with no added sugar    · Low-fat dairy:      ? Nonfat (skim) milk, 1% milk, and low-fat almond, cashew, or soy milks fortified with calcium    ? Low-fat cheese, regular or frozen yogurt, and cottage cheese    · Meats and proteins:      ? Lean cuts of beef and pork (loin, leg, round), skinless chicken and turkey    ? Legumes, soy products, egg whites, or nuts    Limit or do not include the following in your heart healthy plan:   · Unhealthy fats and oils:      ? Whole or 2% milk, cream cheese, sour cream, or cheese    ? High-fat cuts of beef (T-bone steaks, ribs), chicken or turkey with skin, and organ meats such as liver    ? Butter, stick margarine, shortening, and cooking oils such as coconut or palm oil    · Foods and liquids high in sodium:      ? Packaged foods, such as frozen dinners, cookies, macaroni and cheese, and cereals with more than 300 mg of sodium per serving    ? Vegetables with added sodium, such as instant potatoes, vegetables with added sauces, or regular canned vegetables    ? Cured or smoked meats, such as hot dogs, mendoza, and sausage    ? High-sodium ketchup, barbecue sauce, salad dressing, pickles, olives, soy sauce, or miso    · Foods and liquids high in sugar:      ? Candy, cake, cookies, pies, or doughnuts    ? Soft drinks (soda), sports drinks, or sweetened tea    ? Canned or dry mixes for cakes, soups, sauces, or gravies    Other healthy heart guidelines:   · Do not smoke  Nicotine and other chemicals in cigarettes and cigars can cause lung and heart damage  Ask your healthcare provider for information if you currently smoke and need help to quit   E-cigarettes or smokeless tobacco still contain nicotine  Talk to your healthcare provider before you use these products  · Limit or do not drink alcohol as directed  Alcohol can damage your heart and raise your blood pressure  Your healthcare provider may give you specific daily and weekly limits  The general recommended limit is 1 drink a day for women 21 or older and for men 72 or older  Do not have more than 3 drinks in a day or 7 in a week  The recommended limit is 2 drinks a day for men 24to 59years of age  Do not have more than 4 drinks in a day or 14 in a week  A drink of alcohol is 12 ounces of beer, 5 ounces of wine, or 1½ ounces of liquor  · Exercise regularly  Exercise can help you maintain a healthy weight and improve your blood pressure and cholesterol levels  Regular exercise can also decrease your risk for heart problems  Ask your healthcare provider about the best exercise plan for you  Do not start an exercise program without asking your healthcare provider  Follow up with your doctor or cardiologist as directed:  Write down your questions so you remember to ask them during your visits  © Copyright [address] Information is for End User's use only and may not be sold, redistributed or otherwise used for commercial purposes  All illustrations and images included in CareNotes® are the copyrighted property of A D A M , Inc  or [address] [place] [address]  The above information is an  only  It is not intended as medical advice for individual conditions or treatments  Talk to your doctor, nurse or pharmacist before following any medical regimen to see if it is safe and effective for you

## 2021-01-05 NOTE — PROGRESS NOTES
Assessment/Plan:    Anxiety  No benefit with addition of low lan Buspar - increase from 5 mg to 7 5 mg bid and if no better in 2 wks may go to 10 mg bid, con't current Effexor, re-eval in 4 wks, call with SE/new/worse mood    Disc degeneration, lumbar  Saw pain mgt and had to Marcaine injections  Has appt for ablation in early and mid Feb, wishes to try Skelaxin rather then Robaxin and Flexeril as felt it worked better - rx sent, con't current Gabapentin and Ultracet, has f/u with pain mgt       Diagnoses and all orders for this visit:    Anxiety  -     busPIRone (BUSPAR) 7 5 mg tablet; Take 1 tablet (7 5 mg total) by mouth 2 (two) times a day    Disc degeneration, lumbar  -     metaxalone (SKELAXIN) 400 MG tablet; Take 1 tablet (400 mg total) by mouth 3 (three) times a day as needed (muscle relaxer)    Chronic pain of right knee  Comments:  XRay 2019 - mild OA, encouraged exercise/wgt loss and Tylenol, cont Ultracet prn, ice and compressive sleeve can be used, may need ortho if persists/worsens    Obesity (BMI 30 0-34  9)    BMI 32 0-32 9,adult      Colonoscopy 3/20    Mammo 2/20    Dexa 10/18 - nml    PAP s/p hysterectomy     BW 9/20      Subjective:      Patient ID: Angel Omalley is a 56 y o  female  HPI Pt here for follow up appt    Last visit in Nov mood was not controlled and we subsequently added Buspar 5 mg 1 tab PO bid to her current Effexor  She is taking med twice daily as directed w/o SE  She feels anxiety is a bit worse  Pt saw Pain mgt early Dec for her chronic LBP  She was set up for Marcaine injections and  They were given 12/14 and 12/29/20  She was asked how she did with the injections and she states "it is hard to tell as I have so much pain in so many places"  She has her first ablation scheduled for 2/4/21 and 2nd one 2/18/21  She is taking Gabapentin tid as well as Robaxin prn - approx 3-4 x's a week at night or weekends only    She was UTT Flexeril (too sedated) and has minimal benefit with Robaxin  She had better benefit with Skelaxin in the past    She uses Ultracet as directed  Having some R knee pain for a year or so  Audra Wetzel something on floor recently and pain increased  Notes feeling like its going to give out but has had no falls or locking up  Had limped d/t the pain  Has tried heat with some benefit  Not exercising  BMI reviewed - wgt up 4 lbs  Diet could be better with holidays recently  Colonoscopy 3/20    Mammo 2/20    Dexa 10/18 - nml    PAP s/p hysterectomy     BW 9/20    Review of Systems   Constitutional: Negative for chills, fatigue and fever  HENT: Negative for congestion and sore throat  Eyes: Negative for pain and visual disturbance  Respiratory: Negative for cough, shortness of breath and wheezing  Cardiovascular: Negative for chest pain and palpitations  Gastrointestinal: Negative for abdominal pain, diarrhea, nausea and vomiting  Endocrine: Negative for polyuria  Genitourinary: Negative for difficulty urinating and dysuria  Musculoskeletal: Positive for arthralgias, back pain and gait problem  Negative for neck pain  Skin: Negative for rash and wound  Neurological: Negative for dizziness, light-headedness and headaches  Hematological: Negative for adenopathy  Psychiatric/Behavioral: Negative for behavioral problems, confusion and dysphoric mood  The patient is nervous/anxious  Objective:    /72   Pulse 74   Temp 97 5 °F (36 4 °C) (Temporal)   Ht 5' 3 5" (1 613 m)   Wt 84 6 kg (186 lb 9 6 oz)   BMI 32 54 kg/m²      Physical Exam  Vitals signs and nursing note reviewed  Constitutional:       General: She is not in acute distress  Appearance: She is well-developed  She is obese  She is not ill-appearing  HENT:      Head: Normocephalic and atraumatic  Eyes:      General:         Right eye: No discharge  Left eye: No discharge        Conjunctiva/sclera: Conjunctivae normal    Neck: Musculoskeletal: Neck supple  Trachea: No tracheal deviation  Cardiovascular:      Rate and Rhythm: Normal rate and regular rhythm  Heart sounds: Normal heart sounds  No murmur  No friction rub  Pulmonary:      Effort: Pulmonary effort is normal  No respiratory distress  Breath sounds: Normal breath sounds  No wheezing or rales  Abdominal:      General: There is no distension  Palpations: Abdomen is soft  Tenderness: There is no abdominal tenderness  There is no guarding or rebound  Musculoskeletal:      Right lower leg: No edema  Left lower leg: No edema  Comments: No crepitus/popping of R knee with flex/ext, neg ant/post drawer test and neg valgus/varus stress, no warmth/erythema/effusion noted   Skin:     General: Skin is warm  Findings: No rash  Neurological:      General: No focal deficit present  Mental Status: She is alert  Motor: No abnormal muscle tone  Gait: Gait normal    Psychiatric:         Mood and Affect: Mood normal          Behavior: Behavior normal          Thought Content: Thought content normal          Judgment: Judgment normal          BMI Counseling: Body mass index is 32 54 kg/m²  The BMI is above normal  Nutrition recommendations include reducing portion sizes, 3-5 servings of fruits/vegetables daily, consuming healthier snacks, moderation in carbohydrate intake, increasing intake of lean protein and reducing intake of saturated fat and trans fat  Exercise recommendations include exercising 3-5 times per week

## 2021-01-05 NOTE — ASSESSMENT & PLAN NOTE
Saw pain mgt and had to Marcaine injections    Has appt for ablation in early and mid Feb, wishes to try Skelaxin rather then Robaxin and Flexeril as felt it worked better - rx sent, con't current Gabapentin and Ultracet, has f/u with pain mgt

## 2021-01-14 DIAGNOSIS — F41.9 ANXIETY: ICD-10-CM

## 2021-01-14 DIAGNOSIS — F32.A DEPRESSION, UNSPECIFIED DEPRESSION TYPE: ICD-10-CM

## 2021-01-14 RX ORDER — VENLAFAXINE HYDROCHLORIDE 150 MG/1
CAPSULE, EXTENDED RELEASE ORAL
Qty: 30 CAPSULE | Refills: 1 | Status: SHIPPED | OUTPATIENT
Start: 2021-01-14 | End: 2021-03-11

## 2021-01-14 RX ORDER — VENLAFAXINE HYDROCHLORIDE 75 MG/1
CAPSULE, EXTENDED RELEASE ORAL
Qty: 30 CAPSULE | Refills: 2 | Status: SHIPPED | OUTPATIENT
Start: 2021-01-14 | End: 2021-05-06

## 2021-01-24 DIAGNOSIS — M51.36 DISC DEGENERATION, LUMBAR: ICD-10-CM

## 2021-02-04 ENCOUNTER — HOSPITAL ENCOUNTER (OUTPATIENT)
Dept: RADIOLOGY | Facility: CLINIC | Age: 61
Discharge: HOME | End: 2021-02-04
Attending: ANESTHESIOLOGY
Payer: COMMERCIAL

## 2021-02-04 ENCOUNTER — TELEPHONE (OUTPATIENT)
Dept: PAIN MEDICINE | Facility: CLINIC | Age: 61
End: 2021-02-04

## 2021-02-04 VITALS
OXYGEN SATURATION: 96 % | TEMPERATURE: 97.5 F | RESPIRATION RATE: 20 BRPM | HEART RATE: 66 BPM | SYSTOLIC BLOOD PRESSURE: 115 MMHG | DIASTOLIC BLOOD PRESSURE: 77 MMHG

## 2021-02-04 DIAGNOSIS — M54.50 LOW BACK PAIN, UNSPECIFIED BACK PAIN LATERALITY, UNSPECIFIED CHRONICITY, UNSPECIFIED WHETHER SCIATICA PRESENT: ICD-10-CM

## 2021-02-04 DIAGNOSIS — M47.816 LUMBAR SPONDYLOSIS: ICD-10-CM

## 2021-02-04 PROCEDURE — 64636 DESTROY L/S FACET JNT ADDL: CPT | Performed by: ANESTHESIOLOGY

## 2021-02-04 PROCEDURE — 64635 DESTROY LUMB/SAC FACET JNT: CPT | Performed by: ANESTHESIOLOGY

## 2021-02-04 RX ORDER — LIDOCAINE HYDROCHLORIDE 10 MG/ML
5 INJECTION, SOLUTION EPIDURAL; INFILTRATION; INTRACAUDAL; PERINEURAL ONCE
Status: COMPLETED | OUTPATIENT
Start: 2021-02-04 | End: 2021-02-04

## 2021-02-04 RX ADMIN — LIDOCAINE HYDROCHLORIDE 4 ML: 20 INJECTION, SOLUTION EPIDURAL; INFILTRATION; INTRACAUDAL at 13:42

## 2021-02-04 RX ADMIN — LIDOCAINE HYDROCHLORIDE 5 ML: 10 INJECTION, SOLUTION EPIDURAL; INFILTRATION; INTRACAUDAL; PERINEURAL at 13:42

## 2021-02-04 NOTE — DISCHARGE INSTRUCTIONS

## 2021-02-04 NOTE — TELEPHONE ENCOUNTER
Patient is S/P a Right  L3, L4,L5 RFA c/ SL on 2/4/21  Next OPRO  scheduled for 2/18/21  Next OVS scheduled for 3/12/21  Please call on 2/5/21 post OPRO   Thanks

## 2021-02-05 NOTE — TELEPHONE ENCOUNTER
S/w pt s/p RFA yesterday, pt said she is doing good just a little tender today  Pt denies any s/s of infection, fevers or sunburn like sensation  Confirmed next procedure and advised pt to call back with any issues prior  Pt verbalized understanding

## 2021-02-09 ENCOUNTER — OFFICE VISIT (OUTPATIENT)
Dept: FAMILY MEDICINE CLINIC | Facility: HOSPITAL | Age: 61
End: 2021-02-09
Payer: COMMERCIAL

## 2021-02-09 VITALS
HEIGHT: 64 IN | TEMPERATURE: 97.7 F | WEIGHT: 188 LBS | BODY MASS INDEX: 32.1 KG/M2 | SYSTOLIC BLOOD PRESSURE: 108 MMHG | DIASTOLIC BLOOD PRESSURE: 78 MMHG | HEART RATE: 62 BPM

## 2021-02-09 DIAGNOSIS — F41.9 ANXIETY: Primary | ICD-10-CM

## 2021-02-09 DIAGNOSIS — G89.29 CHRONIC BILATERAL LOW BACK PAIN WITHOUT SCIATICA: ICD-10-CM

## 2021-02-09 DIAGNOSIS — M54.50 CHRONIC BILATERAL LOW BACK PAIN WITHOUT SCIATICA: ICD-10-CM

## 2021-02-09 DIAGNOSIS — F33.41 RECURRENT MAJOR DEPRESSIVE DISORDER, IN PARTIAL REMISSION (HCC): ICD-10-CM

## 2021-02-09 PROCEDURE — 99214 OFFICE O/P EST MOD 30 MIN: CPT | Performed by: INTERNAL MEDICINE

## 2021-02-09 RX ORDER — BUSPIRONE HYDROCHLORIDE 15 MG/1
15 TABLET ORAL 2 TIMES DAILY
Qty: 60 TABLET | Refills: 1 | Status: SHIPPED | OUTPATIENT
Start: 2021-02-09 | End: 2021-04-24 | Stop reason: SDUPTHER

## 2021-02-09 NOTE — ASSESSMENT & PLAN NOTE
Still stressed and anxious with separation/divorce and still living in same house with spouse, little benefit with increase in Buspar but no SE noted - increase from 10 mg bid to 15 mg bid and re-eval in 4 wks,  d/w pt that it takes 4-6 wks to get maximum benefit of med and that med has to be taken every day and to not miss doses of med,  con't current Effexor, call with new/worse mood/SI/panic attacks

## 2021-02-09 NOTE — ASSESSMENT & PLAN NOTE
Had R sided RFA of lumbar spine last week and has L side coming up, on Gabapentin, con't current meds and f/u as per pain mgt

## 2021-02-09 NOTE — ASSESSMENT & PLAN NOTE
Feeling a bit more down/tearfi; with separation/divorce and still living in same house with spouse, little benefit with increase in Buspar but no SE noted - increase from 10 mg bid to 15 mg bid and re-eval in 4 wks,  d/w pt that it takes 4-6 wks to get maximum benefit of med and that med has to be taken every day and to not miss doses of med,  con't current Effexor, call with new/worse mood/SI/panic attacks

## 2021-02-09 NOTE — PROGRESS NOTES
Assessment/Plan:    Anxiety  Still stressed and anxious with separation/divorce and still living in same house with spouse, little benefit with increase in Buspar but no SE noted - increase from 10 mg bid to 15 mg bid and re-eval in 4 wks,  d/w pt that it takes 4-6 wks to get maximum benefit of med and that med has to be taken every day and to not miss doses of med,  con't current Effexor, call with new/worse mood/SI/panic attacks    Depression  Feeling a bit more down/tearfi; with separation/divorce and still living in same house with spouse, little benefit with increase in Buspar but no SE noted - increase from 10 mg bid to 15 mg bid and re-eval in 4 wks,  d/w pt that it takes 4-6 wks to get maximum benefit of med and that med has to be taken every day and to not miss doses of med,  con't current Effexor, call with new/worse mood/SI/panic attacks    Chronic bilateral low back pain without sciatica  Had R sided RFA of lumbar spine last week and has L side coming up, on Gabapentin, con't current meds and f/u as per pain mgt       Diagnoses and all orders for this visit:    Anxiety  -     busPIRone (BUSPAR) 15 mg tablet; Take 1 tablet (15 mg total) by mouth 2 (two) times a day    Recurrent major depressive disorder, in partial remission ([facility] [address] )    Chronic bilateral low back pain without sciatica      Colonoscopy 3/20     Mammo 2/20 - scheduled for March 2021     Dexa 10/18 - nml - scheduled for March 2021     PAP s/p hysterectomy      BW 9/20      Subjective:      Patient ID: Carmelina Mckeon is a 56 y o  female  HPI Pt here for follow up appt    Last visit in Jan 2021 pt was con't to note anxiety despite starting low dose Buspar  We subsequently increased the dose to 7 5 mg bid and advised if not better in 2 wks could increase further to 10 mg bid  She was advised to con't her current Effexor  She is here for a med/mood check  She increased the Buspar as directed and noted no SE   She is taking 10 mg bid and notes no benefit with the anxiety  She is still chewing her nails and feels worried/stressed  She is tearful at times d/t end of marriage and packing up and moving out  Down/sad mood is increasing more as well  She states sleep is at baseline - gets up 1-2 times to urinate  She notes no SI/panic attacks  Pt had her RFA of R L3, L4, and L5 region last week for her chronic LBP  She noted discomfort from the procedure and still feels tender if she leans back on the region  She was told it would likely be 4-6 wks to see any significant benefit  She is having the L side done in a little over a week  Colonoscopy 3/20     Mammo 2/20 - scheduled for March 2021     Dexa 10/18 - nml - scheduled for March 2021     PAP s/p hysterectomy      BW 9/20      Review of Systems   Constitutional: Negative for chills and fever  HENT: Negative for congestion and sore throat  Respiratory: Negative for cough and shortness of breath  Cardiovascular: Negative for chest pain and palpitations  Gastrointestinal: Negative for abdominal pain, diarrhea, nausea and vomiting  Genitourinary: Negative for difficulty urinating and dysuria  Musculoskeletal: Positive for back pain  Negative for neck pain  Skin: Negative for rash and wound  Neurological: Negative for dizziness, light-headedness and headaches  Hematological: Negative for adenopathy  Psychiatric/Behavioral: Positive for dysphoric mood  Negative for behavioral problems, confusion and suicidal ideas  The patient is nervous/anxious  Objective:    /78   Pulse 62   Temp 97 7 °F (36 5 °C) (Temporal)   Ht 5' 3 5" (1 613 m)   Wt 85 3 kg (188 lb)   BMI 32 78 kg/m²      Physical Exam  Vitals signs and nursing note reviewed  Constitutional:       General: She is not in acute distress  Appearance: She is well-developed  She is not ill-appearing  HENT:      Head: Normocephalic and atraumatic  Eyes:      General:         Right eye: No discharge  Left eye: No discharge  Conjunctiva/sclera: Conjunctivae normal    Neck:      Musculoskeletal: Neck supple  Trachea: No tracheal deviation  Cardiovascular:      Rate and Rhythm: Normal rate and regular rhythm  Heart sounds: Normal heart sounds  No murmur  No friction rub  Pulmonary:      Effort: Pulmonary effort is normal  No respiratory distress  Breath sounds: Normal breath sounds  No wheezing, rhonchi or rales  Musculoskeletal:      Right lower leg: No edema  Left lower leg: No edema  Skin:     General: Skin is warm  Coloration: Skin is not pale  Findings: No rash  Neurological:      Mental Status: She is alert  Motor: No abnormal muscle tone  Psychiatric:         Mood and Affect: Mood normal          Behavior: Behavior normal          Thought Content:  Thought content normal          Judgment: Judgment normal       Comments: Tearful at times

## 2021-02-10 ENCOUNTER — TELEPHONE (OUTPATIENT)
Dept: FAMILY MEDICINE CLINIC | Facility: HOSPITAL | Age: 61
End: 2021-02-10

## 2021-02-10 NOTE — TELEPHONE ENCOUNTER
Patient forget to tell you at her appt the other day - she has been having nose bleeds - about 15 in the past 3 weeks  Never had nose bleeds before  Per nurse, patient was advised to pinch bridge of nose and lean forward; and to go to er if bleeding persists for more than 20 minutes      Patient wanted to know if you had any thoughts/advice on this? (patient aware you are not in today)    pcb

## 2021-02-10 NOTE — TELEPHONE ENCOUNTER
Common in winter mos d/t dry forced heat and cold - use saline nasal spray and can put a humidifier in her bed room

## 2021-02-12 DIAGNOSIS — M51.36 DISC DEGENERATION, LUMBAR: ICD-10-CM

## 2021-02-13 RX ORDER — GABAPENTIN 300 MG/1
CAPSULE ORAL
Qty: 90 CAPSULE | Refills: 2 | Status: SHIPPED | OUTPATIENT
Start: 2021-02-13 | End: 2021-05-13

## 2021-02-15 ENCOUNTER — TELEPHONE (OUTPATIENT)
Dept: BARIATRICS | Facility: CLINIC | Age: 61
End: 2021-02-15

## 2021-02-16 ENCOUNTER — ANESTHESIA EVENT (OUTPATIENT)
Dept: GASTROENTEROLOGY | Facility: HOSPITAL | Age: 61
End: 2021-02-16

## 2021-02-17 ENCOUNTER — ANESTHESIA (OUTPATIENT)
Dept: GASTROENTEROLOGY | Facility: HOSPITAL | Age: 61
End: 2021-02-17

## 2021-02-17 ENCOUNTER — HOSPITAL ENCOUNTER (OUTPATIENT)
Dept: GASTROENTEROLOGY | Facility: HOSPITAL | Age: 61
Setting detail: OUTPATIENT SURGERY
Discharge: HOME | End: 2021-02-17
Attending: SURGERY | Admitting: SURGERY
Payer: COMMERCIAL

## 2021-02-17 VITALS
SYSTOLIC BLOOD PRESSURE: 129 MMHG | OXYGEN SATURATION: 97 % | HEART RATE: 66 BPM | RESPIRATION RATE: 18 BRPM | DIASTOLIC BLOOD PRESSURE: 58 MMHG | TEMPERATURE: 99.3 F

## 2021-02-17 VITALS — HEART RATE: 71 BPM

## 2021-02-17 DIAGNOSIS — Z98.84 BARIATRIC SURGERY STATUS: ICD-10-CM

## 2021-02-17 PROCEDURE — 88305 TISSUE EXAM BY PATHOLOGIST: CPT | Performed by: PATHOLOGY

## 2021-02-17 PROCEDURE — 43239 EGD BIOPSY SINGLE/MULTIPLE: CPT | Performed by: SURGERY

## 2021-02-17 RX ORDER — PROPOFOL 10 MG/ML
INJECTION, EMULSION INTRAVENOUS AS NEEDED
Status: DISCONTINUED | OUTPATIENT
Start: 2021-02-17 | End: 2021-02-17

## 2021-02-17 RX ORDER — LIDOCAINE HYDROCHLORIDE 20 MG/ML
INJECTION, SOLUTION EPIDURAL; INFILTRATION; INTRACAUDAL; PERINEURAL AS NEEDED
Status: DISCONTINUED | OUTPATIENT
Start: 2021-02-17 | End: 2021-02-17

## 2021-02-17 RX ORDER — SODIUM CHLORIDE 9 MG/ML
125 INJECTION, SOLUTION INTRAVENOUS CONTINUOUS
Status: DISCONTINUED | OUTPATIENT
Start: 2021-02-17 | End: 2021-02-21 | Stop reason: HOSPADM

## 2021-02-17 RX ADMIN — PROPOFOL 100 MG: 10 INJECTION, EMULSION INTRAVENOUS at 11:22

## 2021-02-17 RX ADMIN — PROPOFOL 50 MG: 10 INJECTION, EMULSION INTRAVENOUS at 11:26

## 2021-02-17 RX ADMIN — LIDOCAINE HYDROCHLORIDE 100 MG: 20 INJECTION, SOLUTION EPIDURAL; INFILTRATION; INTRACAUDAL; PERINEURAL at 11:22

## 2021-02-17 RX ADMIN — SODIUM CHLORIDE 125 ML/HR: 0.9 INJECTION, SOLUTION INTRAVENOUS at 10:33

## 2021-02-17 NOTE — ANESTHESIA PREPROCEDURE EVALUATION
Procedure:  EGD    Relevant Problems   CARDIO   (+) Primary thunderclap headache      ENDO   (+) Hypothyroidism      GI/HEPATIC   (+) Gastroesophageal reflux disease without esophagitis      MUSCULOSKELETAL   (+) Arthritis   (+) Chronic bilateral low back pain without sciatica   (+) Disc degeneration, lumbar   (+) Lumbar spondylosis   (+) Osteoarthritis      NEURO/PSYCH   (+) Anxiety   (+) Depression   (+) Primary thunderclap headache        Physical Exam    Airway    Mallampati score: II  TM Distance: >3 FB  Neck ROM: full     Dental   No notable dental hx     Cardiovascular  Rhythm: regular, Rate: normal, Cardiovascular exam normal    Pulmonary  Pulmonary exam normal Breath sounds clear to auscultation,     Other Findings        Anesthesia Plan  ASA Score- 2     Anesthesia Type- general and IV sedation with anesthesia with ASA Monitors  Additional Monitors:   Airway Plan:           Plan Factors-    Chart reviewed  Patient summary reviewed  Patient is not a current smoker  Patient instructed to abstain from smoking on day of procedure  Patient did not smoke on day of surgery  Induction- intravenous  Postoperative Plan-     Informed Consent- Anesthetic plan and risks discussed with patient  I personally reviewed this patient with the CRNA  Discussed and agreed on the Anesthesia Plan with the GIANNA Trejo

## 2021-02-17 NOTE — NURSING NOTE
Changed into own clothing, taking PO crackers and water, keeping BRAVO up to date with eating, waiting for  for ride home

## 2021-02-17 NOTE — H&P
H&P EXAM - Outpatient Endoscopy  [facility] Resendez [address] [place] [address] GI LAB INTRA   Maryann Issa 56 y o  female MRN: [id number]  Unit/Bed#:  Encounter: [id number]        Impression: Heartburn s/p LSG    Plan:Upper endoscopy and Bravo    Chief Complaint: Heartburn    Physical Exam: Normal not in acute distress   Chest: Clear to auscultation   Heart: Normal S1 and S2

## 2021-02-17 NOTE — DISCHARGE INSTRUCTIONS
Upper Endoscopy   WHAT YOU NEED TO KNOW:   An upper endoscopy is also called an upper gastrointestinal (GI) endoscopy, or an esophagogastroduodenoscopy (EGD)  You may feel bloated, gassy, or have some abdominal discomfort after your procedure  Your throat may be sore for 24 to 36 hours  You may burp or pass gas from air that is still inside your body  DISCHARGE INSTRUCTIONS:   Call 911 if:   · You have sudden chest pain or trouble breathing  Seek care immediately if:   · You feel dizzy or faint  · You have trouble swallowing  · You have severe throat pain  · Your bowel movements are very dark or black  · Your abdomen is hard and firm and you have severe pain  · You vomit blood  Contact your healthcare provider if:   · You feel full or bloated and cannot burp or pass gas  · You have not had a bowel movement for 3 days after your procedure  · You have neck pain  · You have a fever or chills  · You have nausea or are vomiting  · You have a rash or hives  · You have questions or concerns about your endoscopy  Relieve a sore throat:  Suck on throat lozenges or crushed ice  Gargle with a small amount of warm salt water  Mix 1 teaspoon of salt and 1 cup of warm water to make salt water  Relieve gas and discomfort from bloating:  Lie on your right side with a heating pad on your abdomen  Take short walks to help pass gas  Eat small meals until bloating is relieved  Rest after your procedure:  Do not drive or make important decisions until the day after your procedure  Return to your normal activity as directed  You can usually return to work the day after your procedure  Follow up with your healthcare provider as directed:  Write down your questions so you remember to ask them during your visits  © Copyright [address] Information is for End User's use only and may not be sold, redistributed or otherwise used for commercial purposes   All illustrations and images included in CareNotes® are the copyrighted property of A D [employer] M [employer]  or Yusef Boucher   The above information is an  only  It is not intended as medical advice for individual conditions or treatments  Talk to your doctor, nurse or pharmacist before following any medical regimen to see if it is safe and effective for you

## 2021-02-19 ENCOUNTER — TELEPHONE (OUTPATIENT)
Dept: PAIN MEDICINE | Facility: CLINIC | Age: 61
End: 2021-02-19

## 2021-02-19 ENCOUNTER — HOSPITAL ENCOUNTER (OUTPATIENT)
Dept: RADIOLOGY | Facility: CLINIC | Age: 61
Discharge: HOME | End: 2021-02-19
Attending: ANESTHESIOLOGY | Admitting: ANESTHESIOLOGY
Payer: COMMERCIAL

## 2021-02-19 VITALS
RESPIRATION RATE: 20 BRPM | OXYGEN SATURATION: 98 % | HEART RATE: 73 BPM | TEMPERATURE: 97.8 F | SYSTOLIC BLOOD PRESSURE: 111 MMHG | DIASTOLIC BLOOD PRESSURE: 79 MMHG

## 2021-02-19 DIAGNOSIS — M47.816 LUMBAR SPONDYLOSIS: ICD-10-CM

## 2021-02-19 DIAGNOSIS — M54.50 LOW BACK PAIN, UNSPECIFIED BACK PAIN LATERALITY, UNSPECIFIED CHRONICITY, UNSPECIFIED WHETHER SCIATICA PRESENT: ICD-10-CM

## 2021-02-19 PROCEDURE — 64636 DESTROY L/S FACET JNT ADDL: CPT | Performed by: ANESTHESIOLOGY

## 2021-02-19 PROCEDURE — 64635 DESTROY LUMB/SAC FACET JNT: CPT | Performed by: ANESTHESIOLOGY

## 2021-02-19 RX ORDER — LIDOCAINE HYDROCHLORIDE 10 MG/ML
5 INJECTION, SOLUTION EPIDURAL; INFILTRATION; INTRACAUDAL; PERINEURAL ONCE
Status: COMPLETED | OUTPATIENT
Start: 2021-02-19 | End: 2021-02-19

## 2021-02-19 RX ADMIN — LIDOCAINE HYDROCHLORIDE 5 ML: 10 INJECTION, SOLUTION EPIDURAL; INFILTRATION; INTRACAUDAL; PERINEURAL at 14:23

## 2021-02-19 RX ADMIN — LIDOCAINE HYDROCHLORIDE 3 ML: 20 INJECTION, SOLUTION EPIDURAL; INFILTRATION; INTRACAUDAL at 14:24

## 2021-02-19 NOTE — DISCHARGE INSTRUCTIONS

## 2021-02-19 NOTE — H&P
History of Present Illness: The patient is a 56 y o  female who presents with complaints of  Low back pain      Patient Active Problem List   Diagnosis    Anxiety    Atrophy of vagina    Depression    Disc degeneration, lumbar    Disturbance of memory    Dyslipidemia    Hypothyroidism    Osteoarthritis    Postgastrectomy malabsorption    Arthritis    Symptomatic menopausal or female climacteric states    History of sleeve gastrectomy    Primary thunderclap headache    Chronic bilateral low back pain without sciatica    Radiculopathy, lumbar region    Gastroesophageal reflux disease without esophagitis    Obesity, Class I, BMI 30-34 9    Encounter for surgical aftercare following surgery of digestive system    Lumbar spondylosis       Past Medical History:   Diagnosis Date    Bariatric surgery status     Depression     last assessed: 2018     Disturbance of memory     last assessed: 2016     Endometriosis     Hirsutism     History of sleeve gastrectomy     Menometrorrhagia     Morbid obesity ([address] )     last assessed: Dec 5, 2014     PONV (postoperative nausea and vomiting)     Postgastrectomy malabsorption     Sclerosing adenosis of breast     Symptomatic menopausal or female climacteric states     Uterine leiomyoma        Past Surgical History:   Procedure Laterality Date    APPENDECTOMY      BREAST BIOPSY      COLONOSCOPY      complete - 7 year repeat recommended - Resolved:      DILATION AND CURETTAGE OF UTERUS      GASTRECTOMY SLEEVE LAPAROSCOPIC  2018    GYNECOLOGIC CRYOSURGERY      Cervix - 's for abnormal paps     HYSTERECTOMY      INDUCED       x3    LAPAROSCOPIC SALPINGOOPHERECTOMY Right     LAPAROSCOPIC TOTAL HYSTERECTOMY      06, Laparoscopic hysterectomy with LSO with vaginal closure of the vaginal cuff     SALPINGOOPHORECTOMY Left     STOMACH SURGERY  2014    for morbid obesity - Managed by: Hao Xiao TONSILLECTOMY AND ADENOIDECTOMY      TOOTH EXTRACTION           Current Outpatient Medications:     Biotin 5000 MCG CAPS, Take 1 capsule by mouth daily, Disp: , Rfl:     busPIRone (BUSPAR) 15 mg tablet, Take 1 tablet (15 mg total) by mouth 2 (two) times a day, Disp: 60 tablet, Rfl: 1    calcium citrate-vitamin D (CALCIUM CITRATE +) 315-200 MG-UNIT per tablet, Calcium Citrate CAPS  Refills: 0  Active, Disp: , Rfl:     Cholecalciferol (VITAMIN D3) 2000 units TABS, Take 1 tablet by mouth daily, Disp: , Rfl:     Cyanocobalamin (VITAMIN B-12) 1000 MCG SUBL, Place 1 Dose under the tongue daily, Disp: , Rfl:     gabapentin (NEURONTIN) 300 mg capsule, TAKE 1 CAPSULE BY MOUTH EVERY MORNING FOR 3 DAYS, THEN TAKE 1 CAPSULE TWICE A DAY FOR 3 DAYS, THEN TAKE 1 CAPSULE 3 TIMES A DAY, Disp: 90 capsule, Rfl: 2    metaxalone (SKELAXIN) 400 MG tablet, Take 1 tablet (400 mg total) by mouth 3 (three) times a day as needed (muscle relaxer), Disp: 90 tablet, Rfl: 0    Multiple Vitamins-Minerals (CENTRUM SILVER) tablet, Take 1 tablet by mouth daily, Disp: , Rfl:     Nerve Stimulator (STANDARD TENS) TOBY, by Does not apply route 2 (two) times a day, Disp: 1 Device, Rfl: 0    omeprazole (PriLOSEC) 20 mg delayed release capsule, TAKE 1 CAPSULE BY MOUTH TWICE A DAY, Disp: 60 capsule, Rfl: 2    Synthroid 150 MCG tablet, TAKE 1 TABLET BY MOUTH DAILY   BRAND NECESSARY , Disp: 30 tablet, Rfl: 11    traMADol-acetaminophen (ULTRACET) 37 5-325 mg per tablet, TAKE 1 TABLET BY MOUTH EVERY 8 (EIGHT) HOURS AS NEEDED FOR MODERATE PAIN, Disp: 30 tablet, Rfl: 0    venlafaxine (EFFEXOR-XR) 150 mg 24 hr capsule, TAKE 1 CAPSULE BY MOUTH EVERY DAY, Disp: 30 capsule, Rfl: 1    venlafaxine (EFFEXOR-XR) 75 mg 24 hr capsule, TAKE 1 CAPSULE BY MOUTH DAILY WITH BREAKFAST, Disp: 30 capsule, Rfl: 2    Current Facility-Administered Medications:     lidocaine (PF) (XYLOCAINE-MPF) 1 % injection 5 mL, 5 mL, Other, Once, Bogdan Tavares,     lidocaine (PF) (XYLOCAINE-MPF) 2 % injection 5 mL, 5 mL, Perineural, Once, Bogdan Tavares DO    Facility-Administered Medications Ordered in Other Encounters:     sodium chloride 0 9 % infusion, 125 mL/hr, Intravenous, Continuous, Felicitas Caraballo DO, Last Rate: 125 mL/hr at 02/17/21 1033, Continue from Pre-op at 02/17/21 1115    No Known Allergies    Physical Exam:   General: Awake, Alert, Oriented x 3, Mood and affect appropriate  Respiratory: Respirations even and unlabored  Cardiovascular: Peripheral pulses intact; no edema  Musculoskeletal Exam:   Decreased range of motion lumbar spine    ASA Score: II         Assessment:   1  Lumbar spondylosis    2   Low back pain, unspecified back pain laterality, unspecified chronicity, unspecified whether sciatica present        Plan: LEFT L3, L4 AND L5 RFA

## 2021-02-19 NOTE — TELEPHONE ENCOUNTER
Patient is S/P a Left L3-L5 RFA c/ SL on 2/19/21  Next OVS scheduled for 3/12/21 for f/u  Please call on 2/22/21 post RFA   Thanks

## 2021-02-22 NOTE — TELEPHONE ENCOUNTER
S/w pt, stated that she is doing well s/p rfa of 2/19/2021  Advised pt to allow 2-6 weeks for the full effect  Cb if s/s infection present: redness, drainage, swelling at the site or fever 100+, or if a sunburn like sensation in the area of the procedure presents  Ok to continue w/ rest, ice / heat, medication as prescribed / directed  Pt verbalized understanding and confirmed 3/12/2019 fu ov  Amanda Spivey

## 2021-02-25 ENCOUNTER — DOCUMENTATION (OUTPATIENT)
Dept: BARIATRICS | Facility: CLINIC | Age: 61
End: 2021-02-25
Payer: COMMERCIAL

## 2021-02-25 PROCEDURE — 99214 OFFICE O/P EST MOD 30 MIN: CPT | Performed by: SURGERY

## 2021-02-25 NOTE — PROGRESS NOTES
Bravo pH Probe Study Report    Pre-study Dx:     Heartburn  Post Op Dx:     Abnormal study, and  positive for reflux disease      Interpretation of ambulatory pH monitoring    The patient's tracings and  were reviewed by myself and were found to be adequate for analysis  The patient had a Bravo study performed on 2 consecutive days  Day 2  was chosen because it was the most significant day  On day 2  the patient's DeMeester score was 64 4  which was significant  The symptom studied was heartburn  The symptom association probability (SAP) scores was 60 5 The symptom indexes (SIs) was 33 3  Which was significant  correlation between acid reflux and her studied symptom  Percent time spent in reflux was 15 8%, which is significant  CONCLUSION    The patient had a  significantly elevated DeMeester score at 64 4  with  significant SAP and SIs between her symptom and reflux  In addition, the patient's percent time spent in reflux was 15 8%, which is  significant  Overall, the study is POSITIVE  for reflux            Ness Prince MD  Bariatric Surgery Fellow  2/25/2021  4:09 PM

## 2021-03-01 ENCOUNTER — HOSPITAL ENCOUNTER (OUTPATIENT)
Dept: BONE DENSITY | Facility: IMAGING CENTER | Age: 61
Discharge: HOME | End: 2021-03-01
Payer: COMMERCIAL

## 2021-03-01 ENCOUNTER — HOSPITAL ENCOUNTER (OUTPATIENT)
Dept: MAMMOGRAPHY | Facility: IMAGING CENTER | Age: 61
Discharge: HOME | End: 2021-03-01
Payer: COMMERCIAL

## 2021-03-01 VITALS — WEIGHT: 188 LBS | BODY MASS INDEX: 33.31 KG/M2 | HEIGHT: 63 IN

## 2021-03-01 DIAGNOSIS — Z12.31 ENCOUNTER FOR SCREENING MAMMOGRAM FOR BREAST CANCER: ICD-10-CM

## 2021-03-01 DIAGNOSIS — E28.39 MENOPAUSE OVARIAN FAILURE: ICD-10-CM

## 2021-03-01 PROCEDURE — 77080 DXA BONE DENSITY AXIAL: CPT

## 2021-03-01 PROCEDURE — 77063 BREAST TOMOSYNTHESIS BI: CPT

## 2021-03-01 PROCEDURE — 77067 SCR MAMMO BI INCL CAD: CPT

## 2021-03-02 ENCOUNTER — HOSPITAL ENCOUNTER (OUTPATIENT)
Dept: MAMMOGRAPHY | Facility: IMAGING CENTER | Age: 61
Discharge: HOME | End: 2021-03-02

## 2021-03-02 DIAGNOSIS — Z12.31 VISIT FOR SCREENING MAMMOGRAM: ICD-10-CM

## 2021-03-11 ENCOUNTER — OFFICE VISIT (OUTPATIENT)
Dept: BARIATRICS | Facility: CLINIC | Age: 61
End: 2021-03-11
Payer: COMMERCIAL

## 2021-03-11 VITALS
DIASTOLIC BLOOD PRESSURE: 80 MMHG | HEIGHT: 64 IN | RESPIRATION RATE: 20 BRPM | BODY MASS INDEX: 32.52 KG/M2 | SYSTOLIC BLOOD PRESSURE: 112 MMHG | HEART RATE: 74 BPM | TEMPERATURE: 97.9 F | WEIGHT: 190.5 LBS

## 2021-03-11 DIAGNOSIS — F32.A DEPRESSION, UNSPECIFIED DEPRESSION TYPE: ICD-10-CM

## 2021-03-11 DIAGNOSIS — K21.9 ACID REFLUX: ICD-10-CM

## 2021-03-11 DIAGNOSIS — K44.9 HIATAL HERNIA: ICD-10-CM

## 2021-03-11 DIAGNOSIS — Z98.84 BARIATRIC SURGERY STATUS: Primary | ICD-10-CM

## 2021-03-11 DIAGNOSIS — F41.9 ANXIETY: ICD-10-CM

## 2021-03-11 PROCEDURE — 99214 OFFICE O/P EST MOD 30 MIN: CPT | Performed by: SURGERY

## 2021-03-11 RX ORDER — VENLAFAXINE HYDROCHLORIDE 150 MG/1
CAPSULE, EXTENDED RELEASE ORAL
Qty: 30 CAPSULE | Refills: 1 | Status: SHIPPED | OUTPATIENT
Start: 2021-03-11 | End: 2021-05-06

## 2021-03-11 RX ORDER — BUSPIRONE HYDROCHLORIDE 7.5 MG/1
TABLET ORAL
Qty: 60 TABLET | Refills: 1 | Status: SHIPPED | OUTPATIENT
Start: 2021-03-11 | End: 2021-03-12

## 2021-03-11 NOTE — PROGRESS NOTES
BARIATRIC HISTORY AND PHYSICAL - BARIATRIC SURGERY  Arianne Herzog 59 y o  female MRN: [id number]  Unit/Bed#:  Encounter: [id number]      HPI:  Arianne Herzog is a 59 y o  female who presents  To discuss EGD and Bravo results  She status post  Laparoscopic sleeve gastrectomy back in 2014 by Rola Lyons  she reports intermittent acid reflux and food regurgitation, her acid reflux partially resolved with omeprazole  She also reports an episodes of shoulder and back pain, and  She thought that she had  Heart attack   patient denies shortness of breath, or difficulty of swelling  she has a concern about long-term side effect of omeprazole  she had an upper GI study which did show gastric volvulus, small sliding hernia, esophagitis LA grade B  She also had a Bravo study which was significant for acid reflux with DeMeester score of 64  4    patient also  Has a concern of weight regain and would like to address it  Her initial weight was 225 lb, lowest 144,   Current 190 lb with a BMI of 33 22    Review of Systems   Constitutional: Positive for unexpected weight change  Negative for chills and fatigue  HENT: Negative for ear pain  Eyes: Negative for pain  Respiratory: Negative for cough, shortness of breath and wheezing  Cardiovascular: Negative for chest pain  Gastrointestinal: Negative for abdominal distention and abdominal pain  Acid reflux   food regurgitation   Endocrine: Negative for polydipsia  Genitourinary: Negative for flank pain  Musculoskeletal: Positive for back pain  Negative for arthralgias  Shoulder pain   Skin: Negative for pallor  Allergic/Immunologic: Negative for food allergies  Neurological: Negative for weakness and headaches  Hematological: Does not bruise/bleed easily  Psychiatric/Behavioral: Negative for confusion         Historical Information   Past Medical History:   Diagnosis Date    Bariatric surgery status     Depression     last assessed: Jan 18, 2018     Disturbance of memory     last assessed: 2016     Endometriosis     Hirsutism     History of sleeve gastrectomy     Menometrorrhagia     Morbid obesity ([address] )     last assessed: Dec 5, 2014     PONV (postoperative nausea and vomiting)     Postgastrectomy malabsorption     Sclerosing adenosis of breast     Symptomatic menopausal or female climacteric states     Uterine leiomyoma      Past Surgical History:   Procedure Laterality Date    APPENDECTOMY      BREAST BIOPSY Left 2010    COLONOSCOPY      complete - 7 year repeat recommended - Resolved:      DILATION AND CURETTAGE OF UTERUS      GASTRECTOMY SLEEVE LAPAROSCOPIC  2018    GYNECOLOGIC CRYOSURGERY      Cervix - s for abnormal paps     HYSTERECTOMY      INDUCED       x3    LAPAROSCOPIC SALPINGOOPHERECTOMY Right     LAPAROSCOPIC TOTAL HYSTERECTOMY      06, Laparoscopic hysterectomy with LSO with vaginal closure of the vaginal cuff     SALPINGOOPHORECTOMY Left     STOMACH SURGERY  2014    for morbid obesity - Managed by: [address] EXTRACTION       Social History   Social History     Substance and Sexual Activity   Alcohol Use Yes    Frequency: 2-4 times a month    Comment: occasional     Social History     Substance and Sexual Activity   Drug Use No     Social History     Tobacco Use   Smoking Status Former Smoker    Quit date:     Years since quittin 2   Smokeless Tobacco Never Used     Family History: non-contributory    Meds/Allergies   all medications and allergies reviewed  No Known Allergies    Objective     Current Vitals:   Blood Pressure: 112/80 (21 1645)  Pulse: 74 (21)  Temperature: 97 9 °F (36 6 °C) (21)  Temp Source: Tympanic (21 164)  Respirations: 20 (21 1645)  Height: 5' 3 5" (161 3 cm) (21 164)  Weight - Scale: 86 4 kg (190 lb 8 oz) (21)  bowel movement  [unfilled]    Invasive Devices     None                 Physical Exam  Constitutional:       Appearance: Normal appearance  HENT:      Head: Normocephalic  Mouth/Throat:      Mouth: Mucous membranes are moist    Eyes:      Conjunctiva/sclera: Conjunctivae normal       Pupils: Pupils are equal, round, and reactive to light  Neck:      Musculoskeletal: Normal range of motion  Cardiovascular:      Rate and Rhythm: Normal rate and regular rhythm  Pulmonary:      Effort: Pulmonary effort is normal    Abdominal:      General: Abdomen is flat  There is no distension  Tenderness: There is no abdominal tenderness  Comments: All laparoscopic incisions are healing well, with no signs of infection  There are no palpable incisions hernias  Deep palpation is not painful in all 4 quadrants, and there are no peritoneal findings  Musculoskeletal: Normal range of motion  Skin:     General: Skin is warm  Capillary Refill: Capillary refill takes less than 2 seconds  Neurological:      General: No focal deficit present  Mental Status: She is alert  Psychiatric:         Mood and Affect: Mood normal          Lab Results: I have personally reviewed pertinent lab results  Imaging: I have personally reviewed pertinent reports  EKG, Pathology, and Other Studies: I have personally reviewed pertinent reports  EGD February 17, 2021  FINDINGS:  · Irregular Z-line  · Small herniation of gastric fundus into thoracic cavity (type II hiatal hernia) without Pinkie Jean lesions present  · Previous sleeve gastrectomy  No evidence of stricture at the incisura however there was evidence of sharp angulation at the incisura consistent with what appeared to be gastric volvulus on upper GI  · Performed single biopsy in the stomach  · Z-line 35 cm from the incisors  Bravo probe deployed at 29 cm without any difficulties  · Moderate abnormal mucosa in the lower third of the esophagus   LA grade B        SPECIMENS:  ID Type Source Tests Collected by Time Destination   1 : bx gastric pouch-r/o h pylori Tissue Stomach TISSUE EXAM Blaze Caraballo MD 2/17/2021 1124           IMPRESSION:  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  Small sliding hiatal hernia  Esophagitis LA grade B   Successful deployment of Bravo probe    -----------------------------------------  A  Gastric pouch (biopsy):     - Minimal chronic inactive gastritis involving antral mucosa  - No definitive morphologic evidence of Helicobacter on routine sections      - No intestinal metaplasia, dysplasia or neoplasia identified   --------------------------------------------   Bravo study:  CONCLUSION     The patient had a  significantly elevated DeMeester score at 64 4  with  significant SAP and SIs between her symptom and reflux      In addition, the patient's percent time spent in reflux was 15 8%, which is  significant      Overall, the study is POSITIVE  for reflux    Code Status: [unfilled]  Advance Directive and Living Will:      Power of :    POLST:      Assessment/Plan:  Shankar Martin  Is a 78-year-old female status post laparoscopic sleeve gastrectomy  Patient presents with acid reflux, food regurgitation and weight regain  She is here to discuss upper GI endoscopy and Bravo results  she has small sliding hiatal hernia, esophagitis grade B, gastric volvulus, she is also found to have significant acid reflux based on prior study with DeMeester score of 64  4      of note patient has weight regain, her initial weight was 225 lb, lost 144 lb, current 190 lb with BMI of 33 22  I discussed and explained to her that she will benefit from conversion to Lissette-en-Y gastric bypass which will address weight regain, acid reflux,  And gastric volvulus  we also discuss the option of placing LINX, but that will address only the acid reflux  patient requested a copy of  EGD,  bravo and the biopsy results  And she would like to think about both options  She seems leaning towards a gastric bypass       meanwhile will have her see our  and dietitian, and our         Lenin Brooke MD  Bariatric Surgery Fellow  3/11/2021  5:07 PM

## 2021-03-12 ENCOUNTER — OFFICE VISIT (OUTPATIENT)
Dept: PAIN MEDICINE | Facility: CLINIC | Age: 61
End: 2021-03-12
Payer: COMMERCIAL

## 2021-03-12 ENCOUNTER — OFFICE VISIT (OUTPATIENT)
Dept: FAMILY MEDICINE CLINIC | Facility: HOSPITAL | Age: 61
End: 2021-03-12
Payer: COMMERCIAL

## 2021-03-12 VITALS
HEIGHT: 63 IN | BODY MASS INDEX: 33.84 KG/M2 | HEART RATE: 68 BPM | WEIGHT: 191 LBS | DIASTOLIC BLOOD PRESSURE: 74 MMHG | TEMPERATURE: 97.8 F | SYSTOLIC BLOOD PRESSURE: 118 MMHG

## 2021-03-12 VITALS
WEIGHT: 190.6 LBS | TEMPERATURE: 98.4 F | HEIGHT: 63 IN | SYSTOLIC BLOOD PRESSURE: 108 MMHG | BODY MASS INDEX: 33.77 KG/M2 | HEART RATE: 74 BPM | DIASTOLIC BLOOD PRESSURE: 62 MMHG

## 2021-03-12 DIAGNOSIS — M51.36 DISC DEGENERATION, LUMBAR: ICD-10-CM

## 2021-03-12 DIAGNOSIS — K21.9 GASTROESOPHAGEAL REFLUX DISEASE WITHOUT ESOPHAGITIS: ICD-10-CM

## 2021-03-12 DIAGNOSIS — G89.29 CHRONIC BILATERAL LOW BACK PAIN WITHOUT SCIATICA: Primary | ICD-10-CM

## 2021-03-12 DIAGNOSIS — M54.50 CHRONIC BILATERAL LOW BACK PAIN WITHOUT SCIATICA: Primary | ICD-10-CM

## 2021-03-12 DIAGNOSIS — M47.816 LUMBAR SPONDYLOSIS: ICD-10-CM

## 2021-03-12 DIAGNOSIS — F33.41 RECURRENT MAJOR DEPRESSIVE DISORDER, IN PARTIAL REMISSION (HCC): Primary | ICD-10-CM

## 2021-03-12 DIAGNOSIS — M79.18 MYOFASCIAL PAIN SYNDROME: ICD-10-CM

## 2021-03-12 DIAGNOSIS — F41.9 ANXIETY: ICD-10-CM

## 2021-03-12 PROCEDURE — 3008F BODY MASS INDEX DOCD: CPT | Performed by: NURSE PRACTITIONER

## 2021-03-12 PROCEDURE — 1036F TOBACCO NON-USER: CPT | Performed by: INTERNAL MEDICINE

## 2021-03-12 PROCEDURE — 99214 OFFICE O/P EST MOD 30 MIN: CPT | Performed by: INTERNAL MEDICINE

## 2021-03-12 PROCEDURE — 99214 OFFICE O/P EST MOD 30 MIN: CPT | Performed by: NURSE PRACTITIONER

## 2021-03-12 RX ORDER — TIZANIDINE 2 MG/1
TABLET ORAL
Qty: 75 TABLET | Refills: 1 | Status: SHIPPED | OUTPATIENT
Start: 2021-03-12 | End: 2021-07-30 | Stop reason: SDUPTHER

## 2021-03-12 NOTE — ASSESSMENT & PLAN NOTE
Mood better with increase in Buspar, taking Effexor daily, wishes for no changes in meds, con't current regimen, re-eval in 3 mos - call with new/worse mood in interm

## 2021-03-12 NOTE — ASSESSMENT & PLAN NOTE
S/p Bravo EGD - see procedure note for details, on Omeprazole daily, con't tx and f/u as per bariatrics

## 2021-03-12 NOTE — PROGRESS NOTES
Assessment:  1  Chronic bilateral low back pain without sciatica    2  Lumbar spondylosis    3  Disc degeneration, lumbar    4  Myofascial pain syndrome        Plan:    While the patient was in the office today, I did have a thorough conversation with the patient regarding their chronic pain syndrome, symptoms, medication regimen, and treatment plan  I discussed the patient at this point time because of the fall it may be difficult for her to tell how much relief she is getting from the radiofrequency ablation procedure and reminded her that our overall goal to try to obtain at least a 50% improvement 50% of the time  I explained to the patient that hopefully over the next 4-6 weeks she may see slow and steady improvement especially in the overlying myofascial pain and acute flare-up pain  The patient was agreeable and verbalized an understanding  To try to help with the myofascial pain and spasms, we could try tizanidine 2 mg t i d  p r n  for pain and see if that is helpful  I advised the patient that if they experience any side effects or issues with the changes in their medication regiment, they should give our office a call to discuss  I also advised the patient not to drive or operate machinery until they see how the changes in the medication regimen affects them  The patient was agreeable and verbalized an understanding  I discussed with the patient that at this point time she can followup with our office on an as-needed basis  I did review the patient that if her pain symptoms should change, worsen, and/or if she would experience any new symptoms as she would like to be evaluated for, she should give our office a call  The patient was agreeable and verbalized an understanding  History of Present Illness:     The patient is a 59 y o  female last seen on 2/19/2021 who presents for a follow up office visit in regards to  Chronic low back pain secondary to  Lumbar spondylosis and disc degeneration with myofascial pain  The patient currently reports that since her last office visit as she is status post a bilateral L3 through L5 radiofrequency ablation procedure with the left side being the 2nd side completed on February 19, 2021 with Dr Cary Garcia  The patient reports that she is starting to notice minimal to moderate improvement, however, the relief is not as significant as she was hoping for and is hoping that she will continue to see some improvement  She also reports that some of the issue is she did have a fall a week ago and feels that she has flared up some of the myofascial pain and spasms and it is difficult to tell how effective the radiofrequency ablation procedure is because she feels she is having an acute flare-up because of the fall  She reports that metaxalone is helpful for her spasms, however, her insurance company will not cover it and she has nothing she can take for spasms as previously she has tried and failed methocarbamol and cyclobenzaprine either with significant side effects or without any kind of relief  She presents today for follow-up and to discuss her treatment plan options  I have personally reviewed and/or updated the patient's past medical history, past surgical history, family history, social history, current medications, allergies, and vital signs today  Review of Systems:    Review of Systems   Respiratory: Negative for shortness of breath  Cardiovascular: Negative for chest pain  Gastrointestinal: Negative for constipation, diarrhea, nausea and vomiting  Musculoskeletal: Positive for gait problem  Negative for arthralgias, joint swelling (joint stiffness) and myalgias  Skin: Negative for rash  Neurological: Positive for weakness  Negative for dizziness and seizures  All other systems reviewed and are negative          Past Medical History:   Diagnosis Date    Bariatric surgery status     Depression     last assessed: Jan 18, 2018    Kimberley Li Disturbance of memory     last assessed: 2016     Endometriosis     Hirsutism     History of sleeve gastrectomy     Menometrorrhagia     Morbid obesity ([facility] [address] )     last assessed: Dec 5, 2014     PONV (postoperative nausea and vomiting)     Postgastrectomy malabsorption     Sclerosing adenosis of breast     Symptomatic menopausal or female climacteric states     Uterine leiomyoma        Past Surgical History:   Procedure Laterality Date    APPENDECTOMY      BREAST BIOPSY Left 2010    COLONOSCOPY      complete - 7 year repeat recommended - Resolved:      DILATION AND CURETTAGE OF UTERUS      GASTRECTOMY SLEEVE LAPAROSCOPIC  2018    GYNECOLOGIC CRYOSURGERY      Cervix - 's for abnormal paps     HYSTERECTOMY      INDUCED       x3    LAPAROSCOPIC SALPINGOOPHERECTOMY Right     LAPAROSCOPIC TOTAL HYSTERECTOMY      06, Laparoscopic hysterectomy with LSO with vaginal closure of the vaginal cuff     SALPINGOOPHORECTOMY Left     STOMACH SURGERY  2014    for morbid obesity - Managed by: Luis M Lin, [address] [place] [address]      TOOTH EXTRACTION         Family History   Problem Relation Age of Onset    Alzheimer's disease Mother     Stroke Mother     Dementia Mother     Diabetes Father         mellitus     Prostate cancer Father     Rectal cancer Father     Lung cancer Father     Liver cancer Father     Heart attack Father     Colon cancer Father     Colon polyps Sister     Breast cancer Maternal Grandmother        Social History     Occupational History    Not on file   Tobacco Use    Smoking status: Former Smoker     Quit date:      Years since quittin 2    Smokeless tobacco: Never Used   Substance and Sexual Activity    Alcohol use: Yes     Frequency: 2-4 times a month     Comment: occasional    Drug use: No    Sexual activity: Yes     Partners: Male         Current Outpatient Medications:     busPIRone (BUSPAR) 15 mg tablet, Take 1 tablet (15 mg total) by mouth 2 (two) times a day, Disp: 60 tablet, Rfl: 1    calcium citrate-vitamin D (CALCIUM CITRATE +) 315-200 MG-UNIT per tablet, Calcium Citrate CAPS  Refills: 0  Active, Disp: , Rfl:     Cholecalciferol (VITAMIN D3) 2000 units TABS, Take 1 tablet by mouth daily, Disp: , Rfl:     Cyanocobalamin (VITAMIN B-12) 1000 MCG SUBL, Place 1 Dose under the tongue daily, Disp: , Rfl:     gabapentin (NEURONTIN) 300 mg capsule, TAKE 1 CAPSULE BY MOUTH EVERY MORNING FOR 3 DAYS, THEN TAKE 1 CAPSULE TWICE A DAY FOR 3 DAYS, THEN TAKE 1 CAPSULE 3 TIMES A DAY, Disp: 90 capsule, Rfl: 2    Multiple Vitamins-Minerals (CENTRUM SILVER) tablet, Take 1 tablet by mouth daily, Disp: , Rfl:     omeprazole (PriLOSEC) 20 mg delayed release capsule, TAKE 1 CAPSULE BY MOUTH TWICE A DAY, Disp: 60 capsule, Rfl: 2    Synthroid 150 MCG tablet, TAKE 1 TABLET BY MOUTH DAILY  BRAND NECESSARY , Disp: 30 tablet, Rfl: 11    traMADol-acetaminophen (ULTRACET) 37 5-325 mg per tablet, TAKE 1 TABLET BY MOUTH EVERY 8 (EIGHT) HOURS AS NEEDED FOR MODERATE PAIN, Disp: 30 tablet, Rfl: 0    venlafaxine (EFFEXOR-XR) 150 mg 24 hr capsule, TAKE 1 CAPSULE BY MOUTH EVERY DAY, Disp: 30 capsule, Rfl: 1    venlafaxine (EFFEXOR-XR) 75 mg 24 hr capsule, TAKE 1 CAPSULE BY MOUTH DAILY WITH BREAKFAST, Disp: 30 capsule, Rfl: 2    Nerve Stimulator (STANDARD TENS) TOBY, by Does not apply route 2 (two) times a day, Disp: 1 Device, Rfl: 0    tiZANidine (ZANAFLEX) 2 mg tablet, Take 1 PO TID PRN for pain/spams  , Disp: 75 tablet, Rfl: 1    No Known Allergies    Physical Exam:    /74 (BP Location: Left arm, Patient Position: Sitting, Cuff Size: Standard)   Pulse 68   Temp 97 8 °F (36 6 °C)   Ht 5' 3" (1 6 m)   Wt 86 6 kg (191 lb)   BMI 33 83 kg/m²     Constitutional:normal, well developed, well nourished, alert, in no distress and non-toxic and no overt pain behavior    Eyes:anicteric  HEENT:grossly intact  Neck:supple, symmetric, trachea midline and no masses   Pulmonary:even and unlabored  Cardiovascular:No edema or pitting edema present  Skin:Normal without rashes or lesions and well hydrated  Psychiatric:Mood and affect appropriate  Neurologic:Cranial Nerves II-XII grossly intact  Musculoskeletal:normal      Imaging  No orders to display         No orders of the defined types were placed in this encounter

## 2021-03-12 NOTE — PATIENT INSTRUCTIONS
Tizanidine (By mouth)   Tizanidine ([id number])  Treats muscle spasticity  Brand Name(s): Zanaflex, Zanaflex Capsule   There may be other brand names for this medicine  When This Medicine Should Not Be Used: This medicine is not right for everyone  Do not use if you had an allergic reaction to tizanidine  How to Use This Medicine:   Capsule, Tablet  · Take your medicine as directed  Your dose may need to be changed several times to find what works best for you  · You may take this medicine with or without food, but always take it the same way every time  Tizanidine works differently depending on whether you take it on an empty stomach or a full stomach  Talk to your doctor if you have any questions about this  · Missed dose: Take a dose as soon as you remember  If it is almost time for your next dose, wait until then and take a regular dose  Do not take extra medicine to make up for a missed dose  · Store the medicine in a closed container at room temperature, away from heat, moisture, and direct light  Drugs and Foods to Avoid:   Ask your doctor or pharmacist before using any other medicine, including over-the-counter medicines, vitamins, and herbal products  · Do not use this medicine together with ciprofloxacin or fluvoxamine  · Some foods and medicines can affect how tizanidine works  Tell your doctor if you are using any of the following:  ? Acyclovir, baclofen, cimetidine, famotidine, ticlopidine, verapamil, zileuton  ? Birth control pills, blood pressure medicine, medicine for heart rhythm problems (such as amiodarone, mexiletine, propafenone), or medicine to treat an infection (such as levofloxacin, moxifloxacin)  · Do not drink alcohol while you are using this medicine  · Tell your doctor if you use anything else that makes you sleepy  Some examples are allergy medicine, narcotic pain medicine, and alcohol    Warnings While Using This Medicine:   · Tell your doctor if you are pregnant or breastfeeding, or if you have kidney disease or liver disease  · This medicine may cause the following problems:  ? Low blood pressure  ? Liver damage  · This medicine may make you dizzy or drowsy  Do not drive or do anything else that could be dangerous until you know how this medicine affects you  Stand or sit up slowly if you are dizzy  · Do not stop using this medicine suddenly  Your doctor will need to slowly decrease your dose before you stop it completely  · Your doctor will do lab tests at regular visits to check on the effects of this medicine  Keep all appointments  · Keep all medicine out of the reach of children  Never share your medicine with anyone  Possible Side Effects While Using This Medicine:   Call your doctor right away if you notice any of these side effects:  · Allergic reaction: Itching or hives, swelling in your face or hands, swelling or tingling in your mouth or throat, chest tightness, trouble breathing  · Dark urine or pale stools, nausea, vomiting, loss of appetite, stomach pain, yellow skin or eyes  · Lightheadedness, dizziness, or fainting  · Seeing or hearing things that are not really there  · Slow heartbeat  If you notice these less serious side effects, talk with your doctor:   · Dry mouth  · Drowsiness or sleepiness  · Weakness  If you notice other side effects that you think are caused by this medicine, tell your doctor  Call your doctor for medical advice about side effects  You may report side effects to FDA at 1-193-FDA-0484  © Copyright [address] Information is for End User's use only and may not be sold, redistributed or otherwise used for commercial purposes  The above information is an  only  It is not intended as medical advice for individual conditions or treatments  Talk to your doctor, nurse or pharmacist before following any medical regimen to see if it is safe and effective for you

## 2021-03-12 NOTE — PROGRESS NOTES
Assessment/Plan:    Depression  Mood better with increase in Buspar, taking Effexor daily, wishes for no changes in meds, con't current regimen, re-eval in 3 mos - call with new/worse mood in interm    Anxiety  Mood better with increase in Buspar, taking Effexor daily, wishes for no changes in meds, con't current regimen, re-eval in 3 mos - call with new/worse mood in interm    Gastroesophageal reflux disease without esophagitis  S/p Bravo EGD - see procedure note for details, on Omeprazole daily, con't tx and f/u as per bariatrics       Diagnoses and all orders for this visit:    Recurrent major depressive disorder, in partial remission ([facility] [address] )    Anxiety    Gastroesophageal reflux disease without esophagitis      Colonoscopy 3/20     Mammo 3/21     Dexa 3/21 - nml      PAP s/p hysterectomy      BW 9/20      Subjective:      Patient ID: Katy Waldron is a 59 y o  female  HPI Pt here for follow up appt    Last visit 2/9/21 pt was noting a lot of issues with anxiety and depression  We subsequently increased her Buspar from 10 to 15 mg bid  She was told to con't her current Effexor  She is here for a med/mood check  She is taking the medication as directed w/o SE  She feels less anxious and not chewing on her nails as much  She feels less sad  She is starting to have more good days - days are better when she is away from her ex-  She is still not sleeping well but that is not new  She notes no SI/panic attacks  Pt had EGD Bravo 2/17/21 and f/u with bariatrics 3/11/21 - procedure and OV note reviewed  EGD Bravo showed small hiatal hernia as well as an irregular Z-line and moderate abnormal mucosa of lower third of esophagus  An apparent gastric volvulus was noted  RYGB was discussed  Pt wishes to think about the procedure  She is taking Omeprazole once daily        Colonoscopy 3/20     Mammo 3/21     Dexa 3/21 - nml      PAP s/p hysterectomy      BW 9/20      Review of Systems   Constitutional: Negative for chills and fever  HENT: Negative for congestion and sore throat  Respiratory: Negative for cough and shortness of breath  Cardiovascular: Negative for chest pain, palpitations and leg swelling  Gastrointestinal: Negative for abdominal pain, blood in stool, constipation, diarrhea, nausea and vomiting  Musculoskeletal: Positive for arthralgias and back pain  Negative for neck pain  Skin: Negative for rash and wound  Neurological: Negative for dizziness, syncope, light-headedness and headaches  Hematological: Negative for adenopathy  Psychiatric/Behavioral: Positive for dysphoric mood and sleep disturbance  Negative for suicidal ideas  The patient is nervous/anxious  Objective:    /62   Pulse 74   Temp 98 4 °F (36 9 °C) (Temporal)   Ht 5' 3" (1 6 m)   Wt 86 5 kg (190 lb 9 6 oz)   BMI 33 76 kg/m²      Physical Exam  Vitals signs and nursing note reviewed  Constitutional:       General: She is not in acute distress  Appearance: She is well-developed  She is not ill-appearing  HENT:      Head: Normocephalic and atraumatic  Eyes:      General:         Right eye: No discharge  Left eye: No discharge  Conjunctiva/sclera: Conjunctivae normal    Neck:      Musculoskeletal: Neck supple  Trachea: No tracheal deviation  Cardiovascular:      Rate and Rhythm: Normal rate and regular rhythm  Heart sounds: Normal heart sounds  No murmur  No friction rub  Pulmonary:      Effort: Pulmonary effort is normal  No respiratory distress  Breath sounds: Normal breath sounds  No wheezing or rales  Abdominal:      General: There is no distension  Palpations: Abdomen is soft  Tenderness: There is no abdominal tenderness  There is no guarding or rebound  Musculoskeletal:      Right lower leg: No edema  Left lower leg: No edema  Skin:     General: Skin is warm  Coloration: Skin is not pale  Findings: No rash  Neurological:      General: No focal deficit present  Mental Status: She is alert  Mental status is at baseline  Motor: No abnormal muscle tone  Psychiatric:         Mood and Affect: Mood normal          Behavior: Behavior normal          Thought Content:  Thought content normal          Judgment: Judgment normal

## 2021-04-12 DIAGNOSIS — M51.36 DISC DEGENERATION, LUMBAR: ICD-10-CM
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
## 2021-04-13 ENCOUNTER — OFFICE VISIT (OUTPATIENT)
Dept: BARIATRICS | Facility: CLINIC | Age: 61
End: 2021-04-13

## 2021-04-13 VITALS — BODY MASS INDEX: 33.77 KG/M2 | WEIGHT: 190.6 LBS | HEIGHT: 63 IN

## 2021-04-13 DIAGNOSIS — K91.2 POSTGASTRECTOMY MALABSORPTION: ICD-10-CM

## 2021-04-13 DIAGNOSIS — Z90.3 HISTORY OF SLEEVE GASTRECTOMY: Primary | ICD-10-CM

## 2021-04-13 DIAGNOSIS — Z90.3 POSTGASTRECTOMY MALABSORPTION: ICD-10-CM

## 2021-04-13 DIAGNOSIS — Z01.818 PRE-OPERATIVE CLEARANCE: ICD-10-CM

## 2021-04-13 DIAGNOSIS — K44.9 HIATAL HERNIA: ICD-10-CM

## 2021-04-13 DIAGNOSIS — K21.9 GASTROESOPHAGEAL REFLUX DISEASE WITHOUT ESOPHAGITIS: ICD-10-CM

## 2021-04-13 DIAGNOSIS — K21.9 GERD (GASTROESOPHAGEAL REFLUX DISEASE): Primary | ICD-10-CM

## 2021-04-13 PROCEDURE — RECHECK

## 2021-04-13 PROCEDURE — 3008F BODY MASS INDEX DOCD: CPT | Performed by: INTERNAL MEDICINE
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
## 2021-04-24 DIAGNOSIS — F41.9 ANXIETY: ICD-10-CM

## 2021-04-25 RX ORDER — BUSPIRONE HYDROCHLORIDE 15 MG/1
15 TABLET ORAL 2 TIMES DAILY
Qty: 60 TABLET | Refills: 0 | Status: SHIPPED | OUTPATIENT
Start: 2021-04-25 | End: 2021-05-23

## 2021-05-06 DIAGNOSIS — F41.9 ANXIETY: ICD-10-CM

## 2021-05-06 DIAGNOSIS — F32.A DEPRESSION, UNSPECIFIED DEPRESSION TYPE: ICD-10-CM

## 2021-05-06 RX ORDER — VENLAFAXINE HYDROCHLORIDE 150 MG/1
CAPSULE, EXTENDED RELEASE ORAL
Qty: 30 CAPSULE | Refills: 1 | Status: SHIPPED | OUTPATIENT
Start: 2021-05-06 | End: 2021-07-09

## 2021-05-06 RX ORDER — VENLAFAXINE HYDROCHLORIDE 75 MG/1
CAPSULE, EXTENDED RELEASE ORAL
Qty: 30 CAPSULE | Refills: 2 | Status: SHIPPED | OUTPATIENT
Start: 2021-05-06 | End: 2021-07-09

## 2021-05-12 DIAGNOSIS — M51.36 DISC DEGENERATION, LUMBAR: ICD-10-CM

## 2021-05-13 RX ORDER — GABAPENTIN 300 MG/1
CAPSULE ORAL
Qty: 90 CAPSULE | Refills: 2 | Status: SHIPPED | OUTPATIENT
Start: 2021-05-13 | End: 2021-06-15 | Stop reason: SDUPTHER

## 2021-05-22 DIAGNOSIS — F41.9 ANXIETY: ICD-10-CM

## 2021-05-23 RX ORDER — BUSPIRONE HYDROCHLORIDE 15 MG/1
TABLET ORAL
Qty: 60 TABLET | Refills: 5 | Status: SHIPPED | OUTPATIENT
Start: 2021-05-23 | End: 2021-06-15

## 2021-05-24 ENCOUNTER — VBI (OUTPATIENT)
Dept: ADMINISTRATIVE | Facility: OTHER | Age: 61
End: 2021-05-24

## 2021-05-24 NOTE — TELEPHONE ENCOUNTER
05/24/21 10:04 AM     See documentation in the [facility] SmartForm       Elvin Spinner, [place]

## 2021-06-07 ENCOUNTER — TELEPHONE (OUTPATIENT)
Dept: BARIATRICS | Facility: CLINIC | Age: 61
End: 2021-06-07

## 2021-06-15 ENCOUNTER — OFFICE VISIT (OUTPATIENT)
Dept: FAMILY MEDICINE CLINIC | Facility: HOSPITAL | Age: 61
End: 2021-06-15
Payer: COMMERCIAL

## 2021-06-15 VITALS
HEART RATE: 74 BPM | DIASTOLIC BLOOD PRESSURE: 72 MMHG | BODY MASS INDEX: 33.56 KG/M2 | TEMPERATURE: 98.7 F | HEIGHT: 63 IN | SYSTOLIC BLOOD PRESSURE: 112 MMHG | WEIGHT: 189.4 LBS

## 2021-06-15 DIAGNOSIS — Z90.3 HISTORY OF SLEEVE GASTRECTOMY: ICD-10-CM

## 2021-06-15 DIAGNOSIS — M51.36 DISC DEGENERATION, LUMBAR: ICD-10-CM

## 2021-06-15 DIAGNOSIS — F41.9 ANXIETY: Primary | ICD-10-CM

## 2021-06-15 PROCEDURE — 99214 OFFICE O/P EST MOD 30 MIN: CPT | Performed by: INTERNAL MEDICINE

## 2021-06-15 PROCEDURE — 1036F TOBACCO NON-USER: CPT | Performed by: INTERNAL MEDICINE

## 2021-06-15 PROCEDURE — 3008F BODY MASS INDEX DOCD: CPT | Performed by: INTERNAL MEDICINE

## 2021-06-15 RX ORDER — GABAPENTIN 400 MG/1
400 CAPSULE ORAL 3 TIMES DAILY
Qty: 90 CAPSULE | Refills: 2 | Status: SHIPPED | OUTPATIENT
Start: 2021-06-15 | End: 2021-06-15 | Stop reason: SDUPTHER

## 2021-06-15 RX ORDER — HYDROXYZINE HYDROCHLORIDE 25 MG/1
25 TABLET, FILM COATED ORAL EVERY 6 HOURS PRN
Qty: 30 TABLET | Refills: 0 | Status: SHIPPED | OUTPATIENT
Start: 2021-06-15

## 2021-06-15 RX ORDER — GABAPENTIN 100 MG/1
CAPSULE ORAL
Qty: 30 CAPSULE | Refills: 0 | Status: SHIPPED | OUTPATIENT
Start: 2021-06-15 | End: 2021-07-02

## 2021-06-15 RX ORDER — GABAPENTIN 400 MG/1
400 CAPSULE ORAL 3 TIMES DAILY
Qty: 90 CAPSULE | Refills: 2
Start: 2021-06-15 | End: 2021-10-01

## 2021-06-15 NOTE — ASSESSMENT & PLAN NOTE
Going for eval for revision - going to have gastric bypass, going to have cardiac clearance, con't f/u and eval as per bariatric program

## 2021-06-15 NOTE — ASSESSMENT & PLAN NOTE
Feeling Buspar was making anxiety worse, will wean off rx, on max dose of Effexor, will try and increase Gabapentin from 300 mg tid to 400 mg tid and try a trial of hydroxyzine for prn anxiety

## 2021-06-15 NOTE — ASSESSMENT & PLAN NOTE
She has a SCS, she has Ultracet and Zanaflex to use prn, will increase Gabapentin from 300 mg to 400 mg tid, re-eval in 6 wks, will check with office in [facility] about poss medical marijuana, re-eval in 6 wks, call with SE/new/worse symptoms

## 2021-06-15 NOTE — PROGRESS NOTES
Assessment/Plan:    Anxiety  Feeling Buspar was making anxiety worse, will wean off rx, on max dose of Effexor, will try and increase Gabapentin from 300 mg tid to 400 mg tid and try a trial of hydroxyzine for prn anxiety    Disc degeneration, lumbar  She has a SCS, she has Ultracet and Zanaflex to use prn, will increase Gabapentin from 300 mg to 400 mg tid, re-eval in 6 wks, will check with office in [facility] about poss medical marijuana, re-eval in 6 wks, call with SE/new/worse symptoms    History of sleeve gastrectomy  Going for eval for revision - going to have gastric bypass, going to have cardiac clearance, con't f/u and eval as per bariatric program       Diagnoses and all orders for this visit:    Anxiety  -     hydrOXYzine HCL (ATARAX) 25 mg tablet; Take 1 tablet (25 mg total) by mouth every 6 (six) hours as needed for anxiety  -     gabapentin (NEURONTIN) 100 mg capsule; Use as directed for gradual increase in dose    Disc degeneration, lumbar  -     Discontinue: gabapentin (NEURONTIN) 400 mg capsule; Take 1 capsule (400 mg total) by mouth 3 (three) times a day  -     Discontinue: gabapentin (NEURONTIN) 400 mg capsule; Take 1 capsule (400 mg total) by mouth 3 (three) times a day  -     gabapentin (NEURONTIN) 100 mg capsule; Use as directed for gradual increase in dose  -     gabapentin (NEURONTIN) 400 mg capsule; Take 1 capsule (400 mg total) by mouth 3 (three) times a day    History of sleeve gastrectomy      Colonoscopy 3/20     Mammo 3/21     Dexa 3/21 - nml      PAP s/p hysterectomy      BW 9/20        Subjective:      Patient ID: Lesley Vazquez is a 59 y o  female  HPI Pt here for follow up appt    She feels the Buspirone is making her feel more anxious  She feels more anxious and more stressed  She is taking her Effexor daily as directed for a total of 225 mg daily  She is on Gabapentin for her back pain    She notes her back pain is not at goal  She is frustrated with lack of improvement of her chronic pain  She is on Ultracet as well as Zanaflex  She has a SCS  She is undergoing eval for revision of gastric sleeve - going to do gastric bypass  She has to make appt for cardiac eval/preoperative clearance  Colonoscopy 3/20     Mammo 3/21     Dexa 3/21 - nml      PAP s/p hysterectomy      BW 9/20        Review of Systems   Constitutional: Negative for chills and fever  HENT: Negative for congestion and sore throat  Eyes: Negative for pain and visual disturbance  Respiratory: Positive for cough  Negative for shortness of breath and wheezing  Cardiovascular: Negative for chest pain and palpitations  Gastrointestinal: Negative for abdominal pain, diarrhea and nausea  Genitourinary: Negative for difficulty urinating and dysuria  Musculoskeletal: Positive for back pain  Negative for neck pain  Skin: Negative for rash and wound  Neurological: Negative for dizziness and headaches  Hematological: Negative for adenopathy  Psychiatric/Behavioral: Positive for dysphoric mood and sleep disturbance  Negative for behavioral problems and confusion  The patient is nervous/anxious  Objective:      /72   Pulse 74   Temp 98 7 °F (37 1 °C) (Tympanic)   Ht 5' 3" (1 6 m)   Wt 85 9 kg (189 lb 6 4 oz)   BMI 33 55 kg/m²          Physical Exam  Vitals and nursing note reviewed  Constitutional:       General: She is not in acute distress  Appearance: She is well-developed  She is obese  She is not ill-appearing  HENT:      Head: Normocephalic and atraumatic  Eyes:      General:         Right eye: No discharge  Left eye: No discharge  Conjunctiva/sclera: Conjunctivae normal    Neck:      Trachea: No tracheal deviation  Cardiovascular:      Rate and Rhythm: Normal rate and regular rhythm  Heart sounds: Normal heart sounds  No murmur heard  No friction rub  Pulmonary:      Effort: Pulmonary effort is normal  No respiratory distress        Breath sounds: Normal breath sounds  No wheezing, rhonchi or rales  Musculoskeletal:         General: No deformity or signs of injury  Cervical back: Neck supple  Skin:     General: Skin is warm  Coloration: Skin is not pale  Findings: No rash  Neurological:      General: No focal deficit present  Mental Status: She is alert  Motor: No abnormal muscle tone  Gait: Gait normal    Psychiatric:         Mood and Affect: Mood normal          Behavior: Behavior normal          Thought Content:  Thought content normal          Judgment: Judgment normal

## 2021-06-18 LAB
ALBUMIN SERPL-MCNC: 4.6 G/DL (ref 3.8–4.8)
ALBUMIN/GLOB SERPL: 1.8 {RATIO} (ref 1.2–2.2)
ALP SERPL-CCNC: 66 IU/L (ref 48–121)
ALT SERPL-CCNC: 25 IU/L (ref 0–32)
AST SERPL-CCNC: 24 IU/L (ref 0–40)
BILIRUB SERPL-MCNC: 0.4 MG/DL (ref 0–1.2)
BUN SERPL-MCNC: 22 MG/DL (ref 8–27)
BUN/CREAT SERPL: 27 (ref 12–28)
CALCIUM SERPL-MCNC: 10.1 MG/DL (ref 8.7–10.3)
CHLORIDE SERPL-SCNC: 102 MMOL/L (ref 96–106)
CO2 SERPL-SCNC: 30 MMOL/L (ref 20–29)
CREAT SERPL-MCNC: 0.81 MG/DL (ref 0.57–1)
ERYTHROCYTE [DISTWIDTH] IN BLOOD BY AUTOMATED COUNT: 12.7 % (ref 11.7–15.4)
GLOBULIN SER-MCNC: 2.6 G/DL (ref 1.5–4.5)
GLUCOSE SERPL-MCNC: 92 MG/DL (ref 65–99)
HCT VFR BLD AUTO: 43.8 % (ref 34–46.6)
HGB BLD-MCNC: 14.4 G/DL (ref 11.1–15.9)
MCH RBC QN AUTO: 28.3 PG (ref 26.6–33)
MCHC RBC AUTO-ENTMCNC: 32.9 G/DL (ref 31.5–35.7)
MCV RBC AUTO: 86 FL (ref 79–97)
PLATELET # BLD AUTO: 325 X10E3/UL (ref 150–450)
POTASSIUM SERPL-SCNC: 4.6 MMOL/L (ref 3.5–5.2)
PROT SERPL-MCNC: 7.2 G/DL (ref 6–8.5)
RBC # BLD AUTO: 5.09 X10E6/UL (ref 3.77–5.28)
SL AMB EGFR AFRICAN AMERICAN: 89 ML/MIN/1.73
SL AMB EGFR NON AFRICAN AMERICAN: 78 ML/MIN/1.73
SODIUM SERPL-SCNC: 143 MMOL/L (ref 134–144)
WBC # BLD AUTO: 6.9 X10E3/UL (ref 3.4–10.8)

## 2021-06-21 NOTE — ASSESSMENT & PLAN NOTE
"Dear  Rohan Nichols Md  [address] [place] [address]    Thank you for the opportunity to participate in the care of  Pretty Vigil.    She is a 58 y.o. y/o who comes to the clinic for evaluation of her sleep problems.    The patient reports snoring but she does not know the exact onset of her snoring. The snoring is loud .  The patient has a  bed partner that confirms the snoring. The snoring happens every night .  She thinks that the snoring has been getting worse over time.     The patient describes having the sensation that she is not breathing while she is on a superficial stage of sleep. She has to tell herself to \"breathe\" and eventually wakes up feeling short of breath.    She also mentions that she has been unable to sleep since 1991. She recalls that her sleep changed shortly after her divorce and progressively got worse. She was having significant depression and anxiety around her divorce but her sleep problems persisted even after all the other symptoms improve. She describes her symptoms as having frequent awakenings that are present every night. She estimates that she is sleeping 4 to 6 hours every night.    She has been tracking her sleep with a fitbit and this is showing frequent sleep interruptions and superficial sleep.    The patient recalls trying trazodone in the past but she felt groggy the next day and she eventually stopped using it.     STOP BANG 10/25/2018   Do you snore loudly (louder than talking or loud enough to be heard through closed doors)? 1   Do you often feel tired, fatigued, or sleepy during daytime? 1   Has anyone observed you stop breathing in your sleep? 1   Do you have or are you being treated for high blood pressure? 0   BMI more than 35 kg/m2 0   Age over 50 years old? 1   Neck circumference greater than 16 inches? 0   Gender male? 0   Total Score 4   Epworths Sleepiness Scale 10/25/2018   Sitting and reading 2   Watching TV 2   Sitting, inactive in a " No benefit with addition of low lan Buspar - increase from 5 mg to 7 5 mg bid and if no better in 2 wks may go to 10 mg bid, con't current Effexor, re-eval in 4 wks, call with SE/new/worse mood public place (e.g. a theatre or a meeting) 1   As a passenger in a car for an hour without a break 0   Lying down to rest in the afternoon when circumstances permit 0   Sitting and talking to someone 0   Sitting quietly after a lunch without alcohol 1   In a car, while stopped for a few minutes in traffic 0   Total score 6   Rooming 10/25/2018   Usual bedtime 9-11 pm   Sleep Latency les than 20 minutes   Awakenings many   Wake Up Time 6:30 am   Weekends same   Coffee 2/3 cups per day   Difficulty falling asleep No   Difficulty staying asleep Yes   Excessive daytime tiredness Yes   Excessive daytime sleepiness Yes   Dozing off while driving No   Shift Worker No   Sleep Walking? No   Sleep Talking? No   Kicking or punching? No   Restless legs symptoms No       Past Medical History  Past Medical History:   Diagnosis Date     Anxiety      Esophageal candidiasis (H)      Hot flashes      Migraine headache      Premature menopause      Patient Active Problem List   Diagnosis     Chronic Constipation     Premature Menopause     Overweight     Eye Pain     Pain During Urination (Dysuria)     Cough     Anxiety     Esophageal Candidiasis     Menopause     Osteoporosis     Osteopenia     Vitamin D Deficiency     Migraine Headache     Essential Hypercholesterolemia     Esophageal Reflux     Nontoxic Nodular Goiter     Solitary nodule of right lobe of thyroid          Past Surgical History  Past Surgical History:   Procedure Laterality Date     AL KNEE SCOPE,DIAGNOSTIC      Description: Arthroscopy Knee Right;  Proc Date: 06/18/1995;        Meds  Current Outpatient Prescriptions   Medication Sig Dispense Refill     CHOLECALCIFEROL, VITAMIN D3, (VITAMIN D3 ORAL) Take 1,000 Units by mouth daily.        diflorasone-emollient (APEXICON E) 0.05 % Crea Apply topically 2 (two) times a day.       Lactobacillus rhamnosus GG (CULTURELLE) 15 billion cell CpSP Take 1 capsule by mouth 3 (three) times a day with meals.       lansoprazole  (PREVACID) 15 MG capsule Take 1 capsule by mouth daily.  11     OMEGA-3/DHA/EPA/FISH OIL (FISH OIL-OMEGA-3 FATTY ACIDS) 300-1,000 mg capsule Take 2 g by mouth daily.       PROGESTERONE MISC 50 mg.       ranitidine (ZANTAC) 150 MG tablet Take 1 tablet (150 mg total) by mouth at bedtime. 90 tablet 0     triamcinolone (KENALOG) 0.1 % cream APPLY EXTERNALLY TO THE AFFECTED AREA TWICE DAILY AS NEEDED 15 g 0     vitamin E 100 UNIT capsule Take 100 Units by mouth daily.       Current Facility-Administered Medications   Medication Dose Route Frequency Provider Last Rate Last Dose     denosumab 60 mg (PROLIA 60 mg/ml)  60 mg Subcutaneous Q6 Months Jillian Cardona MD   60 mg at 10/08/15 1149        Allergies  Review of patient's allergies indicates no known allergies.     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      Social History Main Topics     Smoking status: Former Smoker     Quit date: 1/1/1982     Smokeless tobacco: Never Used     Alcohol use Not on file     Drug use: Not on file     Sexual activity: Not on file     Other Topics Concern     Not on file     Social History Narrative        Family History  Family History   Problem Relation Age of Onset     Breast cancer Mother      Kidney cancer Mother      Cancer Father      lung     Venous thrombosis Father      acute venous thrombosis of the lower extremities      Crohn's disease Father      Factor V Leiden deficiency Father      Hyperlipidemia Brother            Review of Systems:  Constitutional: Negative except as noted in HPI.   Eyes: Negative except as noted in HPI.   ENT: Negative except as noted in HPI.   Cardiovascular: Negative except as noted in HPI.   Respiratory: Negative except as noted in HPI.   Gastrointestinal: Negative except as noted in HPI.   Genitourinary: Negative except as noted in  "HPI.   Musculoskeletal: Negative except as noted in HPI.   Integumentary: Negative except as noted in HPI.   Neurological: Negative except as noted in HPI.   Psychiatric: Negative except as noted in HPI.   Endocrine: Negative except as noted in HPI.   Hematologic/Lymphatic: Negative except as noted in HPI.      Physical Exam:  Pulse 80  Ht 5' 6.75\" (1.695 m)  Wt 170 lb (77.1 kg)  SpO2 100%  BMI 26.83 kg/m2  BMI:Body mass index is 26.83 kg/(m^2).   GEN: NAD  Head: Normocephalic.  EYES: PERRLA, EOMI  ENT: Oropharynx is clear, Mallampatti class IV airway. Uvula is not edematous  Nasal mucosa is pink, no polyps.   CV: Regular rate and rhythm, S1 & S2 are normal. No murmurs  LUNGS: clear to auscultation bilaterally, no wheezes  ABDOMEN: positive bowel sounds, soft, no rebound or guarding  MUSCULOSKELETAL: no clubbing, cyanosis or edema  SKIN: warm, dry, no rashes  Neurological: Alert, oriented to time, place, and person.  Psych:  normal mood, normal affect     Labs/Studies:     Lab Results   Component Value Date    WBC 5.6 08/27/2018    HGB 13.1 08/27/2018    HCT 38.1 08/27/2018    MCV 89 08/27/2018     08/27/2018         Chemistry        Component Value Date/Time     08/27/2018 0806    K 4.5 08/27/2018 0806     08/27/2018 0806    CO2 28 08/27/2018 0806    BUN 13 08/27/2018 0806    CREATININE 0.73 08/27/2018 0806     08/27/2018 0806        Component Value Date/Time    CALCIUM 9.2 08/27/2018 0806    ALKPHOS 68 08/27/2018 0806    AST 50 (H) 08/27/2018 0806    ALT 56 (H) 08/27/2018 0806    BILITOT 0.6 08/27/2018 0806            No results found for: FERRITIN  Lab Results   Component Value Date    TSH 1.74 09/16/2014         Assessment and Plan:  In summary Pretty Vigil is a 58 y.o. year old female here for consultation.    1. Snoring/ apneic episodes/ risk for sleep-disordered breathing.  Pretty Vigil has high risk for obstructive sleep apnea based on the results of her STOP BANG " questionnaire and crowded airway.  I explained to the patient that some of her insomnia symptoms are probably related to her initial anxiety and depression rather than sleep -disordered breathing.   Recommend getting an overnight polysomnography to split per protocol.  The patient should return to the clinic to discuss results and treatment option in a patient-centered approach.  She lives in [facility] and could be hard for her to make it to one of our labs. I explained to the patient that while we are in the process of integrating our clinics, we still operate as two separate locations but we will contact [facility] to see if the test can be completed there.    2. Chronic Insomnia.  Her insomnia symptoms precede her snoring and apneic episodes and were triggered by her initial divorce.  She seems to have snoring and apneic episodes that could be worsening her insomnia. SDB is more frequently comorbid with insomnia in women.  She could be an excellent candidate for CBTi after her PSG.     Patient verbalized understanding of these issues, agrees with the plan and all questions were answered today. Patient was given an opportuntity to voice any other symptoms or concerns not listed above. Patient did not have any other symptoms or concerns.      F/U to be determined after her PSG is scheduled.     MD ROYCE Erazo Board Certified in Internal Medicine and Sleep Medicine  [facility].

## 2021-07-02 ENCOUNTER — OFFICE VISIT (OUTPATIENT)
Dept: CARDIOLOGY CLINIC | Facility: CLINIC | Age: 61
End: 2021-07-02
Payer: COMMERCIAL

## 2021-07-02 VITALS
SYSTOLIC BLOOD PRESSURE: 100 MMHG | HEIGHT: 63 IN | DIASTOLIC BLOOD PRESSURE: 70 MMHG | WEIGHT: 189 LBS | HEART RATE: 62 BPM | BODY MASS INDEX: 33.49 KG/M2

## 2021-07-02 DIAGNOSIS — K21.9 GERD (GASTROESOPHAGEAL REFLUX DISEASE): ICD-10-CM

## 2021-07-02 PROCEDURE — 3008F BODY MASS INDEX DOCD: CPT | Performed by: INTERNAL MEDICINE

## 2021-07-02 PROCEDURE — 99203 OFFICE O/P NEW LOW 30 MIN: CPT | Performed by: INTERNAL MEDICINE

## 2021-07-02 PROCEDURE — 1036F TOBACCO NON-USER: CPT | Performed by: INTERNAL MEDICINE

## 2021-07-02 PROCEDURE — 93000 ELECTROCARDIOGRAM COMPLETE: CPT | Performed by: INTERNAL MEDICINE

## 2021-07-02 NOTE — PROGRESS NOTES
Cardiology   MD Afshin Fong MD Ather Mansoor, MD Tia Pitman, DO, Ana Zavala DO, Yanique Lee DO, [facility] [place]  -------------------------------------------------------------------  [facility]  [address], [place] [address]  [place]  [phone]                        Garth Bound                     1960                     [id number]        Reason for consultation:  Preoperative cardiac clearance      Assessment/Plan:    1  Preoperative risk stratification:  No major preoperative cardiac risk factors, good functional capacity, normal cardiac examination and 12 lead ECG today  As such, no further cardiac testing recommended at this time  I suspect that she will be at low cardiac risk for bariatric surgery  Prior lipid panel elevated 09/2020, although 10 year risk of ASCVD is low  Advised heart healthy diet, exercise, weight loss for dyslipidemia  Blood pressure well controlled  Interval History: This is a very pleasant 38-year-old female scheduled for revision of gastric sleeve surgery in the near future who presents today for preoperative cardiac clearance prior to bariatric surgery  She denies any prior history of myocardial infarction, stroke, dysrhythmia, valvulopathy, congestive heart failure, or cardiomyopathy  She does not have diabetes, is a nonsmoker, denies any family history of premature CAD or sudden death  She is active and denies any exertional cardiac symptoms               Vitals:  Vitals:    07/02/21 1055   BP: 100/70   BP Location: Left arm   Patient Position: Sitting   Cuff Size: Adult   Pulse: 62   Weight: 85 7 kg (189 lb)   Height: 5' 3" (1 6 m)         Past Medical History:   Diagnosis Date    Arthritis     Bariatric surgery status     Depression     last assessed: Jan 18, 2018     Disturbance of memory     last assessed: June 23, 2016     Endometriosis     GERD (gastroesophageal reflux disease)     Hiatal hernia     Hirsutism     History of sleeve gastrectomy     Menometrorrhagia     Morbid obesity ([facility] [address] )     last assessed: Dec 5, 2014     PONV (postoperative nausea and vomiting)     Postgastrectomy malabsorption     Sclerosing adenosis of breast     Sleep apnea     Symptomatic menopausal or female climacteric states     Thyroid disease     Uterine leiomyoma      Social History     Socioeconomic History    Marital status: /Civil Union     Spouse name: Not on file    Number of children: 1    Years of education: Not on file    Highest education level: Not on file   Occupational History    Not on file   Tobacco Use    Smoking status: Former Smoker     Quit date:      Years since quittin 5    Smokeless tobacco: Never Used   Vaping Use    Vaping Use: Never used   Substance and Sexual Activity    Alcohol use: Yes     Comment: occasional    Drug use: No    Sexual activity: Yes     Partners: Male   Other Topics Concern    Not on file   Social History Narrative    Always uses seat belt     Caffeine use     [employer]      Social Determinants of Health     Financial Resource Strain:     Difficulty of Paying Living Expenses:    Food Insecurity:     Worried About [address] in the Last Year:     [address] [place] in the Last Year:    Transportation Needs:     Lack of Transportation (Medical):      Lack of Transportation (Non-Medical):    Physical Activity:     Days of Exercise per Week:     Minutes of Exercise per Session:    Stress:     Feeling of Stress :    Social Connections:     Frequency of Communication with Friends and Family:     Frequency of Social Gatherings with Friends and Family:     Attends Yarsanism Services:     Active Member of Clubs or Organizations:     Attends Club or Organization Meetings:     Marital Status:    Intimate Partner Violence:     Fear of Current or Ex-Partner:  Emotionally Abused:     Physically Abused:     Sexually Abused:       Family History   Problem Relation Age of Onset    Hypertension Mother     Alzheimer's disease Mother     Stroke Mother     Dementia Mother     Hyperlipidemia Father     Diabetes Father         mellitus     Prostate cancer Father     Rectal cancer Father     Lung cancer Father     Liver cancer Father     Heart attack Father     Colon cancer Father     Hypertension Sister     Colon polyps Sister     Hyperlipidemia Brother     Breast cancer Maternal Grandmother      Past Surgical History:   Procedure Laterality Date    APPENDECTOMY      BREAST BIOPSY Left 2010    COLONOSCOPY      complete - 7 year repeat recommended - Resolved:      DILATION AND CURETTAGE OF UTERUS      GASTRECTOMY SLEEVE LAPAROSCOPIC  2018    GYNECOLOGIC CRYOSURGERY      Cervix - 's for abnormal paps     HYSTERECTOMY      INDUCED       x3    LAPAROSCOPIC SALPINGOOPHERECTOMY Right     LAPAROSCOPIC TOTAL HYSTERECTOMY      06, Laparoscopic hysterectomy with LSO with vaginal closure of the vaginal cuff     SALPINGOOPHORECTOMY Left     STOMACH SURGERY  2014    for morbid obesity - Managed by: Elsa Magallon, Meghan W Saundra Paul ADENOIDECTOMY      TOOTH EXTRACTION         Current Outpatient Medications:     Calcium 250 MG CAPS, Take 500 mg by mouth daily, Disp: , Rfl:     Cholecalciferol (VITAMIN D3) 2000 units TABS, Take 1 tablet by mouth daily, Disp: , Rfl:     Cyanocobalamin (VITAMIN B-12) 1000 MCG SUBL, Place 1 Dose under the tongue daily, Disp: , Rfl:     gabapentin (NEURONTIN) 400 mg capsule, Take 1 capsule (400 mg total) by mouth 3 (three) times a day, Disp: 90 capsule, Rfl: 2    hydrOXYzine HCL (ATARAX) 25 mg tablet, Take 1 tablet (25 mg total) by mouth every 6 (six) hours as needed for anxiety, Disp: 30 tablet, Rfl: 0    Multiple Vitamins-Minerals (MULTIPLE VITAMINS/WOMENS PO), Take by mouth daily, Disp: , Rfl:     omeprazole (PriLOSEC) 20 mg delayed release capsule, TAKE 1 CAPSULE BY MOUTH TWICE A DAY (Patient taking differently: daily ), Disp: 60 capsule, Rfl: 2    Synthroid 150 MCG tablet, TAKE 1 TABLET BY MOUTH DAILY  BRAND NECESSARY , Disp: 30 tablet, Rfl: 11    tiZANidine (ZANAFLEX) 2 mg tablet, Take 1 PO TID PRN for pain/spams  , Disp: 75 tablet, Rfl: 1    traMADol-acetaminophen (ULTRACET) 37 5-325 mg per tablet, Take 1 tablet by mouth every 8 (eight) hours as needed for moderate pain, Disp: 30 tablet, Rfl: 0    venlafaxine (EFFEXOR-XR) 150 mg 24 hr capsule, TAKE 1 CAPSULE BY MOUTH EVERY DAY, Disp: 30 capsule, Rfl: 1    venlafaxine (EFFEXOR-XR) 75 mg 24 hr capsule, TAKE 1 CAPSULE BY MOUTH DAILY WITH BREAKFAST, Disp: 30 capsule, Rfl: 2    Nerve Stimulator (STANDARD TENS) TOBY, by Does not apply route 2 (two) times a day (Patient not taking: Reported on 7/2/2021), Disp: 1 Device, Rfl: 0        Review of Systems:  Review of Systems   Constitutional: Negative for activity change, fever and unexpected weight change  HENT: Negative for facial swelling, nosebleeds and voice change  Respiratory: Negative for chest tightness, shortness of breath and wheezing  Cardiovascular: Negative for chest pain, palpitations and leg swelling  Gastrointestinal: Negative for abdominal distention  Genitourinary: Negative for hematuria  Musculoskeletal: Negative for arthralgias  Skin: Negative for color change, pallor, rash and wound  Neurological: Negative for dizziness, seizures and syncope  Psychiatric/Behavioral: Negative for agitation  Physical Exam:  Physical Exam  Vitals reviewed  Constitutional:       Appearance: She is well-developed  HENT:      Head: Normocephalic and atraumatic  Cardiovascular:      Rate and Rhythm: Normal rate and regular rhythm  Heart sounds: Normal heart sounds     Pulmonary:      Effort: Pulmonary effort is normal       Breath sounds: Normal breath sounds  Abdominal:      Palpations: Abdomen is soft  Musculoskeletal:         General: Normal range of motion  Cervical back: Normal range of motion and neck supple  Skin:     General: Skin is warm and dry  Neurological:      Mental Status: She is alert and oriented to person, place, and time  Psychiatric:         Behavior: Behavior normal          Thought Content: Thought content normal          Judgment: Judgment normal          This note was completed in part utilizing [employer] Direct Software  Grammatical errors, random word insertions, spelling mistakes, and incomplete sentences can be an occasional consequence of this system secondary to software limitations, ambient noise, and hardware issues  If you have any questions or concerns about the content, text, or information contained within the body of this dictation, please contact the provider for clarification

## 2021-07-09 DIAGNOSIS — F32.A DEPRESSION, UNSPECIFIED DEPRESSION TYPE: ICD-10-CM

## 2021-07-09 DIAGNOSIS — F41.9 ANXIETY: ICD-10-CM

## 2021-07-09 RX ORDER — VENLAFAXINE HYDROCHLORIDE 75 MG/1
CAPSULE, EXTENDED RELEASE ORAL
Qty: 30 CAPSULE | Refills: 2 | Status: SHIPPED | OUTPATIENT
Start: 2021-07-09 | End: 2021-10-01

## 2021-07-09 RX ORDER — VENLAFAXINE HYDROCHLORIDE 150 MG/1
CAPSULE, EXTENDED RELEASE ORAL
Qty: 30 CAPSULE | Refills: 1 | Status: SHIPPED | OUTPATIENT
Start: 2021-07-09 | End: 2021-08-09

## 2021-07-14 ENCOUNTER — TELEPHONE (OUTPATIENT)
Dept: FAMILY MEDICINE CLINIC | Facility: HOSPITAL | Age: 61
End: 2021-07-14

## 2021-07-14 NOTE — TELEPHONE ENCOUNTER
Pt states she would like to know if she can take Magnesium with her current medications    Pt can be reached at [phone]

## 2021-07-23 ENCOUNTER — TELEPHONE (OUTPATIENT)
Dept: FAMILY MEDICINE CLINIC | Facility: HOSPITAL | Age: 61
End: 2021-07-23

## 2021-07-23 NOTE — TELEPHONE ENCOUNTER
Patient wanted Dr Willie Salazar to know she started taking iron with Vitamin C - Beri-Melts, no units or mg was given

## 2021-07-30 ENCOUNTER — OFFICE VISIT (OUTPATIENT)
Dept: FAMILY MEDICINE CLINIC | Facility: HOSPITAL | Age: 61
End: 2021-07-30
Payer: COMMERCIAL

## 2021-07-30 VITALS
BODY MASS INDEX: 33.1 KG/M2 | SYSTOLIC BLOOD PRESSURE: 124 MMHG | TEMPERATURE: 97 F | HEART RATE: 72 BPM | HEIGHT: 63 IN | WEIGHT: 186.8 LBS | DIASTOLIC BLOOD PRESSURE: 80 MMHG

## 2021-07-30 DIAGNOSIS — M54.50 CHRONIC BILATERAL LOW BACK PAIN WITHOUT SCIATICA: ICD-10-CM

## 2021-07-30 DIAGNOSIS — F41.9 ANXIETY: Primary | ICD-10-CM

## 2021-07-30 DIAGNOSIS — R03.0 ELEVATED BLOOD PRESSURE READING IN OFFICE WITHOUT DIAGNOSIS OF HYPERTENSION: ICD-10-CM

## 2021-07-30 DIAGNOSIS — G89.29 CHRONIC BILATERAL LOW BACK PAIN WITHOUT SCIATICA: ICD-10-CM

## 2021-07-30 PROCEDURE — 99214 OFFICE O/P EST MOD 30 MIN: CPT | Performed by: INTERNAL MEDICINE

## 2021-07-30 PROCEDURE — 1036F TOBACCO NON-USER: CPT | Performed by: INTERNAL MEDICINE

## 2021-07-30 PROCEDURE — 3008F BODY MASS INDEX DOCD: CPT | Performed by: INTERNAL MEDICINE

## 2021-07-30 RX ORDER — TIZANIDINE 4 MG/1
TABLET ORAL
Qty: 30 TABLET | Refills: 0 | Status: SHIPPED | OUTPATIENT
Start: 2021-07-30 | End: 2021-11-08

## 2021-07-30 NOTE — ASSESSMENT & PLAN NOTE
Anxiety is better off Buspar - will remain off medication, has improved slightly with increase in Gabapentin, has some benefit with hydroxyzine but has sedation, wishes to con't current regimen so no changes to be made today, call with new/worse symptoms

## 2021-07-30 NOTE — PROGRESS NOTES
Assessment/Plan:    Anxiety  Anxiety is better off Buspar - will remain off medication, has improved slightly with increase in Gabapentin, has some benefit with hydroxyzine but has sedation, wishes to con't current regimen so no changes to be made today, call with new/worse symptoms    Chronic bilateral low back pain without sciatica  Improvement in pain with increase in Gabapentin, no SE noted, con't current regimen, call with new/worse symptoms       Diagnoses and all orders for this visit:    Anxiety    Chronic bilateral low back pain without sciatica  -     tiZANidine (ZANAFLEX) 4 mg tablet; Take 1 PO TID PRN for pain/spams  Elevated blood pressure reading in office without diagnosis of hypertension  Comments:  Bp at goal by end of appt, con't to monitor off meds, urged healthy diet/exercise/wgt loss, recheck in 3 mos      Colonoscopy 3/20    Mammo 3/21    Dexa 3/21 - nml    PAP s/p hysterectomy    BW 9/20 - has labs ordered by Carolyn for Oct 2021    May be moving to [facility] in the winter        Subjective:      Patient ID: Marcie Barbosa is a 59 y o  female  HPI Pt here for follow up appt    Last visit pt was noting anxiety worse with Buspar  We weaned her off the Buspar and increased Gabapentin from 300 mg tid to 400 mg tid and tried a trial of hydroxyzine prn anxiety  She notes the hydroxyzine has helped with the anxiety when she takes it but she notes some sedation with the medication  She noted no issues weaning off the Buspar  She feels the increase in the Gabapentin might have helped the anxiety she is feeling less anxious  Pts Gabapentin was increased last visit to help with anxiety but also with worsening LBP  She was told to con't her Ultracet as well as Zanaflex  She was going to look in to medical marijuana as well  She feels the Tizanidine has not helped as much as she thought  She is not noting any SE and she is interested in trying a higher dose  BP a bit elevated on presentation    Better by end of visit  She notes no HA's/dizziness/CP/palp  Colonoscopy 3/20    Mammo 3/21    Dexa 3/21 - nml    PAP s/p hysterectomy    BW 9/20 - has labs ordered by Endo for Oct 2021      Review of Systems   Constitutional: Negative for chills, fever and unexpected weight change  HENT: Negative for congestion and sore throat  Eyes: Negative for pain  Respiratory: Negative for cough and shortness of breath  Cardiovascular: Negative for chest pain, palpitations and leg swelling  Gastrointestinal: Negative for abdominal pain, blood in stool, constipation, diarrhea, nausea and vomiting  Genitourinary: Negative for difficulty urinating and dysuria  Musculoskeletal: Positive for arthralgias and back pain  Negative for neck pain  Skin: Negative for rash and wound  Neurological: Negative for dizziness, light-headedness and headaches  Hematological: Negative for adenopathy  Psychiatric/Behavioral: Negative for behavioral problems and confusion  The patient is not nervous/anxious  Objective:    /80   Pulse 72   Temp (!) 97 °F (36 1 °C) (Temporal)   Ht 5' 3" (1 6 m)   Wt 84 7 kg (186 lb 12 8 oz)   BMI 33 09 kg/m²      Physical Exam  Vitals and nursing note reviewed  Constitutional:       General: She is not in acute distress  Appearance: She is well-developed  She is not ill-appearing  HENT:      Head: Normocephalic and atraumatic  Eyes:      General:         Right eye: No discharge  Left eye: No discharge  Conjunctiva/sclera: Conjunctivae normal    Neck:      Trachea: No tracheal deviation  Cardiovascular:      Rate and Rhythm: Normal rate and regular rhythm  Heart sounds: Normal heart sounds  No murmur heard  No friction rub  Pulmonary:      Effort: Pulmonary effort is normal  No respiratory distress  Breath sounds: Normal breath sounds  No wheezing, rhonchi or rales  Abdominal:      General: There is no distension        Palpations: Abdomen is soft  Tenderness: There is no abdominal tenderness  There is no guarding or rebound  Musculoskeletal:         General: No deformity or signs of injury  Cervical back: Neck supple  Skin:     General: Skin is warm  Coloration: Skin is not pale  Findings: No rash  Neurological:      General: No focal deficit present  Mental Status: She is alert  Mental status is at baseline  Motor: No abnormal muscle tone  Gait: Gait normal    Psychiatric:         Mood and Affect: Mood normal          Behavior: Behavior normal          Thought Content:  Thought content normal          Judgment: Judgment normal

## 2021-07-30 NOTE — ASSESSMENT & PLAN NOTE
Improvement in pain with increase in Gabapentin, no SE noted, con't current regimen, call with new/worse symptoms

## 2021-07-31 DIAGNOSIS — M51.36 DISC DEGENERATION, LUMBAR: ICD-10-CM

## 2021-08-09 DIAGNOSIS — F32.A DEPRESSION, UNSPECIFIED DEPRESSION TYPE: ICD-10-CM

## 2021-08-09 DIAGNOSIS — F41.9 ANXIETY: ICD-10-CM

## 2021-08-09 RX ORDER — VENLAFAXINE HYDROCHLORIDE 150 MG/1
CAPSULE, EXTENDED RELEASE ORAL
Qty: 30 CAPSULE | Refills: 1 | Status: SHIPPED | OUTPATIENT
Start: 2021-08-09 | End: 2021-09-05

## 2021-09-04 DIAGNOSIS — F41.9 ANXIETY: ICD-10-CM

## 2021-09-04 DIAGNOSIS — F32.A DEPRESSION, UNSPECIFIED DEPRESSION TYPE: ICD-10-CM

## 2021-09-05 RX ORDER — VENLAFAXINE HYDROCHLORIDE 150 MG/1
CAPSULE, EXTENDED RELEASE ORAL
Qty: 30 CAPSULE | Refills: 1 | Status: SHIPPED | OUTPATIENT
Start: 2021-09-05 | End: 2021-10-01

## 2021-09-09 ENCOUNTER — OFFICE VISIT (OUTPATIENT)
Dept: BARIATRICS | Facility: CLINIC | Age: 61
End: 2021-09-09
Payer: COMMERCIAL

## 2021-09-09 ENCOUNTER — CLINICAL SUPPORT (OUTPATIENT)
Dept: BARIATRICS | Facility: CLINIC | Age: 61
End: 2021-09-09

## 2021-09-09 VITALS
HEIGHT: 63 IN | TEMPERATURE: 96.6 F | DIASTOLIC BLOOD PRESSURE: 80 MMHG | BODY MASS INDEX: 33.31 KG/M2 | HEART RATE: 84 BPM | SYSTOLIC BLOOD PRESSURE: 120 MMHG | WEIGHT: 188 LBS

## 2021-09-09 DIAGNOSIS — E66.9 OBESITY, CLASS I, BMI 30-34.9: ICD-10-CM

## 2021-09-09 DIAGNOSIS — Z98.84 BARIATRIC SURGERY STATUS: Primary | ICD-10-CM

## 2021-09-09 DIAGNOSIS — K91.2 POSTGASTRECTOMY MALABSORPTION: Primary | ICD-10-CM

## 2021-09-09 DIAGNOSIS — Z90.3 POSTGASTRECTOMY MALABSORPTION: Primary | ICD-10-CM

## 2021-09-09 DIAGNOSIS — Z90.3 HISTORY OF SLEEVE GASTRECTOMY: ICD-10-CM

## 2021-09-09 DIAGNOSIS — K21.9 GERD (GASTROESOPHAGEAL REFLUX DISEASE): ICD-10-CM

## 2021-09-09 PROCEDURE — 99213 OFFICE O/P EST LOW 20 MIN: CPT | Performed by: SURGERY

## 2021-09-09 PROCEDURE — 3008F BODY MASS INDEX DOCD: CPT | Performed by: SURGERY

## 2021-09-09 PROCEDURE — 1036F TOBACCO NON-USER: CPT | Performed by: SURGERY

## 2021-09-09 PROCEDURE — RECHECK: Performed by: DIETITIAN, REGISTERED

## 2021-09-09 RX ORDER — HEPARIN SODIUM 5000 [USP'U]/ML
5000 INJECTION, SOLUTION INTRAVENOUS; SUBCUTANEOUS
Status: CANCELLED | OUTPATIENT
Start: 2021-09-28 | End: 2021-09-29

## 2021-09-09 RX ORDER — CELECOXIB 200 MG/1
200 CAPSULE ORAL ONCE
Status: CANCELLED | OUTPATIENT
Start: 2021-09-28 | End: 2021-09-09

## 2021-09-09 RX ORDER — GABAPENTIN 300 MG/1
300 CAPSULE ORAL ONCE
Status: CANCELLED | OUTPATIENT
Start: 2021-09-28 | End: 2021-09-09

## 2021-09-09 RX ORDER — ACETAMINOPHEN 325 MG/1
975 TABLET ORAL ONCE
Status: CANCELLED | OUTPATIENT
Start: 2021-09-28 | End: 2021-09-09

## 2021-09-09 RX ORDER — SCOLOPAMINE TRANSDERMAL SYSTEM 1 MG/1
1 PATCH, EXTENDED RELEASE TRANSDERMAL ONCE
Status: CANCELLED | OUTPATIENT
Start: 2021-09-28 | End: 2021-09-09

## 2021-09-09 RX ORDER — CEFAZOLIN SODIUM 2 G/50ML
2000 SOLUTION INTRAVENOUS ONCE
Status: CANCELLED | OUTPATIENT
Start: 2021-09-28 | End: 2021-09-09

## 2021-09-09 NOTE — PROGRESS NOTES
BARIATRIC HISTORY AND PHYSICAL - BARIATRIC SURGERY  Tia Brothers 59 y o  female MRN: [id number]  Unit/Bed#:  Encounter: [id number]      HPI:  Tia Brothers is a 59 y o  female who presents to review their preoperative workup and see if they are a good candidate to undergo a bariatric procedure  Hx sleeve gastrectomy with Dr Geoff Kilgore 2014  Review of Systems   Constitutional: Negative for chills and fever  HENT: Negative for ear pain and sore throat  Eyes: Negative for pain and visual disturbance  Respiratory: Negative for cough and shortness of breath  Cardiovascular: Negative for chest pain and palpitations  Gastrointestinal: Negative for abdominal pain and vomiting  Genitourinary: Negative for dysuria and hematuria  Musculoskeletal: Positive for back pain  Negative for arthralgias  Skin: Negative for color change and rash  Neurological: Negative for seizures and syncope  All other systems reviewed and are negative        Historical Information   Past Medical History:   Diagnosis Date    Arthritis     Bariatric surgery status     Depression     last assessed: Jan 18, 2018     Disturbance of memory     last assessed: June 23, 2016     Endometriosis     GERD (gastroesophageal reflux disease)     Hiatal hernia     Hirsutism     History of sleeve gastrectomy     Menometrorrhagia     Morbid obesity ([address] )     last assessed: Dec 5, 2014     PONV (postoperative nausea and vomiting)     Postgastrectomy malabsorption     Sclerosing adenosis of breast     Sleep apnea     Symptomatic menopausal or female climacteric states     Uterine leiomyoma      Past Surgical History:   Procedure Laterality Date    APPENDECTOMY      BREAST BIOPSY Left 2010    COLONOSCOPY      complete - 7 year repeat recommended - Resolved: 2007     DILATION AND CURETTAGE OF UTERUS      GASTRECTOMY SLEEVE LAPAROSCOPIC  11/24/2018    GYNECOLOGIC CRYOSURGERY      Cervix - 1980's for abnormal paps     HYSTERECTOMY  INDUCED       x3    LAPAROSCOPIC SALPINGOOPHERECTOMY Right     LAPAROSCOPIC TOTAL HYSTERECTOMY      06, Laparoscopic hysterectomy with LSO with vaginal closure of the vaginal cuff     SALPINGOOPHORECTOMY Left     STOMACH SURGERY  2014    for morbid obesity - Managed by: Merissa Mooneppa [address] EXTRACTION       Social History   Social History     Substance and Sexual Activity   Alcohol Use Yes    Comment: occasional     Social History     Substance and Sexual Activity   Drug Use No     Social History     Tobacco Use   Smoking Status Former Smoker    Quit date: 44    Years since quittin 7   Smokeless Tobacco Never Used     Family History: non-contributory    Meds/Allergies   all medications and allergies reviewed  No Known Allergies    Objective     Current Vitals:      bowel movement  [unfilled]    Invasive Devices     None                 Physical Exam  Constitutional:       Appearance: Normal appearance  She is obese  HENT:      Head: Normocephalic and atraumatic  Nose: Nose normal       Mouth/Throat:      Mouth: Mucous membranes are moist    Eyes:      Extraocular Movements: Extraocular movements intact  Pupils: Pupils are equal, round, and reactive to light  Cardiovascular:      Rate and Rhythm: Normal rate and regular rhythm  Pulses: Normal pulses  Heart sounds: Normal heart sounds  Pulmonary:      Effort: Pulmonary effort is normal       Breath sounds: Normal breath sounds  Abdominal:      Palpations: Abdomen is soft  Comments: Well healed scars   Musculoskeletal:      Cervical back: Normal range of motion  Skin:     General: Skin is warm  Neurological:      General: No focal deficit present  Mental Status: She is alert and oriented to person, place, and time     Psychiatric:         Mood and Affect: Mood normal          Behavior: Behavior normal          Lab Results: I have personally reviewed pertinent lab results  Imaging: I have personally reviewed pertinent reports  EKG, Pathology, and Other Studies: I have personally reviewed pertinent reports  EGD with Bravo 2/17/21:   FINDINGS:  · Irregular Z-line  · Small herniation of gastric fundus into thoracic cavity (type II hiatal hernia) without Cherylynn Goodwill lesions present  · Previous sleeve gastrectomy  No evidence of stricture at the incisura however there was evidence of sharp angulation at the incisura consistent with what appeared to be gastric volvulus on upper GI  · Performed single biopsy in the stomach  · Z-line 35 cm from the incisors  Bravo probe deployed at 29 cm without any difficulties  · Moderate abnormal mucosa in the lower third of the esophagus  LA grade B    Bravo study:  CONCLUSION     The patient had a  significantly elevated DeMeester score at 64 4  with  significant SAP and SIs between her symptom and reflux      In addition, the patient's percent time spent in reflux was 15 8%, which is  significant      Overall, the study is POSITIVE  for reflux       Code Status: [unfilled]  Advance Directive and Living Will:      Power of :    POLST:        Assessment/Plan:    The patient presented to review the preoperative workup and see if bariatric surgery is appropriate and indicated following the extensive preoperative workup and the enrollment in our weight loss program   Preoperative workup was complete  Results were reviewed with the patient including the blood work results and the endoscopy findings and the biopsy results  We also reviewed the cardiology evaluation      The patient was determined to be a good candidate for conversion from sleeve gastrectomy to RYGB with robotic assist   ----------------------------------------------------------------------  Smoking: Quit 1988     Blood thinner use: Denies  Home pain medication use and who manages it: Ultracet 37 5mg-325mg from PCP for back pain, only takes rarely, no pain agreement, only uses rarely, discussed not to take postoperatively while on tylenol, will prescribe the normal tylenol and oxycodone postoperatively   CPAP use: Denies (If yes, sleep study obtained and reminded pt to bring CPAP to hospital)  History of blood clots: Denies  Previous abdominal surgeries: Yes, sleeve gastrectomy, right oophorectomy, hysterectomy,    Cardiac clearance obtained: Yes, 21  EKG performed: 21  EGD prior to surgery: Yes  Screening Labs obtained (CBC, CMP): Yes, 21  H  Pylori status: Negative  Medication allergies: None  SLIM consult Post-Op: No  Reminded patient about 2 week pre-op liquid diet: No  Consent signed: Yes  Pre-Op ERAS Orders placed: Yes  -----------------------------------------------------------------------  Risks and benefits explained one more time to the patient  Alternatives to surgery and alternative forms of surgery were also explained  Post-surgical commitment and after care programs were explained  We also discussed that the [employer] robot may be available to us to use on the day of the patient procedure and that the procedure may be performed robotically  I discussed in details the advantages and disadvantages of this approach including the potential decrease in postoperative pain because of the remote center technology  I also mentioned the lack of strong evidence for the use of robot in bariatric patients and the potential disadvantage to patients because of the prolonged operative time  Consent was signed  Questions were answered and concerns were addressed  Patient will need to start the 2 week liquid diet prior to surgery  Patient wishes to proceed  As per [facility] guidelines, I had a discussion with the patient regarding their CODE STATUS in the perioperative period and the patient is level 1 or FULL CODE STATUS      Yash De Jesus PA-C  Bariatric Surgery   2021  12:26 PM

## 2021-09-14 DIAGNOSIS — K21.9 GASTROESOPHAGEAL REFLUX DISEASE: ICD-10-CM

## 2021-09-14 DIAGNOSIS — E66.9 OBESITY, CLASS I, BMI 30-34.9: Primary | ICD-10-CM

## 2021-09-14 RX ORDER — OMEPRAZOLE 20 MG/1
20 CAPSULE, DELAYED RELEASE ORAL DAILY
Qty: 30 CAPSULE | Refills: 3 | Status: SHIPPED | OUTPATIENT
Start: 2021-09-14 | End: 2022-05-16

## 2021-09-14 RX ORDER — OXYCODONE HYDROCHLORIDE 5 MG/1
5 TABLET ORAL EVERY 4 HOURS PRN
Qty: 5 TABLET | Refills: 0 | Status: SHIPPED | OUTPATIENT
Start: 2021-09-28 | End: 2021-10-11

## 2021-09-14 RX ORDER — OMEPRAZOLE 20 MG/1
CAPSULE, DELAYED RELEASE ORAL
Qty: 60 CAPSULE | Refills: 2 | Status: SHIPPED | OUTPATIENT
Start: 2021-09-14 | End: 2021-09-29 | Stop reason: HOSPADM

## 2021-09-17 ENCOUNTER — TELEPHONE (OUTPATIENT)
Dept: DERMATOLOGY | Facility: CLINIC | Age: 61
End: 2021-09-17

## 2021-09-17 NOTE — TELEPHONE ENCOUNTER
Called pt back from , she stated she was driving if I can cb, I advised I will do my best but she should also try to cb   ismael

## 2021-09-20 ENCOUNTER — ANESTHESIA EVENT (OUTPATIENT)
Dept: PERIOP | Facility: HOSPITAL | Age: 61
DRG: 328 | End: 2021-09-20
Payer: COMMERCIAL

## 2021-09-21 ENCOUNTER — TELEPHONE (OUTPATIENT)
Dept: BARIATRICS | Facility: CLINIC | Age: 61
End: 2021-09-21

## 2021-09-21 PROCEDURE — U0005 INFEC AGEN DETEC AMPLI PROBE: HCPCS | Performed by: SURGERY

## 2021-09-21 PROCEDURE — U0003 INFECTIOUS AGENT DETECTION BY NUCLEIC ACID (DNA OR RNA); SEVERE ACUTE RESPIRATORY SYNDROME CORONAVIRUS 2 (SARS-COV-2) (CORONAVIRUS DISEASE [COVID-19]), AMPLIFIED PROBE TECHNIQUE, MAKING USE OF HIGH THROUGHPUT TECHNOLOGIES AS DESCRIBED BY CMS-2020-01-R: HCPCS | Performed by: SURGERY

## 2021-09-21 RX ORDER — MAGNESIUM 200 MG
1 TABLET ORAL DAILY
COMMUNITY

## 2021-09-28 ENCOUNTER — HOSPITAL ENCOUNTER (INPATIENT)
Facility: HOSPITAL | Age: 61
LOS: 1 days | Discharge: HOME | DRG: 328 | End: 2021-09-29
Attending: SURGERY | Admitting: SURGERY
Payer: COMMERCIAL

## 2021-09-28 ENCOUNTER — ANESTHESIA (OUTPATIENT)
Dept: PERIOP | Facility: HOSPITAL | Age: 61
DRG: 328 | End: 2021-09-28
Payer: COMMERCIAL

## 2021-09-28 DIAGNOSIS — E66.9 OBESITY, CLASS I, BMI 30-34.9: Primary | ICD-10-CM

## 2021-09-28 LAB
ANION GAP SERPL CALCULATED.3IONS-SCNC: 9 MMOL/L (ref 4–13)
BUN SERPL-MCNC: 17 MG/DL (ref 5–25)
CALCIUM SERPL-MCNC: 8.8 MG/DL (ref 8.3–10.1)
CHLORIDE SERPL-SCNC: 107 MMOL/L (ref 100–108)
CO2 SERPL-SCNC: 29 MMOL/L (ref 21–32)
CREAT SERPL-MCNC: 0.76 MG/DL (ref 0.6–1.3)
GFR SERPL CREATININE-BSD FRML MDRD: 84 ML/MIN/1.73SQ M
GLUCOSE P FAST SERPL-MCNC: 101 MG/DL (ref 65–99)
GLUCOSE SERPL-MCNC: 101 MG/DL (ref 65–140)
POTASSIUM SERPL-SCNC: 4.3 MMOL/L (ref 3.5–5.3)
SODIUM SERPL-SCNC: 145 MMOL/L (ref 136–145)

## 2021-09-28 PROCEDURE — C9290 INJ, BUPIVACAINE LIPOSOME: HCPCS | Performed by: PHYSICIAN ASSISTANT

## 2021-09-28 PROCEDURE — 43281 LAP PARAESOPHAG HERN REPAIR: CPT | Performed by: SURGERY

## 2021-09-28 PROCEDURE — S2900 ROBOTIC SURGICAL SYSTEM: HCPCS | Performed by: SURGERY

## 2021-09-28 PROCEDURE — 80048 BASIC METABOLIC PNL TOTAL CA: CPT | Performed by: ANESTHESIOLOGY

## 2021-09-28 PROCEDURE — 0DJ08ZZ INSPECTION OF UPPER INTESTINAL TRACT, VIA NATURAL OR ARTIFICIAL OPENING ENDOSCOPIC: ICD-10-PCS | Performed by: SURGERY

## 2021-09-28 PROCEDURE — 43281 LAP PARAESOPHAG HERN REPAIR: CPT | Performed by: STUDENT IN AN ORGANIZED HEALTH CARE EDUCATION/TRAINING PROGRAM

## 2021-09-28 PROCEDURE — 43659 UNLISTED LAPS PX STOMACH: CPT | Performed by: STUDENT IN AN ORGANIZED HEALTH CARE EDUCATION/TRAINING PROGRAM

## 2021-09-28 PROCEDURE — 0DQV4ZZ REPAIR MESENTERY, PERCUTANEOUS ENDOSCOPIC APPROACH: ICD-10-PCS | Performed by: SURGERY

## 2021-09-28 PROCEDURE — C1781 MESH (IMPLANTABLE): HCPCS | Performed by: SURGERY

## 2021-09-28 PROCEDURE — 0DN44ZZ RELEASE ESOPHAGOGASTRIC JUNCTION, PERCUTANEOUS ENDOSCOPIC APPROACH: ICD-10-PCS | Performed by: SURGERY

## 2021-09-28 PROCEDURE — 43659 UNLISTED LAPS PX STOMACH: CPT | Performed by: SURGERY

## 2021-09-28 DEVICE — SEAMGUARD STPL REINF INTUITIVE SUREFORM 60 GREEN: Type: IMPLANTABLE DEVICE | Status: FUNCTIONAL

## 2021-09-28 RX ORDER — SODIUM CHLORIDE 9 MG/ML
INJECTION, SOLUTION INTRAVENOUS AS NEEDED
Status: DISCONTINUED | OUTPATIENT
Start: 2021-09-28 | End: 2021-09-28 | Stop reason: HOSPADM

## 2021-09-28 RX ORDER — ONDANSETRON 2 MG/ML
INJECTION INTRAMUSCULAR; INTRAVENOUS AS NEEDED
Status: DISCONTINUED | OUTPATIENT
Start: 2021-09-28 | End: 2021-09-28

## 2021-09-28 RX ORDER — FENTANYL CITRATE 50 UG/ML
INJECTION, SOLUTION INTRAMUSCULAR; INTRAVENOUS AS NEEDED
Status: DISCONTINUED | OUTPATIENT
Start: 2021-09-28 | End: 2021-09-28

## 2021-09-28 RX ORDER — CEFAZOLIN SODIUM 2 G/50ML
2000 SOLUTION INTRAVENOUS EVERY 8 HOURS
Status: COMPLETED | OUTPATIENT
Start: 2021-09-28 | End: 2021-09-28

## 2021-09-28 RX ORDER — OXYCODONE HCL 5 MG/5 ML
5 SOLUTION, ORAL ORAL EVERY 4 HOURS PRN
Status: DISCONTINUED | OUTPATIENT
Start: 2021-09-28 | End: 2021-09-29 | Stop reason: HOSPADM

## 2021-09-28 RX ORDER — CEFAZOLIN SODIUM 2 G/50ML
2000 SOLUTION INTRAVENOUS ONCE
Status: COMPLETED | OUTPATIENT
Start: 2021-09-28 | End: 2021-09-28

## 2021-09-28 RX ORDER — GABAPENTIN 300 MG/1
300 CAPSULE ORAL ONCE
Status: COMPLETED | OUTPATIENT
Start: 2021-09-28 | End: 2021-09-28

## 2021-09-28 RX ORDER — PROMETHAZINE HYDROCHLORIDE 25 MG/ML
25 INJECTION, SOLUTION INTRAMUSCULAR; INTRAVENOUS EVERY 6 HOURS PRN
Status: DISCONTINUED | OUTPATIENT
Start: 2021-09-28 | End: 2021-09-29 | Stop reason: HOSPADM

## 2021-09-28 RX ORDER — SODIUM CHLORIDE 9 MG/ML
125 INJECTION, SOLUTION INTRAVENOUS CONTINUOUS
Status: DISCONTINUED | OUTPATIENT
Start: 2021-09-28 | End: 2021-09-29 | Stop reason: HOSPADM

## 2021-09-28 RX ORDER — GLYCOPYRROLATE 0.2 MG/ML
INJECTION INTRAMUSCULAR; INTRAVENOUS AS NEEDED
Status: DISCONTINUED | OUTPATIENT
Start: 2021-09-28 | End: 2021-09-28

## 2021-09-28 RX ORDER — FENTANYL CITRATE/PF 50 MCG/ML
25 SYRINGE (ML) INJECTION
Status: DISCONTINUED | OUTPATIENT
Start: 2021-09-28 | End: 2021-09-28 | Stop reason: HOSPADM

## 2021-09-28 RX ORDER — HYDROXYZINE HYDROCHLORIDE 25 MG/1
25 TABLET, FILM COATED ORAL EVERY 6 HOURS PRN
Status: DISCONTINUED | OUTPATIENT
Start: 2021-09-28 | End: 2021-09-29 | Stop reason: HOSPADM

## 2021-09-28 RX ORDER — PROPOFOL 10 MG/ML
INJECTION, EMULSION INTRAVENOUS AS NEEDED
Status: DISCONTINUED | OUTPATIENT
Start: 2021-09-28 | End: 2021-09-28

## 2021-09-28 RX ORDER — HYDROMORPHONE HCL/PF 1 MG/ML
SYRINGE (ML) INJECTION AS NEEDED
Status: DISCONTINUED | OUTPATIENT
Start: 2021-09-28 | End: 2021-09-28

## 2021-09-28 RX ORDER — CELECOXIB 200 MG/1
200 CAPSULE ORAL ONCE
Status: COMPLETED | OUTPATIENT
Start: 2021-09-28 | End: 2021-09-28

## 2021-09-28 RX ORDER — HYDROMORPHONE HCL/PF 1 MG/ML
0.5 SYRINGE (ML) INJECTION
Status: DISCONTINUED | OUTPATIENT
Start: 2021-09-28 | End: 2021-09-28 | Stop reason: HOSPADM

## 2021-09-28 RX ORDER — VENLAFAXINE HYDROCHLORIDE 150 MG/1
150 CAPSULE, EXTENDED RELEASE ORAL DAILY
Status: DISCONTINUED | OUTPATIENT
Start: 2021-09-28 | End: 2021-09-29 | Stop reason: HOSPADM

## 2021-09-28 RX ORDER — DIPHENHYDRAMINE HCL 25 MG
25 TABLET ORAL EVERY 8 HOURS PRN
Status: DISCONTINUED | OUTPATIENT
Start: 2021-09-28 | End: 2021-09-29 | Stop reason: HOSPADM

## 2021-09-28 RX ORDER — SCOLOPAMINE TRANSDERMAL SYSTEM 1 MG/1
1 PATCH, EXTENDED RELEASE TRANSDERMAL ONCE
Status: DISCONTINUED | OUTPATIENT
Start: 2021-09-28 | End: 2021-09-29 | Stop reason: HOSPADM

## 2021-09-28 RX ORDER — VENLAFAXINE HYDROCHLORIDE 75 MG/1
75 CAPSULE, EXTENDED RELEASE ORAL DAILY
Status: DISCONTINUED | OUTPATIENT
Start: 2021-09-28 | End: 2021-09-29 | Stop reason: HOSPADM

## 2021-09-28 RX ORDER — ACETAMINOPHEN 325 MG/1
975 TABLET ORAL ONCE
Status: COMPLETED | OUTPATIENT
Start: 2021-09-28 | End: 2021-09-28

## 2021-09-28 RX ORDER — ACETAMINOPHEN 325 MG/1
975 TABLET ORAL EVERY 8 HOURS
Status: DISCONTINUED | OUTPATIENT
Start: 2021-09-28 | End: 2021-09-29 | Stop reason: HOSPADM

## 2021-09-28 RX ORDER — SIMETHICONE 80 MG
80 TABLET,CHEWABLE ORAL 4 TIMES DAILY PRN
Status: DISCONTINUED | OUTPATIENT
Start: 2021-09-28 | End: 2021-09-29 | Stop reason: HOSPADM

## 2021-09-28 RX ORDER — LIDOCAINE HYDROCHLORIDE 20 MG/ML
INJECTION, SOLUTION EPIDURAL; INFILTRATION; INTRACAUDAL; PERINEURAL AS NEEDED
Status: DISCONTINUED | OUTPATIENT
Start: 2021-09-28 | End: 2021-09-28

## 2021-09-28 RX ORDER — BUPIVACAINE HYDROCHLORIDE AND EPINEPHRINE 5; 5 MG/ML; UG/ML
INJECTION, SOLUTION PERINEURAL AS NEEDED
Status: DISCONTINUED | OUTPATIENT
Start: 2021-09-28 | End: 2021-09-28 | Stop reason: HOSPADM

## 2021-09-28 RX ORDER — METOCLOPRAMIDE HYDROCHLORIDE 5 MG/ML
10 INJECTION INTRAMUSCULAR; INTRAVENOUS EVERY 6 HOURS PRN
Status: DISCONTINUED | OUTPATIENT
Start: 2021-09-28 | End: 2021-09-29 | Stop reason: HOSPADM

## 2021-09-28 RX ORDER — MORPHINE SULFATE 4 MG/ML
4 INJECTION, SOLUTION INTRAMUSCULAR; INTRAVENOUS EVERY 4 HOURS PRN
Status: DISCONTINUED | OUTPATIENT
Start: 2021-09-28 | End: 2021-09-29 | Stop reason: HOSPADM

## 2021-09-28 RX ORDER — GABAPENTIN 400 MG/1
400 CAPSULE ORAL 3 TIMES DAILY
Status: DISCONTINUED | OUTPATIENT
Start: 2021-09-28 | End: 2021-09-29 | Stop reason: HOSPADM

## 2021-09-28 RX ORDER — HEPARIN SODIUM 5000 [USP'U]/ML
5000 INJECTION, SOLUTION INTRAVENOUS; SUBCUTANEOUS
Status: COMPLETED | OUTPATIENT
Start: 2021-09-28 | End: 2021-09-28

## 2021-09-28 RX ORDER — ONDANSETRON 2 MG/ML
4 INJECTION INTRAMUSCULAR; INTRAVENOUS ONCE AS NEEDED
Status: COMPLETED | OUTPATIENT
Start: 2021-09-28 | End: 2021-09-28

## 2021-09-28 RX ORDER — ONDANSETRON 2 MG/ML
4 INJECTION INTRAMUSCULAR; INTRAVENOUS EVERY 6 HOURS PRN
Status: DISCONTINUED | OUTPATIENT
Start: 2021-09-28 | End: 2021-09-29 | Stop reason: HOSPADM

## 2021-09-28 RX ORDER — TIZANIDINE 4 MG/1
4 TABLET ORAL 3 TIMES DAILY
Status: DISCONTINUED | OUTPATIENT
Start: 2021-09-28 | End: 2021-09-29 | Stop reason: HOSPADM

## 2021-09-28 RX ORDER — ACETAMINOPHEN 160 MG/5ML
975 SUSPENSION, ORAL (FINAL DOSE FORM) ORAL EVERY 8 HOURS
Status: DISCONTINUED | OUTPATIENT
Start: 2021-09-28 | End: 2021-09-29 | Stop reason: HOSPADM

## 2021-09-28 RX ORDER — OXYCODONE HCL 5 MG/5 ML
10 SOLUTION, ORAL ORAL EVERY 4 HOURS PRN
Status: DISCONTINUED | OUTPATIENT
Start: 2021-09-28 | End: 2021-09-29 | Stop reason: HOSPADM

## 2021-09-28 RX ORDER — NEOSTIGMINE METHYLSULFATE 1 MG/ML
INJECTION INTRAVENOUS AS NEEDED
Status: DISCONTINUED | OUTPATIENT
Start: 2021-09-28 | End: 2021-09-28

## 2021-09-28 RX ORDER — DEXAMETHASONE SODIUM PHOSPHATE 4 MG/ML
INJECTION, SOLUTION INTRA-ARTICULAR; INTRALESIONAL; INTRAMUSCULAR; INTRAVENOUS; SOFT TISSUE AS NEEDED
Status: DISCONTINUED | OUTPATIENT
Start: 2021-09-28 | End: 2021-09-28

## 2021-09-28 RX ORDER — ROCURONIUM BROMIDE 10 MG/ML
INJECTION, SOLUTION INTRAVENOUS AS NEEDED
Status: DISCONTINUED | OUTPATIENT
Start: 2021-09-28 | End: 2021-09-28

## 2021-09-28 RX ORDER — MIDAZOLAM HYDROCHLORIDE 2 MG/2ML
INJECTION, SOLUTION INTRAMUSCULAR; INTRAVENOUS AS NEEDED
Status: DISCONTINUED | OUTPATIENT
Start: 2021-09-28 | End: 2021-09-28

## 2021-09-28 RX ORDER — MAGNESIUM HYDROXIDE 1200 MG/15ML
LIQUID ORAL AS NEEDED
Status: DISCONTINUED | OUTPATIENT
Start: 2021-09-28 | End: 2021-09-28 | Stop reason: HOSPADM

## 2021-09-28 RX ORDER — SODIUM CHLORIDE, SODIUM LACTATE, POTASSIUM CHLORIDE, CALCIUM CHLORIDE 600; 310; 30; 20 MG/100ML; MG/100ML; MG/100ML; MG/100ML
100 INJECTION, SOLUTION INTRAVENOUS CONTINUOUS
Status: DISCONTINUED | OUTPATIENT
Start: 2021-09-28 | End: 2021-09-29 | Stop reason: HOSPADM

## 2021-09-28 RX ADMIN — FENTANYL CITRATE 25 MCG: 50 INJECTION INTRAMUSCULAR; INTRAVENOUS at 11:14

## 2021-09-28 RX ADMIN — DEXAMETHASONE SODIUM PHOSPHATE 4 MG: 4 INJECTION INTRA-ARTICULAR; INTRALESIONAL; INTRAMUSCULAR; INTRAVENOUS; SOFT TISSUE at 07:33

## 2021-09-28 RX ADMIN — SODIUM CHLORIDE: 0.9 INJECTION, SOLUTION INTRAVENOUS at 07:21

## 2021-09-28 RX ADMIN — SODIUM CHLORIDE: 0.9 INJECTION, SOLUTION INTRAVENOUS at 09:23

## 2021-09-28 RX ADMIN — ONDANSETRON 4 MG: 2 INJECTION INTRAMUSCULAR; INTRAVENOUS at 11:13

## 2021-09-28 RX ADMIN — GABAPENTIN 300 MG: 300 CAPSULE ORAL at 06:19

## 2021-09-28 RX ADMIN — LIDOCAINE HYDROCHLORIDE 80 MG: 20 INJECTION, SOLUTION EPIDURAL; INFILTRATION; INTRACAUDAL; PERINEURAL at 07:33

## 2021-09-28 RX ADMIN — HYDROMORPHONE HYDROCHLORIDE 0.5 MG: 1 INJECTION, SOLUTION INTRAMUSCULAR; INTRAVENOUS; SUBCUTANEOUS at 11:43

## 2021-09-28 RX ADMIN — ROCURONIUM BROMIDE 10 MG: 50 INJECTION, SOLUTION INTRAVENOUS at 09:23

## 2021-09-28 RX ADMIN — OXYCODONE HYDROCHLORIDE 10 MG: 5 SOLUTION ORAL at 23:53

## 2021-09-28 RX ADMIN — SODIUM CHLORIDE, SODIUM LACTATE, POTASSIUM CHLORIDE, AND CALCIUM CHLORIDE 100 ML/HR: .6; .31; .03; .02 INJECTION, SOLUTION INTRAVENOUS at 12:21

## 2021-09-28 RX ADMIN — CEFAZOLIN SODIUM 2000 MG: 2 SOLUTION INTRAVENOUS at 14:50

## 2021-09-28 RX ADMIN — CELECOXIB 200 MG: 200 CAPSULE ORAL at 06:19

## 2021-09-28 RX ADMIN — PROPOFOL 200 MG: 10 INJECTION, EMULSION INTRAVENOUS at 07:33

## 2021-09-28 RX ADMIN — ROCURONIUM BROMIDE 10 MG: 50 INJECTION, SOLUTION INTRAVENOUS at 08:10

## 2021-09-28 RX ADMIN — SODIUM CHLORIDE 125 ML/HR: 0.9 INJECTION, SOLUTION INTRAVENOUS at 06:21

## 2021-09-28 RX ADMIN — GLYCOPYRROLATE 0.6 MG: 0.2 INJECTION, SOLUTION INTRAMUSCULAR; INTRAVENOUS at 10:13

## 2021-09-28 RX ADMIN — TIZANIDINE 4 MG: 4 TABLET ORAL at 16:19

## 2021-09-28 RX ADMIN — OXYCODONE HYDROCHLORIDE 10 MG: 5 SOLUTION ORAL at 16:35

## 2021-09-28 RX ADMIN — MIDAZOLAM 2 MG: 1 INJECTION INTRAMUSCULAR; INTRAVENOUS at 07:26

## 2021-09-28 RX ADMIN — ROCURONIUM BROMIDE 10 MG: 50 INJECTION, SOLUTION INTRAVENOUS at 08:53

## 2021-09-28 RX ADMIN — CEFAZOLIN SODIUM 2000 MG: 2 SOLUTION INTRAVENOUS at 07:21

## 2021-09-28 RX ADMIN — FENTANYL CITRATE 50 MCG: 50 INJECTION INTRAMUSCULAR; INTRAVENOUS at 09:17

## 2021-09-28 RX ADMIN — FAMOTIDINE 20 MG: 10 INJECTION INTRAVENOUS at 14:08

## 2021-09-28 RX ADMIN — ACETAMINOPHEN 975 MG: 325 TABLET, FILM COATED ORAL at 06:18

## 2021-09-28 RX ADMIN — METRONIDAZOLE 500 MG: 500 INJECTION, SOLUTION INTRAVENOUS at 16:19

## 2021-09-28 RX ADMIN — CEFAZOLIN SODIUM 2000 MG: 2 SOLUTION INTRAVENOUS at 22:02

## 2021-09-28 RX ADMIN — ROCURONIUM BROMIDE 50 MG: 50 INJECTION, SOLUTION INTRAVENOUS at 07:33

## 2021-09-28 RX ADMIN — HYDROMORPHONE HYDROCHLORIDE 0.5 MG: 1 INJECTION, SOLUTION INTRAMUSCULAR; INTRAVENOUS; SUBCUTANEOUS at 12:01

## 2021-09-28 RX ADMIN — FENTANYL CITRATE 50 MCG: 50 INJECTION INTRAMUSCULAR; INTRAVENOUS at 08:04

## 2021-09-28 RX ADMIN — SCOPALAMINE 1 PATCH: 1 PATCH, EXTENDED RELEASE TRANSDERMAL at 06:20

## 2021-09-28 RX ADMIN — MORPHINE SULFATE 4 MG: 4 INJECTION INTRAVENOUS at 14:08

## 2021-09-28 RX ADMIN — FENTANYL CITRATE 50 MCG: 50 INJECTION INTRAMUSCULAR; INTRAVENOUS at 07:55

## 2021-09-28 RX ADMIN — GABAPENTIN 400 MG: 400 CAPSULE ORAL at 22:01

## 2021-09-28 RX ADMIN — HYDROMORPHONE HYDROCHLORIDE 0.5 MG: 1 INJECTION, SOLUTION INTRAMUSCULAR; INTRAVENOUS; SUBCUTANEOUS at 11:29

## 2021-09-28 RX ADMIN — TIZANIDINE 4 MG: 4 TABLET ORAL at 22:02

## 2021-09-28 RX ADMIN — HYDROMORPHONE HYDROCHLORIDE 0.5 MG: 1 INJECTION, SOLUTION INTRAMUSCULAR; INTRAVENOUS; SUBCUTANEOUS at 08:53

## 2021-09-28 RX ADMIN — METRONIDAZOLE 500 MG: 500 INJECTION, SOLUTION INTRAVENOUS at 23:52

## 2021-09-28 RX ADMIN — ONDANSETRON 4 MG: 2 INJECTION INTRAMUSCULAR; INTRAVENOUS at 07:33

## 2021-09-28 RX ADMIN — GABAPENTIN 400 MG: 400 CAPSULE ORAL at 16:19

## 2021-09-28 RX ADMIN — ROCURONIUM BROMIDE 5 MG: 50 INJECTION, SOLUTION INTRAVENOUS at 10:05

## 2021-09-28 RX ADMIN — SIMETHICONE 80 MG: 80 TABLET, CHEWABLE ORAL at 16:35

## 2021-09-28 RX ADMIN — NEOSTIGMINE METHYLSULFATE 3.5 MG: 1 INJECTION INTRAVENOUS at 10:13

## 2021-09-28 RX ADMIN — FENTANYL CITRATE 50 MCG: 50 INJECTION INTRAMUSCULAR; INTRAVENOUS at 07:33

## 2021-09-28 RX ADMIN — FENTANYL CITRATE 25 MCG: 50 INJECTION INTRAMUSCULAR; INTRAVENOUS at 11:20

## 2021-09-28 RX ADMIN — SODIUM CHLORIDE, SODIUM LACTATE, POTASSIUM CHLORIDE, AND CALCIUM CHLORIDE 100 ML/HR: .6; .31; .03; .02 INJECTION, SOLUTION INTRAVENOUS at 23:25

## 2021-09-28 RX ADMIN — HEPARIN SODIUM 5000 UNITS: 5000 INJECTION INTRAVENOUS; SUBCUTANEOUS at 06:19

## 2021-09-28 RX ADMIN — ACETAMINOPHEN 975 MG: 325 SUSPENSION ORAL at 22:01

## 2021-09-28 RX ADMIN — FENTANYL CITRATE 50 MCG: 50 INJECTION INTRAMUSCULAR; INTRAVENOUS at 08:13

## 2021-09-28 RX ADMIN — METRONIDAZOLE 500 MG: 500 INJECTION, SOLUTION INTRAVENOUS at 07:31

## 2021-09-28 NOTE — ANESTHESIA PREPROCEDURE EVALUATION
Procedure:  LAPAROSCOPIC REVISION CONVERSION  JENN-EN-Y GASTRIC BYPASS WITH ROBOTICS AND INTRAOPERATIVE EGD (N/A Abdomen)    Relevant Problems   CARDIO   (+) Primary thunderclap headache      ENDO   (+) Hypothyroidism      GI/HEPATIC   (+) Gastroesophageal reflux disease without esophagitis      MUSCULOSKELETAL   (+) Arthritis   (+) Chronic bilateral low back pain without sciatica   (+) Disc degeneration, lumbar   (+) Lumbar spondylosis   (+) Osteoarthritis      NEURO/PSYCH   (+) Anxiety   (+) Depression   (+) Primary thunderclap headache      Other   (+) Disturbance of memory   (+) Dyslipidemia   (+) History of sleeve gastrectomy   (+) Obesity, Class I, BMI 30-34 9        Physical Exam    Airway    Mallampati score: II  TM Distance: >3 FB  Neck ROM: full     Dental   No notable dental hx     Cardiovascular  Rhythm: regular, Rate: normal, Cardiovascular exam normal    Pulmonary  Pulmonary exam normal Breath sounds clear to auscultation,     Other Findings        Anesthesia Plan  ASA Score- 2     Anesthesia Type- general with ASA Monitors  Additional Monitors:   Airway Plan: ETT  Comment: SCOP patch ordered by [employer]          Plan Factors-    Chart reviewed  EKG reviewed  Existing labs reviewed  Patient summary reviewed  Patient is not a current smoker  Patient instructed to abstain from smoking on day of procedure  Patient did not smoke on day of surgery  Induction- intravenous  Postoperative Plan- Plan for postoperative opioid use  Planned trial extubation    Informed Consent- Anesthetic plan and risks discussed with patient and daughter

## 2021-09-28 NOTE — ANESTHESIA POSTPROCEDURE EVALUATION
Post-Op Assessment Note    CV Status:  Stable    Pain management: adequate     Mental Status:  Alert and awake   Hydration Status:  Euvolemic   PONV Controlled:  Controlled   Airway Patency:  Patent      Post Op Vitals Reviewed: Yes      Staff: Anesthesiologist         No complications documented      /68 (09/28/21 1221)    Temp      Pulse 87 (09/28/21 1221)   Resp 13 (09/28/21 1221)    SpO2 94 % (09/28/21 1221)

## 2021-09-29 VITALS
OXYGEN SATURATION: 97 % | RESPIRATION RATE: 18 BRPM | SYSTOLIC BLOOD PRESSURE: 103 MMHG | HEART RATE: 60 BPM | HEIGHT: 63 IN | DIASTOLIC BLOOD PRESSURE: 59 MMHG | TEMPERATURE: 97.9 F | WEIGHT: 190.7 LBS | BODY MASS INDEX: 33.79 KG/M2

## 2021-09-29 LAB
ANION GAP SERPL CALCULATED.3IONS-SCNC: 9 MMOL/L (ref 4–13)
BUN SERPL-MCNC: 12 MG/DL (ref 5–25)
CALCIUM SERPL-MCNC: 8.8 MG/DL (ref 8.3–10.1)
CHLORIDE SERPL-SCNC: 107 MMOL/L (ref 100–108)
CO2 SERPL-SCNC: 26 MMOL/L (ref 21–32)
CREAT SERPL-MCNC: 0.74 MG/DL (ref 0.6–1.3)
ERYTHROCYTE [DISTWIDTH] IN BLOOD BY AUTOMATED COUNT: 12.7 % (ref 11.6–15.1)
ERYTHROCYTE [DISTWIDTH] IN BLOOD BY AUTOMATED COUNT: 12.8 % (ref 11.6–15.1)
GFR SERPL CREATININE-BSD FRML MDRD: 86 ML/MIN/1.73SQ M
GLUCOSE SERPL-MCNC: 109 MG/DL (ref 65–140)
HCT VFR BLD AUTO: 33.6 % (ref 34.8–46.1)
HCT VFR BLD AUTO: 38.9 % (ref 34.8–46.1)
HGB BLD-MCNC: 10.7 G/DL (ref 11.5–15.4)
HGB BLD-MCNC: 12.4 G/DL (ref 11.5–15.4)
MCH RBC QN AUTO: 29.1 PG (ref 26.8–34.3)
MCH RBC QN AUTO: 29.3 PG (ref 26.8–34.3)
MCHC RBC AUTO-ENTMCNC: 31.8 G/DL (ref 31.4–37.4)
MCHC RBC AUTO-ENTMCNC: 31.9 G/DL (ref 31.4–37.4)
MCV RBC AUTO: 91 FL (ref 82–98)
MCV RBC AUTO: 92 FL (ref 82–98)
PLATELET # BLD AUTO: 219 THOUSANDS/UL (ref 149–390)
PLATELET # BLD AUTO: 258 THOUSANDS/UL (ref 149–390)
PMV BLD AUTO: 9.6 FL (ref 8.9–12.7)
PMV BLD AUTO: 9.7 FL (ref 8.9–12.7)
POTASSIUM SERPL-SCNC: 4.6 MMOL/L (ref 3.5–5.3)
RBC # BLD AUTO: 3.65 MILLION/UL (ref 3.81–5.12)
RBC # BLD AUTO: 4.26 MILLION/UL (ref 3.81–5.12)
SODIUM SERPL-SCNC: 142 MMOL/L (ref 136–145)
WBC # BLD AUTO: 10.29 THOUSAND/UL (ref 4.31–10.16)
WBC # BLD AUTO: 11.83 THOUSAND/UL (ref 4.31–10.16)

## 2021-09-29 PROCEDURE — 85027 COMPLETE CBC AUTOMATED: CPT | Performed by: STUDENT IN AN ORGANIZED HEALTH CARE EDUCATION/TRAINING PROGRAM

## 2021-09-29 PROCEDURE — 99024 POSTOP FOLLOW-UP VISIT: CPT | Performed by: SURGERY

## 2021-09-29 PROCEDURE — NC001 PR NO CHARGE: Performed by: SURGERY

## 2021-09-29 PROCEDURE — 80048 BASIC METABOLIC PNL TOTAL CA: CPT | Performed by: PHYSICIAN ASSISTANT

## 2021-09-29 PROCEDURE — 85027 COMPLETE CBC AUTOMATED: CPT | Performed by: PHYSICIAN ASSISTANT

## 2021-09-29 RX ORDER — ACETAMINOPHEN 160 MG/5ML
975 SUSPENSION, ORAL (FINAL DOSE FORM) ORAL EVERY 8 HOURS
Qty: 638.4 ML | Refills: 0 | Status: SHIPPED | OUTPATIENT
Start: 2021-09-29 | End: 2021-10-06

## 2021-09-29 RX ADMIN — GABAPENTIN 400 MG: 400 CAPSULE ORAL at 08:28

## 2021-09-29 RX ADMIN — FAMOTIDINE 20 MG: 10 INJECTION INTRAVENOUS at 08:28

## 2021-09-29 RX ADMIN — GABAPENTIN 400 MG: 400 CAPSULE ORAL at 15:10

## 2021-09-29 RX ADMIN — TIZANIDINE 4 MG: 4 TABLET ORAL at 15:10

## 2021-09-29 RX ADMIN — ACETAMINOPHEN 975 MG: 325 TABLET, FILM COATED ORAL at 13:20

## 2021-09-29 RX ADMIN — SODIUM CHLORIDE, SODIUM LACTATE, POTASSIUM CHLORIDE, AND CALCIUM CHLORIDE 500 ML: .6; .31; .03; .02 INJECTION, SOLUTION INTRAVENOUS at 05:33

## 2021-09-29 RX ADMIN — SODIUM CHLORIDE, SODIUM LACTATE, POTASSIUM CHLORIDE, AND CALCIUM CHLORIDE 100 ML/HR: .6; .31; .03; .02 INJECTION, SOLUTION INTRAVENOUS at 06:31

## 2021-09-29 RX ADMIN — OXYCODONE HYDROCHLORIDE 5 MG: 5 SOLUTION ORAL at 15:59

## 2021-09-29 RX ADMIN — TIZANIDINE 4 MG: 4 TABLET ORAL at 08:28

## 2021-09-29 RX ADMIN — ACETAMINOPHEN 975 MG: 325 SUSPENSION ORAL at 05:47

## 2021-09-30 ENCOUNTER — TELEPHONE (OUTPATIENT)
Dept: MEDSURG UNIT | Facility: HOSPITAL | Age: 61
End: 2021-09-30

## 2021-09-30 ENCOUNTER — TRANSITIONAL CARE MANAGEMENT (OUTPATIENT)
Dept: FAMILY MEDICINE CLINIC | Facility: HOSPITAL | Age: 61
End: 2021-09-30

## 2021-10-01 ENCOUNTER — TELEPHONE (OUTPATIENT)
Dept: FAMILY MEDICINE CLINIC | Facility: HOSPITAL | Age: 61
End: 2021-10-01

## 2021-10-06 ENCOUNTER — TELEPHONE (OUTPATIENT)
Dept: BARIATRICS | Facility: CLINIC | Age: 61
End: 2021-10-06

## 2021-10-06 ENCOUNTER — OFFICE VISIT (OUTPATIENT)
Dept: BARIATRICS | Facility: CLINIC | Age: 61
End: 2021-10-06

## 2021-10-06 VITALS
WEIGHT: 180.5 LBS | SYSTOLIC BLOOD PRESSURE: 120 MMHG | HEIGHT: 64 IN | HEART RATE: 77 BPM | TEMPERATURE: 97.3 F | BODY MASS INDEX: 30.81 KG/M2 | DIASTOLIC BLOOD PRESSURE: 74 MMHG

## 2021-10-06 DIAGNOSIS — K21.9 GASTROESOPHAGEAL REFLUX DISEASE, UNSPECIFIED WHETHER ESOPHAGITIS PRESENT: ICD-10-CM

## 2021-10-06 DIAGNOSIS — Z98.84 BARIATRIC SURGERY STATUS: Primary | ICD-10-CM

## 2021-10-06 PROCEDURE — 99024 POSTOP FOLLOW-UP VISIT: CPT | Performed by: SURGERY

## 2021-10-06 RX ORDER — GABAPENTIN 300 MG/1
CAPSULE ORAL
COMMUNITY
Start: 2021-10-02 | End: 2021-10-11 | Stop reason: SDUPTHER

## 2021-10-07 ENCOUNTER — OFFICE VISIT (OUTPATIENT)
Dept: BARIATRICS | Facility: CLINIC | Age: 61
End: 2021-10-07

## 2021-10-07 DIAGNOSIS — Z98.84 BARIATRIC SURGERY STATUS: Primary | ICD-10-CM

## 2021-10-07 DIAGNOSIS — K21.9 GASTROESOPHAGEAL REFLUX DISEASE WITHOUT ESOPHAGITIS: ICD-10-CM

## 2021-10-07 PROCEDURE — RECHECK: Performed by: DIETITIAN, REGISTERED

## 2021-10-11 ENCOUNTER — OFFICE VISIT (OUTPATIENT)
Dept: FAMILY MEDICINE CLINIC | Facility: HOSPITAL | Age: 61
End: 2021-10-11
Payer: COMMERCIAL

## 2021-10-11 VITALS
HEIGHT: 63 IN | SYSTOLIC BLOOD PRESSURE: 138 MMHG | WEIGHT: 179.4 LBS | HEART RATE: 80 BPM | DIASTOLIC BLOOD PRESSURE: 84 MMHG | BODY MASS INDEX: 31.79 KG/M2 | TEMPERATURE: 97.1 F

## 2021-10-11 DIAGNOSIS — E66.9 OBESITY, CLASS I, BMI 30-34.9: ICD-10-CM

## 2021-10-11 DIAGNOSIS — Z09 HOSPITAL DISCHARGE FOLLOW-UP: Primary | ICD-10-CM

## 2021-10-11 DIAGNOSIS — F33.41 RECURRENT MAJOR DEPRESSIVE DISORDER, IN PARTIAL REMISSION (HCC): ICD-10-CM

## 2021-10-11 DIAGNOSIS — Z48.815 ENCOUNTER FOR SURGICAL AFTERCARE FOLLOWING SURGERY OF DIGESTIVE SYSTEM: ICD-10-CM

## 2021-10-11 DIAGNOSIS — M51.36 DISC DEGENERATION, LUMBAR: ICD-10-CM

## 2021-10-11 DIAGNOSIS — Z23 ENCOUNTER FOR IMMUNIZATION: ICD-10-CM

## 2021-10-11 PROCEDURE — 90471 IMMUNIZATION ADMIN: CPT

## 2021-10-11 PROCEDURE — 1111F DSCHRG MED/CURRENT MED MERGE: CPT | Performed by: INTERNAL MEDICINE

## 2021-10-11 PROCEDURE — 90682 RIV4 VACC RECOMBINANT DNA IM: CPT

## 2021-10-11 PROCEDURE — 99495 TRANSJ CARE MGMT MOD F2F 14D: CPT | Performed by: INTERNAL MEDICINE

## 2021-10-11 RX ORDER — GABAPENTIN 400 MG/1
400 CAPSULE ORAL 3 TIMES DAILY
Qty: 90 CAPSULE | Refills: 2 | Status: SHIPPED | OUTPATIENT
Start: 2021-10-11 | End: 2022-01-11 | Stop reason: SDUPTHER

## 2021-10-12 ENCOUNTER — TELEPHONE (OUTPATIENT)
Dept: FAMILY MEDICINE CLINIC | Facility: HOSPITAL | Age: 61
End: 2021-10-12

## 2021-10-12 DIAGNOSIS — M54.16 RADICULOPATHY, LUMBAR REGION: Primary | ICD-10-CM

## 2021-10-13 LAB
25(OH)D3+25(OH)D2 SERPL-MCNC: 76.9 NG/ML (ref 30–100)
ALBUMIN SERPL-MCNC: 4.7 G/DL (ref 3.8–4.8)
ALBUMIN/GLOB SERPL: 1.6 {RATIO} (ref 1.2–2.2)
ALP SERPL-CCNC: 71 IU/L (ref 44–121)
ALT SERPL-CCNC: 25 IU/L (ref 0–32)
AST SERPL-CCNC: 19 IU/L (ref 0–40)
BILIRUB SERPL-MCNC: 0.7 MG/DL (ref 0–1.2)
BUN SERPL-MCNC: 18 MG/DL (ref 8–27)
BUN/CREAT SERPL: 24 (ref 12–28)
CALCIUM SERPL-MCNC: 10.4 MG/DL (ref 8.7–10.3)
CHLORIDE SERPL-SCNC: 99 MMOL/L (ref 96–106)
CHOLEST SERPL-MCNC: 196 MG/DL (ref 100–199)
CO2 SERPL-SCNC: 24 MMOL/L (ref 20–29)
CREAT SERPL-MCNC: 0.75 MG/DL (ref 0.57–1)
EST. AVERAGE GLUCOSE BLD GHB EST-MCNC: 111 MG/DL
GLOBULIN SER-MCNC: 2.9 G/DL (ref 1.5–4.5)
GLUCOSE SERPL-MCNC: 98 MG/DL (ref 65–99)
HBA1C MFR BLD: 5.5 % (ref 4.8–5.6)
HDLC SERPL-MCNC: 45 MG/DL
LDLC SERPL CALC-MCNC: 129 MG/DL (ref 0–99)
LDLC/HDLC SERPL: 2.9 RATIO (ref 0–3.2)
POTASSIUM SERPL-SCNC: 4.1 MMOL/L (ref 3.5–5.2)
PROT SERPL-MCNC: 7.6 G/DL (ref 6–8.5)
PTH-INTACT SERPL-MCNC: 28 PG/ML (ref 15–65)
SL AMB EGFR AFRICAN AMERICAN: 98 ML/MIN/1.73
SL AMB EGFR NON AFRICAN AMERICAN: 85 ML/MIN/1.73
SL AMB VLDL CHOLESTEROL CALC: 22 MG/DL (ref 5–40)
SODIUM SERPL-SCNC: 142 MMOL/L (ref 134–144)
T4 FREE SERPL-MCNC: 1.37 NG/DL (ref 0.82–1.77)
TRIGL SERPL-MCNC: 120 MG/DL (ref 0–149)
TSH SERPL DL<=0.005 MIU/L-ACNC: 0.71 UIU/ML (ref 0.45–4.5)

## 2021-10-18 ENCOUNTER — TELEPHONE (OUTPATIENT)
Dept: BARIATRICS | Facility: CLINIC | Age: 61
End: 2021-10-18

## 2021-10-20 ENCOUNTER — OFFICE VISIT (OUTPATIENT)
Dept: ENDOCRINOLOGY | Facility: HOSPITAL | Age: 61
End: 2021-10-20
Payer: COMMERCIAL

## 2021-10-20 VITALS
HEART RATE: 68 BPM | DIASTOLIC BLOOD PRESSURE: 70 MMHG | HEIGHT: 63 IN | BODY MASS INDEX: 31.4 KG/M2 | WEIGHT: 177.2 LBS | SYSTOLIC BLOOD PRESSURE: 112 MMHG

## 2021-10-20 DIAGNOSIS — E78.5 HYPERLIPIDEMIA, UNSPECIFIED HYPERLIPIDEMIA TYPE: ICD-10-CM

## 2021-10-20 DIAGNOSIS — E03.9 HYPOTHYROIDISM, UNSPECIFIED TYPE: Primary | ICD-10-CM

## 2021-10-20 DIAGNOSIS — E55.9 VITAMIN D DEFICIENCY: ICD-10-CM

## 2021-10-20 PROCEDURE — 1111F DSCHRG MED/CURRENT MED MERGE: CPT | Performed by: INTERNAL MEDICINE

## 2021-10-20 PROCEDURE — 3008F BODY MASS INDEX DOCD: CPT | Performed by: INTERNAL MEDICINE

## 2021-10-20 PROCEDURE — 99214 OFFICE O/P EST MOD 30 MIN: CPT | Performed by: INTERNAL MEDICINE

## 2021-10-20 PROCEDURE — 1036F TOBACCO NON-USER: CPT | Performed by: INTERNAL MEDICINE

## 2021-10-20 RX ORDER — LEVOTHYROXINE SODIUM 150 UG/1
TABLET ORAL
Qty: 30 TABLET | Refills: 11 | Status: SHIPPED | OUTPATIENT
Start: 2021-10-20 | End: 2022-04-29 | Stop reason: SDUPTHER

## 2021-10-25 ENCOUNTER — TELEPHONE (OUTPATIENT)
Dept: BARIATRICS | Facility: CLINIC | Age: 61
End: 2021-10-25

## 2021-11-07 DIAGNOSIS — M54.16 RADICULOPATHY, LUMBAR REGION: ICD-10-CM

## 2021-11-07 DIAGNOSIS — M54.50 CHRONIC BILATERAL LOW BACK PAIN WITHOUT SCIATICA: ICD-10-CM

## 2021-11-07 DIAGNOSIS — G89.29 CHRONIC BILATERAL LOW BACK PAIN WITHOUT SCIATICA: ICD-10-CM

## 2021-11-08 RX ORDER — TIZANIDINE 4 MG/1
TABLET ORAL
Qty: 30 TABLET | Refills: 0 | Status: SHIPPED | OUTPATIENT
Start: 2021-11-08 | End: 2021-11-29

## 2021-11-09 ENCOUNTER — CLINICAL SUPPORT (OUTPATIENT)
Dept: BARIATRICS | Facility: CLINIC | Age: 61
End: 2021-11-09

## 2021-11-09 DIAGNOSIS — Z98.84 BARIATRIC SURGERY STATUS: Primary | ICD-10-CM

## 2021-11-09 PROCEDURE — RECHECK: Performed by: DIETITIAN, REGISTERED

## 2021-11-12 ENCOUNTER — TELEPHONE (OUTPATIENT)
Dept: BARIATRICS | Facility: CLINIC | Age: 61
End: 2021-11-12

## 2021-11-19 ENCOUNTER — TELEPHONE (OUTPATIENT)
Dept: PAIN MEDICINE | Facility: CLINIC | Age: 61
End: 2021-11-19

## 2021-11-27 DIAGNOSIS — M54.50 CHRONIC BILATERAL LOW BACK PAIN WITHOUT SCIATICA: ICD-10-CM

## 2021-11-27 DIAGNOSIS — M54.16 RADICULOPATHY, LUMBAR REGION: ICD-10-CM

## 2021-11-27 DIAGNOSIS — G89.29 CHRONIC BILATERAL LOW BACK PAIN WITHOUT SCIATICA: ICD-10-CM

## 2021-11-29 RX ORDER — TIZANIDINE 4 MG/1
TABLET ORAL
Qty: 30 TABLET | Refills: 0 | Status: SHIPPED | OUTPATIENT
Start: 2021-11-29 | End: 2022-01-11

## 2021-11-29 RX ORDER — CYCLOBENZAPRINE HCL 10 MG
10 TABLET ORAL 3 TIMES DAILY PRN
Qty: 90 TABLET | Refills: 0 | OUTPATIENT
Start: 2021-11-29

## 2021-12-27 ENCOUNTER — OFFICE VISIT (OUTPATIENT)
Dept: BARIATRICS | Facility: CLINIC | Age: 61
End: 2021-12-27
Payer: COMMERCIAL

## 2021-12-27 ENCOUNTER — TELEPHONE (OUTPATIENT)
Dept: FAMILY MEDICINE CLINIC | Facility: HOSPITAL | Age: 61
End: 2021-12-27

## 2021-12-27 VITALS
SYSTOLIC BLOOD PRESSURE: 110 MMHG | BODY MASS INDEX: 27.66 KG/M2 | HEIGHT: 64 IN | TEMPERATURE: 98.5 F | HEART RATE: 68 BPM | WEIGHT: 162 LBS | DIASTOLIC BLOOD PRESSURE: 73 MMHG

## 2021-12-27 DIAGNOSIS — Z48.815 ENCOUNTER FOR SURGICAL AFTERCARE FOLLOWING SURGERY OF DIGESTIVE SYSTEM: Primary | ICD-10-CM

## 2021-12-27 DIAGNOSIS — E03.8 HYPOTHYROIDISM DUE TO HASHIMOTO'S THYROIDITIS: ICD-10-CM

## 2021-12-27 DIAGNOSIS — K21.9 GASTROESOPHAGEAL REFLUX DISEASE, UNSPECIFIED WHETHER ESOPHAGITIS PRESENT: ICD-10-CM

## 2021-12-27 DIAGNOSIS — M19.90 OSTEOARTHRITIS: ICD-10-CM

## 2021-12-27 DIAGNOSIS — Z98.84 BARIATRIC SURGERY STATUS: ICD-10-CM

## 2021-12-27 DIAGNOSIS — E06.3 HYPOTHYROIDISM DUE TO HASHIMOTO'S THYROIDITIS: ICD-10-CM

## 2021-12-27 DIAGNOSIS — K91.2 POSTSURGICAL MALABSORPTION: ICD-10-CM

## 2021-12-27 PROCEDURE — 99214 OFFICE O/P EST MOD 30 MIN: CPT | Performed by: NURSE PRACTITIONER

## 2021-12-27 PROCEDURE — 3008F BODY MASS INDEX DOCD: CPT | Performed by: FAMILY MEDICINE

## 2021-12-28 ENCOUNTER — TELEMEDICINE (OUTPATIENT)
Dept: FAMILY MEDICINE CLINIC | Facility: CLINIC | Age: 61
End: 2021-12-28
Payer: COMMERCIAL

## 2021-12-28 DIAGNOSIS — J06.9 VIRAL URI WITH COUGH: Primary | ICD-10-CM

## 2021-12-28 PROCEDURE — 87636 SARSCOV2 & INF A&B AMP PRB: CPT | Performed by: FAMILY MEDICINE

## 2021-12-28 PROCEDURE — 1036F TOBACCO NON-USER: CPT | Performed by: FAMILY MEDICINE

## 2021-12-28 PROCEDURE — 99213 OFFICE O/P EST LOW 20 MIN: CPT | Performed by: FAMILY MEDICINE

## 2021-12-29 DIAGNOSIS — F41.9 ANXIETY: ICD-10-CM

## 2021-12-29 DIAGNOSIS — F33.41 RECURRENT MAJOR DEPRESSIVE DISORDER, IN PARTIAL REMISSION (HCC): ICD-10-CM

## 2021-12-29 RX ORDER — VENLAFAXINE 100 MG/1
TABLET ORAL
Qty: 60 TABLET | Refills: 2 | Status: SHIPPED | OUTPATIENT
Start: 2021-12-29 | End: 2022-04-01

## 2021-12-30 DIAGNOSIS — Z13.820 SCREENING FOR OSTEOPOROSIS: Primary | ICD-10-CM

## 2022-01-03 ENCOUNTER — TELEPHONE (OUTPATIENT)
Dept: FAMILY MEDICINE CLINIC | Facility: HOSPITAL | Age: 62
End: 2022-01-03

## 2022-01-03 NOTE — TELEPHONE ENCOUNTER
Patient tested covid+ - test was 12/27, but just got result today  Was told to call pcp for further instruction  Onset of symptoms was 12/27  Still has headache, which has improved, and upper respiratory issues        pls call back w/ quarantine instructions

## 2022-01-04 ENCOUNTER — TELEMEDICINE (OUTPATIENT)
Dept: FAMILY MEDICINE CLINIC | Facility: HOSPITAL | Age: 62
End: 2022-01-04
Payer: COMMERCIAL

## 2022-01-04 VITALS — BODY MASS INDEX: 27.66 KG/M2 | HEIGHT: 64 IN | WEIGHT: 162 LBS

## 2022-01-04 DIAGNOSIS — U07.1 COVID-19: Primary | ICD-10-CM

## 2022-01-04 PROCEDURE — 99213 OFFICE O/P EST LOW 20 MIN: CPT | Performed by: INTERNAL MEDICINE

## 2022-01-04 NOTE — PROGRESS NOTES
COVID-19 Outpatient Progress Note    Assessment/Plan:    Problem List Items Addressed This Visit     None         Disposition:     Risks and benefits of COVID-19 vaccination was discussed with patient  Patient has COVID-19 infection  Based off CDC guidelines, they were recommended to isolate for 5 days from the date of the positive test  If they remain asymptomatic, isolation may be ended followed by 5 days of wearing a mask when around othes to minimize risk of infecting others  If they have a fever, continue to stay home until fever resolves for at least 24 hours  I have spent 10 minutes directly with the patient  Greater than 50% of this time was spent in counseling/coordination of care regarding: instructions for management, patient and family education and impressions  Outside window for MAB infusion, urged to con't monitoring symptoms and call with any fever or new/worse symptoms, vaccine encouraged     Encounter provider Yamila Tavares DO    Provider located at [address] [place] [address] [phone] [facility] [id number]    Recent Visits  Date Type Provider Dept   01/03/22 Telephone [facility] [place] [facility]   Showing recent visits within past 7 days and meeting all other requirements  Today's Visits  Date Type Provider Dept   01/04/22 Telemedicine Yamila Tavares DO [facility] [place] [facility]   Showing today's visits and meeting all other requirements  Future Appointments  No visits were found meeting these conditions  Showing future appointments within next 150 days and meeting all other requirements     This virtual check-in was done via Not able to connect via [employer] x 3 so used Google Duo and patient was informed that this is not a secure, HIPAA-compliant platform  She agrees to proceed      Patient agrees to participate in a virtual check in via telephone or video visit instead of presenting to the office to address urgent/immediate medical needs  Patient is aware this is a billable service  After connecting through [facility], the patient was identified by name and date of birth  Kwaku Kay was informed that this was a telemedicine visit and that the exam was being conducted confidentially over secure lines  My office door was closed  No one else was in the room  Kwaku Kay acknowledged consent and understanding of privacy and security of the telemedicine visit  I informed the patient that I have reviewed her record in Epic and presented the opportunity for her to ask any questions regarding the visit today  The patient agreed to participate  Verification of patient location:  Patient is located in the following state in which I hold an active license: [place]    Subjective:   Kwaku Kay is a 59 y o  female who has been screened for COVID-19  Symptom change since last report: improving  Patient's symptoms include rhinorrhea, anosmia, loss of taste, cough and headache  Patient denies fever, chills, fatigue, congestion, sore throat, shortness of breath, chest tightness, abdominal pain, nausea, vomiting, diarrhea and myalgias  Date of symptom onset: 12/27/2021  Date of positive COVID-19 PCR: 12/28/2021  COVID-19 vaccination status: Not vaccinated    Harriet Gilliam has been staying home and has isolated themselves in her home  She is taking care to not share personal items and     Cleaning all surfaces that are touched often?: is not      Wearing a mask when leaving room?: is not      Pt with beginning of scratch throat/cough and runny on 12/27/21  She woke up 12/28/21 and noted fever, body aches and muscle pain  She was tested 12/28/21 and came back positive  She notes feeling better with no recent fever and resolution of joint pains but con't to note HA and some runny nose  Yesterday she noted decrease in smell and loss of taste        Lab Results   Component Value Date    SARSCOV2 Positive (A) 12/28/2021 Past Medical History:   Diagnosis Date    Anxiety     Arthritis     Bariatric surgery status     Chronic pain disorder     Depression     last assessed: 2018     Disturbance of memory     last assessed: 2016     Endometriosis     GERD (gastroesophageal reflux disease)     Hiatal hernia     Hirsutism     History of sleeve gastrectomy     Lumbar herniated disc     Menometrorrhagia     Morbid obesity ([address] )     last assessed: Dec 5, 2014     Motion sickness     Postgastrectomy malabsorption     Sclerosing adenosis of breast     Symptomatic menopausal or female climacteric states     Uterine leiomyoma      Past Surgical History:   Procedure Laterality Date    APPENDECTOMY      BREAST BIOPSY Left 2010    COLONOSCOPY      complete - 7 year repeat recommended - Resolved:      DILATION AND CURETTAGE OF UTERUS      GASTRECTOMY SLEEVE LAPAROSCOPIC  2018    GYNECOLOGIC CRYOSURGERY      Cervix - 's for abnormal paps     HYSTERECTOMY      INDUCED       x3    LAPAROSCOPIC SALPINGOOPHERECTOMY Right     LAPAROSCOPIC TOTAL HYSTERECTOMY      06, Laparoscopic hysterectomy with LSO with vaginal closure of the vaginal cuff     NC REVISE,GASTROJEJUN ANAST,W/O VAGOTOMY N/A 2021    Procedure: LAPAROSCOPIC REVISION CONVERSION  JENN-EN-Y GASTRIC BYPASS WITH ROBOTICS AND INTRAOPERATIVE EGD, PARAESOPHAGEAL HERNIA REPAIR;  Surgeon: Victorino Verduzco MD;  Location: [facility] OR;  Service: Bariatrics    SALPINGOOPHORECTOMY Left     STOMACH SURGERY  2014    for morbid obesity - Managed by: Sanjeev Vidal, [facility]      TOOTH EXTRACTION       Current Outpatient Medications   Medication Sig Dispense Refill    Calcium 250 MG CAPS Take 500 mg by mouth daily      Cholecalciferol (VITAMIN D3) 2000 units TABS Take 1 tablet by mouth daily      Cyanocobalamin (VITAMIN B-12) 1000 MCG SUBL Place 1 Dose under the tongue daily      gabapentin (NEURONTIN) 400 mg capsule Take 1 capsule (400 mg total) by mouth 3 (three) times a day 90 capsule 2    hydrOXYzine HCL (ATARAX) 25 mg tablet Take 1 tablet (25 mg total) by mouth every 6 (six) hours as needed for anxiety 30 tablet 0    IRON-VITAMIN C PO Take 1 tablet by mouth daily      Magnesium 200 MG TABS Take 1 tablet by mouth daily        Multiple Vitamins-Minerals (MULTIPLE VITAMINS/WOMENS PO) Take by mouth daily      omeprazole (PriLOSEC) 20 mg delayed release capsule Take 1 capsule (20 mg total) by mouth daily 30 capsule 3    Synthroid 150 MCG tablet TAKE 1 TABLET BY MOUTH DAILY  BRAND NECESSARY  30 tablet 11    tiZANidine (ZANAFLEX) 4 mg tablet TAKE 1 TABLET BY MOUTH 3 TIMES A DAY AS NEEDED FOR PAIN OR MUSCLE SPASMS 30 tablet 0    traMADol-acetaminophen (ULTRACET) 37 5-325 mg per tablet TAKE 1 TABLET BY MOUTH EVERY 6 HOURS AS NEEDED FOR PAIN (MODERATE PAIN) 60 tablet 0    venlafaxine (EFFEXOR) 100 MG tablet TAKE 1 TABLET BY MOUTH TWICE A DAY 60 tablet 2     No current facility-administered medications for this visit  No Known Allergies    Review of Systems   Constitutional: Negative for chills, fatigue and fever  HENT: Positive for rhinorrhea  Negative for congestion and sore throat  Respiratory: Positive for cough  Negative for chest tightness and shortness of breath  Gastrointestinal: Negative for abdominal pain, diarrhea, nausea and vomiting  Musculoskeletal: Negative for myalgias  Neurological: Positive for headaches  Objective:    Vitals:    01/04/22 0914   Weight: 73 5 kg (162 lb)   Height: 5' 3 5" (1 613 m)       Physical Exam  Vitals and nursing note reviewed  Constitutional:       General: She is not in acute distress  Appearance: Normal appearance  She is well-developed  She is not ill-appearing  HENT:      Head: Normocephalic and atraumatic  Eyes:      General:         Right eye: No discharge  Left eye: No discharge  Conjunctiva/sclera: Conjunctivae normal    Neck:      Thyroid: No thyromegaly  Trachea: No tracheal deviation  Pulmonary:      Effort: Pulmonary effort is normal  No respiratory distress  Skin:     Coloration: Skin is not pale  Findings: No rash  Neurological:      Mental Status: She is alert  Motor: No abnormal muscle tone  Psychiatric:         Mood and Affect: Mood normal          Behavior: Behavior normal          Thought Content: Thought content normal          Judgment: Judgment normal          VIRTUAL VISIT DISCLAIMER    Lia Langston verbally agrees to participate in [employer]  Pt is aware that [employer] could be limited without vital signs or the ability to perform a full hands-on physical Essence Alvarezy understands she or the provider may request at any time to terminate the video visit and request the patient to seek care or treatment in person

## 2022-01-11 ENCOUNTER — OFFICE VISIT (OUTPATIENT)
Dept: FAMILY MEDICINE CLINIC | Facility: HOSPITAL | Age: 62
End: 2022-01-11
Payer: COMMERCIAL

## 2022-01-11 VITALS
TEMPERATURE: 95.8 F | BODY MASS INDEX: 26.98 KG/M2 | WEIGHT: 158 LBS | SYSTOLIC BLOOD PRESSURE: 122 MMHG | HEART RATE: 68 BPM | DIASTOLIC BLOOD PRESSURE: 74 MMHG | HEIGHT: 64 IN

## 2022-01-11 DIAGNOSIS — M54.50 CHRONIC BILATERAL LOW BACK PAIN WITHOUT SCIATICA: ICD-10-CM

## 2022-01-11 DIAGNOSIS — Z12.31 ENCOUNTER FOR SCREENING MAMMOGRAM FOR MALIGNANT NEOPLASM OF BREAST: ICD-10-CM

## 2022-01-11 DIAGNOSIS — F41.9 ANXIETY: ICD-10-CM

## 2022-01-11 DIAGNOSIS — G89.29 CHRONIC BILATERAL LOW BACK PAIN WITHOUT SCIATICA: ICD-10-CM

## 2022-01-11 DIAGNOSIS — F33.41 RECURRENT MAJOR DEPRESSIVE DISORDER, IN PARTIAL REMISSION (HCC): ICD-10-CM

## 2022-01-11 DIAGNOSIS — Z00.00 ANNUAL PHYSICAL EXAM: Primary | ICD-10-CM

## 2022-01-11 DIAGNOSIS — F11.20 CONTINUOUS OPIOID DEPENDENCE (HCC): ICD-10-CM

## 2022-01-11 DIAGNOSIS — M51.36 DISC DEGENERATION, LUMBAR: ICD-10-CM

## 2022-01-11 PROCEDURE — 1036F TOBACCO NON-USER: CPT | Performed by: INTERNAL MEDICINE

## 2022-01-11 PROCEDURE — 99396 PREV VISIT EST AGE 40-64: CPT | Performed by: INTERNAL MEDICINE

## 2022-01-11 PROCEDURE — 3008F BODY MASS INDEX DOCD: CPT | Performed by: INTERNAL MEDICINE

## 2022-01-11 PROCEDURE — 3725F SCREEN DEPRESSION PERFORMED: CPT | Performed by: INTERNAL MEDICINE

## 2022-01-11 RX ORDER — GABAPENTIN 400 MG/1
400 CAPSULE ORAL 3 TIMES DAILY
Qty: 90 CAPSULE | Refills: 2 | Status: SHIPPED | OUTPATIENT
Start: 2022-01-11 | End: 2022-04-22 | Stop reason: SDUPTHER

## 2022-01-11 RX ORDER — TIZANIDINE 4 MG/1
TABLET ORAL
Qty: 60 TABLET | Refills: 2 | Status: SHIPPED | OUTPATIENT
Start: 2022-01-11 | End: 2022-07-22 | Stop reason: SDUPTHER

## 2022-01-11 NOTE — PROGRESS NOTES
[facility] PRIMARY CARE [address]     NAME: Tanika Pablo  AGE: 59 y o  SEX: female  : 1960     DATE: 2022     Assessment and Plan:     Problem List Items Addressed This Visit        Musculoskeletal and Integument    Disc degeneration, lumbar    Relevant Medications    gabapentin (NEURONTIN) 400 mg capsule       Other    Anxiety    Recurrent major depressive disorder, in partial remission (HCC)    Chronic bilateral low back pain without sciatica    Relevant Medications    tiZANidine (ZANAFLEX) 4 mg tablet    Continuous opioid dependence ([facility] [address] )      Other Visit Diagnoses     Annual physical exam    -  Primary    Encounter for screening mammogram for malignant neoplasm of breast        Relevant Orders    Mammo screening bilateral w 3d & cad          Immunizations and preventive care screenings were discussed with patient today  Appropriate education was printed on patient's after visit summary  Counseling:  Alcohol/drug use: discussed moderation in alcohol intake, the recommendations for healthy alcohol use, and avoidance of illicit drug use  Dental Health: discussed importance of regular tooth brushing, flossing, and dental visits  · Exercise: the importance of regular exercise/physical activity was discussed  Recommend exercise 3-5 times per week for at least 30 minutes  · Colon cancer screening: Colonoscopy 3/20 - 5 yrs  · Breast cancer screening: Mammo 3/21  · Cervical cancer screening: s/p hysterectomy  · Osteoporosis screening: Dexa 3/21 - nml         No follow-ups on file  Chief Complaint:     Chief Complaint   Patient presents with    Follow-up      History of Present Illness:     Adult Annual Physical   Patient here for a comprehensive physical exam  The patient reports no problems  Had Claudiavandana in Dec  She did well w/o hospitalization  She notes no residual cough/SOB   She notes slight return of taste/smell but it is not back to normal       Diet and Physical Activity  · Diet/Nutrition: currently following bariatric diet with 1/2 cup of food and protein shakes  · Exercise: no formal exercise  · Pt states she is down a total of 34 lbs from surgery     Depression Screening  PHQ-2/9 Depression Screening    Little interest or pleasure in doing things: 0 - not at all  Feeling down, depressed, or hopeless: 1 - several days  Trouble falling or staying asleep, or sleeping too much: 3 - nearly every day  Feeling tired or having little energy: 3 - nearly every day  Poor appetite or overeating: 3 - nearly every day  Feeling bad about yourself - or that you are a failure or have let yourself or your family down: 0 - not at all  Trouble concentrating on things, such as reading the newspaper or watching television: 0 - not at all  Moving or speaking so slowly that other people could have noticed  Or the opposite - being so fidgety or restless that you have been moving around a lot more than usual: 0 - not at all  Thoughts that you would be better off dead, or of hurting yourself in some way: 0 - not at all  PHQ-9 Score: 10   PHQ-9 Interpretation: Moderate depression        General Health  · Sleep: sleeps poorly and only sleeps well if she uses a muscle relaxor and Tramadol for her back pain and then sleeps well  · Hearing: normal - bilateral   · Vision: vision problems: notes con't issues with seeing small print, goes for regular eye exams, most recent eye exam <1 year ago and wears glasses  · Dental: regular dental visits, brushes teeth twice daily and flosses more then once a day  /GYN Health  · Patient is: postmenopausal  · Last menstrual period: postmenopausal  · Contraceptive method: hysterctomy  · Mammo 3/21   · PAP s/p hysterectomy     Review of Systems:     Review of Systems   Constitutional: Negative for chills, fever and unexpected weight change  HENT: Negative for congestion, hearing loss and trouble swallowing  Eyes: Negative for pain and visual disturbance  Respiratory: Negative for cough, shortness of breath and wheezing  Cardiovascular: Negative for chest pain, palpitations and leg swelling  Gastrointestinal: Negative for abdominal pain, constipation, diarrhea, nausea and vomiting  Genitourinary: Negative for difficulty urinating and dysuria  Musculoskeletal: Negative for back pain and neck pain  Skin: Negative for rash and wound  Neurological: Negative for dizziness, light-headedness and headaches  Hematological: Does not bruise/bleed easily  Psychiatric/Behavioral: Negative for behavioral problems, confusion and sleep disturbance        Past Medical History:     Past Medical History:   Diagnosis Date    Anxiety     Arthritis     Bariatric surgery status     Chronic pain disorder     Depression     last assessed: 2018     Disturbance of memory     last assessed: 2016     Endometriosis     GERD (gastroesophageal reflux disease)     Hiatal hernia     Hirsutism     History of sleeve gastrectomy     Lumbar herniated disc     Menometrorrhagia     Morbid obesity ([facility] [address] )     last assessed: Dec 5, 2014     Motion sickness     Postgastrectomy malabsorption     Sclerosing adenosis of breast     Symptomatic menopausal or female climacteric states     Uterine leiomyoma       Past Surgical History:     Past Surgical History:   Procedure Laterality Date    APPENDECTOMY      BREAST BIOPSY Left 2010    COLONOSCOPY      complete - 7 year repeat recommended - Resolved: 2007     DILATION AND CURETTAGE OF UTERUS      GASTRECTOMY SLEEVE LAPAROSCOPIC  2018    GYNECOLOGIC CRYOSURGERY      Cervix - 's for abnormal paps     HYSTERECTOMY      INDUCED       x3    LAPAROSCOPIC SALPINGOOPHERECTOMY Right     LAPAROSCOPIC TOTAL HYSTERECTOMY      06, Laparoscopic hysterectomy with LSO with vaginal closure of the vaginal cuff     MD REVISE,GASTROJEJUN ANAST,W/O VAGOTOMY N/A 2021    Procedure: LAPAROSCOPIC REVISION CONVERSION  JENN-EN-Y GASTRIC BYPASS WITH ROBOTICS AND INTRAOPERATIVE EGD, PARAESOPHAGEAL HERNIA REPAIR;  Surgeon: Will Bhandari MD;  Location: [facility];  Service: Bariatrics    SALPINGOOPHORECTOMY Left     STOMACH SURGERY  2014    for morbid obesity - Managed by: Catarino Pack, Meghan W Saundra Paul ADENOIDECTOMY      TOOTH EXTRACTION        Social History:     Social History     Socioeconomic History    Marital status: /Civil Union     Spouse name: None    Number of children: 1    Years of education: None    Highest education level: None   Occupational History    None   Tobacco Use    Smoking status: Former Smoker     Quit date:      Years since quittin 0    Smokeless tobacco: Never Used   Vaping Use    Vaping Use: Never used   Substance and Sexual Activity    Alcohol use: Yes     Comment: occasional    Drug use: No    Sexual activity: None   Other Topics Concern    None   Social History Narrative    Always uses seat belt     Caffeine use     [employer]      Social Determinants of Health     Financial Resource Strain: Not on file   Food Insecurity: Not on file   Transportation Needs: Not on file   Physical Activity: Not on file   Stress: Not on file   Social Connections: Not on file   Intimate Partner Violence: Not on file   Housing Stability: Not on file      Family History:     Family History   Problem Relation Age of Onset    Hypertension Mother     Alzheimer's disease Mother     Stroke Mother     Dementia Mother     Hyperlipidemia Father     Diabetes Father         mellitus     Prostate cancer Father     Rectal cancer Father     Lung cancer Father     Liver cancer Father     Heart attack Father     Colon cancer Father     Hypertension Sister     Colon polyps Sister     Hyperlipidemia Brother     Breast cancer Maternal Grandmother       Current Medications: Current Outpatient Medications   Medication Sig Dispense Refill    Calcium 250 MG CAPS Take 500 mg by mouth daily      Cholecalciferol (VITAMIN D3) 2000 units TABS Take 1 tablet by mouth daily      Cyanocobalamin (VITAMIN B-12) 1000 MCG SUBL Place 1 Dose under the tongue daily      gabapentin (NEURONTIN) 400 mg capsule Take 1 capsule (400 mg total) by mouth 3 (three) times a day 90 capsule 2    hydrOXYzine HCL (ATARAX) 25 mg tablet Take 1 tablet (25 mg total) by mouth every 6 (six) hours as needed for anxiety 30 tablet 0    IRON-VITAMIN C PO Take 1 tablet by mouth daily      Magnesium 200 MG TABS Take 1 tablet by mouth daily        Multiple Vitamins-Minerals (MULTIPLE VITAMINS/WOMENS PO) Take by mouth daily      omeprazole (PriLOSEC) 20 mg delayed release capsule Take 1 capsule (20 mg total) by mouth daily 30 capsule 3    Synthroid 150 MCG tablet TAKE 1 TABLET BY MOUTH DAILY  BRAND NECESSARY  30 tablet 11    tiZANidine (ZANAFLEX) 4 mg tablet 1 tab PO q 8 hrs prn muscle pain or spasm 60 tablet 2    traMADol-acetaminophen (ULTRACET) 37 5-325 mg per tablet TAKE 1 TABLET BY MOUTH EVERY 6 HOURS AS NEEDED FOR PAIN (MODERATE PAIN) 60 tablet 0    venlafaxine (EFFEXOR) 100 MG tablet TAKE 1 TABLET BY MOUTH TWICE A DAY 60 tablet 2     No current facility-administered medications for this visit  Allergies:     No Known Allergies   Physical Exam:     /74   Pulse 68   Temp (!) 95 8 °F (35 4 °C) (Tympanic)   Ht 5' 3 5" (1 613 m)   Wt 71 7 kg (158 lb)   BMI 27 55 kg/m²     Physical Exam  Vitals and nursing note reviewed  Constitutional:       General: She is not in acute distress  Appearance: She is well-developed  She is not ill-appearing  HENT:      Head: Normocephalic and atraumatic  Right Ear: Tympanic membrane and external ear normal       Left Ear: Tympanic membrane and external ear normal    Eyes:      General:         Right eye: No discharge  Left eye: No discharge  Conjunctiva/sclera: Conjunctivae normal    Neck:      Thyroid: No thyromegaly  Trachea: No tracheal deviation  Cardiovascular:      Rate and Rhythm: Normal rate and regular rhythm  Heart sounds: Normal heart sounds  No murmur heard  Pulmonary:      Effort: Pulmonary effort is normal  No respiratory distress  Breath sounds: Normal breath sounds  No wheezing, rhonchi or rales  Abdominal:      General: There is no distension  Palpations: Abdomen is soft  Tenderness: There is no abdominal tenderness  There is no guarding or rebound  Musculoskeletal:         General: No deformity or signs of injury  Cervical back: Neck supple  Lymphadenopathy:      Cervical: No cervical adenopathy  Skin:     General: Skin is warm and dry  Coloration: Skin is not pale  Findings: No rash  Neurological:      General: No focal deficit present  Mental Status: She is alert  Mental status is at baseline  Motor: No abnormal muscle tone  Gait: Gait normal    Psychiatric:         Mood and Affect: Mood normal          Behavior: Behavior normal          Thought Content:  Thought content normal          Judgment: Judgment normal           Arturo Hester DO  [id number]

## 2022-01-11 NOTE — PATIENT INSTRUCTIONS

## 2022-02-02 DIAGNOSIS — M54.16 RADICULOPATHY, LUMBAR REGION: ICD-10-CM

## 2022-03-28 ENCOUNTER — OFFICE VISIT (OUTPATIENT)
Dept: BARIATRICS | Facility: CLINIC | Age: 62
End: 2022-03-28
Payer: COMMERCIAL

## 2022-03-28 VITALS
HEART RATE: 62 BPM | WEIGHT: 151 LBS | SYSTOLIC BLOOD PRESSURE: 100 MMHG | DIASTOLIC BLOOD PRESSURE: 68 MMHG | BODY MASS INDEX: 25.78 KG/M2 | TEMPERATURE: 97.7 F | HEIGHT: 64 IN

## 2022-03-28 DIAGNOSIS — Z48.815 ENCOUNTER FOR SURGICAL AFTERCARE FOLLOWING SURGERY ON THE DIGESTIVE SYSTEM: Primary | ICD-10-CM

## 2022-03-28 DIAGNOSIS — Z98.84 BARIATRIC SURGERY STATUS: ICD-10-CM

## 2022-03-28 DIAGNOSIS — K21.9 GASTROESOPHAGEAL REFLUX DISEASE, UNSPECIFIED WHETHER ESOPHAGITIS PRESENT: ICD-10-CM

## 2022-03-28 DIAGNOSIS — K91.2 POSTSURGICAL MALABSORPTION: ICD-10-CM

## 2022-03-28 PROCEDURE — 3008F BODY MASS INDEX DOCD: CPT | Performed by: NURSE PRACTITIONER

## 2022-03-28 PROCEDURE — 99213 OFFICE O/P EST LOW 20 MIN: CPT | Performed by: NURSE PRACTITIONER

## 2022-03-28 PROCEDURE — 1036F TOBACCO NON-USER: CPT | Performed by: NURSE PRACTITIONER

## 2022-03-28 NOTE — PROGRESS NOTES
Assessment/Plan:     Patient ID: Malathi Morrissey is a 57 y o  female  Bariatric Surgery Status    - s/p robot assisted conversion from sleeve to gastric bypass with Dr Jacob Starr 9/28/21  Presents to the office today for 3rd postop visit  Overall doing well  Denies having any reflux, regurgitation, N/V/D/C  Continues to take her PPI and MVI daily  [place]  Patient reports she will find an accredited hospital that will take care of her with her bariatric needs  PLAN:     - Start tapering off PPI as discussed  - Routine follow up in 6 months for annual visit  - Continue with healthy lifestyle, adequate protein intake of 60 gm, fluid intake of at least 64 oz  - Continue with MVI daily  - Activity as tolerated  - Labs ordered and will adjust accordingly if any deficiency  - Follow up with RD and SW as needed  · Continued/Maintain healthy weight loss with good nutrition intakes  · Adequate hydration with at least 64oz  fluid intake  · Follow diet as discussed  · Follow vitamin and mineral recommendations as reviewed with you  · Exercise as tolerated  · Colonoscopy referral made: UTD  · Mammogram - UTD    · Follow-up in 6 months for annual  We kindly ask that your arrive 15 minutes before your scheduled appointment time with your provider to allow our staff to room you, get your vital signs and update your chart  · Get lab work done  Please call the office if you need a script  It is recommended to check with your insurance BEFORE getting labs done to make sure they are covered by your policy  · Call our office if you have any problems with abdominal pain especially associated with fever, chills, nausea, vomiting or any other concerns  · All  Post-bariatric surgery patients should be aware that very small quantities of any alcohol can cause impairment and it is very possible not to feel the effect   The effect can be in the system for several hours  It is also a stomach irritant  · It is advised to AVOID alcohol, Nonsteroidal antiinflammatory drugs (NSAIDS) and nicotine of all forms   Any of these can cause stomach irritation/pain  · Discussed the effects of alcohol on a bariatric patient and the increased impairment risk  · Keep up the good work! Postsurgical Malabsorption   -At risk for malabsorption of vitamins/minerals secondary to malabsorption and restriction of intake from bariatric surgery  -Currently taking adequate postop bariatric surgery vitamin supplementation  - Last set of labs ordered in December - not done  Advised to obtain  -Patient received education about the importance of adhering to a lifelong supplementation regimen to avoid vitamin/mineral deficiencies      Diagnoses and all orders for this visit:    Encounter for surgical aftercare following surgery on the digestive system    Bariatric surgery status    Postsurgical malabsorption    Gastroesophageal reflux disease, unspecified whether esophagitis present    BMI 26 0-26 9,adult         Subjective:      Patient ID: Reggie Ruiz is a 57 y o  female  - s/p robot assisted conversion from sleeve to gastric bypass with Dr Abram Amin 9/28/21  Presents to the office today for 3rd postop visit  Overall doing well  Denies having any reflux, regurgitation, N/V/D/C  Continues to take her PPI and MVI daily  [place]  Patient reports she will find an accredited hospital that will take care of her with her bariatric needs  Initial: 189 lbs  Current: 151 lbs  EWL: (Weight loss is a head of schedule at this post surgical period )  Charly: current  Current BMI is Body mass index is 26 33 kg/m²  · Tolerating a regular diet-yes  · Eating at least 60 grams of protein per day-getting at least 50 gm in per day     · Following 30/60 minute rule with liquids-yes  · Drinking at least 64 ounces of fluid per day-yes  · Drinking carbonated beverages-no  · Sufficient exercise-SOME - gets mostly walking, has physical limitations due to back pain  · Using NSAIDs regularly-no  · Using nicotine-no  · Using alcohol-no  · Supplements: cris MVI    · EWL is 88%, which places the patient ahead of schedule for expected post surgical weight loss at this time  The following portions of the patient's history were reviewed and updated as appropriate: allergies, current medications, past family history, past medical history, past social history, past surgical history and problem list     Review of Systems   Constitutional: Negative  Respiratory: Negative  Cardiovascular: Negative  Gastrointestinal: Negative  Musculoskeletal: Positive for back pain (sciatica)  Psychiatric/Behavioral: Negative  Objective:    /68 (BP Location: Left arm, Patient Position: Sitting, Cuff Size: Standard)   Pulse 62   Temp 97 7 °F (36 5 °C) (Tympanic)   Ht 5' 3 5" (1 613 m)   Wt 68 5 kg (151 lb)   BMI 26 33 kg/m²      Physical Exam  Vitals and nursing note reviewed  Constitutional:       Appearance: Normal appearance  Cardiovascular:      Rate and Rhythm: Normal rate and regular rhythm  Pulses: Normal pulses  Pulmonary:      Effort: Pulmonary effort is normal       Breath sounds: Normal breath sounds  Abdominal:      General: Bowel sounds are normal       Palpations: Abdomen is soft  Musculoskeletal:         General: Normal range of motion  Skin:     General: Skin is warm and dry  Neurological:      General: No focal deficit present  Mental Status: She is alert and oriented to person, place, and time  Psychiatric:         Mood and Affect: Mood normal          Behavior: Behavior normal          Thought Content:  Thought content normal          Judgment: Judgment normal

## 2022-03-28 NOTE — PATIENT INSTRUCTIONS
· Follow-up in 6 months for annual  We kindly ask that your arrive 15 minutes before your scheduled appointment time with your provider to allow our staff to room you, get your vital signs and update your chart  · Get lab work done  Please call the office if you need a script  It is recommended to check with your insurance BEFORE getting labs done to make sure they are covered by your policy  · Call our office if you have any problems with abdominal pain especially associated with fever, chills, nausea, vomiting or any other concerns  · All  Post-bariatric surgery patients should be aware that very small quantities of any alcohol can cause impairment and it is very possible not to feel the effect  The effect can be in the system for several hours  It is also a stomach irritant  · It is advised to AVOID alcohol, Nonsteroidal antiinflammatory drugs (NSAIDS) and nicotine of all forms   Any of these can cause stomach irritation/pain  · Discussed the effects of alcohol on a bariatric patient and the increased impairment risk  · Keep up the good work!     - Taper off omeprazole as discussed

## 2022-03-29 ENCOUNTER — RA CDI HCC (OUTPATIENT)
Dept: OTHER | Facility: HOSPITAL | Age: 62
End: 2022-03-29

## 2022-03-29 NOTE — PROGRESS NOTES
[facility]  coding opportunities       Chart reviewed, no opportunity found: CHART REVIEWED, NO OPPORTUNITY FOUND        Patients Insurance     Medicare Insurance: [employer] [facility] Advantage

## 2022-04-01 DIAGNOSIS — F33.41 RECURRENT MAJOR DEPRESSIVE DISORDER, IN PARTIAL REMISSION (HCC): ICD-10-CM

## 2022-04-01 DIAGNOSIS — F41.9 ANXIETY: ICD-10-CM

## 2022-04-01 RX ORDER — VENLAFAXINE 100 MG/1
TABLET ORAL
Qty: 60 TABLET | Refills: 2 | Status: SHIPPED | OUTPATIENT
Start: 2022-04-01 | End: 2022-07-01

## 2022-04-05 ENCOUNTER — OFFICE VISIT (OUTPATIENT)
Dept: FAMILY MEDICINE CLINIC | Facility: HOSPITAL | Age: 62
End: 2022-04-05
Payer: COMMERCIAL

## 2022-04-05 VITALS
DIASTOLIC BLOOD PRESSURE: 82 MMHG | BODY MASS INDEX: 25.57 KG/M2 | SYSTOLIC BLOOD PRESSURE: 112 MMHG | TEMPERATURE: 98.2 F | HEIGHT: 64 IN | WEIGHT: 149.8 LBS | HEART RATE: 62 BPM

## 2022-04-05 DIAGNOSIS — F33.41 RECURRENT MAJOR DEPRESSIVE DISORDER, IN PARTIAL REMISSION (HCC): ICD-10-CM

## 2022-04-05 DIAGNOSIS — K21.9 GASTROESOPHAGEAL REFLUX DISEASE, UNSPECIFIED WHETHER ESOPHAGITIS PRESENT: ICD-10-CM

## 2022-04-05 DIAGNOSIS — Z98.84 H/O BARIATRIC SURGERY: Primary | ICD-10-CM

## 2022-04-05 DIAGNOSIS — F41.9 ANXIETY: ICD-10-CM

## 2022-04-05 PROCEDURE — 3008F BODY MASS INDEX DOCD: CPT | Performed by: INTERNAL MEDICINE

## 2022-04-05 PROCEDURE — 99214 OFFICE O/P EST MOD 30 MIN: CPT | Performed by: INTERNAL MEDICINE

## 2022-04-05 PROCEDURE — 1036F TOBACCO NON-USER: CPT | Performed by: INTERNAL MEDICINE

## 2022-04-05 NOTE — ASSESSMENT & PLAN NOTE
Some anxiety still with current living situation and going through divorce and likely moving [address] this summer/fall but feels depression if pretty stable, deferring need to adjust meds, call with new/worse mood/SI, will follow

## 2022-04-05 NOTE — PROGRESS NOTES
Assessment/Plan:    GERD (gastroesophageal reflux disease)  Trying to wean off PPI currently on qod - con't wean as tolerated, call with significant reflux symptoms    Anxiety  Some anxiety still with current living situation and going through divorce and likely moving [place] this summer/fall, deferring need to adjust meds, call with new/worse mood/panic attacks, will follow    Recurrent major depressive disorder, in partial remission ([facility] [address] )  Some anxiety still with current living situation and going through divorce and likely moving [place] this summer/fall but feels depression if pretty stable, deferring need to adjust meds, call with new/worse mood/SI, will follow       Diagnoses and all orders for this visit:    H/O bariatric surgery  Comments:  Doing great and con't to loose wgt, has BW and f/u with bariatric program - con't plan/tx as per specialists - will get copy of recent BW from [facility] (3/22)    Gastroesophageal reflux disease, unspecified whether esophagitis present    Recurrent major depressive disorder, in partial remission ([facility] )    [address] 3/20 - 5 yrs    Mammo 3/21 - scheduled for later this month    PAP s/p hysterectomy    BW 10/21        Subjective:      Patient ID: Lev Garcias is a 57 y o  female  HPI Pt here for follow up appt    Pt saw the NP with bariatric program in March for f/u gastric bypass - OV note reviewed  She is currently on a high protein diet and does so via a protein shake and normal diet with  small portions otherwise  She is able to eat fruits and veggies now as well  She is watching carbs and fried foods as advised  She doesn't feel hungry a lot of the time but does make herself eat  She was advised to start tapering off her PPI  She is trying qod of the PPI at this time  Felt poorly when tried last week but so far OK this week  She was given order for BW to be done prior to f/u appt and told to f/u in 6 mos  Wgt down another 9 lbs from Jan 2022        She con't to take her Effexor daily for depression and anxiety  She feels a bit more anxiety recently - not sure if d/t life changes with ongoing separation but still living with ex-spouse  She feels her Effexor is effective and does not feel any changes are needed  She uses a rare hydroxyzine but when she takes it she gets very sedated  Review of Systems   Constitutional: Negative for chills, fever and unexpected weight change  HENT: Negative for congestion and trouble swallowing  Eyes: Negative for pain and visual disturbance  Respiratory: Negative for cough, shortness of breath and wheezing  Cardiovascular: Negative for chest pain and palpitations  Gastrointestinal: Positive for constipation  Negative for abdominal pain, diarrhea, nausea and vomiting  Endocrine: Negative for polydipsia and polyuria  Genitourinary: Negative for difficulty urinating and dysuria  Musculoskeletal: Positive for back pain  Negative for neck pain  Skin: Negative for rash and wound  Neurological: Negative for dizziness, light-headedness and headaches  Hematological: Does not bruise/bleed easily  Psychiatric/Behavioral: Negative for behavioral problems and dysphoric mood  The patient is nervous/anxious  Objective:    /82   Pulse 62   Temp 98 2 °F (36 8 °C) (Tympanic)   Ht 5' 3 5" (1 613 m)   Wt 67 9 kg (149 lb 12 8 oz)   BMI 26 12 kg/m²      Physical Exam  Vitals and nursing note reviewed  Constitutional:       General: She is not in acute distress  Appearance: She is well-developed  She is not ill-appearing  HENT:      Head: Normocephalic and atraumatic  Eyes:      General:         Right eye: No discharge  Left eye: No discharge  Conjunctiva/sclera: Conjunctivae normal    Neck:      Trachea: No tracheal deviation  Cardiovascular:      Rate and Rhythm: Normal rate and regular rhythm  Heart sounds: Normal heart sounds  No murmur heard  No friction rub  Pulmonary:      Effort: Pulmonary effort is normal  No respiratory distress  Breath sounds: Normal breath sounds  No wheezing, rhonchi or rales  Abdominal:      General: There is no distension  Palpations: Abdomen is soft  Tenderness: There is no abdominal tenderness  There is no guarding or rebound  Musculoskeletal:      Cervical back: Neck supple  Right lower leg: No edema  Left lower leg: No edema  Skin:     General: Skin is warm  Coloration: Skin is not pale  Findings: No rash  Neurological:      General: No focal deficit present  Mental Status: She is alert  Mental status is at baseline  Motor: No abnormal muscle tone  Gait: Gait normal    Psychiatric:         Mood and Affect: Mood normal          Behavior: Behavior normal          Thought Content:  Thought content normal          Judgment: Judgment normal

## 2022-04-05 NOTE — ASSESSMENT & PLAN NOTE
Trying to wean off PPI currently on qod - con't wean as tolerated, call with significant reflux symptoms

## 2022-04-05 NOTE — ASSESSMENT & PLAN NOTE
Some anxiety still with current living situation and going through divorce and likely moving [address] this summer/fall, deferring need to adjust meds, call with new/worse mood/panic attacks, will follow

## 2022-04-07 DIAGNOSIS — M54.16 RADICULOPATHY, LUMBAR REGION: ICD-10-CM

## 2022-04-08 LAB
ERYTHROCYTE [DISTWIDTH] IN BLOOD BY AUTOMATED COUNT: 12.8 % (ref 11.7–15.4)
FERRITIN SERPL-MCNC: 115 NG/ML (ref 15–150)
FOLATE SERPL-MCNC: >20 NG/ML
HCT VFR BLD AUTO: 43.7 % (ref 34–46.6)
HGB BLD-MCNC: 14.3 G/DL (ref 11.1–15.9)
IRON SATN MFR SERPL: 32 % (ref 15–55)
IRON SERPL-MCNC: 103 UG/DL (ref 27–139)
MCH RBC QN AUTO: 29 PG (ref 26.6–33)
MCHC RBC AUTO-ENTMCNC: 32.7 G/DL (ref 31.5–35.7)
MCV RBC AUTO: 89 FL (ref 79–97)
PLATELET # BLD AUTO: 293 X10E3/UL (ref 150–450)
RBC # BLD AUTO: 4.93 X10E6/UL (ref 3.77–5.28)
TIBC SERPL-MCNC: 325 UG/DL (ref 250–450)
UIBC SERPL-MCNC: 222 UG/DL (ref 118–369)
VIT A SERPL-MCNC: 58.4 UG/DL (ref 22–69.5)
VIT B1 BLD-SCNC: 158.9 NMOL/L (ref 66.5–200)
VIT B12 SERPL-MCNC: >2000 PG/ML (ref 232–1245)
WBC # BLD AUTO: 6.3 X10E3/UL (ref 3.4–10.8)
ZINC SERPL-MCNC: 95 UG/DL (ref 44–115)

## 2022-04-21 ENCOUNTER — TELEPHONE (OUTPATIENT)
Dept: FAMILY MEDICINE CLINIC | Facility: HOSPITAL | Age: 62
End: 2022-04-21

## 2022-04-21 NOTE — TELEPHONE ENCOUNTER
Pt would like to know who Dr Yves Salcido would recommend for a breast augmentation     [phone]

## 2022-04-22 ENCOUNTER — HOSPITAL ENCOUNTER (OUTPATIENT)
Dept: MAMMOGRAPHY | Facility: IMAGING CENTER | Age: 62
Discharge: HOME | End: 2022-04-22
Payer: COMMERCIAL

## 2022-04-22 VITALS — BODY MASS INDEX: 26.17 KG/M2 | HEIGHT: 63 IN | WEIGHT: 147.71 LBS

## 2022-04-22 DIAGNOSIS — M51.36 DISC DEGENERATION, LUMBAR: ICD-10-CM

## 2022-04-22 DIAGNOSIS — Z12.31 VISIT FOR SCREENING MAMMOGRAM: ICD-10-CM

## 2022-04-22 DIAGNOSIS — Z12.31 ENCOUNTER FOR SCREENING MAMMOGRAM FOR MALIGNANT NEOPLASM OF BREAST: ICD-10-CM

## 2022-04-22 PROCEDURE — 77067 SCR MAMMO BI INCL CAD: CPT

## 2022-04-22 PROCEDURE — 77063 BREAST TOMOSYNTHESIS BI: CPT

## 2022-04-23 RX ORDER — GABAPENTIN 400 MG/1
400 CAPSULE ORAL 3 TIMES DAILY
Qty: 90 CAPSULE | Refills: 2 | Status: SHIPPED | OUTPATIENT
Start: 2022-04-23

## 2022-04-25 ENCOUNTER — TELEPHONE (OUTPATIENT)
Dept: PLASTIC SURGERY | Facility: CLINIC | Age: 62
End: 2022-04-25

## 2022-04-25 NOTE — TELEPHONE ENCOUNTER
Patient called inquiring about a breast lift  I let the patient know it will be a $150 cosmetic consultation fee, but she stated she called her insurance company and they said they would cover the procedure  I made the patient aware that although the insurance company said they would cover the procedure they might not and she would be finically responsible  She decided she would still like to make an appointment

## 2022-04-29 LAB
25(OH)D3+25(OH)D2 SERPL-MCNC: 49.9 NG/ML (ref 30–100)
ALBUMIN SERPL-MCNC: 4.8 G/DL (ref 3.8–4.8)
ALBUMIN/GLOB SERPL: 2.1 {RATIO} (ref 1.2–2.2)
ALP SERPL-CCNC: 77 IU/L (ref 44–121)
ALT SERPL-CCNC: 43 IU/L (ref 0–32)
AST SERPL-CCNC: 32 IU/L (ref 0–40)
BILIRUB SERPL-MCNC: 0.6 MG/DL (ref 0–1.2)
BUN SERPL-MCNC: 21 MG/DL (ref 8–27)
BUN/CREAT SERPL: 27 (ref 12–28)
CALCIUM SERPL-MCNC: 10.4 MG/DL (ref 8.7–10.3)
CHLORIDE SERPL-SCNC: 99 MMOL/L (ref 96–106)
CHOLEST SERPL-MCNC: 203 MG/DL (ref 100–199)
CO2 SERPL-SCNC: 28 MMOL/L (ref 20–29)
CREAT SERPL-MCNC: 0.79 MG/DL (ref 0.57–1)
EGFR: 83 ML/MIN/1.73
GLOBULIN SER-MCNC: 2.3 G/DL (ref 1.5–4.5)
GLUCOSE SERPL-MCNC: 80 MG/DL (ref 65–99)
HDLC SERPL-MCNC: 60 MG/DL
LDLC SERPL CALC-MCNC: 119 MG/DL (ref 0–99)
LDLC/HDLC SERPL: 2 RATIO (ref 0–3.2)
POTASSIUM SERPL-SCNC: 5 MMOL/L (ref 3.5–5.2)
PROT SERPL-MCNC: 7.1 G/DL (ref 6–8.5)
PTH-INTACT SERPL-MCNC: 27 PG/ML (ref 15–65)
SL AMB VLDL CHOLESTEROL CALC: 24 MG/DL (ref 5–40)
SODIUM SERPL-SCNC: 141 MMOL/L (ref 134–144)
T4 FREE SERPL-MCNC: 1.34 NG/DL (ref 0.82–1.77)
TRIGL SERPL-MCNC: 136 MG/DL (ref 0–149)
TSH SERPL DL<=0.005 MIU/L-ACNC: 14.3 UIU/ML (ref 0.45–4.5)

## 2022-05-03 ENCOUNTER — TELEPHONE (OUTPATIENT)
Dept: FAMILY MEDICINE CLINIC | Facility: HOSPITAL | Age: 62
End: 2022-05-03

## 2022-05-04 ENCOUNTER — VBI (OUTPATIENT)
Dept: ADMINISTRATIVE | Facility: OTHER | Age: 62
End: 2022-05-04

## 2022-05-04 NOTE — TELEPHONE ENCOUNTER
05/04/22 3:42 PM     See documentation in the VB [employer] SmartForm       Ivan Jasmine, [address] [facility] Devora Arreola '

## 2022-05-16 ENCOUNTER — CONSULT (OUTPATIENT)
Dept: PLASTIC SURGERY | Facility: CLINIC | Age: 62
End: 2022-05-16
Payer: COMMERCIAL

## 2022-05-16 ENCOUNTER — OFFICE VISIT (OUTPATIENT)
Dept: FAMILY MEDICINE CLINIC | Facility: HOSPITAL | Age: 62
End: 2022-05-16
Payer: COMMERCIAL

## 2022-05-16 VITALS
SYSTOLIC BLOOD PRESSURE: 113 MMHG | TEMPERATURE: 96.8 F | WEIGHT: 149 LBS | BODY MASS INDEX: 26.4 KG/M2 | HEART RATE: 64 BPM | HEIGHT: 63 IN | DIASTOLIC BLOOD PRESSURE: 75 MMHG

## 2022-05-16 VITALS
WEIGHT: 148.4 LBS | HEART RATE: 72 BPM | SYSTOLIC BLOOD PRESSURE: 116 MMHG | HEIGHT: 63 IN | TEMPERATURE: 96.8 F | DIASTOLIC BLOOD PRESSURE: 78 MMHG | BODY MASS INDEX: 26.29 KG/M2

## 2022-05-16 DIAGNOSIS — B37.2 CUTANEOUS CANDIDIASIS: ICD-10-CM

## 2022-05-16 DIAGNOSIS — N62 MACROMASTIA: Primary | ICD-10-CM

## 2022-05-16 DIAGNOSIS — R51.9 CHRONIC NONINTRACTABLE HEADACHE, UNSPECIFIED HEADACHE TYPE: ICD-10-CM

## 2022-05-16 DIAGNOSIS — G89.29 CHRONIC NONINTRACTABLE HEADACHE, UNSPECIFIED HEADACHE TYPE: ICD-10-CM

## 2022-05-16 DIAGNOSIS — M54.2 CERVICALGIA: Primary | ICD-10-CM

## 2022-05-16 PROCEDURE — 3008F BODY MASS INDEX DOCD: CPT | Performed by: INTERNAL MEDICINE

## 2022-05-16 PROCEDURE — 99204 OFFICE O/P NEW MOD 45 MIN: CPT | Performed by: STUDENT IN AN ORGANIZED HEALTH CARE EDUCATION/TRAINING PROGRAM

## 2022-05-16 PROCEDURE — 1036F TOBACCO NON-USER: CPT | Performed by: INTERNAL MEDICINE

## 2022-05-16 PROCEDURE — 99214 OFFICE O/P EST MOD 30 MIN: CPT | Performed by: INTERNAL MEDICINE

## 2022-05-16 RX ORDER — NYSTATIN 100000 [USP'U]/G
POWDER TOPICAL 3 TIMES DAILY
Qty: 30 G | Refills: 2 | Status: SHIPPED | OUTPATIENT
Start: 2022-05-16

## 2022-05-16 NOTE — PROGRESS NOTES
Assessment/Plan:       Diagnoses and all orders for this visit:    Cervicalgia  Comments:  Felt to be muscular in nature and large breasts are likely a contributing factor, going to start PT (as per breast surgeon), heat/massage/streteches/Tylenol/muscle relaxer prn, call with new/worse symptoms or with any red flag radicular symptoms (reviewed with pt in detail)    Cutaneous candidiasis  Comments:  Under breasts B/L - urged to keep area dry/change bra/clothing when sweats a lot, rx for nystatin pwder tid as needed sent to pharmacy, breast reduction may certainly help decrease these rashes  Orders:  -     nystatin (MYCOSTATIN) powder; Apply topically 3 (three) times a day    Chronic nonintractable headache, unspecified headache type  Comments:  Likely triggered by cervicalgia/cervical strain/large breasts, encouraged Tylenol prn and treatment of neck symptoms, breast reduction may help, call with red flag/new/worse symptoms      Jacumba 3/20 - 5 yrs     Mammo 4/22     PAP s/p hysterectomy    BW 10/21      Subjective:      Patient ID: Rach Jaquez is a 57 y o  female  HPI Pt here for an acute visit    Pt saw plastic today to discuss breast reduction  She has had some headaches and neck pain  She has had itchy recurrent rash under the breasts B/L  Rash worse recently as weather getting warmer  Neck pain R>L low neck  The pain does not radiate and feels like "a needle, like pinching"  She states the neck pain is intermittent and seems worse as the day goes on  She notes no radiation of symptoms into the UE  She notes no weakness/numbness/tingling in the UE on a recurrent basis  Had one episode where it felt like she hit her funny bone on her LUE but that resolved and has not reoccurred  She notes some B/L temporal region HA's  Pain is better but also worse if she pushes on temples  She notes no double vision/loss of vision/slurred speech/word finding difficulty/focal weakness or numbness or tingling    She notes mild nausea with the headaches but no vomiting/light sensitivity/sound sensitivity  She notes no new meds or changes in meds  She has not tried anything for her symptoms  Review of Systems   Constitutional: Negative for chills and fever  HENT: Negative for ear pain and hearing loss  Eyes: Negative for photophobia and visual disturbance  Respiratory: Negative for cough and shortness of breath  Cardiovascular: Negative for chest pain and palpitations  Gastrointestinal: Positive for nausea  Negative for abdominal pain, constipation, diarrhea and vomiting  Genitourinary: Negative for difficulty urinating and dysuria  Musculoskeletal: Positive for neck pain  Negative for back pain  Skin: Positive for rash  Negative for wound  Neurological: Positive for headaches  Negative for dizziness, facial asymmetry, speech difficulty, weakness, light-headedness and numbness  Hematological: Does not bruise/bleed easily  Psychiatric/Behavioral: Negative for dysphoric mood  Objective:    /78   Pulse 72   Temp (!) 96 8 °F (36 °C) (Tympanic)   Ht 5' 3" (1 6 m)   Wt 67 3 kg (148 lb 6 4 oz)   BMI 26 29 kg/m²      Physical Exam  Vitals and nursing note reviewed  Constitutional:       General: She is not in acute distress  Appearance: She is well-developed  She is not ill-appearing  HENT:      Head: Normocephalic and atraumatic  Right Ear: Tympanic membrane and external ear normal  There is no impacted cerumen  Left Ear: Tympanic membrane and external ear normal  There is no impacted cerumen  Mouth/Throat:      Mouth: Mucous membranes are moist       Pharynx: Oropharynx is clear  No oropharyngeal exudate  Eyes:      General:         Right eye: No discharge  Left eye: No discharge  Extraocular Movements: Extraocular movements intact  Conjunctiva/sclera: Conjunctivae normal       Pupils: Pupils are equal, round, and reactive to light     Neck: Vascular: No carotid bruit  Trachea: No tracheal deviation  Cardiovascular:      Rate and Rhythm: Normal rate and regular rhythm  Heart sounds: Normal heart sounds  No murmur heard  No friction rub  Pulmonary:      Effort: Pulmonary effort is normal  No respiratory distress  Breath sounds: Normal breath sounds  No wheezing, rhonchi or rales  Abdominal:      General: There is no distension  Palpations: Abdomen is soft  Tenderness: There is no abdominal tenderness  There is no guarding or rebound  Musculoskeletal:         General: No deformity or signs of injury  Cervical back: Neck supple  Comments: 5/5 global muscle strength   Skin:     General: Skin is warm  Coloration: Skin is not pale  Findings: Rash present  Comments: L>R breast fold with erythematous  Rash with rare papules   Neurological:      Mental Status: She is alert  Cranial Nerves: No cranial nerve deficit  Sensory: Sensory deficit present  Motor: No weakness or abnormal muscle tone  Coordination: Coordination normal       Gait: Gait normal       Deep Tendon Reflexes: Reflexes normal       Comments: CN II-XII intact, 5/5 global muscle strength, decreased sensation to light touch L lateral distal LE (chronic), 2/4 bicep and patellar reflexes B/L LE, nml FN and HS, neg Rhomberg   Psychiatric:         Mood and Affect: Mood normal          Behavior: Behavior normal          Thought Content:  Thought content normal          Judgment: Judgment normal

## 2022-05-17 NOTE — PROGRESS NOTES
Plastic Surgery Consult    Reason for visit: heavy breasts    HPI:  Patient is a 58 y/o female who presents with symptomatic macromastia  She complains of large, heavy, pendulous breasts that causes her chest, upper back, neck, and shoulder discomfort  She also complains of rashes underneath her breast during humid weather and has difficulty exercising  She has tried conservative therapy including supportive bra, ibuprofen with minimal symptomatic improvement  She denies lumps or discharge on self-exams  She currently wears 42D and would like to be C cup ideally  Mammogram status: in April 2022 and was normal    Plans to have more children: no    Physical therapy attempted: no    Of note patient is a massive weight loss patient, with 86 lb weight loss  She had sleeve gastrectomy in 2014 and a revision in Sept 2021  She currently has very significant rash/yeast infection under both breasts in the IMF region  ROS: 12 pt ROS negative, except as otherwise noted in HPI      PMH: herniated lumbar disc, sciatica, hypothyroidism, two miscarriages, no history of blood clots  FamHx: non-contrib  SurgHx: hysterectomy, right oopherectomy, tonsillectomy, adenoidectomy, bariatric surgery  SocHx: no tobacco, no ETOH  Meds: no blood thinners  Allergies: NKDA    PE:  Vitals:    05/16/22 1048   BP: 113/75   Pulse: 64   Temp: (!) 96 8 °F (36 °C)       General: NC/AT, breathing comfortably on RA  Neuro: CN II-XII grossly intact, symmetric reflexes  HEENT: PERRLA, EOMI, external ears normal, no lesions or deformities, neck supple, trachea midline  Respiratory: CTAB, normal respiratory effort  Cardio: RRR, normal S1, S2, no murmur, rubs, gallops  GI: soft, non-tender, non-distended  MSK: normal alignment, mobility, gait  Skin: no rashes, lesions, subcutaneous nodules      BMI:  26  BSA: 1 71    Breast Exam:  Bilateral moderate-large ptotic breasts, with severe grade II ptosis bilaterally  Enlarged areolas  Bilateral shoulder grooving  No masses, skin dimpling, or discharge  Normal nipple sensation bilaterally  Striae throughout  Poor skin quality, diminished elasticity  Significant yeast infection and rash in bilateral IMF regions      Measurements:    R  L  SN-N  32 cm  34 cm  N-IMF  19 cm  18 cm      A/P: 56 y/o female with symptomatic macromastia     -Patient would benefit from bilateral reduction mammoplasty  Technique: wise-pattern with inferior pedicle with estimated resection weight of 300-400 g per side  Any greater resection would make her flat chested   -Discussed with patient the risks, benefits, procedure, and complications  Discussed changes/loss/hypersentivity in nipple sensation, diminished breast feeding ability and increase in size of breasts should she become pregnant  Discussed risk of partial vs complete nipple loss due to vascular issues  Discussed that breast reduction will help but may not completely resolve her back, chest, upper neck, and shoulder pain  Due to patient's obesity, I discussed the increased risk of surgical complications including infection, seroma, fat necrosis, abscess, wound dehisence  All of these risks increase with increasing BMI   Due to her large pendulous breasts, the possibility of free nipple graft was also discussed   -In this patient's case, discussed recurrent ptosis due to the poor skin quality and diminished elasticity of the skin    -Patient's questions answered, concerns addressed   -Photos taken  -Prescription of physical therapy given  -Instructed patient to complete PT and to follow-up with PCP for medication for intertrigo and documentation of 3 months duration of symptoms from macromastia  -Upon completion of 3 months of documentation, instructed patient to return to initiate submission to insurance      Demetrice Jaeger MD   [address] [place] [address] and Reconstructive Surgery   [address] [facility], [address]   Office: [phone]

## 2022-05-21 DIAGNOSIS — M54.16 RADICULOPATHY, LUMBAR REGION: ICD-10-CM

## 2022-05-24 ENCOUNTER — TELEPHONE (OUTPATIENT)
Dept: ENDOCRINOLOGY | Facility: HOSPITAL | Age: 62
End: 2022-05-24

## 2022-05-24 DIAGNOSIS — E03.9 HYPOTHYROIDISM, UNSPECIFIED TYPE: ICD-10-CM

## 2022-05-24 DIAGNOSIS — E55.9 VITAMIN D DEFICIENCY: Primary | ICD-10-CM

## 2022-05-24 RX ORDER — LEVOTHYROXINE SODIUM 150 UG/1
TABLET ORAL
Qty: 30 TABLET | Refills: 11
Start: 2022-05-24 | End: 2022-05-24 | Stop reason: SDUPTHER

## 2022-05-24 RX ORDER — LEVOTHYROXINE SODIUM 150 UG/1
TABLET ORAL
Qty: 30 TABLET | Refills: 11 | Status: SHIPPED | OUTPATIENT
Start: 2022-05-24 | End: 2022-05-24 | Stop reason: SDUPTHER

## 2022-05-24 RX ORDER — LEVOTHYROXINE SODIUM 150 UG/1
TABLET ORAL
Qty: 32 TABLET | Refills: 11 | Status: SHIPPED | OUTPATIENT
Start: 2022-05-24 | End: 2022-07-24

## 2022-05-26 ENCOUNTER — EVALUATION (OUTPATIENT)
Dept: PHYSICAL THERAPY | Facility: REHABILITATION | Age: 62
End: 2022-05-26
Payer: COMMERCIAL

## 2022-05-26 DIAGNOSIS — N62 MACROMASTIA: ICD-10-CM

## 2022-05-26 DIAGNOSIS — M54.2 NECK PAIN: Primary | ICD-10-CM

## 2022-05-26 PROCEDURE — 97110 THERAPEUTIC EXERCISES: CPT | Performed by: PHYSICAL THERAPIST

## 2022-05-26 PROCEDURE — 97162 PT EVAL MOD COMPLEX 30 MIN: CPT | Performed by: PHYSICAL THERAPIST

## 2022-05-26 NOTE — PROGRESS NOTES
PT Evaluation     Today's date: 2022  Patient name: Jeronimo Hanson  : 1960  MRN: [id number]  Referring provider: Ninfa Rodrigues MD  Dx:   Encounter Diagnosis     ICD-10-CM    1  Neck pain  M54 2    2  Macromastia  N62 Ambulatory Referral to Physical Therapy                  Assessment  Assessment details: Demetrice Luna is a 58 yo female presenting with chronic neck and low back pain  Symptoms of neck and upper scapular pain are consistent with postural syndrome and mobility deficits related to chronic load of macromastia  She would benefit from manual therapy for joint mobilization as well as stretches and postural strengthening  Symptom irritability: moderateUnderstanding of Dx/Px/POC: good   Prognosis: good    Goals  1  Cervical ROM will improve by 25% into L rotation and extension, painfree, in 12 weeks  2  Patient will report 25% improvement in symptoms of neck pain after 12 weeks  Plan  Planned therapy interventions: manual therapy, joint mobilization, patient education, strengthening, stretching, therapeutic exercise and home exercise program  Frequency: 1x week  Duration in weeks: 12  Plan of Care beginning date: 2022  Plan of Care expiration date: 2022  Treatment plan discussed with: patient and referring physician        Subjective Evaluation    History of Present Illness  Mechanism of injury: Neck pain; R>L upper trapezius muscle soreness; more recently stabbing/tweaking; feels her neck crack and it relieves  Aggravating factors: lifting/carrying boxes; denies radicular paresthesias or pau weakness  Side sleeper, 1 pillow  Chronic headaches, in the morning - posterior occiput, other times MELANIE temples  Chronic low back pain with Left LE sciatica  Has seen PT in the past but symptoms plateaued  Back pain is better with moving and stretching; worse with prolonged sitting  Has trialed spine injections (epidural, spinal nerve ablation, cortisone injections) without improvement      H/o Gastric Sleeve (2016) then revision to a bypass in   Pain  Current pain rating: 3  At best pain ratin  At worst pain ratin  Location: R>L upper scapular region  Quality: dull ache and pulling    Treatments  No previous or current treatments  Patient Goals  Patient goals for therapy: decreased pain and increased motion          Objective     Concurrent Complaints  Negative for tinnitus, trouble swallowing and visual change    Postural Observations  Seated posture: fair  Standing posture: fair        Palpation   Left   Trigger point to levator scapulae and lower trapezius  Right   Trigger point to levator scapulae and lower trapezius       Neurological Testing     Sensation     Shoulder   Left Shoulder   Intact: light touch    Right Shoulder   Intact: light touch    Active Range of Motion   Cervical/Thoracic Spine       Cervical  Subcranial protraction:  with pain   Subcranial retraction:   Restriction level: minimal  Flexion:  with pain Restriction level: minimal  Extension:  Restriction level: moderate  Left lateral flexion:  with pain Restriction level: moderate  Right lateral flexion:  with pain Restriction level moderate  Left rotation:  Restriction level: moderate  Right rotation:  Restriction level: minimal    Thoracic    Extension:  Restriction level: moderate  Left rotation:  Restriction level: minimal  Right rotation:  Restriction level: minimal  Left Shoulder   Internal rotation BTB: T12     Right Shoulder   Internal rotation BTB: L3 with pain    Joint Play     Hypermobile: C4 and C5     Hypomobile: C1, C2, C3, C6, C7 and T1     Strength/Myotome Testing     Left Shoulder   Normal muscle strength    Right Shoulder   Normal muscle strength    Tests   Cervical   Negative alar ligament test and Sharp-Dany test           Precautions: standard    Manuals             Upper Cervical mobs 5'            Thoracic mobs nv **           Lower cervical mobs 5'                         Neuro Re-Ed Ther Ex             Cervical retraction x10 HEP 3x/day            Seated thoracic ext x10 HEP 3x/day                                                                                          Ther Activity                                       Gait Training                                       Modalities

## 2022-05-31 ENCOUNTER — TELEPHONE (OUTPATIENT)
Dept: BARIATRICS | Facility: CLINIC | Age: 62
End: 2022-05-31

## 2022-05-31 NOTE — TELEPHONE ENCOUNTER
Patient requests a call from [address] [place] [address]  Reports she is having an abdominal pain located of epigastric region, more present in the morning as soon as she wakes up  Pain occurred approximately 10 days ago  Feels "like a bruise pain " Tolerating a regular diet  Denies any N/V/D/C, regurgitation, reflux or dysphagia  Patient tapered off omeprazole since last visit approximately beginning of May  Has taken a few doses PRN for food that were highly seasoned  - occasionally have a half cup of coffee  - Denies NSAIDs, smoking, steroid use  Plan - advised patient to start back on her omeprazole 20 mg PO QD for now  Follow up in 1 month via telebvisit to see if her pain improve  If pain still persists, will order UGI and possible scope

## 2022-06-02 ENCOUNTER — OFFICE VISIT (OUTPATIENT)
Dept: PHYSICAL THERAPY | Facility: REHABILITATION | Age: 62
End: 2022-06-02
Payer: COMMERCIAL

## 2022-06-02 DIAGNOSIS — N62 MACROMASTIA: Primary | ICD-10-CM

## 2022-06-02 DIAGNOSIS — M54.2 NECK PAIN: ICD-10-CM

## 2022-06-02 PROCEDURE — 97140 MANUAL THERAPY 1/> REGIONS: CPT

## 2022-06-02 PROCEDURE — 97110 THERAPEUTIC EXERCISES: CPT

## 2022-06-02 NOTE — PROGRESS NOTES
Daily Note     Today's date: 2022  Patient name: Morales Lawson  : 1960  MRN: [id number]  Referring provider: Kvng Cade MD  Dx:   Encounter Diagnosis     ICD-10-CM    1  Macromastia  N62    2  Neck pain  M54 2        Start Time: 1145  Stop Time: 1230  Total time in clinic (min): 45 minutes    Subjective: Pt reports she's been practicing her exercises  Objective: See treatment diary below    Access Code: [email or web address]  URL: [email or web address]  Date: 2022  Prepared by: Teo Shear    Exercises  · Supine Cervical Retraction with Towel - 1 x daily - 7 x weekly - 2 sets - 10 reps  · Seated Passive Cervical Retraction - 1 x daily - 7 x weekly - 2 sets - 10 reps  · Standing Shoulder External Rotation with Resistance - 1 x daily - 7 x weekly - 2 sets - 10 reps  · Doorway Pec Stretch at 60 Elevation - 2 x daily - 7 x weekly - 3 reps - 20 hold  · Supine Scapular Protraction in Flexion with Dumbbells - 1 x daily - 7 x weekly - 2 sets - 10 reps       Assessment: Tolerated treatment well  Patient would benefit from continued PT  Pt benefits from tactile cueing to improve posture with exercises  Fatigues with UBE, R side more bothered  Good tolerance to manual techniques  Plan: Continue per plan of care        Precautions: standard    Manuals            Upper Cervical mobs 5'            Thoracic mobs nv  **          Lower cervical mobs 5'            SOR & upper trap  15' R side with instrument           S/l scap mobility  10'           Neuro Re-Ed                                                                                                        Ther Ex             Cervical retraction x10 HEP 3x/day            Seated thoracic ext x10 HEP 3x/day            UBE  L1 3'/3'           Supine chin tucks  10x           ER w/ band  10x           scap punches  1# 10x                                     Ther Activity                                       Gait Training Modalities

## 2022-06-09 ENCOUNTER — OFFICE VISIT (OUTPATIENT)
Dept: PHYSICAL THERAPY | Facility: REHABILITATION | Age: 62
End: 2022-06-09
Payer: COMMERCIAL

## 2022-06-09 DIAGNOSIS — N62 MACROMASTIA: Primary | ICD-10-CM

## 2022-06-09 DIAGNOSIS — M54.2 NECK PAIN: ICD-10-CM

## 2022-06-09 PROCEDURE — 97110 THERAPEUTIC EXERCISES: CPT | Performed by: PHYSICAL THERAPIST

## 2022-06-09 PROCEDURE — 97140 MANUAL THERAPY 1/> REGIONS: CPT | Performed by: PHYSICAL THERAPIST

## 2022-06-09 NOTE — PROGRESS NOTES
Daily Note     Today's date: 2022  Patient name: Eduard Snyder  : 1960  MRN: [id number]  Referring provider: Cherie Mendez MD  Dx:   Encounter Diagnosis     ICD-10-CM    1  Macromastia  N62    2  Neck pain  M54 2                   Subjective: Was sore for 2 days after last visit (muscle soreness)  Chacorta Chacorta up the stairs last week and has increased neck and back soreness  Has not performed HEP  Objective: See treatment diary below    Access Code: [email or web address]  URL: [email or web address]  Date: 2022  Prepared by: Ericka Barry    Exercises  · Supine Cervical Retraction with Towel - 1 x daily - 7 x weekly - 2 sets - 10 reps  · Seated Passive Cervical Retraction - 1 x daily - 7 x weekly - 2 sets - 10 reps  · Standing Shoulder External Rotation with Resistance - 1 x daily - 7 x weekly - 2 sets - 10 reps  · Doorway Pec Stretch at 60 Elevation - 2 x daily - 7 x weekly - 3 reps - 20 hold  · Supine Scapular Protraction in Flexion with Dumbbells - 1 x daily - 7 x weekly - 2 sets - 10 reps  · Seated thoracic ext over chair  · Supine thoracic ext over FR  · Supine chest stretch (goalpost arms) on FR       Assessment: Tolerated treatment well  Patient would benefit from continued PT  Thoracic and cervical joint mobility decreased symptoms of upper trap pain within session today  Progress strengthening exercises next visit  Continue joint mobs  Limit stretching  Plan: Continue per plan of care        Precautions: standard    Manuals           Upper Cervical mobs 5'  10 min grade 1-3          Thoracic mobs nv  T1-4 grade 2-4          Lower cervical mobs 5'  Grade 2-4          SOR & upper trap  15' R side with instrument           S/l scap mobility  10'           Neuro Re-Ed                                                                                                        Ther Ex             Cervical retraction x10 HEP 3x/day            Seated thoracic ext x10 HEP 3x/day  x20 Thoracic ext supine over FR   3x20          UBE  L1 3'/3' L1 3'/3'          Supine chin tucks  10x x10          ER w/ band  10x           scap punches  1# 10x x20          Supine chest stretch on FR   x10 mobs, 30" cactus arms                       Ther Activity

## 2022-06-14 ENCOUNTER — OFFICE VISIT (OUTPATIENT)
Dept: PHYSICAL THERAPY | Facility: REHABILITATION | Age: 62
End: 2022-06-14
Payer: COMMERCIAL

## 2022-06-14 DIAGNOSIS — M54.2 NECK PAIN: ICD-10-CM

## 2022-06-14 DIAGNOSIS — N62 MACROMASTIA: Primary | ICD-10-CM

## 2022-06-14 PROCEDURE — 97140 MANUAL THERAPY 1/> REGIONS: CPT

## 2022-06-14 PROCEDURE — 97110 THERAPEUTIC EXERCISES: CPT

## 2022-06-14 NOTE — PROGRESS NOTES
Daily Note     Today's date: 2022  Patient name: Radha Cardoza  : 1960  MRN: [id number]  Referring provider: Barbie Drummond MD  Dx:   Encounter Diagnosis     ICD-10-CM    1  Macromastia  N62    2  Neck pain  M54 2        Start Time: 1500  Stop Time: 1545  Total time in clinic (min): 45 minutes    Subjective: Pt reports experiencing good relief after last session  Objective: See treatment diary below    Access Code: [address]  URL: [email or web address]  Date: 2022  Prepared by: Shanice Celis    Program Notes   No weights with the strengthening exercises at this time    Exercises  · Prone Scapular Retraction Arms at Side - 1 x daily - 7 x weekly - 2 sets - 10 reps  · Prone Single Arm Shoulder Horizontal Abduction with Dumbbell - 1 x daily - 7 x weekly - 2 sets - 10 reps  · Prone Single Arm Shoulder Horizontal Abduction with Scapular Retraction and Palm Down - 1 x daily - 7 x weekly - 2 sets - 10 reps  · Standing Upper Trapezius Stretch - 1 x daily - 7 x weekly - 3 reps - 20 hold    Assessment: Tolerated treatment well  Patient would benefit from continued PT  MFR of cervical spine well tolerate in supine position following prone strengthening exercises  With prone exercises, given tactile cueing to ensure proper form, R side more challenged  Issued additional strengthening exercises for home  Plan: Continue per plan of care        Precautions: standard    Manuals          Upper Cervical mobs 5'  10 min grade 1-3 MFR 10'         Thoracic mobs nv  T1-4 grade 2-4          Lower cervical mobs 5'  Grade 2-4 MFR 10'         SOR & upper trap  15' R side with instrument           S/l scap mobility  10'           Neuro Re-Ed                                                                                                        Ther Ex             Cervical retraction x10 HEP 3x/day            Seated thoracic ext x10 HEP 3x/day  x20          Thoracic ext supine over FR   3x20          UBE  L1 3'/3' L1 3'/3' L1 3'/3'         Supine chin tucks  10x x10 x10         ER w/ band  10x           scap punches  1# 10x x20 x20 w/ 1#         Supine chest stretch on FR   x10 mobs, 30" cactus arms x10 mobs, 30" cactus arms         Prone series T, Y & I    10x each                                                Ther Activity

## 2022-06-21 ENCOUNTER — OFFICE VISIT (OUTPATIENT)
Dept: PHYSICAL THERAPY | Facility: REHABILITATION | Age: 62
End: 2022-06-21
Payer: COMMERCIAL

## 2022-06-21 DIAGNOSIS — M54.2 NECK PAIN: ICD-10-CM

## 2022-06-21 DIAGNOSIS — N62 MACROMASTIA: Primary | ICD-10-CM

## 2022-06-21 PROCEDURE — 97110 THERAPEUTIC EXERCISES: CPT

## 2022-06-21 PROCEDURE — 97140 MANUAL THERAPY 1/> REGIONS: CPT

## 2022-06-21 NOTE — PROGRESS NOTES
Daily Note     Today's date: 2022  Patient name: Tarik Christie  : 1960  MRN: [id number]  Referring provider: Flo Ramos MD  Dx:   Encounter Diagnosis     ICD-10-CM    1  Macromastia  N62    2  Neck pain  M54 2        Start Time:   Stop Time:   Total time in clinic (min): 60 5 minutes    Subjective: Pt reports falling in the tub, slipped hit her head, L forearm and L upper leg  Experienced a HA initially but resolved with ice  Denies any vision issues  Objective: See treatment diary below    Access Code: [address]  URL: [email or web address]  Date: 2022  Prepared by: Lisa Malone    Program Notes   No weights with the strengthening exercises at this time    Exercises  · Prone Scapular Retraction Arms at Side - 1 x daily - 7 x weekly - 2 sets - 10 reps  · Prone Single Arm Shoulder Horizontal Abduction with Dumbbell - 1 x daily - 7 x weekly - 2 sets - 10 reps  · Prone Single Arm Shoulder Horizontal Abduction with Scapular Retraction and Palm Down - 1 x daily - 7 x weekly - 2 sets - 10 reps  · Standing Upper Trapezius Stretch - 1 x daily - 7 x weekly - 3 reps - 20 hold    Assessment: Tolerated treatment well  Patient would benefit from continued PT  Pt has experienced her second fall in the last few weeks; once when misjudging the amount of stairs while carrying something and in the shower  Encouraged to seek medical attention if her symptoms worsen  Held UBE at pt's request due to her soreness, but was able to complete the balance of the program      Plan: Continue per plan of care        Precautions: standard    Manuals         Upper Cervical mobs 5'  10 min grade 1-3 MFR 10' 10'        Thoracic mobs nv  T1-4 grade 2-4          Lower cervical mobs 5'  Grade 2-4 MFR 10' 10'        SOR & upper trap  15' R side with instrument           S/l scap mobility  10'           Neuro Re-Ed Ther Ex             Cervical retraction x10 HEP 3x/day            Seated thoracic ext x10 HEP 3x/day  x20  x20        Thoracic ext supine over FR   3x20  5x20"        UBE  L1 3'/3' L1 3'/3' L1 3'/3' held        Supine chin tucks  10x x10 x10 x10        ER w/ band  10x           scap punches  1# 10x x20 x20 w/ 1# x20 2#        Supine chest stretch on FR   x10 mobs, 30" cactus arms x10 mobs, 30" cactus arms x10 mobs, 30" cactus arms        Prone series T, Y & I    10x each 2x10 T & I, Y 2x5        Wall push ups     10x                                  Ther Activity

## 2022-06-27 NOTE — H&P
History of Present Illness: The patient is a 56 y o  female who presents with complaints of  Low back pain      Patient Active Problem List   Diagnosis    Anxiety    Atrophy of vagina    Depression    Disc degeneration, lumbar    Disturbance of memory    Dyslipidemia    Hypothyroidism    Osteoarthritis    Postgastrectomy malabsorption    Arthritis    Symptomatic menopausal or female climacteric states    History of sleeve gastrectomy    Primary thunderclap headache    Chronic bilateral low back pain without sciatica    Radiculopathy, lumbar region    Gastroesophageal reflux disease without esophagitis    Obesity, Class I, BMI 30-34 9    Encounter for surgical aftercare following surgery of digestive system    Lumbar spondylosis       Past Medical History:   Diagnosis Date    Bariatric surgery status     Depression     last assessed: 2018     Disturbance of memory     last assessed: 2016     Endometriosis     Hirsutism     History of sleeve gastrectomy     Menometrorrhagia     Morbid obesity ([facility] [address] )     last assessed: Dec 5, 2014     Postgastrectomy malabsorption     Sclerosing adenosis of breast     Symptomatic menopausal or female climacteric states     Uterine leiomyoma        Past Surgical History:   Procedure Laterality Date    APPENDECTOMY      BREAST BIOPSY      COLONOSCOPY      complete - 7 year repeat recommended - Resolved:      DILATION AND CURETTAGE OF UTERUS      GASTRECTOMY SLEEVE LAPAROSCOPIC  2018    GYNECOLOGIC CRYOSURGERY      Cervix - 's for abnormal paps     HYSTERECTOMY      INDUCED       x3    LAPAROSCOPIC SALPINGOOPHERECTOMY Right     LAPAROSCOPIC TOTAL HYSTERECTOMY      06, Laparoscopic hysterectomy with LSO with vaginal closure of the vaginal cuff     SALPINGOOPHORECTOMY Left     STOMACH SURGERY  2014    for morbid obesity - Managed by: Merissa Archuleta [address] EXTRACTION           Current Outpatient Medications:     Biotin 5000 MCG CAPS, Take 1 capsule by mouth daily, Disp: , Rfl:     busPIRone (BUSPAR) 7 5 mg tablet, Take 1 tablet (7 5 mg total) by mouth 2 (two) times a day, Disp: 60 tablet, Rfl: 1    calcium citrate-vitamin D (CALCIUM CITRATE +) 315-200 MG-UNIT per tablet, Calcium Citrate CAPS  Refills: 0  Active, Disp: , Rfl:     Cholecalciferol (VITAMIN D3) 2000 units TABS, Take 1 tablet by mouth daily, Disp: , Rfl:     Cyanocobalamin (VITAMIN B-12) 1000 MCG SUBL, Place 1 Dose under the tongue daily, Disp: , Rfl:     gabapentin (NEURONTIN) 300 mg capsule, TAKE 1 CAPSULE BY MOUTH EVERY MORNING FOR 3 DAYS, THEN TAKE 1 CAPSULE TWICE A DAY FOR 3 DAYS, THEN TAKE 1 CAPSULE 3 TIMES A DAY, Disp: 90 capsule, Rfl: 2    metaxalone (SKELAXIN) 400 MG tablet, Take 1 tablet (400 mg total) by mouth 3 (three) times a day as needed (muscle relaxer), Disp: 90 tablet, Rfl: 0    Multiple Vitamins-Minerals (CENTRUM SILVER) tablet, Take 1 tablet by mouth daily, Disp: , Rfl:     Nerve Stimulator (STANDARD TENS) TOBY, by Does not apply route 2 (two) times a day, Disp: 1 Device, Rfl: 0    omeprazole (PriLOSEC) 20 mg delayed release capsule, TAKE 1 CAPSULE BY MOUTH TWICE A DAY, Disp: 60 capsule, Rfl: 2    Synthroid 150 MCG tablet, TAKE 1 TABLET BY MOUTH DAILY   BRAND NECESSARY , Disp: 30 tablet, Rfl: 11    traMADol-acetaminophen (ULTRACET) 37 5-325 mg per tablet, TAKE 1 TABLET BY MOUTH EVERY 8 (EIGHT) HOURS AS NEEDED FOR MODERATE PAIN, Disp: 30 tablet, Rfl: 0    venlafaxine (EFFEXOR-XR) 150 mg 24 hr capsule, TAKE 1 CAPSULE BY MOUTH EVERY DAY, Disp: 30 capsule, Rfl: 1    venlafaxine (EFFEXOR-XR) 75 mg 24 hr capsule, TAKE 1 CAPSULE BY MOUTH DAILY WITH BREAKFAST, Disp: 30 capsule, Rfl: 2    Current Facility-Administered Medications:     lidocaine (PF) (XYLOCAINE-MPF) 1 % injection 5 mL, 5 mL, Other, Once, Bogdan Tavares,     lidocaine (PF) (XYLOCAINE-MPF) 2 % injection 5 mL, 5 mL, Perineural, Once, Bogdan Tavares, DO    No Known Allergies    Physical Exam:   General: Awake, Alert, Oriented x 3, Mood and affect appropriate  Respiratory: Respirations even and unlabored  Cardiovascular: Peripheral pulses intact; no edema  Musculoskeletal Exam:  Pain with lumbar extension    ASA Score: II         Assessment:   1  Lumbar spondylosis    2   Low back pain, unspecified back pain laterality, unspecified chronicity, unspecified whether sciatica present        Plan: RIGHT L3, L4 AND L5 RFA Admitted

## 2022-06-28 ENCOUNTER — OFFICE VISIT (OUTPATIENT)
Dept: PHYSICAL THERAPY | Facility: REHABILITATION | Age: 62
End: 2022-06-28
Payer: COMMERCIAL

## 2022-06-28 DIAGNOSIS — M54.2 NECK PAIN: ICD-10-CM

## 2022-06-28 DIAGNOSIS — N62 MACROMASTIA: Primary | ICD-10-CM

## 2022-06-28 PROCEDURE — 97110 THERAPEUTIC EXERCISES: CPT

## 2022-06-28 PROCEDURE — 97140 MANUAL THERAPY 1/> REGIONS: CPT

## 2022-06-28 NOTE — PROGRESS NOTES
Daily Note     Today's date: 2022  Patient name: Bin Dominguez  : 1960  MRN: [id number]  Referring provider: Shaquille Pate MD  Dx:   Encounter Diagnosis     ICD-10-CM    1  Macromastia  N62    2  Neck pain  M54 2        Start Time: 1500  Stop Time: 1545  Total time in clinic (min): 45 minutes    Subjective: Pt reports her bruises are healing, no HA or vision issues  Objective: See treatment diary below    Access Code: [address]  URL: [email or web address]  Date: 2022  Prepared by: Domnick Romberg    Program Notes   No weights with the strengthening exercises at this time    Exercises  · Prone Scapular Retraction Arms at Side - 1 x daily - 7 x weekly - 2 sets - 10 reps  · Prone Single Arm Shoulder Horizontal Abduction with Dumbbell - 1 x daily - 7 x weekly - 2 sets - 10 reps  · Prone Single Arm Shoulder Horizontal Abduction with Scapular Retraction and Palm Down - 1 x daily - 7 x weekly - 2 sets - 10 reps  · Standing Upper Trapezius Stretch - 1 x daily - 7 x weekly - 3 reps - 20 hold    Assessment: Tolerated treatment well  Patient would benefit from continued PT  Able to reintroduce UBE to program  Improved tolerance to program   Able to progress weight  Plan: Continue per plan of care        Precautions: standard    Manuals        Upper Cervical mobs 5'  10 min grade 1-3 MFR 10' 10' 10'       Thoracic mobs nv  T1-4 grade 2-4          Lower cervical mobs 5'  Grade 2-4 MFR 10' 10' 5''       SOR & upper trap  15' R side with instrument           S/l scap mobility  10'           Neuro Re-Ed                                                                                                        Ther Ex             Cervical retraction x10 HEP 3x/day            Seated thoracic ext x10 HEP 3x/day  x20  x20 x20       Thoracic ext supine over FR   3x20  5x20" 5x20"       UBE  L1 3'/3' L1 3'/3' L1 3'/3' held L1 3'3'       Supine chin tucks  10x x10 x10 x10 x15       ER w/ band  10x           scap punches  1# 10x x20 x20 w/ 1# x20 2#        Supine chest stretch on FR   x10 mobs, 30" cactus arms x10 mobs, 30" cactus arms x10 mobs, 30" cactus arms x10 mobs, 30" cactus arms       Prone series T, Y & I    10x each 2x10 T & I, Y 2x5 2x10 T & I, Y        Wall push ups     10x 10x       scaption      2# 2x10       Bicep curls      2x10 4#       Shoulder rows      YCO 2x10       Shoulder extension      YCO 2x10       Ther Activity

## 2022-06-29 ENCOUNTER — TELEMEDICINE (OUTPATIENT)
Dept: BARIATRICS | Facility: CLINIC | Age: 62
End: 2022-06-29
Payer: COMMERCIAL

## 2022-06-29 VITALS — WEIGHT: 145 LBS | HEIGHT: 64 IN | BODY MASS INDEX: 24.75 KG/M2

## 2022-06-29 DIAGNOSIS — K21.9 GERD (GASTROESOPHAGEAL REFLUX DISEASE): ICD-10-CM

## 2022-06-29 DIAGNOSIS — Z48.815 ENCOUNTER FOR SURGICAL AFTERCARE FOLLOWING SURGERY OF DIGESTIVE SYSTEM: Primary | ICD-10-CM

## 2022-06-29 DIAGNOSIS — R10.9 ABDOMINAL PAIN: ICD-10-CM

## 2022-06-29 PROCEDURE — 1036F TOBACCO NON-USER: CPT | Performed by: NURSE PRACTITIONER

## 2022-06-29 PROCEDURE — 99212 OFFICE O/P EST SF 10 MIN: CPT | Performed by: NURSE PRACTITIONER

## 2022-06-29 PROCEDURE — 3008F BODY MASS INDEX DOCD: CPT | Performed by: NURSE PRACTITIONER

## 2022-06-29 NOTE — PROGRESS NOTES
Virtual Regular Visit    Verification of patient location:    Patient is located in the following state in which I hold an active license [place]      Assessment/Plan:    Problem List Items Addressed This Visit        Digestive    GERD (gastroesophageal reflux disease)       Other    Encounter for surgical aftercare following surgery of digestive system - Primary      Other Visit Diagnoses     Abdominal pain            PLAN -   continue with omeprazole for now  Discussed with patient, may take up to a year after surgery  Will plan to taper again at next visit in September at her annual visit  May start on famotidine 20 mg PO BID QOD while she tapers off omeprazole to help with her discomfort  Patient is agreeable to plan  Reason for visit is   Chief Complaint   Patient presents with    Follow-up     Abdominal Pain    Virtual Regular Visit        Encounter provider JANIE Patricia    Provider located at [address] [place] [id number]      Recent Visits  No visits were found meeting these conditions  Showing recent visits within past 7 days and meeting all other requirements  Today's Visits  Date Type Provider Dept   06/29/22 Telemedicine JANIE Patricia [facility]   Showing today's visits and meeting all other requirements  Future Appointments  No visits were found meeting these conditions  Showing future appointments within next 150 days and meeting all other requirements       The patient was identified by name and date of birth  Nikky Gabriel was informed that this is a telemedicine visit and that the visit is being conducted through [facility] and patient was informed this is a secure, HIPAA-complaint platform  She agrees to proceed     My office door was closed  No one else was in the room  She acknowledged consent and understanding of privacy and security of the video platform   The patient has agreed to participate and understands they can discontinue the visit at any time  Patient is aware this is a billable service  Subjective  Ivonne Devine is a 57 y o  female here for follow up of epigastric abdominal pain  s/p conversion from gastric sleeve to RNYGB by Dr Sascha Tobias on 2021 presenting for follow up of abdominal pain after she had tried to taper off her omeprazole  Patient is currently taking omeprazole 20 mg PO QD  Since restarted she did not experienced any further pain  She denies smoking, coffee/caffeinated drinks, ETOH use  She eats small amounts and eats a bland diet  She is following the 30/60 minute rule  Denies any associated N/V/D/C, regurgitation, reflux or dysphagia            Past Medical History:   Diagnosis Date    Anxiety     Arthritis     Bariatric surgery status     Chronic pain disorder     Depression     last assessed: 2018     Disturbance of memory     last assessed: 2016     Endometriosis     GERD (gastroesophageal reflux disease)     Hiatal hernia     Hirsutism     History of sleeve gastrectomy     Lumbar herniated disc     Menometrorrhagia     Morbid obesity ([address] )     last assessed: Dec 5, 2014     Motion sickness     Postgastrectomy malabsorption     Sclerosing adenosis of breast     Symptomatic menopausal or female climacteric states     Uterine leiomyoma        Past Surgical History:   Procedure Laterality Date    APPENDECTOMY      BREAST BIOPSY Left 2010    COLONOSCOPY      complete - 7 year repeat recommended - Resolved:      DILATION AND CURETTAGE OF UTERUS      GASTRECTOMY SLEEVE LAPAROSCOPIC  2018    GYNECOLOGIC CRYOSURGERY      Cervix - 's for abnormal paps     HYSTERECTOMY      INDUCED       x3    LAPAROSCOPIC SALPINGOOPHERECTOMY Right     LAPAROSCOPIC TOTAL HYSTERECTOMY      06, Laparoscopic hysterectomy with LSO with vaginal closure of the vaginal cuff     [facility] REVISE,GASTROJEJUN ANAST,W/O VAGOTOMY N/A 9/28/2021    Procedure: LAPAROSCOPIC REVISION CONVERSION  JENN-EN-Y GASTRIC BYPASS WITH ROBOTICS AND INTRAOPERATIVE EGD, PARAESOPHAGEAL HERNIA REPAIR;  Surgeon: Romi Joyce MD;  Location: [facility];  Service: Manpreet Alejandra SALPINGOOPHORECTOMY Left     STOMACH SURGERY  11/24/2014    for morbid obesity - Managed by: Belia Tovar, [address] [place] [address]      TOOTH EXTRACTION         Current Outpatient Medications   Medication Sig Dispense Refill    BIOTIN MAXIMUM PO Take by mouth      Calcium 250 MG CAPS Take 500 mg by mouth daily      Cholecalciferol (VITAMIN D3) 2000 units TABS Take 1 tablet by mouth daily      Cyanocobalamin (VITAMIN B-12) 1000 MCG SUBL Place 1 Dose under the tongue daily      gabapentin (NEURONTIN) 400 mg capsule Take 1 capsule (400 mg total) by mouth 3 (three) times a day 90 capsule 2    hydrOXYzine HCL (ATARAX) 25 mg tablet Take 1 tablet (25 mg total) by mouth every 6 (six) hours as needed for anxiety 30 tablet 0    IRON-VITAMIN C PO Take 1 tablet by mouth daily      Magnesium 200 MG TABS Take 1 tablet by mouth daily        Multiple Vitamins-Minerals (MULTIPLE VITAMINS/WOMENS PO) Take by mouth daily      nystatin (MYCOSTATIN) powder Apply topically 3 (three) times a day 30 g 2    Synthroid 150 MCG tablet 1 tab 6 days of the week, 1 5 tabs sundays 32 tablet 11    tiZANidine (ZANAFLEX) 4 mg tablet 1 tab PO q 8 hrs prn muscle pain or spasm 60 tablet 2    traMADol-acetaminophen (ULTRACET) 37 5-325 mg per tablet TAKE 1 TABLET BY MOUTH EVERY 6 HOURS AS NEEDED FOR PAIN (MODERATE PAIN) 60 tablet 0    venlafaxine (EFFEXOR) 100 MG tablet TAKE 1 TABLET BY MOUTH TWICE A DAY 60 tablet 2     No current facility-administered medications for this visit  No Known Allergies    Review of Systems   Constitutional: Negative  Respiratory: Negative  Cardiovascular: Negative  Gastrointestinal: Negative          Video Exam    Vitals: 06/29/22 1306   Weight: 65 8 kg (145 lb)   Height: 5' 3 5" (1 613 m)       Physical Exam  Constitutional:       Appearance: Normal appearance  Pulmonary:      Effort: Pulmonary effort is normal    Neurological:      Mental Status: She is alert  Psychiatric:         Mood and Affect: Mood normal          Behavior: Behavior normal          Thought Content: Thought content normal          Judgment: Judgment normal           I spent 15 minutes directly with the patient during this visit    [address] [place] [address] verbally agrees to participate in [employer]  Pt is aware that [employer] could be limited without vital signs or the ability to perform a full hands-on physical Sancho Maldonadoolamide understands she or the provider may request at any time to terminate the video visit and request the patient to seek care or treatment in person

## 2022-07-01 DIAGNOSIS — F33.41 RECURRENT MAJOR DEPRESSIVE DISORDER, IN PARTIAL REMISSION (HCC): ICD-10-CM

## 2022-07-01 DIAGNOSIS — F41.9 ANXIETY: ICD-10-CM

## 2022-07-01 RX ORDER — VENLAFAXINE 100 MG/1
TABLET ORAL
Qty: 60 TABLET | Refills: 2 | Status: SHIPPED | OUTPATIENT
Start: 2022-07-01 | End: 2022-07-23

## 2022-07-05 ENCOUNTER — OFFICE VISIT (OUTPATIENT)
Dept: PHYSICAL THERAPY | Facility: REHABILITATION | Age: 62
End: 2022-07-05
Payer: COMMERCIAL

## 2022-07-05 DIAGNOSIS — N62 MACROMASTIA: Primary | ICD-10-CM

## 2022-07-05 DIAGNOSIS — M54.2 NECK PAIN: ICD-10-CM

## 2022-07-05 PROCEDURE — 97110 THERAPEUTIC EXERCISES: CPT | Performed by: PHYSICAL THERAPIST

## 2022-07-05 PROCEDURE — 97140 MANUAL THERAPY 1/> REGIONS: CPT | Performed by: PHYSICAL THERAPIST

## 2022-07-05 NOTE — PROGRESS NOTES
Daily Note     Today's date: 2022  Patient name: Katey Stock  : 1960  MRN: [id number]  Referring provider: Arthur Severin, MD  Dx:   Encounter Diagnosis     ICD-10-CM    1  Macromastia  N62    2  Neck pain  M54 2                   Subjective: I feel less neck and shoulder pain overall  Objective: See treatment diary below  Assessment: Tolerated treatment well  Patient exhibited good technique with therapeutic exercises for scapular strengthening  Plan: Continue per plan of care        Precautions: standard    Manuals  7      Upper Cervical mobs 5'  10 min grade 1-3 MFR 10' 10' 10'       Thoracic mobs nv  T1-4 grade 2-4    5'      Lower cervical mobs 5'  Grade 2-4 MFR 10' 10' 5'' 5'      SOR & upper trap  15' R side with instrument     5'      S/l scap mobility  10'                                                  Ther Ex             Cervical retraction x10 HEP 3x/day      x10 supine      Seated thoracic ext x10 HEP 3x/day  x20  x20 x20       Thoracic ext supine over FR   3x20  5x20" 5x20" 5'      UBE  L1 3'/3' L1 3'/3' L1 3'/3' held L1 3'3' 3'/3' L1 5      Supine chin tucks  10x x10 x10 x10 x15       ER w/ band  10x           scap punches  1# 10x x20 x20 w/ 1# x20 2#        Supine chest stretch on FR   x10 mobs, 30" cactus arms x10 mobs, 30" cactus arms x10 mobs, 30" cactus arms x10 mobs, 30" cactus arms 30"x2      Prone series T, Y & I    10x each 2x10 T & I, Y 2x5 2x10 T & I, Y  5" x20 each      Wall push ups     10x 10x       scaption      2# 2x10       Bicep curls      2x10 4#       Shoulder rows      YCO 2x10       Shoulder extension      YCO 2x10       Ther Activity

## 2022-07-07 DIAGNOSIS — E03.9 HYPOTHYROIDISM, UNSPECIFIED TYPE: Primary | ICD-10-CM

## 2022-07-12 ENCOUNTER — OFFICE VISIT (OUTPATIENT)
Dept: PHYSICAL THERAPY | Facility: REHABILITATION | Age: 62
End: 2022-07-12
Payer: COMMERCIAL

## 2022-07-12 DIAGNOSIS — M54.2 NECK PAIN: ICD-10-CM

## 2022-07-12 DIAGNOSIS — N62 MACROMASTIA: Primary | ICD-10-CM

## 2022-07-12 PROCEDURE — 97140 MANUAL THERAPY 1/> REGIONS: CPT

## 2022-07-12 PROCEDURE — 97110 THERAPEUTIC EXERCISES: CPT

## 2022-07-12 NOTE — PROGRESS NOTES
Daily Note     Today's date: 2022  Patient name: Nancie Cartagena  : 1960  MRN: [id number]  Referring provider: Matt Dong MD  Dx:   Encounter Diagnosis     ICD-10-CM    1  Macromastia  N62    2  Neck pain  M54 2        Start Time: 1230  Stop Time: 1315  Total time in clinic (min): 45 minutes    Subjective: Pt reports having a HA for the last 9 days  Objective: See treatment diary below  Assessment: Tolerated treatment well  Patient exhibited good technique with therapeutic exercises for scapular strengthening  Requires a short break between sets but overall demonstrates good form and tolerance  Benefits from addition of foam roll against wall for self mobilizations  Plan: Continue per plan of care        Precautions: standard    Manuals      Upper Cervical mobs 5'  10 min grade 1-3 MFR 10' 10' 10'       Thoracic mobs nv  T1-4 grade 2-4    5'      Lower cervical mobs 5'  Grade 2-4 MFR 10' 10' 5'' 5' 5'MFR     SOR & upper trap  13' R side with instrument     5' 5'     S/l scap mobility  10'                                                  Ther Ex             Cervical retraction x10 HEP 3x/day      x10 supine      Seated thoracic ext x10 HEP 3x/day  x20  x20 x20       Thoracic ext supine over FR   3x20  5x20" 5x20" 5'      Foam roll against wall        10x rock back and forth (thoracic spine)     UBE  L1 3'/3' L1 3'/3' L1 3'/3' held L1 3'3' 3'/3' L1 5 3'/3' L1 5     Supine chin tucks  10x x10 x10 x10 x15       ER w/ band  10x           scap punches  1# 10x x20 x20 w/ 1# x20 2#        Supine chest stretch on FR   x10 mobs, 30" cactus arms x10 mobs, 30" cactus arms x10 mobs, 30" cactus arms x10 mobs, 30" cactus arms 30"x2 30"x2     Prone series T, Y & I    10x each 2x10 T & I, Y 2x5 2x10 T & I, Y  5" x20 each 5" x 20     Wall push ups     10x 10x       scaption      2# 2x10  2#     Bicep curls      2x10 4#  2x10 4# one at a time     Shoulder rows      YCO 2x10 YCO 2x10     Shoulder extension      YCO 2x10  YCO 2x10     Ther Activity

## 2022-07-19 ENCOUNTER — OFFICE VISIT (OUTPATIENT)
Dept: PHYSICAL THERAPY | Facility: REHABILITATION | Age: 62
End: 2022-07-19
Payer: COMMERCIAL

## 2022-07-19 DIAGNOSIS — M54.2 NECK PAIN: ICD-10-CM

## 2022-07-19 DIAGNOSIS — N62 MACROMASTIA: Primary | ICD-10-CM

## 2022-07-19 PROCEDURE — 97110 THERAPEUTIC EXERCISES: CPT | Performed by: PHYSICAL THERAPIST

## 2022-07-19 PROCEDURE — 97140 MANUAL THERAPY 1/> REGIONS: CPT | Performed by: PHYSICAL THERAPIST

## 2022-07-19 NOTE — PROGRESS NOTES
Daily Note     Today's date: 2022  Patient name: Lesley Vazquez  : 1960  MRN: [id number]  Referring provider: Divina Carter MD  Dx:   Encounter Diagnosis     ICD-10-CM    1  Macromastia  N62    2  Neck pain  M54 2                   Subjective: Headache is low level but constant for the last 2 weeks; in posterior head, feels better with neck stretches  Objective: See treatment diary below    Assessment: Tolerated treatment well  Patient exhibited good technique with therapeutic exercises  Suspecting cervicogenic headaches due to relief with manual therapy and aggravation with periscap strengthening    but encouraged patient to call PCP to inform of her symptoms  Added prone on elbows with passive, supported cervical retraction  Plan: Continue per plan of care        Precautions: standard    Manuals  7    Upper Cervical mobs 5'  10 min grade 1-3 MFR 10' 10' 10'   5' grade 1-2    Thoracic mobs nv  T1-4 grade 2-4    5'  10'    Lower cervical mobs 5'  Grade 2-4 MFR 10' 10' 5'' 5' 5'MFR 5' grade 2-4    SOR & upper trap  13' R side with instrument     5' 5' 5' SOR    S/l scap mobility  10'                                                  Ther Ex             Cervical retraction x10 HEP 3x/day      x10 supine  x10 supine    Seated thoracic ext x10 HEP 3x/day  x20  x20 x20       Doorway chest stretch         20" x6    Thoracic ext supine over FR   3x20  5x20" 5x20" 5'  3'    Foam roll against wall        10x rock back and forth (thoracic spine)     UBE  L1 3'/3' L1 3'/3' L1 3'/3' held L1 3'3' 3'/3' L1 5 3'/3' L1 5 3'/3'    Supine chin tucks  10x x10 x10 x10 x15   Prone on elbows    ER w/ band  10x           scap punches  1# 10x x20 x20 w/ 1# x20 2#        Supine chest stretch on FR   x10 mobs, 30" cactus arms x10 mobs, 30" cactus arms x10 mobs, 30" cactus arms x10 mobs, 30" cactus arms 30"x2 30"x2     Prone series T, Y & I    10x each 2x10 T & I, Y 2x5 2x10 T & I, Y  5" x20 each 5" x 20 5" x10    Wall push ups     10x 10x       scaption      2# 2x10  2#     Bicep curls      2x10 4#  2x10 4# one at a time     Shoulder rows      YCO 2x10  YCO 2x10 YCO x30    Shoulder extension      YCO 2x10  YCO 2x10 YCO x30

## 2022-07-22 DIAGNOSIS — G89.29 CHRONIC BILATERAL LOW BACK PAIN WITHOUT SCIATICA: ICD-10-CM

## 2022-07-22 DIAGNOSIS — M54.16 RADICULOPATHY, LUMBAR REGION: ICD-10-CM

## 2022-07-22 DIAGNOSIS — M54.50 CHRONIC BILATERAL LOW BACK PAIN WITHOUT SCIATICA: ICD-10-CM

## 2022-07-22 RX ORDER — TIZANIDINE 4 MG/1
TABLET ORAL
Qty: 60 TABLET | Refills: 2 | Status: SHIPPED | OUTPATIENT
Start: 2022-07-22

## 2022-07-23 DIAGNOSIS — E03.9 HYPOTHYROIDISM, UNSPECIFIED TYPE: ICD-10-CM

## 2022-07-24 RX ORDER — LEVOTHYROXINE SODIUM 150 MCG
TABLET ORAL
Qty: 96 TABLET | Refills: 4 | Status: SHIPPED | OUTPATIENT
Start: 2022-07-24 | End: 2022-08-22

## 2022-07-26 ENCOUNTER — APPOINTMENT (OUTPATIENT)
Dept: PHYSICAL THERAPY | Facility: REHABILITATION | Age: 62
End: 2022-07-26
Payer: COMMERCIAL

## 2022-08-02 ENCOUNTER — APPOINTMENT (OUTPATIENT)
Dept: PHYSICAL THERAPY | Facility: REHABILITATION | Age: 62
End: 2022-08-02
Payer: COMMERCIAL

## 2022-08-04 ENCOUNTER — APPOINTMENT (OUTPATIENT)
Dept: PHYSICAL THERAPY | Facility: REHABILITATION | Age: 62
End: 2022-08-04
Payer: COMMERCIAL

## 2022-08-05 ENCOUNTER — OFFICE VISIT (OUTPATIENT)
Dept: PHYSICAL THERAPY | Facility: REHABILITATION | Age: 62
End: 2022-08-05
Payer: COMMERCIAL

## 2022-08-05 DIAGNOSIS — N62 MACROMASTIA: Primary | ICD-10-CM

## 2022-08-05 DIAGNOSIS — M54.2 NECK PAIN: ICD-10-CM

## 2022-08-05 PROCEDURE — 97140 MANUAL THERAPY 1/> REGIONS: CPT

## 2022-08-05 PROCEDURE — 97110 THERAPEUTIC EXERCISES: CPT

## 2022-08-05 NOTE — PROGRESS NOTES
Daily Note     Today's date: 2022  Patient name: Rafaela Schaffer  : 1960  MRN: [id number]  Referring provider: Lillie Turner MD  Dx:   Encounter Diagnosis     ICD-10-CM    1  Macromastia  N62    2  Neck pain  M54 2        Start Time: 730  Stop Time: 0815  Total time in clinic (min): 45 minutes    Subjective: HA is more of a sinus headache due to being sick "lost my voice"  Overall feeling much better and able to do more  Objective: See treatment diary below    Assessment: Tolerated treatment well  Patient exhibited good technique with therapeutic exercises  Challenged with addition of different exercises w/ resistance band and body blade but overall demonstrates good form  Responds well to manual techniques  Plan: Continue per plan of care        Precautions: standard    Manuals  7 8/4   Upper Cervical mobs 5'  10 min grade 1-3 MFR 10' 10' 10'   5' grade 1-2    Thoracic mobs nv  T1-4 grade 2-4    5'  10'    Lower cervical mobs 5'  Grade 2-4 MFR 10' 10' 5'' 5' 5'MFR 5' grade 2-4 10' MFR   SOR & upper trap  13' R side with instrument     5' 5' 5' SOR 5' SOR   S/l scap mobility  10'                                                  Ther Ex             Cervical retraction x10 HEP 3x/day      x10 supine  x10 supine x10 supine   Seated thoracic ext x10 HEP 3x/day  x20  x20 x20       Doorway chest stretch         20" x6 20" x 6   Thoracic ext supine over FR   3x20  5x20" 5x20" 5'  3'    Foam roll against wall        10x rock back and forth (thoracic spine)  10x rock back and forth (thoracic spine)   UBE  L1 3'/3' L1 3'/3' L1 3'/3' held L1 3'3' 3'/3' L1 5 3'/3' L1 5 3'/3' 5'   Supine chin tucks  10x x10 x10 x10 x15   Prone on elbows Prone on elbows x 10   ER w/ band  10x           scap punches  1# 10x x20 x20 w/ 1# x20 2#        Supine chest stretch on FR   x10 mobs, 30" cactus arms x10 mobs, 30" cactus arms x10 mobs, 30" cactus arms x10 mobs, 30" cactus arms 30"x2 30"x2     Prone series T, Y & I    10x each 2x10 T & I, Y 2x5 2x10 T & I, Y  5" x20 each 5" x 20 5" x10 T's standing (bent over) w/ 2#   Wall push ups     10x 10x       scaption      2# 2x10  2#     Bicep curls      2x10 4#  2x10 4# one at a time  W/ YCO 2x10   Shoulder rows      YCO 2x10  YCO 2x10 YCO x30    Shoulder extension      YCO 2x10  YCO 2x10 YCO x30    Body blade          Elbow against trunk both planes 2x20"

## 2022-08-12 ENCOUNTER — OFFICE VISIT (OUTPATIENT)
Dept: PHYSICAL THERAPY | Facility: REHABILITATION | Age: 62
End: 2022-08-12
Payer: COMMERCIAL

## 2022-08-12 DIAGNOSIS — N62 MACROMASTIA: Primary | ICD-10-CM

## 2022-08-12 DIAGNOSIS — M54.2 NECK PAIN: ICD-10-CM

## 2022-08-12 PROCEDURE — 97140 MANUAL THERAPY 1/> REGIONS: CPT

## 2022-08-12 PROCEDURE — 97110 THERAPEUTIC EXERCISES: CPT

## 2022-08-12 NOTE — PROGRESS NOTES
Daily Note     Today's date: 2022  Patient name: Johnetta Homans  : 1960  MRN: [id number]  Referring provider: July Decker MD  Dx:   Encounter Diagnosis     ICD-10-CM    1  Macromastia  N62    2  Neck pain  M54 2        Start Time: 730  Stop Time: 815  Total time in clinic (min): 45 minutes    Subjective: Feels better but continues to have no voice  Reports being more achy today  Objective: See treatment diary below    Assessment: Tolerated treatment well  Patient exhibited good technique with therapeutic exercises  Responds well to progression of exercises, some soreness but overall good  Plan: Continue per plan of care        Precautions: standard    Manuals  7    Upper Cervical mobs 10'   5' grade 1-2      Thoracic mobs  5'  10'      Lower cervical mobs 5'' 5' 5'MFR 5' grade 2-4 10' MFR 10'    SOR & upper trap  5' 5' 5' SOR 5' SOR 5' SOR    S/l scap mobility                                        Ther Ex          Cervical retraction  x10 supine  x10 supine x10 supine 10x    Seated thoracic ext x20         Doorway chest stretch    20" x6 20" x 6     Thoracic ext supine over FR 5x20" 5'  3'      Foam roll against wall   10x rock back and forth (thoracic spine)  10x rock back and forth (thoracic spine) 10x rock back and forth (thoracic spine)    UBE L1 3'3' 3'/3' L1 5 3'/3' L1 5 3'/3' 5' L1 5 3'/3'    Supine chin tucks x15   Prone on elbows Prone on elbows x 10     ER w/ band          scap punches          Supine chest stretch on FR x10 mobs, 30" cactus arms 30"x2 30"x2       Prone series T, Y & I 2x10 T & I, Y  5" x20 each 5" x 20 5" x10 T's standing (bent over) w/ 2#     Wall push ups 10x     Plinth push ups 2x10    scaption 2# 2x10  2#       Bicep curls 2x10 4#  2x10 4# one at a time  W/ YCO 2x10     Shoulder rows YCO 2x10  YCO 2x10 YCO x30      Shoulder extension YCO 2x10  YCO 2x10 YCO x30      Body blade     Elbow against trunk both planes 2x20"     D1/D2 w/ resistance      YCO 20x

## 2022-08-16 DIAGNOSIS — M51.36 DISC DEGENERATION, LUMBAR: ICD-10-CM

## 2022-08-16 RX ORDER — GABAPENTIN 400 MG/1
CAPSULE ORAL
Qty: 90 CAPSULE | Refills: 2 | Status: SHIPPED | OUTPATIENT
Start: 2022-08-16

## 2022-08-17 ENCOUNTER — OFFICE VISIT (OUTPATIENT)
Dept: PHYSICAL THERAPY | Facility: REHABILITATION | Age: 62
End: 2022-08-17
Payer: COMMERCIAL

## 2022-08-17 DIAGNOSIS — M54.2 NECK PAIN: ICD-10-CM

## 2022-08-17 DIAGNOSIS — N62 MACROMASTIA: Primary | ICD-10-CM

## 2022-08-17 PROCEDURE — 97110 THERAPEUTIC EXERCISES: CPT

## 2022-08-17 PROCEDURE — 97140 MANUAL THERAPY 1/> REGIONS: CPT | Performed by: PHYSICAL THERAPIST

## 2022-08-17 PROCEDURE — 97140 MANUAL THERAPY 1/> REGIONS: CPT

## 2022-08-17 NOTE — LETTER
Dr Bashir Corado:    It was my pleasure to treat your patient, Christopher Cheney, at the [place] [address] for neck pain  They have discharged from physical therapy because they have achieved their goals  Please see below documentation from our final treatment session  Lola knows that they can return to physical therapy if their symptoms should return or if they should need further guidance in maintaining their goals  Sincerely,  Chitra Cotto, PT    Daily Note/Discharge Note     Today's date: 2022  Patient name: Tam Hylton  : 1960  MRN: [id number]  Referring provider: Ravinder Ly MD  Dx:   Encounter Diagnosis     ICD-10-CM    1  Macromastia  N62    2  Neck pain  M54 2        Start Time: 745  Stop Time: 830  Total time in clinic (min): 45 minutes    Subjective: Pt reports being more sore after last session  Her neck overall feels much better, thoracic discomfort/pain continues  Objective: See treatment diary below    Assessment: Tolerated treatment well  Good tolerance to today's session, part of session spent with [facility]  Encouraged to be compliant with her exercises and self stretches  Goals  1  Cervical ROM will improve by 25% into L rotation and extension, painfree, in 12 weeks  2  Patient will report 25% improvement in symptoms of neck pain after 12 weeks  GOAL MET     Plan: Discontinue POC       Precautions: standard    Manuals    Upper Cervical mobs 10'   5' grade 1-2      Thoracic mobs  5'  10'      Lower cervical mobs 5'' 5' 5'MFR 5' grade 2-4 10' MFR 10' 10'   SOR & upper trap  5' 5' 5' SOR 5' SOR 5' SOR 5' SOR   S/l scap mobility          assessment       10'                       Ther Ex          Cervical retraction  x10 supine  x10 supine x10 supine 10x    Seated thoracic ext x20         Doorway chest stretch    20" x6 20" x 6  20" x5   Thoracic ext supine over FR 5x20" 5'  3'      Foam roll against wall   10x rock back and forth (thoracic spine)  10x rock back and forth (thoracic spine) 10x rock back and forth (thoracic spine)    UBE L1 3'3' 3'/3' L1 5 3'/3' L1 5 3'/3' 5' L1 5 3'/3' L 1 5 3'/3'   Supine chin tucks x15   Prone on elbows Prone on elbows x 10     ER w/ band          scap punches          Supine chest stretch on FR x10 mobs, 30" cactus arms 30"x2 30"x2    30" x2   Prone series T, Y & I 2x10 T & I, Y  5" x20 each 5" x 20 5" x10 T's standing (bent over) w/ 2#     Wall push ups 10x     Plinth push ups 2x10 2x10   scaption 2# 2x10  2#       Bicep curls 2x10 4#  2x10 4# one at a time  W/ YCO 2x10     Shoulder rows YCO 2x10  YCO 2x10 YCO x30      Shoulder extension YCO 2x10  YCO 2x10 YCO x30      Body blade     Elbow against trunk both planes 2x20"     D1/D2 w/ resistance      YCO 20x                                         Attestation signed by Juana Canchola PT at 8/17/2022 11:44 AM:  I supervised the visit  We discussed the case to ensure appropriate continuation and progression of care and I reviewed the documentation  Goals achieved; improved upper quarter strength, improved cervicothoracic mobility  Neck pain persists but is improved with PT course of care  Discharge appropriate at this time

## 2022-08-17 NOTE — PROGRESS NOTES
Daily Note/Discharge Note     Today's date: 2022  Patient name: Oswald Merida  : 1960  MRN: [id number]  Referring provider: Quiana Arias MD  Dx:   Encounter Diagnosis     ICD-10-CM    1  Macromastia  N62    2  Neck pain  M54 2        Start Time: 07  Stop Time: 0830  Total time in clinic (min): 45 minutes    Subjective: Pt reports being more sore after last session  Her neck overall feels much better, thoracic discomfort/pain continues  Objective: See treatment diary below    Assessment: Tolerated treatment well  Good tolerance to today's session, part of session spent with [facility]  Encouraged to be compliant with her exercises and self stretches  Goals  1  Cervical ROM will improve by 25% into L rotation and extension, painfree, in 12 weeks  2  Patient will report 25% improvement in symptoms of neck pain after 12 weeks  GOAL MET     Plan: Discontinue POC       Precautions: standard    Manuals  7   Upper Cervical mobs 10'   5' grade 1-2      Thoracic mobs  5'  10'      Lower cervical mobs 5'' 5' 5'MFR 5' grade 2-4 10' MFR 10' 10'   SOR & upper trap  5' 5' 5' SOR 5' SOR 5' SOR 5' SOR   S/l scap mobility          assessment       10'                       Ther Ex          Cervical retraction  x10 supine  x10 supine x10 supine 10x    Seated thoracic ext x20         Doorway chest stretch    20" x6 20" x 6  20" x5   Thoracic ext supine over FR 5x20" 5'  3'      Foam roll against wall   10x rock back and forth (thoracic spine)  10x rock back and forth (thoracic spine) 10x rock back and forth (thoracic spine)    UBE L1 3'3' 3'/3' L1 5 3'/3' L1 5 3'/3' 5' L1 5 3'/3' L 1 5 3'/3'   Supine chin tucks x15   Prone on elbows Prone on elbows x 10     ER w/ band          scap punches          Supine chest stretch on FR x10 mobs, 30" cactus arms 30"x2 30"x2    30" x2   Prone series T, Y & I 2x10 T & I, Y  5" x20 each 5" x 20 5" x10 T's standing (bent over) w/ 2#     Wall push ups 10x     Plinth push ups 2x10 2x10   scaption 2# 2x10  2#       Bicep curls 2x10 4#  2x10 4# one at a time  W/ YCO 2x10     Shoulder rows YCO 2x10  YCO 2x10 YCO x30      Shoulder extension YCO 2x10  YCO 2x10 YCO x30      Body blade     Elbow against trunk both planes 2x20"     D1/D2 w/ resistance      YCO 20x

## 2022-08-22 ENCOUNTER — OFFICE VISIT (OUTPATIENT)
Dept: FAMILY MEDICINE CLINIC | Facility: HOSPITAL | Age: 62
End: 2022-08-22
Payer: COMMERCIAL

## 2022-08-22 VITALS
HEIGHT: 63 IN | OXYGEN SATURATION: 98 % | HEART RATE: 84 BPM | SYSTOLIC BLOOD PRESSURE: 110 MMHG | TEMPERATURE: 99.2 F | DIASTOLIC BLOOD PRESSURE: 62 MMHG | BODY MASS INDEX: 25.69 KG/M2 | WEIGHT: 145 LBS

## 2022-08-22 DIAGNOSIS — J01.00 ACUTE NON-RECURRENT MAXILLARY SINUSITIS: Primary | ICD-10-CM

## 2022-08-22 DIAGNOSIS — E03.9 HYPOTHYROIDISM, UNSPECIFIED TYPE: ICD-10-CM

## 2022-08-22 PROCEDURE — 99213 OFFICE O/P EST LOW 20 MIN: CPT | Performed by: STUDENT IN AN ORGANIZED HEALTH CARE EDUCATION/TRAINING PROGRAM

## 2022-08-22 RX ORDER — BENZONATATE 100 MG/1
100 CAPSULE ORAL 3 TIMES DAILY PRN
Qty: 20 CAPSULE | Refills: 0 | Status: SHIPPED | OUTPATIENT
Start: 2022-08-22 | End: 2022-10-11

## 2022-08-22 RX ORDER — AMOXICILLIN AND CLAVULANATE POTASSIUM 875; 125 MG/1; MG/1
1 TABLET, FILM COATED ORAL EVERY 12 HOURS SCHEDULED
Qty: 20 TABLET | Refills: 0 | Status: SHIPPED | OUTPATIENT
Start: 2022-08-22 | End: 2022-09-01

## 2022-08-22 RX ORDER — LEVOTHYROXINE SODIUM 150 MCG
TABLET ORAL
Qty: 96 TABLET | Refills: 5 | Status: SHIPPED | OUTPATIENT
Start: 2022-08-22 | End: 2022-09-22

## 2022-08-22 NOTE — PROGRESS NOTES
[address] [facility]    Assessment/Plan:      Diagnosis ICD-10-CM Associated Orders   1  Acute non-recurrent maxillary sinusitis  J01 00 amoxicillin-clavulanate (Augmentin) 875-125 mg per tablet     benzonatate (TESSALON PERLES) 100 mg capsule     • Trial Augmentin and Tessalon Perles given length of course  Continue rest, adequate nutrition, adequate hydration  Follow-up with PCP for routine care or sooner as needed  • Patient may call or return to office with any questions or concerns  ______________________________________________________________________  Subjective:     Patient ID: Erich Ormond is a 57 y o  female  HPI  Erich Ormond  Chief Complaint   Patient presents with   • Sinus Problem     Patient states she has had symptoms for 3 weeks, has not had COVID testing  Described a fever of 103 last night  Today 99 2  Eyes affected at first    Works at [employer]  Green mucus from sinuses  Throat sore & pain with swallowing  Tylenol & profuse sweating  Using robitussin mostly for sleep  Cough going on  The following portions of the patient's history were reviewed and updated as appropriate: allergies, current medications, past medical history, and problem list     Review of Systems   Constitutional: Positive for chills, diaphoresis, fatigue and fever  HENT: Positive for congestion, ear pain, postnasal drip, rhinorrhea, sinus pressure, sinus pain, sore throat, trouble swallowing and voice change  Eyes: Negative for pain and redness  Respiratory: Positive for cough  Negative for shortness of breath  Cardiovascular: Negative for chest pain and palpitations  Gastrointestinal: Negative for diarrhea, nausea and vomiting  Musculoskeletal: Negative for myalgias  Neurological: Positive for dizziness, light-headedness (mild) and headaches         Objective:      Vitals:    08/22/22 1429   BP: 110/62   Pulse: 84   Temp: 99 2 °F (37 3 °C)   SpO2: 98%      Physical Exam  Vitals reviewed  Constitutional:       General: She is not in acute distress  Appearance: Normal appearance  She is well-developed  She is ill-appearing (warm)  HENT:      Head: Normocephalic and atraumatic  Right Ear: There is no impacted cerumen  Left Ear: There is no impacted cerumen  Nose: Congestion present  Mouth/Throat:      Mouth: Mucous membranes are moist       Pharynx: Oropharynx is clear  No oropharyngeal exudate  Comments: Hoarse voice, cough   Eyes:      General: No scleral icterus  Right eye: No discharge  Left eye: No discharge  Cardiovascular:      Rate and Rhythm: Normal rate and regular rhythm  Pulses: Normal pulses  Heart sounds: Normal heart sounds  No murmur heard  Pulmonary:      Effort: Pulmonary effort is normal       Breath sounds: No stridor  No wheezing  Comments: Noisy upper airways  Musculoskeletal:      Cervical back: Normal range of motion and neck supple  Tenderness present  No rigidity  Right lower leg: No edema  Lymphadenopathy:      Cervical: Cervical adenopathy (left side>right) present  Skin:     General: Skin is warm  Neurological:      Mental Status: She is alert and oriented to person, place, and time  Gait: Gait normal    Psychiatric:         Mood and Affect: Mood normal          Behavior: Behavior normal          Thought Content: Thought content normal          Judgment: Judgment normal            Portions of the record may have been created with voice recognition software  Occasional wrong word or "sound alike" substitutions may have occurred due to the inherent limitations of voice recognition software  Please review the chart carefully and recognize, using context, where substitutions/typographical errors may have occurred

## 2022-08-31 DIAGNOSIS — K91.2 POSTGASTRECTOMY MALABSORPTION: Primary | ICD-10-CM

## 2022-08-31 DIAGNOSIS — E03.9 HYPOTHYROIDISM, UNSPECIFIED TYPE: ICD-10-CM

## 2022-08-31 DIAGNOSIS — E55.9 VITAMIN D DEFICIENCY: ICD-10-CM

## 2022-08-31 DIAGNOSIS — M54.16 RADICULOPATHY, LUMBAR REGION: ICD-10-CM

## 2022-08-31 DIAGNOSIS — Z90.3 POSTGASTRECTOMY MALABSORPTION: Primary | ICD-10-CM

## 2022-08-31 LAB
T4 FREE SERPL-MCNC: 1.53 NG/DL (ref 0.82–1.77)
TSH SERPL DL<=0.005 MIU/L-ACNC: 2.24 UIU/ML (ref 0.45–4.5)

## 2022-09-22 DIAGNOSIS — E03.9 HYPOTHYROIDISM, UNSPECIFIED TYPE: ICD-10-CM

## 2022-09-22 RX ORDER — LEVOTHYROXINE SODIUM 150 MCG
TABLET ORAL
Qty: 96 TABLET | Refills: 6 | Status: SHIPPED | OUTPATIENT
Start: 2022-09-22 | End: 2022-10-17

## 2022-09-28 ENCOUNTER — OFFICE VISIT (OUTPATIENT)
Dept: BARIATRICS | Facility: CLINIC | Age: 62
End: 2022-09-28
Payer: COMMERCIAL

## 2022-09-28 VITALS
SYSTOLIC BLOOD PRESSURE: 124 MMHG | TEMPERATURE: 97.5 F | BODY MASS INDEX: 24.5 KG/M2 | HEIGHT: 64 IN | WEIGHT: 143.5 LBS | DIASTOLIC BLOOD PRESSURE: 70 MMHG | HEART RATE: 73 BPM

## 2022-09-28 DIAGNOSIS — Z48.815 ENCOUNTER FOR SURGICAL AFTERCARE FOLLOWING SURGERY OF DIGESTIVE SYSTEM: Primary | ICD-10-CM

## 2022-09-28 DIAGNOSIS — Z98.84 BARIATRIC SURGERY STATUS: ICD-10-CM

## 2022-09-28 DIAGNOSIS — K91.2 POSTSURGICAL MALABSORPTION: ICD-10-CM

## 2022-09-28 DIAGNOSIS — K21.9 GASTROESOPHAGEAL REFLUX DISEASE, UNSPECIFIED WHETHER ESOPHAGITIS PRESENT: ICD-10-CM

## 2022-09-28 PROCEDURE — 99213 OFFICE O/P EST LOW 20 MIN: CPT | Performed by: NURSE PRACTITIONER

## 2022-09-28 NOTE — PROGRESS NOTES
Assessment/Plan:     Patient ID: Shin Solis is a 57 y o  female  Bariatric Surgery Status/GERD  s/p robot assisted conversion from sleeve to gastric bypass with Dr Ольга Ruiz 9/28/21  Presents to the office today annual  Overall doing well, tolerating a regular diet  Denies having any abdominal pain, N/V/D/C, regurgitation or dysphagia  From last seen, patient with reports of abdominal pain secondary to PPI taper - restarted back on and was doing well until the last few days due to running out of medications  After missing 2-3 doses of omeprazole, she reports having burning sensation and reflux again worsened at night  She denies NSAIDs, smoking, caffeine use  PLAN:      - try switching MVI to morning or earlier before bedtime    - Restart omeprazole however taper off as discussed in the next 2 months  Take pepcid on off days PRN if heartburn/reflux occurs  If reflux continues to worsen will follow up in office sooner  Will do UGI next If unable to tolerate taper    - Routine follow up in 6 months for 18 month post op visit  - Continue with healthy lifestyle, adequate protein intake of 60 gm, fluid intake of at least 64 oz  - Continue with MVI daily  - Activity as tolerated  - Labs ordered and will adjust accordingly if any deficiency  - Follow up with RD and SW as needed  · Continued/Maintain healthy weight loss with good nutrition intakes  · Adequate hydration with at least 64oz  fluid intake  · Follow diet as discussed  · Follow vitamin and mineral recommendations as reviewed with you  · Exercise as tolerated  · Colonoscopy referral made: UTD  · Mammogram - UTD    · Follow-up in 6 months for 18 month PO visit  We kindly ask that your arrive 15 minutes before your scheduled appointment time with your provider to allow our staff to room you, get your vital signs and update your chart  · Get lab work done  Please call the office if you need a script    It is recommended to check with your insurance BEFORE getting labs done to make sure they are covered by your policy  · Call our office if you have any problems with abdominal pain especially associated with fever, chills, nausea, vomiting or any other concerns  · All  Post-bariatric surgery patients should be aware that very small quantities of any alcohol can cause impairment and it is very possible not to feel the effect  The effect can be in the system for several hours  It is also a stomach irritant  · It is advised to AVOID alcohol, Nonsteroidal antiinflammatory drugs (NSAIDS) and nicotine of all forms   Any of these can cause stomach irritation/pain  · Discussed the effects of alcohol on a bariatric patient and the increased impairment risk  · Keep up the good work! Postsurgical Malabsorption   -At risk for malabsorption of vitamins/minerals secondary to malabsorption and restriction of intake from bariatric surgery  -Currently taking adequate postop bariatric surgery vitamin supplementation  -Last set of bariatric labs completed on 03/26/2022 and showed WNL  -Next set of bariatric labs ordered for approximately 2 weeks  -Patient received education about the importance of adhering to a lifelong supplementation regimen to avoid vitamin/mineral deficiencies      Diagnoses and all orders for this visit:    Encounter for surgical aftercare following surgery of digestive system  -     Zinc; Future  -     Vitamin B12; Future  -     Vitamin B1, whole blood; Future  -     Vitamin A; Future  -     CBC and Platelet; Future  -     Ferritin; Future  -     Folate; Future  -     Fe+TIBC+Gonzalo; Future  -     Zinc  -     Vitamin B12  -     Vitamin B1, whole blood  -     Vitamin A  -     CBC and Platelet  -     Ferritin  -     Folate  -     Fe+TIBC+Gonzalo    Bariatric surgery status  -     Zinc; Future  -     Vitamin B12; Future  -     Vitamin B1, whole blood; Future  -     Vitamin A; Future  -     CBC and Platelet;  Future  - Ferritin; Future  -     Folate; Future  -     Fe+TIBC+Gonzalo; Future  -     Zinc  -     Vitamin B12  -     Vitamin B1, whole blood  -     Vitamin A  -     CBC and Platelet  -     Ferritin  -     Folate  -     Fe+TIBC+Gonzalo    Postsurgical malabsorption  -     Zinc; Future  -     Vitamin B12; Future  -     Vitamin B1, whole blood; Future  -     Vitamin A; Future  -     CBC and Platelet; Future  -     Ferritin; Future  -     Folate; Future  -     Fe+TIBC+Gonzalo; Future  -     Zinc  -     Vitamin B12  -     Vitamin B1, whole blood  -     Vitamin A  -     CBC and Platelet  -     Ferritin  -     Folate  -     Fe+TIBC+Gonzalo    Gastroesophageal reflux disease, unspecified whether esophagitis present    BMI 25 0-25 9,adult  -     Zinc; Future  -     Vitamin B12; Future  -     Vitamin B1, whole blood; Future  -     Vitamin A; Future  -     CBC and Platelet; Future  -     Ferritin; Future  -     Folate; Future  -     Fe+TIBC+Gonzalo; Future  -     Zinc  -     Vitamin B12  -     Vitamin B1, whole blood  -     Vitamin A  -     CBC and Platelet  -     Ferritin  -     Folate  -     Fe+TIBC+Gonzalo         Subjective:      Patient ID: Dee Gaston is a 57 y o  female  s/p robot assisted conversion from sleeve to gastric bypass with Dr Eldridge Olszewski 9/28/21  Presents to the office today annual  Overall doing well, tolerating a regular diet  Denies having any abdominal pain, N/V/D/C, regurgitation or dysphagia  From last seen, patient with reports of abdominal pain secondary to PPI taper - restarted back on and was doing well until the last few days due to running out of medications  After missing 2-3 doses of omeprazole, she reports having burning sensation and reflux again worsened at night  She denies NSAIDs, smoking, caffeine use  Initial: 189 lbs  Current: 143 5 lbs  EWL: (Weight loss is ahead of schedule at this post surgical period )  Charly: 140 lbs  Current BMI is Body mass index is 25 02 kg/m²      · Tolerating a regular diet-yes  · Eating at least 60 grams of protein per day-yes  · Following 30/60 minute rule with liquids-yes  · Drinking at least 64 ounces of fluid per day-yes  · Drinking carbonated beverages-no  · Sufficient exercise-yes - being active at work  · Using NSAIDs regularly-no  · Using nicotine-no  · Using alcohol-no  · Supplements: Multivitamins, Iron, B-12 and Calcium     · EWL is 97%, which places the patient ahead of schedule for expected post surgical weight loss at this time  The following portions of the patient's history were reviewed and updated as appropriate: allergies, current medications, past family history, past medical history, past social history, past surgical history and problem list     Review of Systems   Constitutional: Negative  Respiratory: Negative  Cardiovascular: Negative  Gastrointestinal: Negative  Musculoskeletal: Positive for arthralgias and back pain (CHRONIC)  Neurological: Positive for dizziness (occasional with positional changes)  Psychiatric/Behavioral: Negative  Objective:    /70   Pulse 73   Temp 97 5 °F (36 4 °C) (Tympanic)   Ht 5' 3 5" (1 613 m)   Wt 65 1 kg (143 lb 8 oz)   BMI 25 02 kg/m²      Physical Exam  Vitals and nursing note reviewed  Constitutional:       Appearance: Normal appearance  Cardiovascular:      Rate and Rhythm: Normal rate and regular rhythm  Pulses: Normal pulses  Heart sounds: Normal heart sounds  Pulmonary:      Effort: Pulmonary effort is normal       Breath sounds: Normal breath sounds  Abdominal:      General: Bowel sounds are normal       Palpations: Abdomen is soft  Tenderness: There is no abdominal tenderness  Musculoskeletal:         General: Normal range of motion  Skin:     General: Skin is warm and dry  Neurological:      General: No focal deficit present  Mental Status: She is alert and oriented to person, place, and time     Psychiatric:         Mood and Affect: Mood normal          Behavior: Behavior normal          Thought Content:  Thought content normal          Judgment: Judgment normal

## 2022-09-28 NOTE — PATIENT INSTRUCTIONS
Take omeprazole every other day for 2-3 weeks  On the off days, take pepcid as needed  After 2-3 weeks, take omeprazole every 2 days for antoher 2-3 weeks  After this every 3 days for 2 weeks then stop

## 2022-10-03 ENCOUNTER — OFFICE VISIT (OUTPATIENT)
Dept: DERMATOLOGY | Facility: CLINIC | Age: 62
End: 2022-10-03
Payer: COMMERCIAL

## 2022-10-03 VITALS — WEIGHT: 143 LBS | BODY MASS INDEX: 25.34 KG/M2 | HEIGHT: 63 IN

## 2022-10-03 DIAGNOSIS — L72.0 EPIDERMAL INCLUSION CYST: ICD-10-CM

## 2022-10-03 DIAGNOSIS — L57.0 KERATOSIS, ACTINIC: Primary | ICD-10-CM

## 2022-10-03 DIAGNOSIS — Z12.83 SCREENING FOR MALIGNANT NEOPLASM OF SKIN: ICD-10-CM

## 2022-10-03 PROCEDURE — 99203 OFFICE O/P NEW LOW 30 MIN: CPT | Performed by: DERMATOLOGY

## 2022-10-03 PROCEDURE — 17000 DESTRUCT PREMALG LESION: CPT | Performed by: DERMATOLOGY

## 2022-10-03 RX ORDER — OMEPRAZOLE 20 MG/1
20 CAPSULE, DELAYED RELEASE ORAL DAILY
COMMUNITY

## 2022-10-03 NOTE — PATIENT INSTRUCTIONS
Assessment and Plan:  Based on a thorough discussion of this condition and the management approach to it (including a comprehensive discussion of the known risks, side effects and potential benefits of treatment), the patient (family) agrees to implement the following specific plan:    Liquid nitrogen was applied for 10-12 seconds to the skin lesion and the expected blistering or scabbing reaction explained  Do not pick at the area  Patient reminded to expect hypopigmented scars from the procedure  Return if lesion fails to fully resolve  Actinic keratoses are very common on sites repeatedly exposed to the sun, especially the backs of the hands and the face, most often affecting the ears, nose, cheeks, upper lip, vermilion of the lower lip, temples, forehead and balding scalp  In severely chronically sun-damaged individuals, they may also be found on the upper trunk, upper and lower limbs, and dorsum of feet  We discussed the theoretical premalignant (pre-cancerous) nature and etiology of these growths  We discussed the prevailing notion that actinic keratoses are a reflection of abnormal skin cell development due to DNA damage by short wavelength UVB  They are more likely to appear if the immune function is poor, due to aging, recent sun exposure, predisposing disease or certain drugs  We discussed that the main concern is that actinic keratoses may predispose to squamous cell carcinoma  It is rare for a solitary actinic keratosis to evolve to squamous cell carcinoma (SCC), but the risk of SCC occurring at some stage in a patient with more than 10 actinic keratoses is thought to be about 10 to 15%  A tender, thickened, ulcerated or enlarging actinic keratosis is suspicious of SCC  Actinic keratoses may be prevented by strict sun protection   If already present, keratoses may improve with a very high sun protection factor (50+) broad-spectrum sunscreen applied at least daily to affected areas, year-round  We recommend that UPF-rated clothing and hats and sunglasses be worn whenever possible and that a sunscreen-moisturizer combination product such as Neutrogena Daily Defense be applied at least three times a day  We performed a thorough discussion of treatment options and specific risk/benefits/alternatives including but not limited to medical field treatment with medications such as the following:      Cryotherapy (specifically, local pain, scarring, dyspigmentation, blistering, possible superinfection, and treats only what we see versus directed treatment today)  Seborrheic Keratosis  A seborrheic keratosis is a harmless warty spot that appears during adult life as a common sign of skin aging  Seborrheic keratoses can arise on any area of skin, covered or uncovered, with the usual exception of the palms and soles  They do not arise from mucous membranes  Seborrheic keratoses can have highly variable appearance  Seborrheic keratoses are extremely common  It has been estimated that over 90% of adults over the age of 61 years have one or more of them  They occur in males and females of all races, typically beginning to erupt in the 35s or 45s  They are uncommon under the age of 21 years  The precise cause of seborrhoeic keratoses is not known  Seborrhoeic keratoses are considered degenerative in nature  As time goes by, seborrheic keratoses tend to become more numerous  Some people inherit a tendency to develop a very large number of them; some people may have hundreds of them  The name "seborrheic keratosis" is misleading, because these lesions are not limited to a seborrhoeic distribution (scalp, mid-face, chest, upper back), nor are they formed from sebaceous glands, nor are they associated with sebum -- which is greasy    Seborrheic keratosis may also be called "SK," "Seb K," "basal cell papilloma," "senile wart," or "barnacle "      Researchers have noted:  Eruptive seborrhoeic keratoses can follow sunburn or dermatitis  Skin friction may be the reason they appear in body folds  Viral cause (e g , human papillomavirus) seems unlikely  Stable and clonal mutations or activation of FRFR3, PIK3CA, MULU, AKT1 and EGFR genes are found in seborrhoeic keratoses  Seborrhoeic keratosis can arise from solar lentigo  FRFR3 mutations also arise in solar lentigines  These mutations are associated with increased age and location on the head and neck, suggesting a role of ultraviolet radiation in these lesions  Seborrheic keratoses do not harbour tumour suppressor gene mutations  Epidermal growth factor receptor inhibitors, which are used to treat some cancers, often result in an increase in verrucal (warty) keratoses  There is no easy way to remove multiple lesions on a single occasion  Unless a specific lesion is "inflamed" and is causing pain or stinging/burning or is bleeding, most insurance companies do not authorize treatment

## 2022-10-03 NOTE — PROGRESS NOTES
[address] [facility] Note     Patient Name: Dee Gaston  Encounter Date: 10/03/2022     Have you been cared for by a [facility] Dermatologist in the last 3 years and, if so, which one? No    · Have you traveled outside of the [address] [place] [address] in the past 3 months or outside of the [facility] area in the last 2 weeks? No     May we call your Preferred Phone number to discuss your specific medical information? Yes     May we leave a detailed message that includes your specific medical information? Yes      Today's Chief Concerns:   Concern #1:  Full Body Exam     Past Medical History:  Have you personally ever had or currently have any of the following? · Skin cancer (such as Melanoma, Basal Cell Carcinoma, Squamous Cell Carcinoma? (If Yes, please provide more detail)- No  · Eczema: No  · Psoriasis: No  · HIV/AIDS: No  · Hepatitis B or C: No  · Tuberculosis: No  · Systemic Immunosuppression such as Diabetes, Biologic or Immunotherapy, Chemotherapy, Organ Transplantation, Bone Marrow Transplantation (If YES, please provide more detail): No  · Radiation Treatment (If YES, please provide more detail): No  · Any other major medical conditions/concerns? (If Yes, which types)- No    Social History:     What is/was your primary occupation? Teacher      What are your hobbies/past-times? Sleeping     Family History:  Have any of your "first degree relatives" (parent, brother, sister, or child) had any of the following       · Skin cancer such as Melanoma or Merkel Cell Carcinoma or Pancreatic Cancer? YES, father   · Eczema, Asthma, Hay Fever or Seasonal Allergies: YES, Child   · Psoriasis or Psoriatic Arthritis: No  · Do any other medical conditions seem to run in your family? If Yes, what condition and which relatives?   No    Current Medications:       Current Outpatient Medications:     Calcium 250 MG CAPS, Take 500 mg by mouth daily, Disp: , Rfl:     Cholecalciferol (VITAMIN D3) 2000 units TABS, Take 1 tablet by mouth daily, Disp: , Rfl:     Cyanocobalamin (VITAMIN B-12) 1000 MCG SUBL, Place 1 Dose under the tongue daily, Disp: , Rfl:     gabapentin (NEURONTIN) 400 mg capsule, TAKE 1 CAPSULE BY MOUTH 3 TIMES A DAY , Disp: 90 capsule, Rfl: 2    IRON-VITAMIN C PO, Take 1 tablet by mouth daily, Disp: , Rfl:     Magnesium 200 MG TABS, Take 1 tablet by mouth daily  , Disp: , Rfl:     Multiple Vitamins-Minerals (MULTIPLE VITAMINS/WOMENS PO), Take by mouth daily, Disp: , Rfl:     omeprazole (PriLOSEC) 20 mg delayed release capsule, Take 20 mg by mouth daily, Disp: , Rfl:     Synthroid 150 MCG tablet, TAKE 1 TAB 6 DAYS OF THE WEEK, 1 5 TABS SUNDAYS, Disp: 96 tablet, Rfl: 6    tiZANidine (ZANAFLEX) 4 mg tablet, 1 tab PO q 8 hrs prn muscle pain or spasm, Disp: 60 tablet, Rfl: 2    traMADol-acetaminophen (ULTRACET) 37 5-325 mg per tablet, Take 1 tablet by mouth every 6 (six) hours as needed for moderate pain, Disp: 60 tablet, Rfl: 0    venlafaxine (EFFEXOR) 100 MG tablet, TAKE 1 TABLET BY MOUTH TWICE A DAY, Disp: 180 tablet, Rfl: 0    benzonatate (TESSALON PERLES) 100 mg capsule, Take 1 capsule (100 mg total) by mouth 3 (three) times a day as needed for cough (Patient not taking: No sig reported), Disp: 20 capsule, Rfl: 0      Review of Systems:  Have you recently had or currently have any of the following? If YES, what are you doing for the problem? · Fever, chills or unintended weight loss: No  · Sudden loss or change in your vision: No  · Nausea, vomiting or blood in your stool: No  · Painful or swollen joints: No  · Wheezing or cough: No  · Changing mole or non-healing wound: No  · Nosebleeds: No  · Excessive sweating: No  · Easy or prolonged bleeding? No  · Over the last 2 weeks, how often have you been bothered by the following problems?   · Taking little interest or pleasure in doing things: 1 - Not at All  · Feeling down, depressed, or hopeless: 1 - Not at All  · Rapid heartbeat with epinephrine:  No    · FEMALES ONLY:    · Are you pregnant or planning to become pregnant? N/A  · Are you currently or planning to be nursing or breast feeding? N/A    · Any known allergies? · No Known Allergies      Physical Exam:     Was a chaperone (Derm Clinical Assistant) present throughout the entire Physical Exam? Yes     Did the Dermatology Team specifically  the patient on the importance of a Full Skin Exam to be sure that nothing is missed clinically? Yes}  o Did the patient ultimately request or accept a Full Skin Exam? Yes   o Did the patient specifically refuse to have the areas "under-the-bra" examined by the Dermatologist? Tamia Grandchild  o Did the patient specifically refuse to have the areas "under-the-underwear" examined by the Dermatologist? YES    CONSTITUTIONAL:   Vitals:    10/03/22 0934   Weight: 64 9 kg (143 lb)   Height: 5' 3" (1 6 m)       PSYCH: Normal mood and affect  EYES: Normal conjunctiva  ENT: Normal lips and oral mucosa  CARDIOVASCULAR: No edema  RESPIRATORY: Normal respirations  HEME/LYMPH/IMMUNO:  No regional lymphadenopathy except as noted below in "ASSESSMENT AND PLAN BY DIAGNOSIS"    SKIN:  FULL ORGAN SYSTEM EXAM   Hair, Scalp, Ears, Face Normal except as noted below in Assessment   Neck, Cervical Chain Nodes Normal except as noted below in Assessment   Right Arm/Hand/Fingers Normal except as noted below in Assessment   Left Arm/Hand/Fingers Normal except as noted below in Assessment   Chest/Breasts/Axillae Viewed areas Normal except as noted below in Assessment   Abdomen, Umbilicus Normal except as noted below in Assessment   Back/Spine Normal except as noted below in Assessment   Groin/Genitalia/Buttocks Normal except as noted below in Assessment   Right Leg, Foot, Toes Normal except as noted below in Assessment   Left Leg, Foot, Toes Normal except as noted below in Assessment        1   ACTINIC KERATOSIS    Physical Exam:   Anatomic Location Affected:  Left nose    Morphological Description:  Scaly pink papule     Additional History of Present Condition:  Located on the left nasal bridge  Present for few months  Reports irritation  No treatment attempted     Assessment and Plan:  Based on a thorough discussion of this condition and the management approach to it (including a comprehensive discussion of the known risks, side effects and potential benefits of treatment), the patient (family) agrees to implement the following specific plan:     Liquid nitrogen was applied for 10-12 seconds to the skin lesion and the expected blistering or scabbing reaction explained  Do not pick at the area  Patient reminded to expect hypopigmented scars from the procedure  Return if lesion fails to fully resolve  Actinic keratoses are very common on sites repeatedly exposed to the sun, especially the backs of the hands and the face, most often affecting the ears, nose, cheeks, upper lip, vermilion of the lower lip, temples, forehead and balding scalp  In severely chronically sun-damaged individuals, they may also be found on the upper trunk, upper and lower limbs, and dorsum of feet  We discussed the theoretical premalignant (pre-cancerous) nature and etiology of these growths  We discussed the prevailing notion that actinic keratoses are a reflection of abnormal skin cell development due to DNA damage by short wavelength UVB  They are more likely to appear if the immune function is poor, due to aging, recent sun exposure, predisposing disease or certain drugs  We discussed that the main concern is that actinic keratoses may predispose to squamous cell carcinoma  It is rare for a solitary actinic keratosis to evolve to squamous cell carcinoma (SCC), but the risk of SCC occurring at some stage in a patient with more than 10 actinic keratoses is thought to be about 10 to 15%  A tender, thickened, ulcerated or enlarging actinic keratosis is suspicious of SCC      Actinic keratoses may be prevented by strict sun protection  If already present, keratoses may improve with a very high sun protection factor (50+) broad-spectrum sunscreen applied at least daily to affected areas, year-round  We recommend that UPF-rated clothing and hats and sunglasses be worn whenever possible and that a sunscreen-moisturizer combination product such as Neutrogena Daily Defense be applied at least three times a day  We performed a thorough discussion of treatment options and specific risk/benefits/alternatives including but not limited to medical field treatment with medications such as the following:     Cryotherapy (specifically, local pain, scarring, dyspigmentation, blistering, possible superinfection, and treats only what we see versus directed treatment today)  PROCEDURE:  DESTRUCTION OF PRE-MALIGNANT LESIONS  After a thorough discussion of treatment options and risk/benefits/alternatives (including but not limited to local pain, scarring, dyspigmentation, blistering, and possible superinfection), verbal and written consent were obtained and the aforementioned lesions were treated on with cryotherapy using liquid nitrogen x 1 cycle for 5-10 seconds   TOTAL NUMBER of 1 pre-malignant lesions were treated today on the ANATOMIC LOCATION: left nose   The patient tolerated the procedure well, and after-care instructions were provided  Seborrheic Keratosis  A seborrheic keratosis is a harmless warty spot that appears during adult life as a common sign of skin aging  Seborrheic keratoses can arise on any area of skin, covered or uncovered, with the usual exception of the palms and soles  They do not arise from mucous membranes  Seborrheic keratoses can have highly variable appearance  Seborrheic keratoses are extremely common  It has been estimated that over 90% of adults over the age of 61 years have one or more of them   They occur in males and females of all races, typically beginning to erupt in the 35s or 45s  They are uncommon under the age of 21 years  The precise cause of seborrhoeic keratoses is not known  Seborrhoeic keratoses are considered degenerative in nature  As time goes by, seborrheic keratoses tend to become more numerous  Some people inherit a tendency to develop a very large number of them; some people may have hundreds of them  The name "seborrheic keratosis" is misleading, because these lesions are not limited to a seborrhoeic distribution (scalp, mid-face, chest, upper back), nor are they formed from sebaceous glands, nor are they associated with sebum -- which is greasy  Seborrheic keratosis may also be called "SK," "Seb K," "basal cell papilloma," "senile wart," or "barnacle "      Researchers have noted:   Eruptive seborrhoeic keratoses can follow sunburn or dermatitis   Skin friction may be the reason they appear in body folds   Viral cause (e g , human papillomavirus) seems unlikely   Stable and clonal mutations or activation of FRFR3, PIK3CA, MULU, AKT1 and EGFR genes are found in seborrhoeic keratoses   Seborrhoeic keratosis can arise from solar lentigo   FRFR3 mutations also arise in solar lentigines  These mutations are associated with increased age and location on the head and neck, suggesting a role of ultraviolet radiation in these lesions   Seborrheic keratoses do not harbour tumour suppressor gene mutations   Epidermal growth factor receptor inhibitors, which are used to treat some cancers, often result in an increase in verrucal (warty) keratoses  There is no easy way to remove multiple lesions on a single occasion  Unless a specific lesion is "inflamed" and is causing pain or stinging/burning or is bleeding, most insurance companies do not authorize treatment      4  EPIDERMAL INCLUSION CYST    Physical Exam:   Anatomic Location Affected:  Right axilla   Morphological Description:  0 4 cm cystic papule with central punctum Additional History of Present Condition:  Located on the left axilla  Present for few months  Reports irritation from shaving  No treatment attempted    Assessment and Plan:  Based on a thorough discussion of this condition and the management approach to it (including a comprehensive discussion of the known risks, side effects and potential benefits of treatment), the patient (family) agrees to implement the following specific plan:   Benign, no treatment necessary  Can removed if becomes irritated     What are epidermal inclusion cysts? Epidermal inclusion cysts are the most common, benign cutaneous cysts  There are many different names for epidermal inclusion cysts, including epidermoid cyst, epidermal cyst, infundibular cyst, inclusion cyst, and keratin cyst  These cysts can occur anywhere on the body and typically present as nodules directly underneath the skin  There is often a visible pore or opening in the center  The cysts are freely moveable and can range from a few millimeters to several centimeters in diameter  The center of epidermoid cysts almost always contains keratin, which has a cheesy appearance, and not sebum  They also do not originate from sebaceous glands  Therefore, epidermal inclusion cysts are not the same as sebaceous cysts  Cysts may remain stable or progressively enlarge over time  There are no reliable predictive factors to tell if an epidermal inclusion cyst will enlarge, become inflamed, or remain quiescent  Infected cysts tend to become larger, turn red, and are more noticeable to the patient  There may be accompanying pain and discomfort  What causes epidermal inclusion cysts? Epidermal inclusion cysts often appear out of the blue and are not contagious  They are due to a proliferation of epidermal cells within the dermis and are more common in men than women  They occur more frequently in patients in their 20s to 45s   Epidermal inclusion cysts by themselves are usually not inherited, but they can be hereditary in rare syndromes such as Alejo syndrome, nodular elastosis with cysts and comedones (Favre-Racouchot syndrome), and basal cell nevus syndrome (Gorlin syndrome)  Elderly patients with chronic sun-damaged skin areas have a higher likelihood of developing epidermoid cysts  They often occur in areas where hair follicles have been inflamed or repeatedly irritated are more frequent in patients with acne vulgaris  In the  period, they are called milia  Patients on BRAF inhibitors such as imiquimod and cyclosporine have a higher incidence of epidermoid cysts of the face  How do we diagnose an epidermal inclusion cyst?  Epidermoid inclusion cysts are often diagnosed by history and physical exam  There is usually no need for biopsy prior to removal   Radiographic and laboratory exams, such as ultrasound studies, are unnecessary and not typically ordered unless the practitioner suspects a genetic condition  What is the treatment for an epidermal inclusion cyst?  Inflamed, uninfected epidermal inclusion cysts rarely resolve spontaneously without therapy or surgical intervention  Treatment is not emergent unless desired by the patient  Definitive treatment is via surgical excision with walls intact  This method will prevent recurrence  This is best done when the cyst is not inflamed, to decrease the probability of rupture during surgery  - A local anesthetic will be injected around the cyst  - A small incision is made in the skin overlying the cyst, and contents are expressed  - The incision is repaired with sutures    Another option is to use a 4mm punch biopsy with cyst extraction through the defect  Incision and drainage is often needed if the cyst is infected or inflamed  If there is surrounding cellulitis, oral antibiotic therapy may be necessary   The common agents used target methicillin sensitive Staphylococcal aureus and methicillin resistant S aureus in areas of high prevalence           Scribe Attestation    I,:  Mary Moses MA am acting as a scribe while in the presence of the attending physician :       I,:  Harry Schwab MD personally performed the services described in this documentation    as scribed in my presence :         Elio Burks  PGY2 Dermatology Resident

## 2022-10-09 LAB
25(OH)D3+25(OH)D2 SERPL-MCNC: 54.5 NG/ML (ref 30–100)
ALBUMIN SERPL-MCNC: 4.5 G/DL (ref 3.8–4.8)
ALBUMIN/GLOB SERPL: 2 {RATIO} (ref 1.2–2.2)
ALP SERPL-CCNC: 71 IU/L (ref 44–121)
ALT SERPL-CCNC: 21 IU/L (ref 0–32)
AST SERPL-CCNC: 23 IU/L (ref 0–40)
BILIRUB SERPL-MCNC: 0.5 MG/DL (ref 0–1.2)
BUN SERPL-MCNC: 16 MG/DL (ref 8–27)
BUN/CREAT SERPL: 22 (ref 12–28)
CALCIUM SERPL-MCNC: 9.9 MG/DL (ref 8.7–10.3)
CHLORIDE SERPL-SCNC: 102 MMOL/L (ref 96–106)
CO2 SERPL-SCNC: 31 MMOL/L (ref 20–29)
CREAT SERPL-MCNC: 0.73 MG/DL (ref 0.57–1)
EGFR: 92 ML/MIN/1.73
GLOBULIN SER-MCNC: 2.2 G/DL (ref 1.5–4.5)
GLUCOSE SERPL-MCNC: 82 MG/DL (ref 70–99)
POTASSIUM SERPL-SCNC: 4.6 MMOL/L (ref 3.5–5.2)
PROT SERPL-MCNC: 6.7 G/DL (ref 6–8.5)
PTH-INTACT SERPL-MCNC: 34 PG/ML (ref 15–65)
SODIUM SERPL-SCNC: 143 MMOL/L (ref 134–144)
T4 FREE SERPL-MCNC: 1.2 NG/DL (ref 0.82–1.77)
TSH SERPL DL<=0.005 MIU/L-ACNC: 5.59 UIU/ML (ref 0.45–4.5)

## 2022-10-11 ENCOUNTER — OFFICE VISIT (OUTPATIENT)
Dept: FAMILY MEDICINE CLINIC | Facility: HOSPITAL | Age: 62
End: 2022-10-11
Payer: COMMERCIAL

## 2022-10-11 VITALS
DIASTOLIC BLOOD PRESSURE: 74 MMHG | SYSTOLIC BLOOD PRESSURE: 130 MMHG | TEMPERATURE: 97.2 F | HEART RATE: 68 BPM | WEIGHT: 142.6 LBS | BODY MASS INDEX: 25.27 KG/M2 | HEIGHT: 63 IN

## 2022-10-11 DIAGNOSIS — F33.41 RECURRENT MAJOR DEPRESSIVE DISORDER, IN PARTIAL REMISSION (HCC): ICD-10-CM

## 2022-10-11 DIAGNOSIS — N62 MACROMASTIA: ICD-10-CM

## 2022-10-11 DIAGNOSIS — R19.5 ABNORMAL STOOL COLOR: ICD-10-CM

## 2022-10-11 DIAGNOSIS — Z23 ENCOUNTER FOR IMMUNIZATION: ICD-10-CM

## 2022-10-11 DIAGNOSIS — F41.9 ANXIETY: ICD-10-CM

## 2022-10-11 DIAGNOSIS — M54.6 CHRONIC BILATERAL THORACIC BACK PAIN: Primary | ICD-10-CM

## 2022-10-11 DIAGNOSIS — K91.2 POSTGASTRECTOMY MALABSORPTION: ICD-10-CM

## 2022-10-11 DIAGNOSIS — E06.3 HYPOTHYROIDISM DUE TO HASHIMOTO'S THYROIDITIS: ICD-10-CM

## 2022-10-11 DIAGNOSIS — G89.29 CHRONIC BILATERAL THORACIC BACK PAIN: Primary | ICD-10-CM

## 2022-10-11 DIAGNOSIS — K21.9 GASTROESOPHAGEAL REFLUX DISEASE, UNSPECIFIED WHETHER ESOPHAGITIS PRESENT: ICD-10-CM

## 2022-10-11 DIAGNOSIS — M54.2 CERVICALGIA: ICD-10-CM

## 2022-10-11 DIAGNOSIS — Z90.3 POSTGASTRECTOMY MALABSORPTION: ICD-10-CM

## 2022-10-11 DIAGNOSIS — E03.8 HYPOTHYROIDISM DUE TO HASHIMOTO'S THYROIDITIS: ICD-10-CM

## 2022-10-11 PROCEDURE — 90682 RIV4 VACC RECOMBINANT DNA IM: CPT

## 2022-10-11 PROCEDURE — 99214 OFFICE O/P EST MOD 30 MIN: CPT | Performed by: INTERNAL MEDICINE

## 2022-10-11 PROCEDURE — 90471 IMMUNIZATION ADMIN: CPT

## 2022-10-11 RX ORDER — VENLAFAXINE 100 MG/1
100 TABLET ORAL 2 TIMES DAILY
Qty: 180 TABLET | Refills: 1 | Status: SHIPPED | OUTPATIENT
Start: 2022-10-11

## 2022-10-11 NOTE — PROGRESS NOTES
Name: Milo Dillard      : 1960      MRN: [id number]  Encounter Provider: Miguel Franklin DO  Encounter Date: 10/11/2022   Encounter department: [facility]      1  Chronic bilateral thoracic back pain  Comments:  Noted benefit in neck pain but thoracic pain persisting - felt to be d/t macromastia, f/u with plastics about breast reduction, con't Tramadol prn and HEP    2  Cervicalgia  Comments:  Noted benefit with PT, con't prn Tramadol, call with new/worse symptoms    3  Macromastia  Comments:  Finished PT w/improvement in neck pain but not thoracic pain, urged to call plastics for f/u for next step for breast reduction, con't Tramadol prn for pain    4  Gastroesophageal reflux disease, unspecified whether esophagitis present  Assessment & Plan:  Weaning off Omeprazole with qod PPI /H2 blocker, con't f/u and meds as per bariatrics      5  Postgastrectomy malabsorption  Assessment & Plan:  Has vitamin levels and f/u as per bariatrics      6  Hypothyroidism due to Hashimoto's thyroiditis  Assessment & Plan:  TSH a bit elevated again, has f/u with Endo next week, con't meds/labs and f/u as per Endo      7  Anxiety  Assessment & Plan:  Anxiety up and down but pt noting depression is more bothersome, con't current Effexor for now - call with new/wrose mood or if she wishes to discuss med increase    Orders:  -     venlafaxine (EFFEXOR) 100 MG tablet; Take 1 tablet (100 mg total) by mouth 2 (two) times a day    8   Recurrent major depressive disorder, in partial remission ([facility] [address] )  Assessment & Plan:  Mood still up and down - related to ongoing divorce and still living with soon to be ex, discussed adding second medication vs going up on Effexor IR - deferring for now as feels court/moving may be over in the next few weeks - urged to call if she changes mind and can increase to 100 mg tid or go to 150 mg IR bid    Orders:  -     venlafaxine (EFFEXOR) 100 MG tablet; Take 1 tablet (100 mg total) by mouth 2 (two) times a day    9  Abnormal stool color  Comments:  Discussed poss d/t dietary changes, will check CMP/Lipase and follow, call with any other GI symptoms  Orders:  -     Comprehensive metabolic panel  -     Lipase; Future  -     Lipase      Colonoscopy 3/20 - 5 yrs    Mammo 4/22    Dexa 3/21 - nml    PAP s/p hysterectomy    BW 10/22  FLP 4/22    Pt agreeable to flu vaccine        Subjective      HPI Pt here for follow up appt    Pt has done PT for her neck pain/thoracic back pain - felt to be worsened d/t large breast size  Neck pain improved but thoracic discomfort persisted  She was discharged from PT in Aug      Pt saw NP with bariatrics Danilo Krishnamurthy) in Sept for f/u bariatric surgery and GERD - OV note reviewed  She was noting persistent GERD symptoms and was advised to restart Omeprazole but was advised to taper off after 2 mos  Was advised to use Pepcid as needed  Was told if GERD symptoms con't would need UGI series  BW/vitamin levels were ordered  Currently pt is alternating qod with Omeprazole and Pepcid  She is tolerating that w/o significant reflux symptoms  Pt had TSH 10/8/22 and it was elevated at 5 590 and FT4 was wnl  Her calcium/PTH/VIt D were back to normal   She has an appt next week with Endo  Taking her Effexor as directed  She notes a lot of stress and is more tearful  She is more down /sad  She feels the Effexor has worked well in the past  She has some mild anxiety but depression is worse then the anxiety  Notes some changes in stool color - instead of dark brown stools they are now a "henry yellow"  She notes no greasy stools/abd pain/abnormal foul smelling stools/blood in stools  Review of Systems   Constitutional: Negative for chills, fever and unexpected weight change  HENT: Positive for postnasal drip  Negative for congestion and trouble swallowing  Eyes: Negative for pain and visual disturbance  Respiratory: Positive for cough  Negative for shortness of breath and wheezing  Cardiovascular: Negative for chest pain, palpitations and leg swelling  Gastrointestinal: Negative for abdominal pain, blood in stool, constipation, diarrhea, nausea and vomiting  Genitourinary: Negative for difficulty urinating and dysuria  Musculoskeletal: Positive for back pain  Negative for neck pain  Skin: Negative for rash and wound  Neurological: Negative for dizziness and headaches  Hematological: Does not bruise/bleed easily  Psychiatric/Behavioral: Positive for dysphoric mood  The patient is nervous/anxious  Current Outpatient Medications on File Prior to Visit   Medication Sig   • Calcium 250 MG CAPS Take 500 mg by mouth daily   • Cholecalciferol (VITAMIN D3) 2000 units TABS Take 1 tablet by mouth daily   • Cyanocobalamin (VITAMIN B-12) 1000 MCG SUBL Place 1 Dose under the tongue daily   • gabapentin (NEURONTIN) 400 mg capsule TAKE 1 CAPSULE BY MOUTH 3 TIMES A DAY     • IRON-VITAMIN C PO Take 1 tablet by mouth daily   • Magnesium 200 MG TABS Take 1 tablet by mouth daily     • Multiple Vitamins-Minerals (MULTIPLE VITAMINS/WOMENS PO) Take by mouth daily   • omeprazole (PriLOSEC) 20 mg delayed release capsule Take 20 mg by mouth daily   • Synthroid 150 MCG tablet TAKE 1 TAB 6 DAYS OF THE WEEK, 1 5 TABS SUNDAYS   • tiZANidine (ZANAFLEX) 4 mg tablet 1 tab PO q 8 hrs prn muscle pain or spasm   • traMADol-acetaminophen (ULTRACET) 37 5-325 mg per tablet Take 1 tablet by mouth every 6 (six) hours as needed for moderate pain   • [DISCONTINUED] benzonatate (TESSALON PERLES) 100 mg capsule Take 1 capsule (100 mg total) by mouth 3 (three) times a day as needed for cough (Patient not taking: No sig reported)   • [DISCONTINUED] venlafaxine (EFFEXOR) 100 MG tablet TAKE 1 TABLET BY MOUTH TWICE A DAY       Objective     /74   Pulse 68   Temp (!) 97 2 °F (36 2 °C) (Tympanic)   Ht 5' 3" (1 6 m)   Wt 64 7 kg (142 lb 9 6 oz)   BMI 25 26 kg/m²     Physical Exam  Vitals and nursing note reviewed  Constitutional:       General: She is not in acute distress  Appearance: She is well-developed  She is not ill-appearing  HENT:      Head: Normocephalic and atraumatic  Eyes:      General:         Right eye: No discharge  Left eye: No discharge  Conjunctiva/sclera: Conjunctivae normal    Neck:      Trachea: No tracheal deviation  Cardiovascular:      Rate and Rhythm: Normal rate and regular rhythm  Heart sounds: Normal heart sounds  No murmur heard  No friction rub  Pulmonary:      Effort: Pulmonary effort is normal  No respiratory distress  Breath sounds: Normal breath sounds  No wheezing, rhonchi or rales  Abdominal:      General: There is no distension  Palpations: Abdomen is soft  Tenderness: There is no abdominal tenderness  There is no guarding or rebound  Musculoskeletal:         General: No deformity or signs of injury  Cervical back: Neck supple  Skin:     General: Skin is warm  Coloration: Skin is not pale  Findings: No rash  Neurological:      General: No focal deficit present  Mental Status: She is alert  Motor: No abnormal muscle tone  Gait: Gait normal    Psychiatric:         Behavior: Behavior normal          Thought Content:  Thought content normal          Judgment: Judgment normal       Comments: Tearful at times       Cristina Taylor, DO

## 2022-10-11 NOTE — ASSESSMENT & PLAN NOTE
Anxiety up and down but pt noting depression is more bothersome, con't current Effexor for now - call with new/wrose mood or if she wishes to discuss med increase

## 2022-10-11 NOTE — ASSESSMENT & PLAN NOTE
Mood still up and down - related to ongoing divorce and still living with soon to be ex, discussed adding second medication vs going up on Effexor IR - deferring for now as feels court/moving may be over in the next few weeks - urged to call if she changes mind and can increase to 100 mg tid or go to 150 mg IR bid

## 2022-10-12 ENCOUNTER — TELEPHONE (OUTPATIENT)
Dept: FAMILY MEDICINE CLINIC | Facility: HOSPITAL | Age: 62
End: 2022-10-12

## 2022-10-12 NOTE — TELEPHONE ENCOUNTER
Patient needs a work note- saying that she was at the doctors yesterday are you able to write one just saying she was in yesterday

## 2022-10-15 DIAGNOSIS — E03.9 HYPOTHYROIDISM, UNSPECIFIED TYPE: ICD-10-CM

## 2022-10-17 RX ORDER — LEVOTHYROXINE SODIUM 150 MCG
TABLET ORAL
Qty: 96 TABLET | Refills: 7 | Status: SHIPPED | OUTPATIENT
Start: 2022-10-17 | End: 2022-10-20

## 2022-10-20 ENCOUNTER — OFFICE VISIT (OUTPATIENT)
Dept: ENDOCRINOLOGY | Facility: CLINIC | Age: 62
End: 2022-10-20
Payer: COMMERCIAL

## 2022-10-20 VITALS
DIASTOLIC BLOOD PRESSURE: 70 MMHG | HEART RATE: 67 BPM | WEIGHT: 143.2 LBS | SYSTOLIC BLOOD PRESSURE: 118 MMHG | OXYGEN SATURATION: 98 % | BODY MASS INDEX: 25.37 KG/M2 | HEIGHT: 63 IN

## 2022-10-20 DIAGNOSIS — E03.9 HYPOTHYROIDISM, UNSPECIFIED TYPE: Primary | ICD-10-CM

## 2022-10-20 PROCEDURE — 99213 OFFICE O/P EST LOW 20 MIN: CPT | Performed by: INTERNAL MEDICINE

## 2022-10-20 RX ORDER — LEVOTHYROXINE SODIUM 150 UG/1
CAPSULE ORAL
Qty: 90 CAPSULE | Refills: 1 | Status: SHIPPED | OUTPATIENT
Start: 2022-10-20 | End: 2022-10-21

## 2022-10-20 NOTE — PROGRESS NOTES
10/20/2022    Assessment/Plan      Diagnoses and all orders for this visit:    Hypothyroidism, unspecified type  -     Levothyroxine Sodium (Tirosint) 150 MCG CAPS; 1 cap daily  TIROSINT BRAND ONLY  -     TSH, 3rd generation; Future  -     T4, free; Future  -     TSH, 3rd generation  -     T4, free    Other orders  -     BIOTIN PO; Take by mouth Two tablets once day        Assessment/Plan:  Hypothyroidism:  We discussed considering increasing her dose vs considering seeing if a gel cap will absorb better  We will transition to Tirosint 150 mcg daily and repeat labs in about 4-6 weeks  Will adjust regimen from there  Follow-up in 1 year but we will be in touch with the patient as results are available  CC: Follow-up    History of Present Illness     HPI: Mariela Marina is a 57y o  year old female with history of hypothyroidism due to Hashimoto's thyroiditis as well as history of gastric sleeve with recent revision to Lissette-en-Y gastric bypass as well as history of hyperlipidemia presents for a follow-up appointment  She continues on Synthroid 150 mcg 6 days a week 1 5 tablets Sundays  Presents today overall feeling okay  She does note fatigue but relates this perhaps due to busy life and increased stress  In September of last year she had gastric sleeve revision with transition to Lissette-en-Y  She reports all vitamin supplements are on target  She takes her Synthroid appropriately and vitamins in the evening  Review of Systems   Constitutional: Positive for fatigue  HENT: Negative for trouble swallowing and voice change  Eyes: Negative for visual disturbance  Respiratory: Negative for shortness of breath  Cardiovascular: Negative for palpitations and leg swelling  Gastrointestinal: Negative for abdominal pain, nausea and vomiting  Endocrine: Negative for polydipsia and polyuria  Musculoskeletal: Negative for arthralgias and myalgias  Skin: Negative for rash     Neurological: Negative for dizziness, tremors and weakness  Hematological: Negative for adenopathy  Psychiatric/Behavioral: Negative for agitation and confusion         Historical Information   Past Medical History:   Diagnosis Date   • Allergic    • Anxiety    • Arthritis    • Bariatric surgery status    • Chronic pain disorder    • Depression     last assessed: 2018    • Disturbance of memory     last assessed: 2016    • Endometriosis    • GERD (gastroesophageal reflux disease)    • Hiatal hernia    • Hirsutism    • History of sleeve gastrectomy    • Lumbar herniated disc    • Menometrorrhagia    • Morbid obesity ([address] )     last assessed: Dec 5, 2014    • Motion sickness    • Pneumonia 1971   • Postgastrectomy malabsorption    • Sclerosing adenosis of breast    • Symptomatic menopausal or female climacteric states    • Uterine leiomyoma      Past Surgical History:   Procedure Laterality Date   • APPENDECTOMY     • BREAST BIOPSY Left    • COLONOSCOPY      complete - 7 year repeat recommended - Resolved:     • DILATION AND CURETTAGE OF UTERUS     • FRACTURE SURGERY     • GASTRECTOMY SLEEVE LAPAROSCOPIC  2018   • GYNECOLOGIC CRYOSURGERY      Cervix - s for abnormal paps    • HYSTERECTOMY     • INDUCED       x3   • LAPAROSCOPIC SALPINGOOPHERECTOMY Right    • LAPAROSCOPIC TOTAL HYSTERECTOMY      06, Laparoscopic hysterectomy with LSO with vaginal closure of the vaginal cuff    • IA REVISE,GASTROJEJUN ANAST,W/O VAGOTOMY N/A 2021    Procedure: LAPAROSCOPIC REVISION CONVERSION  JENN-EN-Y GASTRIC BYPASS WITH ROBOTICS AND INTRAOPERATIVE EGD, PARAESOPHAGEAL HERNIA REPAIR;  Surgeon: Cody Gallegos MD;  Location: [facility];  Service: Bariatrics   • SALPINGOOPHORECTOMY Left    • STOMACH SURGERY  2014    for morbid obesity - Managed by: Jeff Núñez, [address]     • TOOTH EXTRACTION       Social History   Social History     Substance and Sexual Activity Alcohol Use Not Currently    Comment: occasional     Social History     Substance and Sexual Activity   Drug Use No     Social History     Tobacco Use   Smoking Status Former Smoker   • Packs/day: 0 50   • Years: 15 00   • Pack years: 7 50   • Types: Cigarettes   • Start date: 3/4/1971   • Quit date: 1989   • Years since quittin 8   Smokeless Tobacco Never Used   Tobacco Comment    33 years smoke free! Family History:   Family History   Problem Relation Age of Onset   • Hypertension Mother    • Alzheimer's disease Mother    • Stroke Mother         several mini strokes   • Dementia Mother         Alzheimers   • Thyroid disease Mother    • Arthritis Mother    • Hyperlipidemia Father    • Diabetes Father         mellitus    • Prostate cancer Father    • Rectal cancer Father    • Lung cancer Father    • Liver cancer Father    • Heart attack Father    • Colon cancer Father    • Cancer Father         Rectal, metastisized   • Hearing loss Father    • Skin cancer Father    • Hypertension Sister    • Colon polyps Sister    • Hyperlipidemia Brother         no more   • Breast cancer Maternal Grandmother        Meds/Allergies   Current Outpatient Medications   Medication Sig Dispense Refill   • BIOTIN PO Take by mouth Two tablets once day     • Calcium 250 MG CAPS Take 500 mg by mouth daily     • Cholecalciferol (VITAMIN D3) 2000 units TABS Take 2 tablets by mouth daily     • Cyanocobalamin (VITAMIN B-12) 1000 MCG SUBL Place 2 Doses under the tongue daily     • gabapentin (NEURONTIN) 400 mg capsule TAKE 1 CAPSULE BY MOUTH 3 TIMES A DAY  90 capsule 2   • IRON-VITAMIN C PO Take 1 tablet by mouth daily     • Levothyroxine Sodium (Tirosint) 150 MCG CAPS 1 cap daily   TIROSINT BRAND ONLY 90 capsule 1   • Magnesium 200 MG TABS Take 2 tablets by mouth daily     • Multiple Vitamins-Minerals (MULTIPLE VITAMINS/WOMENS PO) Take by mouth daily     • omeprazole (PriLOSEC) 20 mg delayed release capsule Take 20 mg by mouth daily • tiZANidine (ZANAFLEX) 4 mg tablet 1 tab PO q 8 hrs prn muscle pain or spasm 60 tablet 2   • traMADol-acetaminophen (ULTRACET) 37 5-325 mg per tablet Take 1 tablet by mouth every 6 (six) hours as needed for moderate pain 60 tablet 0   • venlafaxine (EFFEXOR) 100 MG tablet Take 1 tablet (100 mg total) by mouth 2 (two) times a day 180 tablet 1     No current facility-administered medications for this visit  No Known Allergies    Objective   Vitals: Blood pressure 118/70, pulse 67, height 5' 3" (1 6 m), weight 65 kg (143 lb 3 2 oz), SpO2 98 %  Invasive Devices  Report    None                 Physical Exam  Vitals reviewed  Constitutional:       General: She is not in acute distress  Appearance: She is well-developed  She is not diaphoretic  HENT:      Head: Normocephalic and atraumatic  Eyes:      Conjunctiva/sclera: Conjunctivae normal       Pupils: Pupils are equal, round, and reactive to light  Neck:      Thyroid: No thyromegaly  Cardiovascular:      Rate and Rhythm: Normal rate and regular rhythm  Pulmonary:      Effort: Pulmonary effort is normal  No respiratory distress  Breath sounds: Normal breath sounds  Abdominal:      General: Bowel sounds are normal       Palpations: Abdomen is soft  Musculoskeletal:         General: Normal range of motion  Cervical back: Normal range of motion and neck supple  Skin:     General: Skin is warm and dry  Findings: No rash  Neurological:      Mental Status: She is alert and oriented to person, place, and time  Motor: No abnormal muscle tone  Psychiatric:         Behavior: Behavior normal          The history was obtained from the review of the chart and from the patient      Lab Results:      Recent Results (from the past 13660 hour(s))   PAT Covid Screening    Collection Time: 09/21/21 10:26 AM    Specimen: Nose; Nares   Result Value Ref Range    SARS-CoV-2 Negative Negative   Basic metabolic panel    Collection Time: 09/28/21  6:23 AM   Result Value Ref Range    Sodium 145 136 - 145 mmol/L    Potassium 4 3 3 5 - 5 3 mmol/L    Chloride 107 100 - 108 mmol/L    CO2 29 21 - 32 mmol/L    ANION GAP 9 4 - 13 mmol/L    BUN 17 5 - 25 mg/dL    Creatinine 0 76 0 60 - 1 30 mg/dL    Glucose 101 65 - 140 mg/dL    Glucose, Fasting 101 (H) 65 - 99 mg/dL    Calcium 8 8 8 3 - 10 1 mg/dL    eGFR 84 ml/min/1 73sq m   Basic metabolic panel    Collection Time: 09/29/21  5:27 AM   Result Value Ref Range    Sodium 142 136 - 145 mmol/L    Potassium 4 6 3 5 - 5 3 mmol/L    Chloride 107 100 - 108 mmol/L    CO2 26 21 - 32 mmol/L    ANION GAP 9 4 - 13 mmol/L    BUN 12 5 - 25 mg/dL    Creatinine 0 74 0 60 - 1 30 mg/dL    Glucose 109 65 - 140 mg/dL    Calcium 8 8 8 3 - 10 1 mg/dL    eGFR 86 ml/min/1 73sq m   CBC (With Platelets)    Collection Time: 09/29/21  5:27 AM   Result Value Ref Range    WBC 10 29 (H) 4 31 - 10 16 Thousand/uL    RBC 3 65 (L) 3 81 - 5 12 Million/uL    Hemoglobin 10 7 (L) 11 5 - 15 4 g/dL    Hematocrit 33 6 (L) 34 8 - 46 1 %    MCV 92 82 - 98 fL    MCH 29 3 26 8 - 34 3 pg    MCHC 31 8 31 4 - 37 4 g/dL    RDW 12 7 11 6 - 15 1 %    Platelets 985 273 - 010 Thousands/uL    MPV 9 6 8 9 - 12 7 fL   CBC and Platelet    Collection Time: 09/29/21  7:50 AM   Result Value Ref Range    WBC 11 83 (H) 4 31 - 10 16 Thousand/uL    RBC 4 26 3 81 - 5 12 Million/uL    Hemoglobin 12 4 11 5 - 15 4 g/dL    Hematocrit 38 9 34 8 - 46 1 %    MCV 91 82 - 98 fL    MCH 29 1 26 8 - 34 3 pg    MCHC 31 9 31 4 - 37 4 g/dL    RDW 12 8 11 6 - 15 1 %    Platelets 930 568 - 379 Thousands/uL    MPV 9 7 8 9 - 12 7 fL   TSH, 3rd generation    Collection Time: 10/12/21 10:42 AM   Result Value Ref Range    TSH 0 712 0 450 - 4 500 uIU/mL   T4, free    Collection Time: 10/12/21 10:42 AM   Result Value Ref Range    Free t4 1 37 0 82 - 1 77 ng/dL   Comprehensive metabolic panel    Collection Time: 10/12/21 10:42 AM   Result Value Ref Range    Glucose, Random 98 65 - 99 mg/dL    BUN 18 8 - 27 mg/dL    Creatinine 0 75 0 57 - 1 00 mg/dL    eGFR Non African American 85 >59 mL/min/1 73    eGFR  98 >59 mL/min/1 73    SL AMB BUN/CREATININE RATIO 24 12 - 28    Sodium 142 134 - 144 mmol/L    Potassium 4 1 3 5 - 5 2 mmol/L    Chloride 99 96 - 106 mmol/L    CO2 24 20 - 29 mmol/L    CALCIUM 10 4 (H) 8 7 - 10 3 mg/dL    Protein, Total 7 6 6 0 - 8 5 g/dL    Albumin 4 7 3 8 - 4 8 g/dL    Globulin, Total 2 9 1 5 - 4 5 g/dL    Albumin/Globulin Ratio 1 6 1 2 - 2 2    TOTAL BILIRUBIN 0 7 0 0 - 1 2 mg/dL    Alk Phos Isoenzymes 71 44 - 121 IU/L    AST 19 0 - 40 IU/L    ALT 25 0 - 32 IU/L   PTH, intact    Collection Time: 10/12/21 10:42 AM   Result Value Ref Range    PTH, Intact 28 15 - 65 pg/mL   Vitamin D 25 hydroxy    Collection Time: 10/12/21 10:42 AM   Result Value Ref Range    25-HYDROXY VIT D 76 9 30 0 - 100 0 ng/mL   Lipid Panel with Direct LDL reflex Lab Collect    Collection Time: 10/12/21 10:42 AM   Result Value Ref Range    Cholesterol, Total 196 100 - 199 mg/dL    Triglycerides 120 0 - 149 mg/dL    HDL 45 >39 mg/dL    VLDL Cholesterol Calculated 22 5 - 40 mg/dL    LDL Calculated 129 (H) 0 - 99 mg/dL    LDl/HDL Ratio 2 9 0 0 - 3 2 ratio   HEMOGLOBIN A1C W/ EAG ESTIMATION Lab Collect    Collection Time: 10/12/21 10:42 AM   Result Value Ref Range    Hemoglobin A1C 5 5 4 8 - 5 6 %    Estimated Average Glucose 111 mg/dL   COVID[facility]FLU- Col[facility] at [facility] or [facility]    Collection Time: 12/28/21 10:57 AM    Specimen: Nose; Nares   Result Value Ref Range    SARS-CoV-2 Positive (A) Negative    INFLUENZA A PCR Negative Negative    INFLUENZA B PCR Negative Negative   CBC and Platelet    Collection Time: 03/26/22  9:37 AM   Result Value Ref Range    White Blood Cell Count 6 3 3 4 - 10 8 x10E3/uL    Red Blood Cell Count 4 93 3 77 - 5 28 x10E6/uL    Hemoglobin 14 3 11 1 - 15 9 g/dL    HCT 43 7 34 0 - 46 6 %    MCV 89 79 - 97 fL    MCH 29 0 26 6 - 33 0 pg    MCHC 32 7 31 5 - 35 7 g/dL    RDW 12 8 11 7 - 15 4 %    Platelet Count 961 149 - 450 x10E3/uL   Ferritin    Collection Time: 03/26/22  9:37 AM   Result Value Ref Range    Ferritin 115 15 - 150 ng/mL   Folate    Collection Time: 03/26/22  9:37 AM   Result Value Ref Range    FOLATE, SERUM >20 0 >3 0 ng/mL   Zinc    Collection Time: 03/26/22  9:37 AM   Result Value Ref Range    Zinc 95 44 - 115 ug/dL   Vitamin B12    Collection Time: 03/26/22  9:37 AM   Result Value Ref Range    Vitamin B-12 >2,000 (H) 232 - 1,245 pg/mL   Vitamin B1, whole blood    Collection Time: 03/26/22  9:37 AM   Result Value Ref Range    Vitamin B1, Whole Blood 158 9 66 5 - 200 0 nmol/L   Vitamin A    Collection Time: 03/26/22  9:37 AM   Result Value Ref Range    Vitamin A 58 4 22 0 - 69 5 ug/dL   TIBC    Collection Time: 03/26/22  9:37 AM   Result Value Ref Range    Total Iron Binding Capacity (TIBC) 325 250 - 450 ug/dL    UIBC 222 118 - 369 ug/dL    Iron, Serum 103 27 - 139 ug/dL    Iron Saturation 32 15 - 55 %   TSH, 3rd generation    Collection Time: 04/28/22  9:37 AM   Result Value Ref Range    TSH 14 300 (H) 0 450 - 4 500 uIU/mL   T4, free    Collection Time: 04/28/22  9:37 AM   Result Value Ref Range    Free t4 1 34 0 82 - 1 77 ng/dL   Comprehensive metabolic panel    Collection Time: 04/28/22  9:37 AM   Result Value Ref Range    Glucose, Random 80 65 - 99 mg/dL    BUN 21 8 - 27 mg/dL    Creatinine 0 79 0 57 - 1 00 mg/dL    eGFR 83 >59 mL/min/1 73    SL AMB BUN/CREATININE RATIO 27 12 - 28    Sodium 141 134 - 144 mmol/L    Potassium 5 0 3 5 - 5 2 mmol/L    Chloride 99 96 - 106 mmol/L    CO2 28 20 - 29 mmol/L    CALCIUM 10 4 (H) 8 7 - 10 3 mg/dL    Protein, Total 7 1 6 0 - 8 5 g/dL    Albumin 4 8 3 8 - 4 8 g/dL    Globulin, Total 2 3 1 5 - 4 5 g/dL    Albumin/Globulin Ratio 2 1 1 2 - 2 2    TOTAL BILIRUBIN 0 6 0 0 - 1 2 mg/dL    Alk Phos Isoenzymes 77 44 - 121 IU/L    AST 32 0 - 40 IU/L    ALT 43 (H) 0 - 32 IU/L   PTH, intact    Collection Time: 04/28/22  9:37 AM   Result Value Ref Range    PTH, Intact 27 15 - 65 pg/mL   Vitamin D 25 hydroxy    Collection Time: 04/28/22  9:37 AM   Result Value Ref Range    25-HYDROXY VIT D 49 9 30 0 - 100 0 ng/mL   Lipid Panel with Direct LDL reflex Lab Collect    Collection Time: 04/28/22  9:37 AM   Result Value Ref Range    Cholesterol, Total 203 (H) 100 - 199 mg/dL    Triglycerides 136 0 - 149 mg/dL    HDL 60 >39 mg/dL    VLDL Cholesterol Calculated 24 5 - 40 mg/dL    LDL Calculated 119 (H) 0 - 99 mg/dL    LDl/HDL Ratio 2 0 0 0 - 3 2 ratio   TSH, 3rd generation    Collection Time: 07/06/22 12:20 PM   Result Value Ref Range    TSH 25 100 (H) 0 450 - 4 500 uIU/mL   T4, free    Collection Time: 07/06/22 12:20 PM   Result Value Ref Range    Free t4 1 03 0 82 - 1 77 ng/dL   TSH, 3rd generation    Collection Time: 08/30/22  8:38 AM   Result Value Ref Range    TSH 2 240 0 450 - 4 500 uIU/mL   T4, free    Collection Time: 08/30/22  8:38 AM   Result Value Ref Range    Free t4 1 53 0 82 - 1 77 ng/dL   TSH, 3rd generation    Collection Time: 10/08/22  9:06 AM   Result Value Ref Range    TSH 5 590 (H) 0 450 - 4 500 uIU/mL   T4, free    Collection Time: 10/08/22  9:06 AM   Result Value Ref Range    Free t4 1 20 0 82 - 1 77 ng/dL   Comprehensive metabolic panel    Collection Time: 10/08/22  9:06 AM   Result Value Ref Range    Glucose, Random 82 70 - 99 mg/dL    BUN 16 8 - 27 mg/dL    Creatinine 0 73 0 57 - 1 00 mg/dL    eGFR 92 >59 mL/min/1 73    SL AMB BUN/CREATININE RATIO 22 12 - 28    Sodium 143 134 - 144 mmol/L    Potassium 4 6 3 5 - 5 2 mmol/L    Chloride 102 96 - 106 mmol/L    CO2 31 (H) 20 - 29 mmol/L    CALCIUM 9 9 8 7 - 10 3 mg/dL    Protein, Total 6 7 6 0 - 8 5 g/dL    Albumin 4 5 3 8 - 4 8 g/dL    Globulin, Total 2 2 1 5 - 4 5 g/dL    Albumin/Globulin Ratio 2 0 1 2 - 2 2    TOTAL BILIRUBIN 0 5 0 0 - 1 2 mg/dL    Alk Phos Isoenzymes 71 44 - 121 IU/L    AST 23 0 - 40 IU/L    ALT 21 0 - 32 IU/L   PTH, intact    Collection Time: 10/08/22  9:06 AM   Result Value Ref Range    PTH, Intact 34 15 - 65 pg/mL   Vitamin D 25 hydroxy    Collection Time: 10/08/22  9:06 AM   Result Value Ref Range    25-HYDROXY VIT D 54 5 30 0 - 100 0 ng/mL         Future Appointments   Date Time Provider Red Renteria   11/4/2022  3:00 PM Sheron Urbina MD [facility]   3/29/2023  7:30 AM JANIE Pinto [facility]   4/11/2023  6:40 PM Jimy Davis DO [facility]  [facility]       Portions of the record may have been created with voice recognition software  Occasional wrong word or "sound a like" substitutions may have occurred due to the inherent limitations of voice recognition software  Read the chart carefully and recognize, using context, where substitutions have occurred

## 2022-10-24 ENCOUNTER — TELEPHONE (OUTPATIENT)
Dept: BARIATRICS | Facility: CLINIC | Age: 62
End: 2022-10-24

## 2022-10-24 DIAGNOSIS — M54.16 RADICULOPATHY, LUMBAR REGION: ICD-10-CM

## 2022-10-25 NOTE — TELEPHONE ENCOUNTER
Spoke with patient  She wanted to know when she can incorporate rice into her diet  As she is one year post op , the recommendation is 1/4 cup serving as tolerated  Pt discussed that she had quinoa the other day and was full very quickly after 3 spoons fulls, as quinoa does swell some after cooking  Patient is pleased with her progress since her revision, maintaining BMI of 25  Has an upcoming plastic procedure scheduled  Overall pleased with her progress     Pt appreciative of the call

## 2022-11-03 ENCOUNTER — TELEPHONE (OUTPATIENT)
Dept: ENDOCRINOLOGY | Facility: CLINIC | Age: 62
End: 2022-11-03

## 2022-11-03 DIAGNOSIS — E03.9 HYPOTHYROIDISM, UNSPECIFIED TYPE: ICD-10-CM

## 2022-11-03 RX ORDER — LEVOTHYROXINE SODIUM 150 UG/1
CAPSULE ORAL
Qty: 90 CAPSULE | Refills: 3 | Status: SHIPPED | OUTPATIENT
Start: 2022-11-03 | End: 2022-11-10

## 2022-11-03 NOTE — TELEPHONE ENCOUNTER
Arla CanavanDarlis Gianotti- PA Case ID: [id number]  Need help? Call us at [phone]    Outcome   Approvedtoday  Request Reference Number: [id number]  TIROSINT CAP 150MCG is approved through 11/03/2024  Please refer to the fax or electronic case notice for further information      Drug    Tirosint 150MCG capsules

## 2022-11-03 NOTE — TELEPHONE ENCOUNTER
Haley CHAMORRO Case ID: [id number]  Need help? Call us at [phone]    Status   Sent to [address] [place] [address] capsules   Form    [employer] Electronic Prior Authorization Form 2017 [employer]

## 2022-11-09 DIAGNOSIS — E03.9 HYPOTHYROIDISM, UNSPECIFIED TYPE: Primary | ICD-10-CM

## 2022-11-10 DIAGNOSIS — E03.9 HYPOTHYROIDISM, UNSPECIFIED TYPE: ICD-10-CM

## 2022-11-10 RX ORDER — LEVOTHYROXINE SODIUM 0.15 MG/1
150 TABLET ORAL DAILY
Qty: 90 TABLET | Refills: 0 | OUTPATIENT
Start: 2022-11-10

## 2022-11-10 RX ORDER — LEVOTHYROXINE SODIUM 150 MCG
TABLET ORAL
Qty: 96 TABLET | Refills: 7 | Status: SHIPPED | OUTPATIENT
Start: 2022-11-10

## 2022-11-28 ENCOUNTER — OFFICE VISIT (OUTPATIENT)
Dept: PLASTIC SURGERY | Facility: CLINIC | Age: 62
End: 2022-11-28

## 2022-11-28 VITALS — HEIGHT: 63 IN | BODY MASS INDEX: 26.01 KG/M2 | WEIGHT: 146.8 LBS

## 2022-11-28 DIAGNOSIS — N62 MACROMASTIA: Primary | ICD-10-CM

## 2022-11-28 NOTE — PROGRESS NOTES
Plastic Surgery Consult     Reason for visit: heavy breasts    HPI from 11/28/22  Patient represents for symptomatic macromastia  She has undergone and completed physical therapy  She has obtained all the necessary 3 months of documentation and continues to have symptoms including chest heaviness, intermittent rashes, neck pain, shoulder discomfort due to her large breasts  Again reviewed, all the risks, benefits, complications, and alternatives  I discussed the biggest issue is the required amount of resection by insurance which, depending on the amount, could make her flat chested  Patient acknowledged  Will submit to insurance as well as email cosmetic quote      HPI from 5/16/22  Patient is a 56 y/o female who presents with symptomatic macromastia  She complains of large, heavy, pendulous breasts that causes her chest, upper back, neck, and shoulder discomfort  She also complains of rashes underneath her breast during humid weather and has difficulty exercising  She has tried conservative therapy including supportive bra, ibuprofen with minimal symptomatic improvement  She denies lumps or discharge on self-exams      She currently wears 42D and would like to be C cup ideally  Mammogram status: in April 2022 and was normal     Plans to have more children: no     Physical therapy attempted: no     Of note patient is a massive weight loss patient, with 86 lb weight loss   She had sleeve gastrectomy in 2014 and a revision in Sept 2021       She currently has very significant rash/yeast infection under both breasts in the IMF region         ROS: 12 pt ROS negative, except as otherwise noted in HPI      PMH: herniated lumbar disc, sciatica, hypothyroidism, two miscarriages, no history of blood clots  FamHx: non-contrib  SurgHx: hysterectomy, right oopherectomy, tonsillectomy, adenoidectomy, bariatric surgery  SocHx: no tobacco, no ETOH  Meds: no blood thinners  Allergies: NKDA         PE:  Vitals:     05/16/22 1043 BP: 113/75   Pulse: 64   Temp: (!) 96 8 °F (36 °C)         General: NC/AT, breathing comfortably on RA  Neuro: CN II-XII grossly intact, symmetric reflexes  HEENT: PERRLA, EOMI, external ears normal, no lesions or deformities, neck supple, trachea midline  Respiratory: CTAB, normal respiratory effort  Cardio: RRR, normal S1, S2, no murmur, rubs, gallops  GI: soft, non-tender, non-distended  MSK: normal alignment, mobility, gait  Skin: no rashes, lesions, subcutaneous nodules        BMI:  26  BSA: 1 71     Breast Exam:  Bilateral moderate-large ptotic breasts, with severe grade II ptosis bilaterally  Enlarged areolas  Bilateral shoulder grooving  No masses, skin dimpling, or discharge  Normal nipple sensation bilaterally  Striae throughout  Poor skin quality, diminished elasticity  Significant yeast infection and rash in bilateral IMF regions        Measurements:                          R                      L  SN-N               32 cm              34 cm  N-IMF              19 cm              18 cm        A/P: 58 y/o female with symptomatic macromastia      -Patient would benefit from bilateral reduction mammoplasty  Technique: wise-pattern with inferior pedicle with estimated resection weight of 250-350 g per side  Any greater resection would make her flat chested, especially with history of massive weight loss   -Discussed with patient the risks, benefits, procedure, and complications  Discussed changes/loss/hypersentivity in nipple sensation, diminished breast feeding ability and increase in size of breasts should she become pregnant  Discussed risk of partial vs complete nipple loss due to vascular issues  Discussed that breast reduction will help but may not completely resolve her back, chest, upper neck, and shoulder pain  Due to patient's obesity, I discussed the increased risk of surgical complications including infection, seroma, fat necrosis, abscess, wound dehisence   All of these risks increase with increasing BMI  Due to her large pendulous breasts, the possibility of free nipple graft was also discussed   -In this patient's case, discussed recurrent ptosis due to the poor skin quality and diminished elasticity of the skin    -Patient's questions answered, concerns addressed   -Photos taken  -Patient has completed PT  -Discussed skin quality, necessary weight of resection for insurance approval, and possible need for free nipple grafts   Patient acknowledged   -Will submit to insurance with all documentation  -Will also email cosmetic quote    Chente Rosales MD   [facility] Plastic and Reconstructive Surgery   [address], [address]   Office: [phone]

## 2022-12-21 DIAGNOSIS — M54.16 RADICULOPATHY, LUMBAR REGION: ICD-10-CM

## 2022-12-29 DIAGNOSIS — M51.36 DISC DEGENERATION, LUMBAR: ICD-10-CM

## 2022-12-29 RX ORDER — GABAPENTIN 400 MG/1
400 CAPSULE ORAL 3 TIMES DAILY
Qty: 90 CAPSULE | Refills: 2 | Status: SHIPPED | OUTPATIENT
Start: 2022-12-29

## 2023-01-08 DIAGNOSIS — M54.50 CHRONIC BILATERAL LOW BACK PAIN WITHOUT SCIATICA: ICD-10-CM

## 2023-01-08 DIAGNOSIS — G89.29 CHRONIC BILATERAL LOW BACK PAIN WITHOUT SCIATICA: ICD-10-CM

## 2023-01-09 RX ORDER — TIZANIDINE 4 MG/1
TABLET ORAL
Qty: 60 TABLET | Refills: 2 | Status: SHIPPED | OUTPATIENT
Start: 2023-01-09

## 2023-02-06 ENCOUNTER — OFFICE VISIT (OUTPATIENT)
Dept: FAMILY MEDICINE CLINIC | Facility: HOSPITAL | Age: 63
End: 2023-02-06

## 2023-02-06 VITALS
DIASTOLIC BLOOD PRESSURE: 78 MMHG | WEIGHT: 138.6 LBS | BODY MASS INDEX: 24.56 KG/M2 | TEMPERATURE: 99.4 F | HEART RATE: 69 BPM | SYSTOLIC BLOOD PRESSURE: 122 MMHG | HEIGHT: 63 IN

## 2023-02-06 DIAGNOSIS — J01.00 ACUTE NON-RECURRENT MAXILLARY SINUSITIS: Primary | ICD-10-CM

## 2023-02-06 RX ORDER — AMOXICILLIN AND CLAVULANATE POTASSIUM 875; 125 MG/1; MG/1
1 TABLET, FILM COATED ORAL EVERY 12 HOURS SCHEDULED
Qty: 20 TABLET | Refills: 0 | Status: SHIPPED | OUTPATIENT
Start: 2023-02-06 | End: 2023-02-16

## 2023-02-06 RX ORDER — FLUTICASONE PROPIONATE 50 MCG
SPRAY, SUSPENSION (ML) NASAL
Qty: 16 G | Refills: 0 | Status: SHIPPED | OUTPATIENT
Start: 2023-02-06

## 2023-02-06 NOTE — PROGRESS NOTES
Name: Ryan Kimbrough      : 1960      MRN: [id number]  Encounter Provider: JANIE Umaña  Encounter Date: 2023   Encounter department: [facility] [address]  Acute non-recurrent maxillary sinusitis  Comments:  will treat w/antibiotic given persistent/worse sx's, use nasal steroid in addition w/OTC meds prn, rest and fluids; call w/worse or persistent sx's  Orders:  -     amoxicillin-clavulanate (Augmentin) 875-125 mg per tablet; Take 1 tablet by mouth every 12 (twelve) hours for 10 days  -     fluticasone (FLONASE) 50 mcg/act nasal spray; Use 2 sprays in each nostril at bedtime           Subjective      Has had a sinus infection for the past 6 weeks  Has not been evaluated for yet  Has left sided sinus pain and pressure into teeth and jaw  Took tylenol once without relief  Takes tramadol for relief at bedtime  Review of Systems   Constitutional: Positive for chills  Negative for fever  HENT: Positive for congestion, sinus pressure and sinus pain  Current Outpatient Medications on File Prior to Visit   Medication Sig   • BIOTIN PO Take by mouth Two tablets once day   • Calcium 250 MG CAPS Take 500 mg by mouth daily   • Cholecalciferol (VITAMIN D3) 2000 units TABS Take 2 tablets by mouth daily   • Cyanocobalamin (VITAMIN B-12) 1000 MCG SUBL Place 2 Doses under the tongue daily   • gabapentin (NEURONTIN) 400 mg capsule Take 1 capsule (400 mg total) by mouth 3 (three) times a day   • IRON-VITAMIN C PO Take 1 tablet by mouth daily   • Magnesium 200 MG TABS Take 2 tablets by mouth daily   • Multiple Vitamins-Minerals (MULTIPLE VITAMINS/WOMENS PO) Take by mouth daily   • omeprazole (PriLOSEC) 20 mg delayed release capsule Take 20 mg by mouth daily   • Synthroid 150 MCG tablet 1 tab 6 days of the week, 1 5 tabs sundays  Synthroid brand     • tiZANidine (ZANAFLEX) 4 mg tablet TAKE 1 TAB BY MOUTH EVERY 8 HRS AS NEEDED FOR MUSCLE PAIN OR SPASM   • traMADol-acetaminophen (ULTRACET) 37 5-325 mg per tablet TAKE 1 TABLET BY MOUTH EVERY 6 HOURS AS NEEDED FOR MODERATE PAIN   • venlafaxine (EFFEXOR) 100 MG tablet TAKE 1 TABLET BY MOUTH TWICE A DAY       Objective     /78   Pulse 69   Temp 99 4 °F (37 4 °C)   Ht 5' 3" (1 6 m)   Wt 62 9 kg (138 lb 9 6 oz)   BMI 24 55 kg/m²       Physical Exam  Vitals reviewed  Constitutional:       General: She is not in acute distress  Appearance: Normal appearance  HENT:      Head: Normocephalic  Right Ear: Tympanic membrane normal       Left Ear: Tympanic membrane normal       Nose: Congestion present  Right Sinus: No maxillary sinus tenderness or frontal sinus tenderness  Left Sinus: Maxillary sinus tenderness present  No frontal sinus tenderness  Mouth/Throat:      Mouth: Mucous membranes are moist       Pharynx: Oropharynx is clear  No posterior oropharyngeal erythema  Eyes:      General: No scleral icterus  Cardiovascular:      Rate and Rhythm: Normal rate and regular rhythm  Heart sounds: No murmur heard  Pulmonary:      Effort: Pulmonary effort is normal  No respiratory distress  Breath sounds: Normal breath sounds  Comments: No cough  Musculoskeletal:      Cervical back: Normal range of motion  Lymphadenopathy:      Cervical: No cervical adenopathy  Skin:     General: Skin is warm and dry  Neurological:      General: No focal deficit present  Mental Status: She is alert and oriented to person, place, and time     Psychiatric:         Mood and Affect: Mood normal          Behavior: Behavior normal           JANIE Dye

## 2023-03-01 DIAGNOSIS — M54.16 RADICULOPATHY, LUMBAR REGION: ICD-10-CM

## 2023-03-13 ENCOUNTER — TELEPHONE (OUTPATIENT)
Dept: BARIATRICS | Facility: CLINIC | Age: 63
End: 2023-03-13

## 2023-03-17 ENCOUNTER — TELEPHONE (OUTPATIENT)
Dept: FAMILY MEDICINE CLINIC | Facility: HOSPITAL | Age: 63
End: 2023-03-17

## 2023-03-17 NOTE — TELEPHONE ENCOUNTER
Patient has a sinus infection and is asking for a script for Augmentin  No availability in schedule today      [facility]

## 2023-03-17 NOTE — TELEPHONE ENCOUNTER
Spoke to pt, symptoms include green mucous blowing out of her nose, a lot of congestion  Reports she hasn't taken a covid test, states she will take on if needed but doesn't think it's covid  Are you willing to call anything in  Please advise

## 2023-04-02 LAB
ERYTHROCYTE [DISTWIDTH] IN BLOOD BY AUTOMATED COUNT: 12.5 % (ref 11.7–15.4)
FERRITIN SERPL-MCNC: 89 NG/ML (ref 15–150)
FOLATE SERPL-MCNC: >20 NG/ML
HCT VFR BLD AUTO: 40.5 % (ref 34–46.6)
HGB BLD-MCNC: 13.5 G/DL (ref 11.1–15.9)
IRON SATN MFR SERPL: 26 % (ref 15–55)
IRON SERPL-MCNC: 80 UG/DL (ref 27–139)
MCH RBC QN AUTO: 28 PG (ref 26.6–33)
MCHC RBC AUTO-ENTMCNC: 33.3 G/DL (ref 31.5–35.7)
MCV RBC AUTO: 84 FL (ref 79–97)
PLATELET # BLD AUTO: 331 X10E3/UL (ref 150–450)
RBC # BLD AUTO: 4.83 X10E6/UL (ref 3.77–5.28)
TIBC SERPL-MCNC: 307 UG/DL (ref 250–450)
UIBC SERPL-MCNC: 227 UG/DL (ref 118–369)
VIT A SERPL-MCNC: 49.6 UG/DL (ref 22–69.5)
VIT B1 BLD-SCNC: 125.3 NMOL/L (ref 66.5–200)
VIT B12 SERPL-MCNC: 702 PG/ML (ref 232–1245)
WBC # BLD AUTO: 6.3 X10E3/UL (ref 3.4–10.8)
ZINC SERPL-MCNC: 92 UG/DL (ref 44–115)

## 2023-04-04 ENCOUNTER — TELEPHONE (OUTPATIENT)
Dept: OTHER | Facility: OTHER | Age: 63
End: 2023-04-04

## 2023-04-04 NOTE — TELEPHONE ENCOUNTER
Patient is calling regarding cancelling an appointment      Date/Time: 4/4/23 at 8am    Patient was rescheduled: YES [] NO [x]    Patient requesting call back to reschedule: YES [] NO [x]

## 2023-04-05 ENCOUNTER — RA CDI HCC (OUTPATIENT)
Dept: OTHER | Facility: HOSPITAL | Age: 63
End: 2023-04-05

## 2023-04-05 NOTE — PROGRESS NOTES
[facility]  coding opportunities       Chart reviewed, no opportunity found: CHART REVIEWED, NO OPPORTUNITY FOUND        Patients Insurance        Commercial Insurance: [employer]

## 2023-04-06 DIAGNOSIS — M54.16 RADICULOPATHY, LUMBAR REGION: ICD-10-CM

## 2023-04-08 DIAGNOSIS — F41.9 ANXIETY: ICD-10-CM

## 2023-04-08 DIAGNOSIS — F33.41 RECURRENT MAJOR DEPRESSIVE DISORDER, IN PARTIAL REMISSION (HCC): ICD-10-CM

## 2023-04-08 RX ORDER — VENLAFAXINE 100 MG/1
100 TABLET ORAL 2 TIMES DAILY
Qty: 180 TABLET | Refills: 1 | Status: SHIPPED | OUTPATIENT
Start: 2023-04-08 | End: 2023-07-07

## 2023-05-16 DIAGNOSIS — M51.36 DISC DEGENERATION, LUMBAR: ICD-10-CM

## 2023-05-16 DIAGNOSIS — M54.16 RADICULOPATHY, LUMBAR REGION: ICD-10-CM

## 2023-05-16 RX ORDER — GABAPENTIN 400 MG/1
400 CAPSULE ORAL 3 TIMES DAILY
Qty: 270 CAPSULE | Refills: 1 | Status: SHIPPED | OUTPATIENT
Start: 2023-05-16

## 2023-07-17 DIAGNOSIS — M54.50 CHRONIC BILATERAL LOW BACK PAIN WITHOUT SCIATICA: ICD-10-CM

## 2023-07-17 DIAGNOSIS — G89.29 CHRONIC BILATERAL LOW BACK PAIN WITHOUT SCIATICA: ICD-10-CM

## 2023-07-17 RX ORDER — TIZANIDINE 4 MG/1
TABLET ORAL
Qty: 60 TABLET | Refills: 2 | Status: SHIPPED | OUTPATIENT
Start: 2023-07-17

## 2023-07-27 ENCOUNTER — OFFICE VISIT (OUTPATIENT)
Dept: FAMILY MEDICINE CLINIC | Facility: HOSPITAL | Age: 63
End: 2023-07-27

## 2023-07-27 VITALS
HEIGHT: 63 IN | SYSTOLIC BLOOD PRESSURE: 114 MMHG | WEIGHT: 128.8 LBS | TEMPERATURE: 98.1 F | HEART RATE: 72 BPM | DIASTOLIC BLOOD PRESSURE: 76 MMHG | BODY MASS INDEX: 22.82 KG/M2

## 2023-07-27 DIAGNOSIS — F33.41 RECURRENT MAJOR DEPRESSIVE DISORDER, IN PARTIAL REMISSION (HCC): Primary | ICD-10-CM

## 2023-07-27 DIAGNOSIS — F41.9 ANXIETY: ICD-10-CM

## 2023-07-27 PROCEDURE — 99214 OFFICE O/P EST MOD 30 MIN: CPT | Performed by: INTERNAL MEDICINE

## 2023-07-27 RX ORDER — BUPROPION HYDROCHLORIDE 100 MG/1
100 TABLET, EXTENDED RELEASE ORAL 2 TIMES DAILY
Qty: 60 TABLET | Refills: 2 | Status: SHIPPED | OUTPATIENT
Start: 2023-07-27

## 2023-07-27 RX ORDER — HYDROXYZINE HYDROCHLORIDE 10 MG/1
10 TABLET, FILM COATED ORAL EVERY 8 HOURS PRN
Qty: 30 TABLET | Refills: 0 | Status: SHIPPED | OUTPATIENT
Start: 2023-07-27

## 2023-07-27 NOTE — ASSESSMENT & PLAN NOTE
Mood not controlled - both anxiety and depression - noting a lot of stress since passing of father and overwhelmed with housing, con't current Effexor for now, add WBSR 100 mg bid,  d/w pt that it takes 4-6 wks to get maximum benefit of med and that med has to be taken every day and to not miss doses of med, call with new/worse symptoms/SI/SE, re-eval in 6-8 wks or sooner if needed, will reach out to Martha Kirkpatrick and see if she knows of a PTSD therapist, will schedule with Nathalie Herr in mean time as well

## 2023-07-27 NOTE — PROGRESS NOTES
Name: Renay Marina      : 1960      MRN: [id number]  Encounter Provider: Juan Mcmillan DO  Encounter Date: 2023   Encounter department: [address]     1. Recurrent major depressive disorder, in partial remission (HCC)  Assessment & Plan:  Mood not controlled - both anxiety and depression - noting a lot of stress since passing of father and overwhelmed with housing, con't current Effexor for now, add WBSR 100 mg bid,  d/w pt that it takes 4-6 wks to get maximum benefit of med and that med has to be taken every day and to not miss doses of med, call with new/worse symptoms/SI/SE, re-eval in 6-8 wks or sooner if needed, will reach out to Ivanna Amezquita and see if she knows of a PTSD therapist, will schedule with Dona Barrera in mean time as well      Orders:  -     buPROPion (Wellbutrin SR) 100 mg 12 hr tablet; Take 1 tablet (100 mg total) by mouth 2 (two) times a day  -     Ambulatory Referral to [facility]; Future    2. Anxiety  Assessment & Plan:  Mood not controlled - both anxiety and depression - noting a lot of stress since passing of father and overwhelmed with housing, con't current Effexor for now, add WBSR 100 mg bid for depression,  d/w pt that it takes 4-6 wks to get maximum benefit of med and that med has to be taken every day and to not miss doses of med, will con't hydroxyzine prn for anxiety/panic attacks - rx refilled,  call with new/worse symptoms/panic attacks/SE, re-eval in 6-8 wks or sooner if needed, will reach out to Ivanna Amezquita and see if she knows of a PTSD therapist, will schedule with Dona Barrera in mean time as well    Orders:  -     hydrOXYzine HCL (ATARAX) 10 mg tablet; Take 1 tablet (10 mg total) by mouth every 8 (eight) hours as needed for anxiety  -     Ambulatory Referral to [facility];  Future      Colonoscopy 3/20- 5 yrs    Mammo 3/21- scheduled for     Dexa 3/21 - nml - scheduled for     PAP - s/p hysterectomy    BW 4/23      Subjective      HPI Pt here for a med check "my nerves"    She states the past few months she has had an issue with her nerves. She has tried medical marijuana and thinks it may have caused her anxiety to be a bit worse. She has had down/sad mood as well as anxiety/stress. She notes she gets overwhelmed looking around her appt and seeing everything from her parents and dgtr and just overwhelmed on how to get rid of everything. She is using hydroxyzine as needed with anxiety with benefit. She has had a few panic attacks. She is interested in seeing a therapist for PTSD. She denies SI. She is taking her Effexor 100 mg bid but feels that Zoloft worked better. She is down 5 lbs from April 23. She admits appetite is a bit decreased. She is sleeping well. Review of Systems   Constitutional: Positive for unexpected weight change. Negative for chills and fever. Respiratory: Negative for cough and shortness of breath. Cardiovascular: Negative for chest pain and palpitations. Gastrointestinal: Negative for abdominal pain, diarrhea, nausea and vomiting. Skin: Negative for rash and wound. Neurological: Negative for dizziness, light-headedness and headaches. Psychiatric/Behavioral: Positive for dysphoric mood. Negative for sleep disturbance and suicidal ideas. The patient is nervous/anxious.         Current Outpatient Medications on File Prior to Visit   Medication Sig   • BIOTIN PO Take by mouth Two tablets once day   • Calcium 250 MG CAPS Take 500 mg by mouth daily   • Cholecalciferol (VITAMIN D3) 2000 units TABS Take 2 tablets by mouth daily   • Cyanocobalamin (VITAMIN B-12) 1000 MCG SUBL Place 2 Doses under the tongue daily   • famotidine (PEPCID) 20 mg tablet Take 20 mg by mouth 2 (two) times a day as needed for heartburn   • gabapentin (NEURONTIN) 400 mg capsule Take 1 capsule (400 mg total) by mouth 3 (three) times a day   • IRON-VITAMIN C PO Take 1 tablet by mouth daily   • Magnesium 200 MG TABS Take 2 tablets by mouth daily   • Multiple Vitamins-Minerals (MULTIPLE VITAMINS/WOMENS PO) Take by mouth daily   • Synthroid 150 MCG tablet 1 tab 6 days of the week, 1.5 tabs sundays. Synthroid brand. • tiZANidine (ZANAFLEX) 4 mg tablet TAKE 1 TAB BY MOUTH EVERY 8 HRS AS NEEDED FOR MUSCLE PAIN OR SPASM   • traMADol-acetaminophen (ULTRACET) 37.5-325 mg per tablet Take 1 tablet by mouth every 6 (six) hours as needed for moderate pain   • venlafaxine (EFFEXOR) 100 MG tablet Take 1 tablet (100 mg total) by mouth 2 (two) times a day       Objective     /76   Pulse 72   Temp 98.1 °F (36.7 °C) (Tympanic)   Ht 5' 3" (1.6 m)   Wt 58.4 kg (128 lb 12.8 oz)   BMI 22.82 kg/m²     Physical Exam  Vitals and nursing note reviewed. Constitutional:       General: She is not in acute distress. Appearance: She is well-developed. She is not ill-appearing. HENT:      Head: Normocephalic and atraumatic. Eyes:      General:         Right eye: No discharge. Left eye: No discharge. Conjunctiva/sclera: Conjunctivae normal.   Neck:      Trachea: No tracheal deviation. Cardiovascular:      Rate and Rhythm: Normal rate and regular rhythm. Heart sounds: Normal heart sounds. No murmur heard. No friction rub. Pulmonary:      Effort: Pulmonary effort is normal. No respiratory distress. Breath sounds: Normal breath sounds. No wheezing, rhonchi or rales. Musculoskeletal:      Cervical back: Neck supple. Skin:     General: Skin is warm. Coloration: Skin is not pale. Findings: No rash. Neurological:      General: No focal deficit present. Mental Status: She is alert. Motor: No abnormal muscle tone. Gait: Gait normal.   Psychiatric:         Behavior: Behavior normal.         Thought Content:  Thought content normal.         Judgment: Judgment normal.      Comments: Tearful and anxious, a bit tremulous        Zaria Darden DO

## 2023-07-27 NOTE — ASSESSMENT & PLAN NOTE
Mood not controlled - both anxiety and depression - noting a lot of stress since passing of father and overwhelmed with housing, con't current Effexor for now, add WBSR 100 mg bid for depression,  d/w pt that it takes 4-6 wks to get maximum benefit of med and that med has to be taken every day and to not miss doses of med, will con't hydroxyzine prn for anxiety/panic attacks - rx refilled,  call with new/worse symptoms/panic attacks/SE, re-eval in 6-8 wks or sooner if needed, will reach out to Pk Lay and see if she knows of a PTSD therapist, will schedule with Juan José Funez in mean time as well

## 2023-07-28 ENCOUNTER — TELEPHONE (OUTPATIENT)
Dept: PSYCHIATRY | Facility: CLINIC | Age: 63
End: 2023-07-28

## 2023-07-28 NOTE — TELEPHONE ENCOUNTER
Rec'd message from Dipak Herrera in integrations regarding provider specializing in PTSD at [facility]. Reviewed client's insurance and all were e-rejected. Called clt to confirm insurance to ensure we are able to see her here. LVM for return call to intake.

## 2023-08-02 NOTE — TELEPHONE ENCOUNTER
Rec'd call back from clt in regards to VM left for services at [facility]. [facility] does not have any other insurance at the moment and is waiting for medicare part B to kick in. Advised of [facility] waitlist for medicare clients seeking therapy. Recommended continued care with Nathanael Pulido or to ask for referral to [facility].

## 2023-09-01 ENCOUNTER — TELEPHONE (OUTPATIENT)
Dept: FAMILY MEDICINE CLINIC | Facility: HOSPITAL | Age: 63
End: 2023-09-01

## 2023-09-01 DIAGNOSIS — M51.36 DISC DEGENERATION, LUMBAR: Primary | ICD-10-CM

## 2023-09-01 NOTE — TELEPHONE ENCOUNTER
Asking for a refill of tramadol, I don't see on med list, please advise, thank you. Would like it sent to [facility] in [place].

## 2023-09-12 ENCOUNTER — OFFICE VISIT (OUTPATIENT)
Dept: FAMILY MEDICINE CLINIC | Facility: HOSPITAL | Age: 63
End: 2023-09-12
Payer: COMMERCIAL

## 2023-09-12 VITALS
TEMPERATURE: 98 F | HEIGHT: 63 IN | SYSTOLIC BLOOD PRESSURE: 102 MMHG | WEIGHT: 130.4 LBS | HEART RATE: 64 BPM | DIASTOLIC BLOOD PRESSURE: 50 MMHG | BODY MASS INDEX: 23.11 KG/M2

## 2023-09-12 DIAGNOSIS — F33.41 RECURRENT MAJOR DEPRESSIVE DISORDER, IN PARTIAL REMISSION (HCC): ICD-10-CM

## 2023-09-12 DIAGNOSIS — F41.9 ANXIETY: Primary | ICD-10-CM

## 2023-09-12 DIAGNOSIS — R41.3 MEMORY IMPAIRMENT: ICD-10-CM

## 2023-09-12 PROCEDURE — 99214 OFFICE O/P EST MOD 30 MIN: CPT | Performed by: INTERNAL MEDICINE

## 2023-09-12 RX ORDER — BUPROPION HYDROCHLORIDE 150 MG/1
150 TABLET, EXTENDED RELEASE ORAL 2 TIMES DAILY
Qty: 60 TABLET | Refills: 2 | Status: SHIPPED | OUTPATIENT
Start: 2023-09-12

## 2023-09-12 RX ORDER — MULTIVIT-MIN/IRON/FOLIC ACID/K 45-800-120
2 CAPSULE ORAL DAILY
COMMUNITY

## 2023-09-12 NOTE — ASSESSMENT & PLAN NOTE
Mood not at goal but has improved with addition of WBSR - will increase from 100 mg bid to 150 mg bid, con't current Effexor,  d/w pt that it takes 4-6 wks to get maximum benefit of med and that med has to be taken every day and to not miss doses of med, call with SE/new/worse mood/SI, has appt to establish with Rl Wilkerson in Nov, has list of psychiatrist from insurance but has not called for appt yet, will follow closely

## 2023-09-12 NOTE — ASSESSMENT & PLAN NOTE
Anxiety still up and down with depression, on Effexor and has hydroxyzine for prn use - UTT higher dose of hydroxyzine, adjust WBSR for depression, has appt with Sebastian Vail to establish with for her PTSD, urged to call psychiatrist numbers provided by insurance as well, call with new/worse mood/panic attacks

## 2023-09-12 NOTE — PROGRESS NOTES
Name: Darinel Ruby      : 1960      MRN: [id number]  Encounter Provider: Steve Wong DO  Encounter Date: 2023   Encounter department: [address]     1. Anxiety  Assessment & Plan:  Anxiety still up and down with depression, on Effexor and has hydroxyzine for prn use - UTT higher dose of hydroxyzine, adjust WBSR for depression, has appt with Oli Chong to establish with for her PTSD, urged to call psychiatrist numbers provided by insurance as well, call with new/worse mood/panic attacks    Orders:  -     TSH, 3rd generation with Free T4 reflex  -     Vitamin B12  -     Folate  -     RPR, Rfx Qn RPR/Confirm TP; Future  -     RPR, Rfx Qn RPR/Confirm TP    2. Recurrent major depressive disorder, in partial remission ([address])  Assessment & Plan:  Mood not at goal but has improved with addition of WBSR - will increase from 100 mg bid to 150 mg bid, con't current Effexor,  d/w pt that it takes 4-6 wks to get maximum benefit of med and that med has to be taken every day and to not miss doses of med, call with SE/new/worse mood/SI, has appt to establish with Oli Chong in Nov, has list of psychiatrist from insurance but has not called for appt yet, will follow closely      Orders:  -     buPROPion (Wellbutrin SR) 150 mg 12 hr tablet; Take 1 tablet (150 mg total) by mouth 2 (two) times a day  -     TSH, 3rd generation with Free T4 reflex  -     Vitamin B12  -     Folate  -     RPR, Rfx Qn RPR/Confirm TP; Future  -     RPR, Rfx Qn RPR/Confirm TP    3.  Memory impairment  Comments:   on MMSE, nml Neuro exam, will check memory labs and MRI of brain, will follow closely  Orders:  -     TSH, 3rd generation with Free T4 reflex  -     Vitamin B12  -     Folate  -     RPR, Rfx Qn RPR/Confirm TP; Future  -     RPR, Rfx Qn RPR/Confirm TP  -     MRI brain wo contrast; Future; Expected date: 2023      Colonoscopy 3/- 5 yrs    Mammo 3/21- scheduled for 2023     Dexa 3/21 - nml - scheduled for 2023     PAP - s/p hysterectomy    BW 4/23      Subjective      HPI pt here for follow up appt    Last visit in July pts mood was not at goal with both anxiety and depression. We subsequently added WBSR 100 mg bid to her Effexor and gave her hydroxyzine q 8 hrs prn anxiety. She was referred to Meggan Penaloza for therapy/PTSD therapy. Pt here for a mood/med check. She has been taking the WBSR bid as directed w/o SE. She con't to feel down and tearful. She has tried the hydroxyzine when needed w/benefit. She feels she could use a higher dose. She has an appt with Josué Vasquez to establish care in Oct. She has a list of psychiatrists to call as well from her pharmacy but has not done so yet. She is taking her Effexor as directed daily as well. Pt noting "since I turned 63 my memory is shot". She notes no issues with long term memory but has issues with STM. She will be in the middle of a sentence and can't think of what she was saying or she was driving and has forgotten where she was going. She denies missing bill payments/bouncing checks/missing appt/important anniversaries or birthdays. She does not feel she has had a change in personality. She has issues with word finding and feels "wobbly". She denies always leaning to one side and just is "clumsy constantly". She denies slurred speech but has some word finding difficulty. She denies HA's/loss of vision-shade coming down and denies focal weakness/numnbess/tingling. Review of Systems   Constitutional: Negative for chills and fever. HENT: Negative for congestion and hearing loss. Eyes: Negative for pain and visual disturbance. Respiratory: Negative for cough and shortness of breath. Cardiovascular: Negative for chest pain and palpitations. Gastrointestinal: Negative for abdominal pain, blood in stool, constipation, diarrhea, nausea and vomiting.    Genitourinary: Negative for difficulty urinating and dysuria. Musculoskeletal: Negative for back pain and neck pain. Skin: Negative for rash and wound. Neurological: Negative for dizziness, speech difficulty, weakness, light-headedness, numbness and headaches. Hematological: Does not bruise/bleed easily. Psychiatric/Behavioral: Positive for dysphoric mood. Negative for sleep disturbance. The patient is nervous/anxious. Current Outpatient Medications on File Prior to Visit   Medication Sig   • Multiple Vitamins-Minerals (Bariatric Multivitamins/Iron) CAPS Take 2 capsules by mouth in the morning   • BIOTIN PO Take by mouth Two tablets once day   • Calcium 250 MG CAPS Take 500 mg by mouth daily   • Cholecalciferol (VITAMIN D3) 2000 units TABS Take 2 tablets by mouth daily   • famotidine (PEPCID) 20 mg tablet Take 20 mg by mouth 2 (two) times a day as needed for heartburn   • gabapentin (NEURONTIN) 400 mg capsule Take 1 capsule (400 mg total) by mouth 3 (three) times a day   • hydrOXYzine HCL (ATARAX) 10 mg tablet Take 1 tablet (10 mg total) by mouth every 8 (eight) hours as needed for anxiety   • Magnesium 200 MG TABS Take 2 tablets by mouth daily   • Synthroid 150 MCG tablet 1 tab 6 days of the week, 1.5 tabs sundays. Synthroid brand.    • tiZANidine (ZANAFLEX) 4 mg tablet TAKE 1 TAB BY MOUTH EVERY 8 HRS AS NEEDED FOR MUSCLE PAIN OR SPASM   • traMADol-acetaminophen (ULTRACET) 37.5-325 mg per tablet Take 1 tablet by mouth every 6 (six) hours as needed for moderate pain   • venlafaxine (EFFEXOR) 100 MG tablet Take 1 tablet (100 mg total) by mouth 2 (two) times a day   • [DISCONTINUED] buPROPion (Wellbutrin SR) 100 mg 12 hr tablet Take 1 tablet (100 mg total) by mouth 2 (two) times a day   • [DISCONTINUED] Cyanocobalamin (VITAMIN B-12) 1000 MCG SUBL Place 2 Doses under the tongue daily   • [DISCONTINUED] IRON-VITAMIN C PO Take 1 tablet by mouth daily   • [DISCONTINUED] Multiple Vitamins-Minerals (MULTIPLE VITAMINS/WOMENS PO) Take by mouth daily Objective     /50   Pulse 64   Temp 98 °F (36.7 °C) (Tympanic)   Ht 5' 3" (1.6 m)   Wt 59.1 kg (130 lb 6.4 oz)   BMI 23.10 kg/m²     Physical Exam  Vitals and nursing note reviewed. Constitutional:       General: She is not in acute distress. Appearance: She is well-developed. She is not ill-appearing. HENT:      Head: Normocephalic and atraumatic. Right Ear: Tympanic membrane and external ear normal. There is no impacted cerumen. Left Ear: Tympanic membrane and external ear normal. There is no impacted cerumen. Mouth/Throat:      Mouth: Mucous membranes are moist.      Pharynx: Oropharynx is clear. No oropharyngeal exudate. Eyes:      General:         Right eye: No discharge. Left eye: No discharge. Extraocular Movements: Extraocular movements intact. Conjunctiva/sclera: Conjunctivae normal.      Pupils: Pupils are equal, round, and reactive to light. Neck:      Vascular: No carotid bruit. Trachea: No tracheal deviation. Cardiovascular:      Rate and Rhythm: Normal rate and regular rhythm. Heart sounds: Normal heart sounds. No murmur heard. No friction rub. Pulmonary:      Effort: Pulmonary effort is normal. No respiratory distress. Breath sounds: Normal breath sounds. No wheezing, rhonchi or rales. Abdominal:      General: There is no distension. Palpations: Abdomen is soft. Tenderness: There is no abdominal tenderness. There is no guarding or rebound. Musculoskeletal:         General: No deformity or signs of injury. Cervical back: Neck supple. Lymphadenopathy:      Cervical: No cervical adenopathy. Skin:     General: Skin is warm. Coloration: Skin is not pale. Findings: No rash. Neurological:      General: No focal deficit present. Mental Status: She is alert. Cranial Nerves: No cranial nerve deficit. Sensory: Sensory deficit present.       Motor: No weakness or abnormal muscle tone.      Coordination: Coordination normal.      Gait: Gait normal.      Deep Tendon Reflexes: Reflexes normal.      Comments: CN II-XII intact, sensation intact to light touch globally EXCEPT slightly decreased L distal lateral LE, 5/5 global muscle strength, 2/4 patellar DTR B/L LE, nml FN and HS, neg Rhomberg   Psychiatric:         Behavior: Behavior normal.         Thought Content:  Thought content normal.         Judgment: Judgment normal.     MMSE 30/30      Bello Alcantar DO

## 2023-09-15 ENCOUNTER — TELEPHONE (OUTPATIENT)
Dept: FAMILY MEDICINE CLINIC | Facility: HOSPITAL | Age: 63
End: 2023-09-15

## 2023-09-15 NOTE — TELEPHONE ENCOUNTER
Patient left the following message about her Vit D dose: She wanted me to call her and let her know what the dosage on my D3 vitamins are and it is 50 micrograms for the serving and that's 2000 IU. If she has any other questions or wants me to take more, let me know. Otherwise I'm good to go. Thank you.  Bye.

## 2023-09-28 ENCOUNTER — TELEPHONE (OUTPATIENT)
Dept: FAMILY MEDICINE CLINIC | Facility: HOSPITAL | Age: 63
End: 2023-09-28

## 2023-09-28 DIAGNOSIS — F41.9 ANXIETY: ICD-10-CM

## 2023-09-28 DIAGNOSIS — F33.41 RECURRENT MAJOR DEPRESSIVE DISORDER, IN PARTIAL REMISSION (HCC): ICD-10-CM

## 2023-09-28 RX ORDER — VENLAFAXINE 100 MG/1
TABLET ORAL
Qty: 90 TABLET | Refills: 1 | Status: SHIPPED | OUTPATIENT
Start: 2023-09-28 | End: 2023-10-01

## 2023-09-28 NOTE — TELEPHONE ENCOUNTER
Can increase Effexor from 100 mg 1 tab bid to 100 mg 2 tab in am and 1 tab in pm for a total of 3 tabs a day. Takes 4-6 wks for max benefit of the medication. Con't current Wellbutrin. [id number] [facility] is not accepting any new pts to my knowledge. She should certainly see a therapist if she is able to get an appt - I have no specific recommendations.

## 2023-09-28 NOTE — TELEPHONE ENCOUNTER
Lola called crying frantically. Is very emotional today, is crying at everything. Just moved into an apartment and is by herself. She misses her ex- dearly and is very emotionally overwhelmed right now. Spoke with her brother who talked her down a bit. Brother and daughter advised her to get a long shower hoping that would help her calm down and relax. Pt still very worked up. Current meds Wellbutrin 150mg and 100mg taking each tab once daily. Also on Effexor 100mg BID. She's asking if she should have a med adjustment as she feels what she is on is not helping. Confirmed no thoughts of suicide or self-harm. Also has not found a psychiatrist yet. Is wondering if you would recommend [facility] or the [facility] [place]. No one was with her when she called. Asked if she had someone close who would be able to come over and help her calm down, reports she has no one close, everyone else is too far away.

## 2023-09-28 NOTE — TELEPHONE ENCOUNTER
Pt aware. Had appointment scheduled with Paula Hairston which will need to be canceled due to provider leaving office. Advised to establish with a therapist ASAP and work on getting into psychiatry from there. To call us with any further concerns.

## 2023-09-29 ENCOUNTER — TELEPHONE (OUTPATIENT)
Dept: FAMILY MEDICINE CLINIC | Facility: HOSPITAL | Age: 63
End: 2023-09-29

## 2023-09-29 DIAGNOSIS — E03.9 HYPOTHYROIDISM, UNSPECIFIED TYPE: ICD-10-CM

## 2023-09-29 LAB
FOLATE SERPL-MCNC: >20 NG/ML
RPR SER QL: NON REACTIVE
TSH SERPL DL<=0.005 MIU/L-ACNC: 0.08 UIU/ML (ref 0.45–4.5)
VIT B12 SERPL-MCNC: 839 PG/ML (ref 232–1245)

## 2023-09-29 RX ORDER — LEVOTHYROXINE SODIUM 150 MCG
TABLET ORAL
Qty: 96 TABLET | Refills: 7
Start: 2023-09-29 | End: 2023-10-16 | Stop reason: SDUPTHER

## 2023-09-29 NOTE — TELEPHONE ENCOUNTER
----- Message from Ab Polanco DO sent at 9/29/2023  7:22 AM EDT -----  See results note - please notify pt that I discussed her thyroid with Dr. Cal Telles and he agreed she can decrease her Synthroid slightly from current dose of 150 mcg 1 tab 6 days a week and 1.5 tabs on Sun ONLY to just 150 mcg 1 tab EVERY DAY (get rid of extra half tab on Sunday). He said he would review with her at upcoming appt as well. TY  ----- Message -----  From: Cristobal King DO  Sent: 9/29/2023   7:20 AM EDT  To: Ab Polanco,     Morning! I agree with that change. I can discuss repeat thyroid testing at her upcoming appt. Thanks,  Jesus Manuel Hannah    ----- Message -----  From: Ab Polanco DO  Sent: 9/29/2023   7:11 AM EDT  To: DO Dr. Cal Rodriguez,    I saw [facility] for a follow up appt. She was noting worsening mood and memory impairment. I ordered memory labs, including a TSH. Her TSH has come back a bit low at 0.080. I didn't want to adjust her Synthroid without discussing with you has you have been the one prescribing the rx. She currently is on Synthroid 150 mcg 1 tab 6 days a week and 1.5 tab on Sun only. I was just going to have her drop the extra 1/2 tab she was doing once a week and go to 1 tab daily EVERY day. Is that OK or do you have further recommendations.      Ab Polanco

## 2023-09-30 ENCOUNTER — HOSPITAL ENCOUNTER (OUTPATIENT)
Dept: MRI IMAGING | Facility: HOSPITAL | Age: 63
Discharge: HOME | End: 2023-09-30
Payer: COMMERCIAL

## 2023-09-30 DIAGNOSIS — R41.3 MEMORY IMPAIRMENT: ICD-10-CM

## 2023-09-30 PROCEDURE — G1004 CDSM NDSC: HCPCS

## 2023-09-30 PROCEDURE — 70551 MRI BRAIN STEM W/O DYE: CPT

## 2023-10-01 DIAGNOSIS — F41.9 ANXIETY: ICD-10-CM

## 2023-10-01 DIAGNOSIS — F33.41 RECURRENT MAJOR DEPRESSIVE DISORDER, IN PARTIAL REMISSION (HCC): ICD-10-CM

## 2023-10-01 RX ORDER — VENLAFAXINE 100 MG/1
TABLET ORAL
Qty: 270 TABLET | Refills: 1 | Status: SHIPPED | OUTPATIENT
Start: 2023-10-01

## 2023-10-03 ENCOUNTER — TELEPHONE (OUTPATIENT)
Dept: FAMILY MEDICINE CLINIC | Facility: HOSPITAL | Age: 63
End: 2023-10-03

## 2023-10-16 ENCOUNTER — OFFICE VISIT (OUTPATIENT)
Dept: ENDOCRINOLOGY | Facility: CLINIC | Age: 63
End: 2023-10-16
Payer: COMMERCIAL

## 2023-10-16 VITALS — HEIGHT: 63 IN | WEIGHT: 131.6 LBS | BODY MASS INDEX: 23.32 KG/M2

## 2023-10-16 DIAGNOSIS — E03.9 HYPOTHYROIDISM, UNSPECIFIED TYPE: ICD-10-CM

## 2023-10-16 PROCEDURE — 99214 OFFICE O/P EST MOD 30 MIN: CPT | Performed by: INTERNAL MEDICINE

## 2023-10-16 RX ORDER — LEVOTHYROXINE SODIUM 150 MCG
TABLET ORAL
Qty: 90 TABLET | Refills: 1 | Status: SHIPPED | OUTPATIENT
Start: 2023-10-16

## 2023-10-16 NOTE — PROGRESS NOTES
10/16/2023    Assessment/Plan      Diagnoses and all orders for this visit:    Hypothyroidism, unspecified type  -     Synthroid 150 MCG tablet; 1 tab 6 days of the week, 0.5 tabs Sundays. Dose change  -     TSH, 3rd generation; Future  -     T4, free; Future  -     TSH, 3rd generation  -     T4, free        Assessment/Plan:  Hypothyroidism: We will reduce her dose of Synthroid to 1 tablet 6 days a week but only half tablet on Sundays and repeat labs in 4-6 weeks. We will call with the results and make adjustments as needed. Follow-up in 6 months but we will be in touch with her as results are available. CC: Follow-up    History of Present Illness     HPI: Darinel Ruby is a 70y.o. year old female with history of hypothyroidism due to Hashimoto's thyroiditis as well as history of gastric sleeve with recent revision to Lissette-en-Y gastric bypass as well as history of hyperlipidemia presents for a follow-up appointment. She continues on Synthroid 150 mcg daily. Presents today overall feeling okay. At last visit, did note fatigue but relates this perhaps due to busy life and increased stress. In September of last year she had gastric sleeve revision with transition to Lissette-en-Y. At her last visit we tried to transition to Corina Arreola however this was too expensive. She presents today for a follow-up visit. She has been undergoing memory evaluation to her primary care. Increased stress in her life lately and has had some weight loss. No palpitations, tremor, shakiness, tachycardia. No neck compressive symptoms. Review of Systems   Constitutional:  Negative for fatigue. HENT:  Negative for trouble swallowing and voice change. Eyes:  Negative for visual disturbance. Respiratory:  Negative for shortness of breath. Cardiovascular:  Negative for chest pain, palpitations and leg swelling. Gastrointestinal:  Negative for abdominal pain, nausea and vomiting.    Endocrine: Negative for polydipsia and polyuria. Musculoskeletal:  Negative for arthralgias and myalgias. Skin:  Negative for rash. Neurological:  Negative for dizziness, tremors and weakness. Hematological:  Negative for adenopathy. Psychiatric/Behavioral:  Negative for agitation and confusion.         Historical Information   Past Medical History:   Diagnosis Date    Allergic 2002    Anxiety     Arthritis     Bariatric surgery status     Chronic pain disorder     Depression     last assessed: 2018     Disturbance of memory     last assessed: 2016     Endometriosis     GERD (gastroesophageal reflux disease)     Hiatal hernia     Hirsutism     History of sleeve gastrectomy     Lumbar herniated disc     Menometrorrhagia     Morbid obesity ([address])     last assessed: Dec 5, 2014     Motion sickness     Postgastrectomy malabsorption     Sclerosing adenosis of breast     Symptomatic menopausal or female climacteric states     Uterine leiomyoma      Past Surgical History:   Procedure Laterality Date    APPENDECTOMY      BREAST BIOPSY Left     COLONOSCOPY      complete - 7 year repeat recommended - Resolved:      DILATION AND CURETTAGE OF UTERUS      FRACTURE SURGERY      GASTRECTOMY SLEEVE LAPAROSCOPIC  2018    GYNECOLOGIC CRYOSURGERY      Cervix - 's for abnormal paps     HYSTERECTOMY      INDUCED       x3    LAPAROSCOPIC SALPINGOOPHERECTOMY Right     LAPAROSCOPIC TOTAL HYSTERECTOMY      06, Laparoscopic hysterectomy with LSO with vaginal closure of the vaginal cuff     MD REVJ GSTR/JJ ANAST W/RCNSTJ W/O VGTMY N/A 2021    Procedure: LAPAROSCOPIC REVISION CONVERSION  JENN-EN-Y GASTRIC BYPASS WITH ROBOTICS AND INTRAOPERATIVE EGD, PARAESOPHAGEAL HERNIA REPAIR;  Surgeon: Ti Garcia MD;  Location: [facility];  Service: Bariatrics    SALPINGOOPHORECTOMY Left     STOMACH SURGERY  2014    for morbid obesity - Managed by: [address] EXTRACTION       Social History   Social History     Substance and Sexual Activity   Alcohol Use Not Currently    Comment: occasional     Social History     Substance and Sexual Activity   Drug Use Yes    Types: Marijuana    Comment: medical     Social History     Tobacco Use   Smoking Status Former    Packs/day: 0.50    Years: 15.00    Total pack years: 7.50    Types: Cigarettes    Start date: 3/4/1971    Quit date: 1989    Years since quittin.8   Smokeless Tobacco Never   Tobacco Comments    33 years smoke free!      Family History:   Family History   Problem Relation Age of Onset    Hypertension Mother     Alzheimer's disease Mother     Stroke Mother         several mini strokes    Dementia Mother         Alzheimers    Thyroid disease Mother     Arthritis Mother     Hyperlipidemia Father     Diabetes Father         mellitus     Prostate cancer Father     Rectal cancer Father     Lung cancer Father     Liver cancer Father     Heart attack Father     Colon cancer Father     Cancer Father         Rectal, metastisized    Hearing loss Father     Skin cancer Father     Hypertension Sister     Colon polyps Sister     Hyperlipidemia Brother         no more    Breast cancer Maternal Grandmother        Meds/Allergies   Current Outpatient Medications   Medication Sig Dispense Refill    BIOTIN PO Take by mouth Two tablets once day      buPROPion (Wellbutrin SR) 150 mg 12 hr tablet Take 1 tablet (150 mg total) by mouth 2 (two) times a day 60 tablet 2    Calcium 250 MG CAPS Take 500 mg by mouth daily      Cholecalciferol (VITAMIN D3) 2000 units TABS Take 2 tablets by mouth daily      famotidine (PEPCID) 20 mg tablet Take 20 mg by mouth 2 (two) times a day as needed for heartburn      gabapentin (NEURONTIN) 400 mg capsule Take 1 capsule (400 mg total) by mouth 3 (three) times a day 270 capsule 1    hydrOXYzine HCL (ATARAX) 10 mg tablet Take 1 tablet (10 mg total) by mouth every 8 (eight) hours as needed for anxiety 30 tablet 0    Magnesium 200 MG TABS Take 2 tablets by mouth daily      Multiple Vitamins-Minerals (Bariatric Multivitamins/Iron) CAPS Take 2 capsules by mouth in the morning      Synthroid 150 MCG tablet 1 tab 6 days of the week, 0.5 tabs Sundays. Dose change 90 tablet 1    tiZANidine (ZANAFLEX) 4 mg tablet TAKE 1 TAB BY MOUTH EVERY 8 HRS AS NEEDED FOR MUSCLE PAIN OR SPASM 60 tablet 2    traMADol-acetaminophen (ULTRACET) 37.5-325 mg per tablet Take 1 tablet by mouth every 6 (six) hours as needed for moderate pain 120 tablet 0    venlafaxine (EFFEXOR) 100 MG tablet take 2 tablets by mouth every morning and 1 tablet every evening 270 tablet 1     No current facility-administered medications for this visit. No Known Allergies    Objective   Vitals: Height 5' 3" (1.6 m), weight 59.7 kg (131 lb 9.6 oz). Invasive Devices       None                   Physical Exam  Vitals reviewed. Constitutional:       General: She is not in acute distress. Appearance: She is well-developed. She is not diaphoretic. HENT:      Head: Normocephalic and atraumatic. Eyes:      Conjunctiva/sclera: Conjunctivae normal.      Pupils: Pupils are equal, round, and reactive to light. Neck:      Thyroid: No thyromegaly. Cardiovascular:      Rate and Rhythm: Normal rate and regular rhythm. Pulmonary:      Effort: Pulmonary effort is normal. No respiratory distress. Breath sounds: Normal breath sounds. Abdominal:      General: Bowel sounds are normal.      Palpations: Abdomen is soft. Musculoskeletal:         General: Normal range of motion. Cervical back: Normal range of motion and neck supple. Skin:     General: Skin is warm and dry. Findings: No rash. Neurological:      Mental Status: She is alert and oriented to person, place, and time. Motor: No abnormal muscle tone.    Psychiatric:         Behavior: Behavior normal.         The history was obtained from the review of the chart and from the patient. Lab Results:      Component      Latest Ref Rng 4/21/2023   TSH, POC      0.450 - 4.500 uIU/mL 0.601    Free T4      0.82 - 1.77 ng/dL 1.70        Component      Latest Ref Rng 9/28/2023   TSH, POC      0.450 - 4.500 uIU/mL 0.080 (L)       Legend:  (L) Low    Future Appointments   Date Time Provider [address]   [facility]21[facility]2023  [facility]   12/21/2023  3:30 PM [facility] M[facility]MO [facility] 1 [facility] Mammo [facility]   12/21/2023  4:00 PM QU DEXA [facility] 1 QU WIC Dexa QU WIC       Portions of the record may have been created with voice recognition software. Occasional wrong word or "sound a like" substitutions may have occurred due to the inherent limitations of voice recognition software. Read the chart carefully and recognize, using context, where substitutions have occurred.

## 2023-10-23 ENCOUNTER — TELEPHONE (OUTPATIENT)
Dept: FAMILY MEDICINE CLINIC | Facility: HOSPITAL | Age: 63
End: 2023-10-23

## 2023-10-23 NOTE — TELEPHONE ENCOUNTER
Pt states she has been on buPROPion (Wellbutrin SR) 150 mg 12 hr tablet for 3 weeks  and wonders when the side effects will go away - she has had a headache every day, dry mouth, sweating of scalp.   Pt can be reached at [phone]

## 2023-10-23 NOTE — TELEPHONE ENCOUNTER
If they  have not resolved by now then she may want to wean back down on the medication and see if it goes away - if it does will have to stay off the medication.   Go from 1 tab bid to 1 tab q day x 1 wk then 1 tab every other day x 1 wk then stop

## 2023-10-23 NOTE — TELEPHONE ENCOUNTER
Patient says she has been on buproprion 150 mg, 2 times daily for about 3 weeks, still having the side effects listed in msg below. She is asking,  Is it common to have them at 3 weeks? Would you advise med change?  TY

## 2023-10-31 ENCOUNTER — SOCIAL WORK (OUTPATIENT)
Dept: BEHAVIORAL/MENTAL HEALTH CLINIC | Facility: CLINIC | Age: 63
End: 2023-10-31
Payer: COMMERCIAL

## 2023-10-31 DIAGNOSIS — F43.22 ADJUSTMENT DISORDER WITH ANXIOUS MOOD: Primary | ICD-10-CM

## 2023-10-31 PROCEDURE — 90791 PSYCH DIAGNOSTIC EVALUATION: CPT | Performed by: SOCIAL WORKER

## 2023-11-01 DIAGNOSIS — M51.36 DISC DEGENERATION, LUMBAR: ICD-10-CM

## 2023-11-01 RX ORDER — GABAPENTIN 400 MG/1
400 CAPSULE ORAL 3 TIMES DAILY
Qty: 270 CAPSULE | Refills: 1 | Status: SHIPPED | OUTPATIENT
Start: 2023-11-01

## 2023-11-01 NOTE — PSYCH
Behavioral Health Psychotherapy Assessment    Date of Initial Psychotherapy Assessment: 10/31/23  Referral Source: Dr. Matthew Skelton  Has a release of information been signed for the referral source? NA    Preferred Name: Comfort Johnson  Preferred Pronouns: She/her  YOB: 1960 Age: 70 y.o. Sex assigned at birth: female   Gender Identity: Female  Race:   Preferred Language: English    Emergency Contact:  Full Name:   Relationship to Client:   Contact information:     Primary Care Physician:  Janey Dorado, [facility] [address]  [phone]  Has a release of information been signed? NA    Physical Health History:  Past surgical procedures:   Do you have a history of any of the following:   Do you have any mobility issues? Chronic back pain     Relevant Family History:      Presenting Problem (What brings you in?)  Divorce ffrom spouse has led to high anxiety, forgetfulness, daily extensive crying spells, difficulty completing ADLs    Mental Health Advance Directive:  Do you currently have a Mental Health Advance Directive? no    Diagnosis:   Diagnosis ICD-10-CM Associated Orders   1.  Adjustment disorder with anxious mood  F43.22           Initial Assessment:     Current Mental Status:    Appearance: appropriate      Behavior/Manner: cooperative      Affect/Mood:  Anxious, labile and sad    Speech:  Talkative    Sleep:  Interrupted    Oriented to: oriented to self       Clinical Symptoms    Anxiety: yes      Anxiety Symptoms: excessive worry, nervous/anxious and difficulty controlling worry      Have you ever been assaultive to others or the environment: No      Have you ever been self-injurious: No      Counseling History:  Previous Counseling or Treatment  (Mental Health or Drug & Alcohol): No    Have you previously taken psychiatric medications: Yes    Previous Medications Attempted:  Zoloft, effexor, others    Suicide Risk Assessment  Have you ever had a suicide attempt: No    Have you had incidents of suicidal ideation: No    Are you currently experiencing suicidal thoughts: No      Substance Abuse/Addiction Assessment:  Alcohol: Yes    Amount:  Social, not of concern per pt report and hx  Heroin: No    Fentanyl: No    Opiates: No    Cocaine: No    Amphetamines: No    Hallucinogens: No    Club Drugs: No    Benzodiazepines: No    Other Rx Meds: No    Marijuana:  Yes    Amount:  Medicinal, not of concern per pt report  Tobacco/Nicotine: No    Have you experienced blackouts as a result of substance use: No    Have you had any periods of abstinence: No    Have you experienced symptoms of withdrawal: No    Have you ever overdosed on any substances?: No    Are you currently using any Medication Assisted Treatment for Substance Use: No      Disordered Eating History:  Do you have a history of disordered eating: No      Social Determinants of Health:    SDOH:  Stress and financial instability    Trauma and Abuse History:    Have you ever been abused: No      Legal History:    Have you ever been arrested  or had a DUI: No      Have you been incarcerated: No      Are you currently on parole/probation: No      Any current Children and Youth involvement: No      Any pending legal charges: No      Relationship History:    Current marital status:       Natural Supports:  Other    Other natural supports:  Adult daughter Rea White    Relationship History:  Recent divorce; finalized beginning of 2023     Employment History    Are you currently employed: Yes      Employer/ Job title:      Sources of income/financial support:  Work     History:      Status: no history of [address] duty  Educational History:     Have you ever been diagnosed with a learning disability: No      Have you ever had an IEP or 504-plan: No      Do you need assistance with reading or writing: No      Recommended Treatment:     Psychotherapy:  Individual sessions    Frequency:  1 time Session frequency:  Weekly    Visit start and stop times: 4 p.m until 4:55 p.m     10/31/23

## 2023-11-06 DIAGNOSIS — M54.50 CHRONIC BILATERAL LOW BACK PAIN WITHOUT SCIATICA: ICD-10-CM

## 2023-11-06 DIAGNOSIS — G89.29 CHRONIC BILATERAL LOW BACK PAIN WITHOUT SCIATICA: ICD-10-CM

## 2023-11-06 RX ORDER — TIZANIDINE 4 MG/1
TABLET ORAL
Qty: 60 TABLET | Refills: 2 | Status: SHIPPED | OUTPATIENT
Start: 2023-11-06

## 2023-11-10 ENCOUNTER — TELEPHONE (OUTPATIENT)
Dept: FAMILY MEDICINE CLINIC | Facility: HOSPITAL | Age: 63
End: 2023-11-10

## 2023-11-10 NOTE — TELEPHONE ENCOUNTER
According to Dr Alberto Salazar last note pt was to take 1 tablet every day x1 week, 1 tablet every other day x1 week and than stop. Pt was made aware.

## 2023-11-10 NOTE — TELEPHONE ENCOUNTER
I'm just calling to find out from [place] what the answer to my question last or two weeks ago was about weaning off of the bupropion. I did it for a week every other day and then a week every every other second day And now I can't remember what else I'm supposed to do. So call me back. You can leave me a message. You can text me whatever this is. Seven [phone]. Thank you.

## 2023-11-13 ENCOUNTER — SOCIAL WORK (OUTPATIENT)
Dept: BEHAVIORAL/MENTAL HEALTH CLINIC | Facility: CLINIC | Age: 63
End: 2023-11-13
Payer: COMMERCIAL

## 2023-11-13 DIAGNOSIS — F43.22 ADJUSTMENT DISORDER WITH ANXIOUS MOOD: Primary | ICD-10-CM

## 2023-11-13 PROCEDURE — 90834 PSYTX W PT 45 MINUTES: CPT | Performed by: SOCIAL WORKER

## 2023-11-13 NOTE — PSYCH
Behavioral Health Psychotherapy Progress Note    Psychotherapy Provided: Individual Psychotherapy     1. Adjustment disorder with anxious mood            Goals addressed in session: Goal 1     DATA:   During this session, this clinician used the following therapeutic modalities: Engagement Strategies, Cognitive Behavioral Therapy, Mindfulness-based Strategies, and Motivational Interviewing    Substance Abuse was not addressed during this session. If the client is diagnosed with a co-occurring substance use disorder, please indicate any changes in the frequency or amount of use:Stage of change for addressing substance use diagnoses:     ASSESSMENT:  Serina Clay presents with a Euthymic/ normal and Labile mood. her affect is Normal range and intensity and Tearful, which is congruent, with her mood and the content of the session. The client has made progress on their goals. Serina Clay presents with a none risk of suicide, none risk of self-harm, and none risk of harm to others. For any risk assessment that surpasses a "low" rating, a safety plan must be developed. A safety plan was indicated: no  If yes, describe in detail     PLAN: Between sessions, Serina Clay will continue to engage in present-moment mindfulness and self-awareness. At the next session, the therapist will use Mindfulness-based Strategies to address tendency to act on urges such as interrupting, defending etc.     [address] and Discharge Planning: Serina Clay is aware of and agrees to continue to work on their treatment plan. They have identified and are working toward their discharge goals.  yes    Visit start and stop times: 4:10 until 4:58 p.m.     11/13/23

## 2023-11-13 NOTE — PATIENT INSTRUCTIONS
Patient will begin self-monitoring log in our next session to observe balance of good days and those that provoke anxiety/tearfulness

## 2023-11-28 ENCOUNTER — SOCIAL WORK (OUTPATIENT)
Dept: BEHAVIORAL/MENTAL HEALTH CLINIC | Facility: CLINIC | Age: 63
End: 2023-11-28

## 2023-11-28 DIAGNOSIS — F43.22 ADJUSTMENT DISORDER WITH ANXIOUS MOOD: Primary | ICD-10-CM

## 2023-11-29 NOTE — PSYCH
Behavioral Health Psychotherapy Progress Note    Psychotherapy Provided: Individual Psychotherapy     1. Adjustment disorder with anxious mood            Goals addressed in session: Goal 1     DATA:   During this session, this clinician used the following therapeutic modalities: Cognitive Behavioral Therapy    Substance Abuse was not addressed during this session. If the client is diagnosed with a co-occurring substance use disorder, please indicate any changes in the frequency or amount of use: . Stage of change for addressing substance use diagnoses: No substance use/Not applicable    ASSESSMENT:  Doreen Figueroa presents with a Euthymic/ normal and Dysthymic mood. her affect is Normal range and intensity and Tearful, which is congruent, with her mood and the content of the session. The client has made progress on their goals. Doreen Figueroa presents with a none risk of suicide, none risk of self-harm, and none risk of harm to others. For any risk assessment that surpasses a "low" rating, a safety plan must be developed. A safety plan was indicated: no  If yes, describe in detail     PLAN: Between sessions, Doreen Figueroa will write a CPT Impact Statement to explore possible stuck points related to childhood experiences. At the next session, the therapist will use psychoeduction to address patient's interest in understanding this experience. Behavioral Health Treatment Plan and Discharge Planning: Doreen Figueroa is aware of and agrees to continue to work on their treatment plan. They have identified and are working toward their discharge goals.  yes    Visit start and stop times: 4 until 4:55 p.m .    11/29/23

## 2023-12-05 ENCOUNTER — SOCIAL WORK (OUTPATIENT)
Dept: BEHAVIORAL/MENTAL HEALTH CLINIC | Facility: CLINIC | Age: 63
End: 2023-12-05
Payer: COMMERCIAL

## 2023-12-05 DIAGNOSIS — F43.22 ADJUSTMENT DISORDER WITH ANXIOUS MOOD: Primary | ICD-10-CM

## 2023-12-05 PROCEDURE — 90832 PSYTX W PT 30 MINUTES: CPT | Performed by: SOCIAL WORKER

## 2023-12-05 NOTE — PSYCH
Behavioral Health Psychotherapy Progress Note    Psychotherapy Provided: Individual Psychotherapy     1. Adjustment disorder with anxious mood            Goals addressed in session: Goal 1     DATA:   During this session, this clinician used the following therapeutic modalities: Cognitive Behavioral Therapy    Substance Abuse was not addressed during this session. If the client is diagnosed with a co-occurring substance use disorder, please indicate any changes in the frequency or amount of use: Stage of change for addressing substance use diagnoses: No substance use/Not applicable    ASSESSMENT:  Doreen Figueroa presents with a Euthymic/ normal and Anxious mood. her affect is Normal range and intensity and Overbright, which is congruent, with her mood and the content of the session. The client has made progress on their goals. Doreen Figueroa presents with a none risk of suicide, none risk of self-harm, and none risk of harm to others. For any risk assessment that surpasses a "low" rating, a safety plan must be developed. A safety plan was indicated: no  If yes, describe in detail     PLAN: Between sessions, Doreen Figueroa will begin to monitor overreactions to small stressors and incidents of ineffective response to traffic situations. At the next session, the therapist will use Cognitive Behavioral Therapy to address how to understand and change these behaviors in the interest of improving overall mood and decreasing anxiety. Behavioral Health Treatment Plan and Discharge Planning: Doreen Figueroa is aware of and agrees to continue to work on their treatment plan. They have identified and are working toward their discharge goals.  yes    Visit start and stop times: 4:20 until 4:50 p.m     12/05/23

## 2023-12-12 ENCOUNTER — TELEPHONE (OUTPATIENT)
Age: 63
End: 2023-12-12

## 2023-12-12 ENCOUNTER — APPOINTMENT (EMERGENCY)
Dept: RADIOLOGY | Facility: HOSPITAL | Age: 63
End: 2023-12-12
Payer: COMMERCIAL

## 2023-12-12 ENCOUNTER — HOSPITAL ENCOUNTER (EMERGENCY)
Facility: HOSPITAL | Age: 63
Discharge: HOME | End: 2023-12-12
Attending: EMERGENCY MEDICINE
Payer: COMMERCIAL

## 2023-12-12 VITALS
BODY MASS INDEX: 23.21 KG/M2 | WEIGHT: 131 LBS | TEMPERATURE: 97.8 F | OXYGEN SATURATION: 98 % | HEIGHT: 63 IN | HEART RATE: 67 BPM | SYSTOLIC BLOOD PRESSURE: 115 MMHG | DIASTOLIC BLOOD PRESSURE: 66 MMHG | RESPIRATION RATE: 18 BRPM

## 2023-12-12 DIAGNOSIS — M51.36 DISC DEGENERATION, LUMBAR: ICD-10-CM

## 2023-12-12 DIAGNOSIS — M54.32 SCIATICA OF LEFT SIDE: Primary | ICD-10-CM

## 2023-12-12 PROCEDURE — 72100 X-RAY EXAM L-S SPINE 2/3 VWS: CPT

## 2023-12-12 PROCEDURE — 99284 EMERGENCY DEPT VISIT MOD MDM: CPT

## 2023-12-12 PROCEDURE — 96374 THER/PROPH/DIAG INJ IV PUSH: CPT

## 2023-12-12 PROCEDURE — 99284 EMERGENCY DEPT VISIT MOD MDM: CPT | Performed by: PHYSICIAN ASSISTANT

## 2023-12-12 RX ORDER — METHOCARBAMOL 500 MG/1
500 TABLET, FILM COATED ORAL ONCE
Status: COMPLETED | OUTPATIENT
Start: 2023-12-12 | End: 2023-12-12

## 2023-12-12 RX ORDER — METHOCARBAMOL 500 MG/1
500 TABLET, FILM COATED ORAL 2 TIMES DAILY
Qty: 20 TABLET | Refills: 0 | Status: SHIPPED | OUTPATIENT
Start: 2023-12-12

## 2023-12-12 RX ORDER — PREDNISONE 50 MG/1
50 TABLET ORAL DAILY
Qty: 5 TABLET | Refills: 0 | Status: SHIPPED | OUTPATIENT
Start: 2023-12-13 | End: 2023-12-18

## 2023-12-12 RX ORDER — METHYLPREDNISOLONE SODIUM SUCCINATE 125 MG/2ML
125 INJECTION, POWDER, LYOPHILIZED, FOR SOLUTION INTRAMUSCULAR; INTRAVENOUS ONCE
Status: COMPLETED | OUTPATIENT
Start: 2023-12-12 | End: 2023-12-12

## 2023-12-12 RX ADMIN — METHYLPREDNISOLONE SODIUM SUCCINATE 125 MG: 125 INJECTION, POWDER, FOR SOLUTION INTRAMUSCULAR; INTRAVENOUS at 14:41

## 2023-12-12 RX ADMIN — METHOCARBAMOL TABLETS 500 MG: 500 TABLET, COATED ORAL at 14:44

## 2023-12-12 NOTE — TELEPHONE ENCOUNTER
Caller: Lola LITTLEJOHN    Doctor: Dr Jana Barbosa    Reason for call: Pt called in to state that someone offered an appt today for the doctor . I did not see a note in regard to that conversation.  Pt stated that she is in so much that she can not wait until the 20th     Call back#: [phone]

## 2023-12-12 NOTE — ED PROVIDER NOTES
History  Chief Complaint   Patient presents with    Back Pain     Patient presents to ED with lower back pain that has been worsening over the past month. Patient has history of lower back chronic pain issues reports seeing pain management for chronic issues but is unable to get in for revaluation. Patient is a 13-year-old female with past medical history significant for hypothyroidism, GERD, Lissette en Y gastric bypass, chronic back pain, who presents for evaluation of pain that travels from her left hip to her calf. She describes the pain as stiff, sharp. The pain started about a month ago, but she was able to manage her symptoms with conservative measures. Over the weekend, she moved boxes into a storage unit, and since then she has been having severe pain and difficulty walking. She tried to see her pain management provider, but was unable to be seen. She states she has chronic tingling and mild numbness on the left lower extremity that has not worsened. She denies bowel or bladder incontinence or retention. She specifically denies fever, chills, n/v/d or weakness. She has tried tylenol and massage without improvement in her symptoms. Back Pain  Associated symptoms: no abdominal pain, no chest pain, no dysuria and no fever        Prior to Admission Medications   Prescriptions Last Dose Informant Patient Reported? Taking? BIOTIN PO  Self Yes No   Sig: Take by mouth Two tablets once day   Calcium 250 MG CAPS  Self Yes No   Sig: Take 500 mg by mouth daily   Cholecalciferol (VITAMIN D3) 2000 units TABS  Self Yes No   Sig: Take 2 tablets by mouth daily   Magnesium 200 MG TABS  Self Yes No   Sig: Take 2 tablets by mouth daily   Multiple Vitamins-Minerals (Bariatric Multivitamins/Iron) CAPS  Self Yes No   Sig: Take 2 capsules by mouth in the morning   Synthroid 150 MCG tablet   No No   Si tab 6 days of the week, 0.5 tabs Sundays.  Dose change   buPROPion (Wellbutrin SR) 150 mg 12 hr tablet  Self No No   Sig: Take 1 tablet (150 mg total) by mouth 2 (two) times a day   famotidine (PEPCID) 20 mg tablet  Self Yes No   Sig: Take 20 mg by mouth 2 (two) times a day as needed for heartburn   gabapentin (NEURONTIN) 400 mg capsule   No No   Sig: Take 1 capsule (400 mg total) by mouth 3 (three) times a day   hydrOXYzine HCL (ATARAX) 10 mg tablet  Self No No   Sig: Take 1 tablet (10 mg total) by mouth every 8 (eight) hours as needed for anxiety   tiZANidine (ZANAFLEX) 4 mg tablet   No No   Sig: TAKE 1 TABLET BY MOUTH EVERY 8 HOURS IF NEEDED FOR MUSCLE SPASM OR PAIN   traMADol-acetaminophen (ULTRACET) 37.5-325 mg per tablet   No No   Sig: Take 1 tablet by mouth every 6 (six) hours as needed for moderate pain   venlafaxine (EFFEXOR) 100 MG tablet  Self No No   Sig: take 2 tablets by mouth every morning and 1 tablet every evening      Facility-Administered Medications: None       Past Medical History:   Diagnosis Date    Allergic     Anxiety     Arthritis     Bariatric surgery status     Chronic pain disorder     Depression     last assessed: 2018     Disturbance of memory     last assessed: 2016     Endometriosis     GERD (gastroesophageal reflux disease)     Hiatal hernia     Hirsutism     History of sleeve gastrectomy     Lumbar herniated disc     Menometrorrhagia     Morbid obesity ([address])     last assessed: Dec 5, 2014     Motion sickness     Postgastrectomy malabsorption     Sclerosing adenosis of breast     Symptomatic menopausal or female climacteric states     Uterine leiomyoma        Past Surgical History:   Procedure Laterality Date    APPENDECTOMY      BREAST BIOPSY Left 2010    COLONOSCOPY      complete - 7 year repeat recommended - Resolved:      DILATION AND CURETTAGE OF UTERUS      FRACTURE SURGERY      GASTRECTOMY SLEEVE LAPAROSCOPIC  2018    GYNECOLOGIC CRYOSURGERY      Cervix - 's for abnormal paps     HYSTERECTOMY      INDUCED       x3    LAPAROSCOPIC SALPINGOOPHERECTOMY Right     LAPAROSCOPIC TOTAL HYSTERECTOMY      06, Laparoscopic hysterectomy with LSO with vaginal closure of the vaginal cuff     DE REVJ GSTR/JJ ANAST W/RCNSTJ W/O VGTMY N/A 2021    Procedure: LAPAROSCOPIC REVISION CONVERSION  JENN-EN-Y GASTRIC BYPASS WITH ROBOTICS AND INTRAOPERATIVE EGD, PARAESOPHAGEAL HERNIA REPAIR;  Surgeon: Kiah Grace MD;  Location: [facility];  Service: Bariatrics    SALPINGOOPHORECTOMY Left     STOMACH SURGERY  2014    for morbid obesity - Managed by: Mark Castro, Sheldon -     TONSILLECTOMY AND ADENOIDECTOMY      TOOTH EXTRACTION         Family History   Problem Relation Age of Onset    Hypertension Mother     Alzheimer's disease Mother     Stroke Mother         several mini strokes    Dementia Mother         Alzheimers    Thyroid disease Mother     Arthritis Mother     Hyperlipidemia Father     Diabetes Father         mellitus     Prostate cancer Father     Rectal cancer Father     Lung cancer Father     Liver cancer Father     Heart attack Father     Colon cancer Father     Cancer Father         Rectal, metastisized    Hearing loss Father     Skin cancer Father     Hypertension Sister     Colon polyps Sister     Hyperlipidemia Brother         no more    Breast cancer Maternal Grandmother      I have reviewed and agree with the history as documented. E-Cigarette/Vaping    E-Cigarette Use Current Some Day User      E-Cigarette/Vaping Substances    Nicotine No     THC Yes     CBD No     Flavoring No     Other No     Unknown No      Social History     Tobacco Use    Smoking status: Former     Packs/day: 0.50     Years: 15.00     Total pack years: 7.50     Types: Cigarettes     Start date: 3/4/1971     Quit date: 1989     Years since quittin.9    Smokeless tobacco: Never    Tobacco comments:     33 years smoke free!    Vaping Use    Vaping Use: Some days    Substances: THC   Substance Use Topics    Alcohol use: Not Currently     Comment: occasional    Drug use: Yes     Types: Marijuana     Comment: medical       Review of Systems   Constitutional: Negative. Negative for chills and fever. HENT: Negative. Negative for ear pain and sore throat. Eyes: Negative. Negative for pain and visual disturbance. Respiratory: Negative. Negative for cough and shortness of breath. Cardiovascular: Negative. Negative for chest pain and palpitations. Gastrointestinal: Negative. Negative for abdominal pain and vomiting. Endocrine: Negative. Genitourinary: Negative. Negative for dysuria and hematuria. Musculoskeletal:  Positive for back pain and gait problem. Negative for arthralgias. Skin: Negative. Negative for color change and rash. Allergic/Immunologic: Negative. Neurological:  Negative for seizures and syncope. Hematological: Negative. Psychiatric/Behavioral: Negative. All other systems reviewed and are negative. Physical Exam  Physical Exam  Vitals and nursing note reviewed. Constitutional:       General: She is not in acute distress. Appearance: She is well-developed. HENT:      Head: Normocephalic and atraumatic. Nose: Nose normal.      Mouth/Throat:      Mouth: Mucous membranes are moist.   Eyes:      General: No scleral icterus. Conjunctiva/sclera: Conjunctivae normal.      Pupils: Pupils are equal, round, and reactive to light. Cardiovascular:      Rate and Rhythm: Normal rate and regular rhythm. Pulses: Normal pulses. Heart sounds: Normal heart sounds. No murmur heard. Pulmonary:      Effort: Pulmonary effort is normal. No respiratory distress. Breath sounds: Normal breath sounds. Abdominal:      General: Abdomen is flat. Palpations: Abdomen is soft. Tenderness: There is no abdominal tenderness. Musculoskeletal:         General: Tenderness present. No swelling. Cervical back: Normal range of motion and neck supple. Right lower leg: No edema.       Left lower leg: No edema. Skin:     General: Skin is warm and dry. Capillary Refill: Capillary refill takes less than 2 seconds. Neurological:      General: No focal deficit present. Mental Status: She is alert and oriented to person, place, and time. Psychiatric:         Mood and Affect: Mood normal.         Vital Signs  ED Triage Vitals [12/12/23 1336]   Temperature Pulse Respirations Blood Pressure SpO2   97.8 °F (36.6 °C) 67 18 115/66 98 %      Temp src Heart Rate Source Patient Position - Orthostatic VS BP Location FiO2 (%)   -- Monitor Sitting Left arm --      Pain Score       --           Vitals:    12/12/23 1336   BP: 115/66   Pulse: 67   Patient Position - Orthostatic VS: Sitting         Visual Acuity      ED Medications  Medications   methylPREDNISolone sodium succinate (Solu-MEDROL) injection 125 mg (125 mg Intravenous Given 12/12/23 1441)   methocarbamol (ROBAXIN) tablet 500 mg (500 mg Oral Given 12/12/23 1444)       Diagnostic Studies  Results Reviewed       None                   XR lumbar spine 2 or 3 views    (Results Pending)              Procedures  Procedures         ED Course                               SBIRT 20yo+      Flowsheet Row Most Recent Value   Initial Alcohol Screen: US AUDIT-C     1. How often do you have a drink containing alcohol? 0 Filed at: 12/12/2023 1337   2. How many drinks containing alcohol do you have on a typical day you are drinking? 0 Filed at: 12/12/2023 1337   3b. FEMALE Any Age, or MALE 65+: How often do you have 4 or more drinks on one occassion? 0 Filed at: 12/12/2023 1337   Audit-C Score 0 Filed at: 12/12/2023 1337   JESSICA: How many times in the past year have you. .. Used an illegal drug or used a prescription medication for non-medical reasons?  Never Filed at: 12/12/2023 1337                      Medical Decision Making  Problems Addressed:  Sciatica of left side: acute illness or injury     Details: pt with acute sciatica  pt has had similar symptoms in the past  nsaids are contraindicated 2/2 christine en y  xray with arthritic changes and DJD as interpreted by me - radiology read pending  given solumedrol and robaxin in ED - d/c with prednisone and robaxin    Amount and/or Complexity of Data Reviewed  Radiology: ordered. Risk  Prescription drug management. Disposition  Final diagnoses:   None     ED Disposition       None          Follow-up Information    None         Patient's Medications   Discharge Prescriptions    No medications on file       No discharge procedures on file.     PDMP Review         Value Time User    PDMP Reviewed  Yes 12/12/2023 11:11 AM Parish Sultana DO            ED Provider  Electronically Signed by             Chelsy Banuelos PA-C  12/12/23 8900

## 2023-12-12 NOTE — TELEPHONE ENCOUNTER
Pt stopped in the office stating that she had an appt at 1pm with Dr Chris Wilkins    Pt is scheduled with Dr Sanchez Mi on 12/20. No appt for today    Pt became very upset due to being in so much pain. She states that she s/w someone from our office today.  Patient was crying uncontrollably    Called management for assistance

## 2023-12-13 ENCOUNTER — TELEPHONE (OUTPATIENT)
Dept: PHYSICAL THERAPY | Facility: OTHER | Age: 63
End: 2023-12-13

## 2023-12-13 NOTE — TELEPHONE ENCOUNTER
Call placed to the patient per [facility] referral.    Spoke with patient, explained CSP and reason for the call. Patient states she has an appt scheduled to see Dr. Branden Peres next week. He has done several procedures already and she prefers to have an eval with him first and then she will decides if PT with (advanced therapist) if appropriate for her. Patient thanked me for calling her and I wished her a good day. Phone number and hours of business provided. Will close referral per protocol.

## 2023-12-18 ENCOUNTER — TELEPHONE (OUTPATIENT)
Dept: BARIATRICS | Facility: CLINIC | Age: 63
End: 2023-12-18

## 2023-12-18 NOTE — TELEPHONE ENCOUNTER
Pt called stating she is having stomach aches since she started taking Prednisone due to her sciatica, today was her last dose, Spoke with provider she advised for pt to take Pepcid twice a day for another week or 2 then drop back down to her regular prescribed dose, give 2 to 3 days to see is stomach ache will go away once the prednisone is done, if pain continues call office.

## 2023-12-20 ENCOUNTER — OFFICE VISIT (OUTPATIENT)
Dept: PAIN MEDICINE | Facility: CLINIC | Age: 63
End: 2023-12-20
Payer: COMMERCIAL

## 2023-12-20 VITALS
DIASTOLIC BLOOD PRESSURE: 70 MMHG | SYSTOLIC BLOOD PRESSURE: 118 MMHG | HEIGHT: 63 IN | WEIGHT: 124 LBS | TEMPERATURE: 97.6 F | BODY MASS INDEX: 21.97 KG/M2 | HEART RATE: 56 BPM

## 2023-12-20 DIAGNOSIS — M54.16 LUMBAR RADICULOPATHY: Primary | ICD-10-CM

## 2023-12-20 DIAGNOSIS — M47.816 LUMBAR SPONDYLOSIS: ICD-10-CM

## 2023-12-20 PROCEDURE — 99214 OFFICE O/P EST MOD 30 MIN: CPT | Performed by: ANESTHESIOLOGY

## 2023-12-20 NOTE — PROGRESS NOTES
Assessment  1. Lumbar radiculopathy - Left    2. Lumbar spondylosis        Plan      At this point the patient's pain persists despite time, relative rest, activity modification, and nonsteroidal anti-inflammatories. Her pain is significantly interfering with her daily living activities.  Is undergone physical therapy and has slight neurological deficit.  It is appropriate to order MRI of the lumbar spine to rule out any significant etiology of her symptoms.    She can continue with her tramadol and tizanidine as prescribed by her primary care physician, I see no issues with those medications at this time.    Once we obtain MRI results, I will follow-up with the patient, review the results and current symptoms, and discuss the next steps of the treatment plan.    Patient is to contact our office or present to hospital Emergency Room if experience worsening or new low back/lower extremity pain, sensory or motor change, bladder or bowel dysfunction, or other neurological change.    My impressions and treatment recommendations were discussed in detail with the patient who verbalized understanding and had no further questions.  Discharge instructions were provided. I personally saw and examined the patient and I agree with the above discussed plan of care.  This note is created using dictation transcription.  It may contain typographical errors, grammatical errors, improperly dictated words, background noise and other errors.    Orders Placed This Encounter   Procedures    MRI lumbar spine without contrast     Standing Status:   Future     Standing Expiration Date:   12/20/2027     Scheduling Instructions:      There is no preparation for this test. Please leave your jewelry and valuables at home, wedding rings are the exception. All patients will be required to change into a hospital gown and pants.  Street clothes are not permitted in the MRI.  Magnetic nail polish must be removed prior to arrival for your test.  Please bring your insurance cards, a form of photo ID and a list of your medications with you. Arrive 15 minutes prior to your appointment time in order to register. Please bring any prior CT or MRI studies of this area that were not performed at a [facility].            To schedule this appointment, please contact Central Scheduling at [phone].            Prior to your appointment, please make sure you complete the MRI Screening Form when you e-Check in for your appointment. This will be available starting 7 days before your appointment in [employer]. You may receive an e-mail with an activation code if you do not have a [employer] account. If you do not have access to a device, we will complete your screening at your appointment.     Order Specific Question:   What is the patient's sedation requirement? If Medication for Claustrophobia is selected, order medication at this point.     Answer:   No Sedation     Order Specific Question:   Does this procedure require the 3T MRI at [place] or [place]?     Answer:   No     Order Specific Question:   Release to patient through [facility]     Answer:   Immediate     Order Specific Question:   Is order priority selected as STAT?     Answer:   No     Order Specific Question:   Reason for Exam (FREE TEXT)     Answer:   LEFT LUMBAR RADIC     No orders of the defined types were placed in this encounter.      History of Present Illness    Lola Wade is a 63 y.o. female with worsening low back and left posterolateral leg pain.  She did go to the emergency department was prescribed oral steroids as well as taking tramadol and Robaxin.  Her pain is significant impairing with her daily living activities but she denies any bowel or bladder dysfunction.  She reports left lower extremity weakness.  Pain is worse in the morning described as constant sharp and shooting.    I have personally reviewed and/or updated the patient's past medical history, past surgical history, family  history, social history, current medications, allergies, and vital signs today.     Review of Systems   Respiratory:  Negative for shortness of breath.    Cardiovascular:  Negative for chest pain.   Gastrointestinal:  Negative for constipation, diarrhea, nausea and vomiting.   Musculoskeletal:  Positive for gait problem. Negative for arthralgias, joint swelling (jOINT STIFFNESS) and myalgias.   Skin:  Negative for rash.   Neurological:  Negative for dizziness, seizures and weakness.   All other systems reviewed and are negative.      Patient Active Problem List   Diagnosis    Anxiety    Atrophy of vagina    Recurrent major depressive disorder, in partial remission (HCC)    Disc degeneration, lumbar    Disturbance of memory    Dyslipidemia    Hypothyroidism    Osteoarthritis    Postgastrectomy malabsorption    Arthritis    Symptomatic menopausal or female climacteric states    History of sleeve gastrectomy    Primary thunderclap headache    Chronic bilateral low back pain without sciatica    Radiculopathy, lumbar region    GERD (gastroesophageal reflux disease)    Obesity, Class I, BMI 30-34.9    Encounter for surgical aftercare following surgery of digestive system    Lumbar spondylosis    Continuous opioid dependence (HCC)    Cervicalgia       Past Medical History:   Diagnosis Date    Allergic 2002    Anxiety     Arthritis     Bariatric surgery status     Chronic pain disorder     Depression     last assessed: Jan 18, 2018     Disturbance of memory     last assessed: June 23, 2016     Endometriosis     GERD (gastroesophageal reflux disease)     Hiatal hernia     Hirsutism     History of sleeve gastrectomy     Lumbar herniated disc     Menometrorrhagia     Morbid obesity (HCC)     last assessed: Dec 5, 2014     Motion sickness     Postgastrectomy malabsorption     Sclerosing adenosis of breast     Symptomatic menopausal or female climacteric states     Uterine leiomyoma        Past Surgical History:   Procedure  Laterality Date    APPENDECTOMY      BREAST BIOPSY Left 2010    COLONOSCOPY      complete - 7 year repeat recommended - Resolved:      DILATION AND CURETTAGE OF UTERUS      FRACTURE SURGERY  1993    GASTRECTOMY SLEEVE LAPAROSCOPIC  2018    GYNECOLOGIC CRYOSURGERY      Cervix - s for abnormal paps     HYSTERECTOMY      INDUCED       x3    LAPAROSCOPIC SALPINGOOPHERECTOMY Right     LAPAROSCOPIC TOTAL HYSTERECTOMY      06, Laparoscopic hysterectomy with LSO with vaginal closure of the vaginal cuff     DE REVJ GSTR/JJ ANAST W/RCNSTJ W/O VGTMY N/A 2021    Procedure: LAPAROSCOPIC REVISION CONVERSION  JENN-EN-Y GASTRIC BYPASS WITH ROBOTICS AND INTRAOPERATIVE EGD, PARAESOPHAGEAL HERNIA REPAIR;  Surgeon: Sheldon Núñez MD;  Location: [facility];  Service: Bariatrics    SALPINGOOPHORECTOMY Left     STOMACH SURGERY  2014    for morbid obesity - Managed by: Sheldon Hendricks -     TONSILLECTOMY AND ADENOIDECTOMY      TOOTH EXTRACTION         Family History   Problem Relation Age of Onset    Hypertension Mother     Alzheimer's disease Mother     Stroke Mother         several mini strokes    Dementia Mother         Alzheimers    Thyroid disease Mother     Arthritis Mother     Hyperlipidemia Father     Diabetes Father         mellitus     Prostate cancer Father     Rectal cancer Father     Lung cancer Father     Liver cancer Father     Heart attack Father     Colon cancer Father     Cancer Father         Rectal, metastisized    Hearing loss Father     Skin cancer Father     Hypertension Sister     Colon polyps Sister     Hyperlipidemia Brother         no more    Breast cancer Maternal Grandmother        Social History     Occupational History    Not on file   Tobacco Use    Smoking status: Former     Current packs/day: 0.00     Average packs/day: 0.5 packs/day for 17.8 years (8.9 ttl pk-yrs)     Types: Cigarettes     Start date: 3/4/1971     Quit date: 1989     Years since quittin.9     Smokeless tobacco: Never    Tobacco comments:     33 years smoke free!   Vaping Use    Vaping status: Some Days    Substances: THC   Substance and Sexual Activity    Alcohol use: Not Currently     Comment: occasional    Drug use: Yes     Types: Marijuana     Comment: medical    Sexual activity: Not Currently     Partners: Male     Birth control/protection: Post-menopausal, Surgical, Female Sterilization, None       Current Outpatient Medications on File Prior to Visit   Medication Sig    BIOTIN PO Take by mouth Two tablets once day    Calcium 250 MG CAPS Take 500 mg by mouth daily    Cholecalciferol (VITAMIN D3) 2000 units TABS Take 2 tablets by mouth daily    famotidine (PEPCID) 20 mg tablet Take 20 mg by mouth 2 (two) times a day as needed for heartburn    gabapentin (NEURONTIN) 400 mg capsule Take 1 capsule (400 mg total) by mouth 3 (three) times a day    hydrOXYzine HCL (ATARAX) 10 mg tablet Take 1 tablet (10 mg total) by mouth every 8 (eight) hours as needed for anxiety    Magnesium 200 MG TABS Take 2 tablets by mouth daily    methocarbamol (ROBAXIN) 500 mg tablet Take 1 tablet (500 mg total) by mouth 2 (two) times a day    Multiple Vitamins-Minerals (Bariatric Multivitamins/Iron) CAPS Take 2 capsules by mouth in the morning    Synthroid 150 MCG tablet 1 tab 6 days of the week, 0.5 tabs Sundays. Dose change    tiZANidine (ZANAFLEX) 4 mg tablet TAKE 1 TABLET BY MOUTH EVERY 8 HOURS IF NEEDED FOR MUSCLE SPASM OR PAIN    traMADol-acetaminophen (ULTRACET) 37.5-325 mg per tablet Take 1 tablet by mouth every 6 (six) hours as needed for moderate pain    venlafaxine (EFFEXOR) 100 MG tablet take 2 tablets by mouth every morning and 1 tablet every evening    buPROPion (Wellbutrin SR) 150 mg 12 hr tablet Take 1 tablet (150 mg total) by mouth 2 (two) times a day     No current facility-administered medications on file prior to visit.       No Known Allergies    Physical Exam    /70 (BP Location: Left arm, Patient  "Position: Sitting, Cuff Size: Standard)   Pulse 56   Temp 97.6 °F (36.4 °C)   Ht 5' 3\" (1.6 m)   Wt 56.2 kg (124 lb)   BMI 21.97 kg/m²     Constitutional: normal, well developed, well nourished, alert, in no distress and non-toxic and no overt pain behavior.  Eyes: anicteric  HEENT: grossly intact  Neck: supple, symmetric, trachea midline and no masses   Pulmonary:even and unlabored  Cardiovascular:No edema or pitting edema present  Skin:Normal without rashes or lesions and well hydrated  Psychiatric:Mood and affect appropriate  Neurologic:Cranial Nerves II-XII grossly intact  Musculoskeletal:normal, difficulty going from sitting to standing sitting position; no obvious skin lesions or erythema lumbar sacral spine; mild tenderness in lumbar paravertebrals, no sacroiliac or greater trochanteric tenderness bilateral; deep tendon reflexes are diminished but symmetrical bilateral patellar and absent left Achilles 1+ right achilles; decree sensation left L5 distribution to pinwheel; 4-5 weakness left knee extension; positive straight leg raising on the left.    Imaging      XRAY LUMBAR SPINE 12/12/2023 @ [facility]     INDICATION:   pain. Pain worsening over the past month     COMPARISON: 6/16/2020     VIEWS:  XR SPINE LUMBAR 2 OR 3 VIEWS INJURY        FINDINGS:     There are 5 non rib bearing lumbar vertebral bodies with additional transitional vertebral body which will be designated a partially lumbarized S1..     There is no evidence of acute fracture or destructive osseous lesion.     Alignment is unremarkable.     Mild degenerative spondylosis with disc space narrowing is noted with osteophytes as well as facet hypertrophy.     The pedicles appear intact.     Surgical sutures are noted in the left upper quadrant.     IMPRESSION:     Degenerative spondylosis     No evidence of compression deformity.     I have personally reviewed pertinent films in PACS and my interpretation is low lumbar spondylosis.      "

## 2023-12-21 ENCOUNTER — HOSPITAL ENCOUNTER (OUTPATIENT)
Dept: BONE DENSITY | Facility: IMAGING CENTER | Age: 63
Discharge: HOME | End: 2023-12-21
Payer: COMMERCIAL

## 2023-12-21 ENCOUNTER — HOSPITAL ENCOUNTER (OUTPATIENT)
Dept: MAMMOGRAPHY | Facility: IMAGING CENTER | Age: 63
Discharge: HOME | End: 2023-12-21
Payer: COMMERCIAL

## 2023-12-21 VITALS — WEIGHT: 124 LBS | BODY MASS INDEX: 21.97 KG/M2 | HEIGHT: 63 IN

## 2023-12-21 VITALS — BODY MASS INDEX: 22.08 KG/M2 | WEIGHT: 124.6 LBS | HEIGHT: 63 IN

## 2023-12-21 DIAGNOSIS — E03.9 HYPOTHYROIDISM, UNSPECIFIED TYPE: Primary | ICD-10-CM

## 2023-12-21 DIAGNOSIS — E28.39 MENOPAUSE OVARIAN FAILURE: ICD-10-CM

## 2023-12-21 DIAGNOSIS — Z12.31 ENCOUNTER FOR SCREENING MAMMOGRAM FOR MALIGNANT NEOPLASM OF BREAST: ICD-10-CM

## 2023-12-21 LAB
T4 FREE SERPL-MCNC: 1.23 NG/DL (ref 0.82–1.77)
TSH SERPL DL<=0.005 MIU/L-ACNC: 3.19 UIU/ML (ref 0.45–4.5)

## 2023-12-21 PROCEDURE — 77063 BREAST TOMOSYNTHESIS BI: CPT

## 2023-12-21 PROCEDURE — 77080 DXA BONE DENSITY AXIAL: CPT

## 2023-12-21 PROCEDURE — 77067 SCR MAMMO BI INCL CAD: CPT

## 2023-12-22 DIAGNOSIS — F41.9 ANXIETY: ICD-10-CM

## 2023-12-22 DIAGNOSIS — F33.41 RECURRENT MAJOR DEPRESSIVE DISORDER, IN PARTIAL REMISSION (HCC): ICD-10-CM

## 2023-12-22 RX ORDER — VENLAFAXINE 100 MG/1
TABLET ORAL
Qty: 90 TABLET | Refills: 1 | Status: SHIPPED | OUTPATIENT
Start: 2023-12-22

## 2023-12-27 ENCOUNTER — HOSPITAL ENCOUNTER (EMERGENCY)
Facility: HOSPITAL | Age: 63
Discharge: HOME | End: 2023-12-27
Attending: EMERGENCY MEDICINE
Payer: COMMERCIAL

## 2023-12-27 ENCOUNTER — TELEPHONE (OUTPATIENT)
Age: 63
End: 2023-12-27

## 2023-12-27 VITALS
HEIGHT: 63 IN | RESPIRATION RATE: 18 BRPM | DIASTOLIC BLOOD PRESSURE: 60 MMHG | OXYGEN SATURATION: 98 % | WEIGHT: 124.56 LBS | HEART RATE: 78 BPM | BODY MASS INDEX: 22.07 KG/M2 | TEMPERATURE: 99.2 F | SYSTOLIC BLOOD PRESSURE: 114 MMHG

## 2023-12-27 DIAGNOSIS — G89.29 CHRONIC BACK PAIN: Primary | ICD-10-CM

## 2023-12-27 DIAGNOSIS — M54.9 CHRONIC BACK PAIN: Primary | ICD-10-CM

## 2023-12-27 PROCEDURE — 99283 EMERGENCY DEPT VISIT LOW MDM: CPT

## 2023-12-27 PROCEDURE — 96372 THER/PROPH/DIAG INJ SC/IM: CPT

## 2023-12-27 PROCEDURE — 99284 EMERGENCY DEPT VISIT MOD MDM: CPT | Performed by: PHYSICIAN ASSISTANT

## 2023-12-27 RX ORDER — LIDOCAINE 50 MG/G
1 PATCH TOPICAL ONCE
Status: DISCONTINUED | OUTPATIENT
Start: 2023-12-27 | End: 2023-12-27 | Stop reason: HOSPADM

## 2023-12-27 RX ORDER — HYDROMORPHONE HCL/PF 1 MG/ML
1 SYRINGE (ML) INJECTION ONCE
Status: COMPLETED | OUTPATIENT
Start: 2023-12-27 | End: 2023-12-27

## 2023-12-27 RX ADMIN — LIDOCAINE 1 PATCH: 50 PATCH TOPICAL at 14:22

## 2023-12-27 RX ADMIN — HYDROMORPHONE HYDROCHLORIDE 1 MG: 1 INJECTION, SOLUTION INTRAMUSCULAR; INTRAVENOUS; SUBCUTANEOUS at 14:23

## 2023-12-27 NOTE — DISCHARGE INSTRUCTIONS
Rest, heating pad to back.  Call pain management today for a follow up appointment and further treatment of pain.  Obtain MRI.  REturn to ER if bowel/bladder dysfunction, weakness,numbness

## 2023-12-27 NOTE — TELEPHONE ENCOUNTER
S/w pt, stated that she is being dc from the ER. She was given a shot of dilaudid for her pain. Pt states this is the same pain that she was evaluated for with Dr. Tavares on 12/20. Per pt, she is scheduled for an mri on 1/10/24 but will be calling to move that appt up if possible. Advised  pt, no notes available from her er visit at this time. It appears she was also given a lidocane patch. Advised pt to use rest, ice  / heat, medication as directed or prescribed. Also, she was given a lidocane patch in the ER. that is available otc at her pharmacy, Lidocane 4% - use as directed. The writer will d/w  On call and cb today or tomorrow with his comments. Pt verbalized understanding and appreciation.

## 2023-12-27 NOTE — TELEPHONE ENCOUNTER
Caller: Lola Wade     Doctor: Dr Tavares    Reason for call: Patient calling stating was admitted and pain level 10/10 and would like to know if Dr Moraes can prescribed anything to help with pain and to send to [facility] please advise     Call back#: [phone]

## 2023-12-31 ENCOUNTER — HOSPITAL ENCOUNTER (EMERGENCY)
Facility: HOSPITAL | Age: 63
Discharge: HOME | End: 2023-12-31
Attending: EMERGENCY MEDICINE
Payer: COMMERCIAL

## 2023-12-31 VITALS
RESPIRATION RATE: 18 BRPM | HEART RATE: 89 BPM | HEIGHT: 62 IN | WEIGHT: 124 LBS | DIASTOLIC BLOOD PRESSURE: 62 MMHG | TEMPERATURE: 97.8 F | SYSTOLIC BLOOD PRESSURE: 115 MMHG | BODY MASS INDEX: 22.82 KG/M2 | OXYGEN SATURATION: 97 %

## 2023-12-31 DIAGNOSIS — M54.32 SCIATICA, LEFT SIDE: Primary | ICD-10-CM

## 2023-12-31 PROCEDURE — 99283 EMERGENCY DEPT VISIT LOW MDM: CPT

## 2023-12-31 PROCEDURE — 99284 EMERGENCY DEPT VISIT MOD MDM: CPT | Performed by: EMERGENCY MEDICINE

## 2023-12-31 RX ORDER — LIDOCAINE 50 MG/G
1 PATCH TOPICAL ONCE
Status: DISCONTINUED | OUTPATIENT
Start: 2023-12-31 | End: 2023-12-31 | Stop reason: HOSPADM

## 2023-12-31 RX ADMIN — LIDOCAINE 1 PATCH: 50 PATCH TOPICAL at 04:03

## 2023-12-31 NOTE — ED PROVIDER NOTES
History  Chief Complaint   Patient presents with    Leg Pain     Pt states that she is having pain in the left leg. Pt states that she has a bad back and pinched a never in the left leg. Pt took tramadol at unknown possible 3pm yesterday.  Pt states that she drove herself in and walked on his leg      HX FROM PATIENT AND MEDICAL RECORDS - [facility] ANXIETY, CHRONIC PAIN, SLEEVE GASTRECTOMY, C/O LEFT SCIATICA FEW WEEKS NOW FLARE UP TONIGHT UNBEARABLE.  NO SIGNIFICANT INJURY.  PAIN RADIATES FROM LEFT BUTTOCK DOWN TO BACK OF LEG TO HEEL.  NO NEW NUMBNESS OR WEAKNESS.  PAIN WORSE WITH MOVEMENT.  NO LOSS OF BOWEL OR BLADDER CONTROL.  PATIENT TRIED TRAMADOL WITHOUT RELIEF.  HAD DILAUDID IN ER RECENTLY FOR SAME.        Prior to Admission Medications   Prescriptions Last Dose Informant Patient Reported? Taking?   BIOTIN PO  Self Yes No   Sig: Take by mouth Two tablets once day   Calcium 250 MG CAPS  Self Yes No   Sig: Take 500 mg by mouth daily   Cholecalciferol (VITAMIN D3) 2000 units TABS  Self Yes No   Sig: Take 2 tablets by mouth daily   Magnesium 200 MG TABS  Self Yes No   Sig: Take 2 tablets by mouth daily   Multiple Vitamins-Minerals (Bariatric Multivitamins/Iron) CAPS  Self Yes No   Sig: Take 2 capsules by mouth in the morning   Synthroid 150 MCG tablet   No No   Si tab 6 days of the week, 0.5 tabs Sundays. Dose change   buPROPion (Wellbutrin SR) 150 mg 12 hr tablet  Self No No   Sig: Take 1 tablet (150 mg total) by mouth 2 (two) times a day   famotidine (PEPCID) 20 mg tablet  Self Yes No   Sig: Take 20 mg by mouth 2 (two) times a day as needed for heartburn   gabapentin (NEURONTIN) 400 mg capsule   No No   Sig: Take 1 capsule (400 mg total) by mouth 3 (three) times a day   hydrOXYzine HCL (ATARAX) 10 mg tablet  Self No No   Sig: Take 1 tablet (10 mg total) by mouth every 8 (eight) hours as needed for anxiety   methocarbamol (ROBAXIN) 500 mg tablet   No No   Sig: Take 1 tablet (500 mg total) by mouth 2 (two) times a  day   tiZANidine (ZANAFLEX) 4 mg tablet   No No   Sig: TAKE 1 TABLET BY MOUTH EVERY 8 HOURS IF NEEDED FOR MUSCLE SPASM OR PAIN   traMADol-acetaminophen (ULTRACET) 37.5-325 mg per tablet   No No   Sig: Take 1 tablet by mouth every 6 (six) hours as needed for moderate pain   venlafaxine (EFFEXOR) 100 MG tablet   No No   Sig: TAKE 2 TABLETS BY MOUTH EVERY MORNING AND 1 TABLET EVERY EVENING      Facility-Administered Medications: None       Past Medical History:   Diagnosis Date    Allergic     Anxiety     Arthritis     Bariatric surgery status     Chronic pain disorder     Depression     last assessed: 2018     Disturbance of memory     last assessed: 2016     Endometriosis     GERD (gastroesophageal reflux disease)     Hiatal hernia     Hirsutism     History of sleeve gastrectomy     Lumbar herniated disc     Menometrorrhagia     Morbid obesity (HCC)     last assessed: Dec 5, 2014     Motion sickness     Postgastrectomy malabsorption     Sclerosing adenosis of breast     Symptomatic menopausal or female climacteric states     Uterine leiomyoma        Past Surgical History:   Procedure Laterality Date    APPENDECTOMY      BREAST EXCISIONAL BIOPSY Left 2010    Benign    COLONOSCOPY      complete - 7 year repeat recommended - Resolved:      DILATION AND CURETTAGE OF UTERUS      FRACTURE SURGERY  1993    GASTRECTOMY SLEEVE LAPAROSCOPIC  2018    GYNECOLOGIC CRYOSURGERY      Cervix - s for abnormal paps     HYSTERECTOMY  2006    INDUCED       x3    LAPAROSCOPIC SALPINGOOPHERECTOMY Right     LAPAROSCOPIC TOTAL HYSTERECTOMY      06, Laparoscopic hysterectomy with LSO with vaginal closure of the vaginal cuff     [place] REVJ GSTR/JJ ANAST W/RCNSTJ W/O VGTMY N/A 2021    Procedure: LAPAROSCOPIC REVISION CONVERSION  JENN-EN-Y GASTRIC BYPASS WITH ROBOTICS AND INTRAOPERATIVE EGD, PARAESOPHAGEAL HERNIA REPAIR;  Surgeon: Sheldon Núñez MD;  Location: [facility];  Service: Bariatrics     SALPINGOOPHORECTOMY Left     Right ovary removed at age 15 and the left one was removed with uterus.    STOMACH SURGERY  2014    for morbid obesity - Managed by: Sheldon Hendricks -     TONSILLECTOMY AND ADENOIDECTOMY      TOOTH EXTRACTION         Family History   Problem Relation Age of Onset    Hypertension Mother     Alzheimer's disease Mother     Stroke Mother         several mini strokes    Dementia Mother         Alzheimers    Thyroid disease Mother     Arthritis Mother     Hyperlipidemia Father     Diabetes Father         mellitus     Prostate cancer Father 70    Rectal cancer Father         80's    Lung cancer Father 90    Liver cancer Father 90    Heart attack Father     Colon cancer Father         80's    Cancer Father         Rectal, metastisized    Hearing loss Father     Skin cancer Father         Multiple times-unknown age    Hypertension Sister     Colon polyps Sister     No Known Problems Daughter     Breast cancer Maternal Grandmother         Unknown age    No Known Problems Maternal Grandfather     No Known Problems Paternal Grandmother     No Known Problems Paternal Grandfather     Hyperlipidemia Brother         no more     I have reviewed and agree with the history as documented.    E-Cigarette/Vaping    E-Cigarette Use Current Some Day User      E-Cigarette/Vaping Substances    Nicotine No     THC Yes     CBD No     Flavoring No     Other No     Unknown No      Social History     Tobacco Use    Smoking status: Former     Current packs/day: 0.00     Average packs/day: 0.5 packs/day for 17.8 years (8.9 ttl pk-yrs)     Types: Cigarettes     Start date: 3/4/1971     Quit date: 1989     Years since quittin.0    Smokeless tobacco: Never    Tobacco comments:     33 years smoke free!   Vaping Use    Vaping status: Some Days    Substances: THC   Substance Use Topics    Alcohol use: Not Currently     Comment: occasional    Drug use: Yes     Types: Marijuana     Comment: medical        Review of Systems   Constitutional:  Negative for chills and fever.   HENT:  Negative for ear pain and sore throat.    Eyes:  Negative for pain and visual disturbance.   Respiratory:  Negative for cough and shortness of breath.    Cardiovascular:  Negative for chest pain and palpitations.   Gastrointestinal:  Negative for abdominal pain and vomiting.   Genitourinary:  Negative for dysuria and hematuria.   Musculoskeletal:  Positive for back pain. Negative for arthralgias.   Skin:  Negative for color change and rash.   Neurological:  Negative for seizures, syncope and numbness.   All other systems reviewed and are negative.      Physical Exam  Physical Exam  Vitals and nursing note reviewed.   Constitutional:       General: She is not in acute distress.     Appearance: She is well-developed.   HENT:      Head: Normocephalic and atraumatic.   Eyes:      Conjunctiva/sclera: Conjunctivae normal.   Cardiovascular:      Rate and Rhythm: Normal rate and regular rhythm.      Heart sounds: No murmur heard.  Pulmonary:      Effort: Pulmonary effort is normal. No respiratory distress.      Breath sounds: Normal breath sounds.   Abdominal:      Palpations: Abdomen is soft.      Tenderness: There is no abdominal tenderness.   Musculoskeletal:         General: No swelling.      Cervical back: Neck supple. No bony tenderness.      Thoracic back: No bony tenderness.      Lumbar back: No bony tenderness. Negative right straight leg raise test and negative left straight leg raise test.      Comments: PAIN ACTUALLY IMPROVES WHEN YOU PRESS AT LEFT SI JOINT   Skin:     General: Skin is warm and dry.      Capillary Refill: Capillary refill takes less than 2 seconds.   Neurological:      Mental Status: She is alert.   Psychiatric:         Mood and Affect: Mood normal.         Vital Signs  ED Triage Vitals [12/31/23 0336]   Temperature Pulse Respirations Blood Pressure SpO2   97.8 °F (36.6 °C) 89 18 115/62 97 %      Temp Source Heart  Rate Source Patient Position - Orthostatic VS BP Location FiO2 (%)   Temporal Monitor -- -- --      Pain Score       --           Vitals:    12/31/23 0336   BP: 115/62   Pulse: 89         Visual Acuity      ED Medications  Medications   lidocaine (LIDODERM) 5 % patch 1 patch (1 patch Topical Medication Applied 12/31/23 0403)       Diagnostic Studies  Results Reviewed       None                   No orders to display              Procedures  Procedures         ED Course                                             Medical Decision Making  DIFF INCLUDES EXACERBATION OF CHRONIC BACK PAIN, LEFT SCIATICA - ALSO CONSIDER PIRIFORMIS SYNDROME, LUMBAR RADICULOPATHY, LESS LIKELY FRACTURE/ CORD COMPRESSION/ CAUDA EQUINA.    Risk  Prescription drug management.             Disposition  Final diagnoses:   Sciatica, left side     Time reflects when diagnosis was documented in both MDM as applicable and the Disposition within this note       Time User Action Codes Description Comment    12/31/2023  3:52 AM Cordell Torres [M54.32] Sciatica, left side           ED Disposition       ED Disposition   Discharge    Condition   Stable    Date/Time   Sun Dec 31, 2023  3:51 AM    Comment   Lola Wade discharge to home/self care.                   Follow-up Information       Follow up With Specialties Details Why Contact Info    Bogdan Tavares DO Pain Medicine Call   [address] [place] [address]  [address] [place] [id number] [phone]              Discharge Medication List as of 12/31/2023  3:52 AM        CONTINUE these medications which have NOT CHANGED    Details   BIOTIN PO Take by mouth Two tablets once day, Historical Med      buPROPion (Wellbutrin SR) 150 mg 12 hr tablet Take 1 tablet (150 mg total) by mouth 2 (two) times a day, Starting Tue 9/12/2023, Normal      Calcium 250 MG CAPS Take 500 mg by mouth daily, Historical Med      Cholecalciferol (VITAMIN D3) 2000 units TABS Take 2 tablets by mouth daily, Historical Med      famotidine  (PEPCID) 20 mg tablet Take 20 mg by mouth 2 (two) times a day as needed for heartburn, Historical Med      gabapentin (NEURONTIN) 400 mg capsule Take 1 capsule (400 mg total) by mouth 3 (three) times a day, Starting Wed 11/1/2023, Normal      hydrOXYzine HCL (ATARAX) 10 mg tablet Take 1 tablet (10 mg total) by mouth every 8 (eight) hours as needed for anxiety, Starting Thu 7/27/2023, Normal      Magnesium 200 MG TABS Take 2 tablets by mouth daily, Historical Med      methocarbamol (ROBAXIN) 500 mg tablet Take 1 tablet (500 mg total) by mouth 2 (two) times a day, Starting Tue 12/12/2023, Normal      Multiple Vitamins-Minerals (Bariatric Multivitamins/Iron) CAPS Take 2 capsules by mouth in the morning, Historical Med      Synthroid 150 MCG tablet 1 tab 6 days of the week, 0.5 tabs Sundays. Dose change, Normal      tiZANidine (ZANAFLEX) 4 mg tablet TAKE 1 TABLET BY MOUTH EVERY 8 HOURS IF NEEDED FOR MUSCLE SPASM OR PAIN, Normal      traMADol-acetaminophen (ULTRACET) 37.5-325 mg per tablet Take 1 tablet by mouth every 6 (six) hours as needed for moderate pain, Starting Tue 12/12/2023, Normal      venlafaxine (EFFEXOR) 100 MG tablet TAKE 2 TABLETS BY MOUTH EVERY MORNING AND 1 TABLET EVERY EVENING, Normal             No discharge procedures on file.    PDMP Review         Value Time User    PDMP Reviewed  Yes 12/12/2023 11:11 AM Lucia Lord DO            ED Provider  Electronically Signed by             Cordell Torres DO  12/31/23 0550

## 2023-12-31 NOTE — DISCHARGE INSTRUCTIONS
TRY OTC LIDOCAINE PATCHES, INCREASE TRAMADOL/ ACETAMINOPHEN TO 2 TABLETS EVERY 6 HOURS AS NEEDED UNTIL FOLLOW-UP IN A FEW DAYS

## 2023-12-31 NOTE — ED NOTES
Pt wheeled to w/r in wheelchair. Discharge instructions reviewed by HENRIETTA Torres with patient. She is able to self dress and ambulate.      Isabel Rios RN  12/31/23 9759

## 2024-01-02 ENCOUNTER — TELEPHONE (OUTPATIENT)
Dept: FAMILY MEDICINE CLINIC | Facility: HOSPITAL | Age: 64
End: 2024-01-02

## 2024-01-02 NOTE — TELEPHONE ENCOUNTER
Patient seen in ed multiple times for sciatic pain.    States that last time she was there, she was told to double up on her tramadol for help w/ the pain.    Not sure if patient is just letting us know or making sure dr daniels is ok w/ this?  No refill needed at this time.

## 2024-01-02 NOTE — TELEPHONE ENCOUNTER
I would not double up on Tramadol for a long period of time ( no more then a week)  - should make appt to be seen as ED is not appropriate treatment location to discuss acute on chronic back pain

## 2024-01-02 NOTE — TELEPHONE ENCOUNTER
Caller: pt    Doctor: malka    Reason for call: pt is in a lot of pain and has been to the ED 3 times. Please advise.    Call back#: [phone]

## 2024-01-02 NOTE — TELEPHONE ENCOUNTER
NAIMA    S/w pt, stated that she has been to the ER 3x in the past 2 weeks for pain. Pt stated that she feels Dr. Tavares does not understand the amt of pain she is in because he ordered an MRI and did not make any changes to her pain killers. Pt stated that fortunately, she got the right doctor at her last ER visit as he was the first doctor to suggest doubling up on her pain pills. Advised pt that she will need to discuss adjustments to her pain medication with her pcp as they are prescribing. This office has ordered an MRI as we would be looking into interventional treatments for her pain. Pt verbalized understanding, stated that she will fu with PCP as she is out of pain medication. Questioned if the pt has tried to get a sooner date for her MRI currently scheduled for 1/10/24. Pt stated that she has had no success but she will try again today.

## 2024-01-03 DIAGNOSIS — E03.9 HYPOTHYROIDISM, UNSPECIFIED TYPE: ICD-10-CM

## 2024-01-03 RX ORDER — LEVOTHYROXINE SODIUM 0.15 MG/1
TABLET ORAL
Qty: 96 TABLET | Refills: 1 | Status: SHIPPED | OUTPATIENT
Start: 2024-01-03

## 2024-01-08 ENCOUNTER — TELEPHONE (OUTPATIENT)
Dept: BEHAVIORAL/MENTAL HEALTH CLINIC | Facility: CLINIC | Age: 64
End: 2024-01-08

## 2024-01-10 ENCOUNTER — HOSPITAL ENCOUNTER (OUTPATIENT)
Dept: MRI IMAGING | Facility: HOSPITAL | Age: 64
Discharge: HOME | End: 2024-01-10
Attending: ANESTHESIOLOGY
Payer: COMMERCIAL

## 2024-01-10 DIAGNOSIS — M54.16 LUMBAR RADICULOPATHY: ICD-10-CM

## 2024-01-10 PROCEDURE — G1004 CDSM NDSC: HCPCS

## 2024-01-10 PROCEDURE — 72148 MRI LUMBAR SPINE W/O DYE: CPT

## 2024-01-11 ENCOUNTER — SOCIAL WORK (OUTPATIENT)
Dept: BEHAVIORAL/MENTAL HEALTH CLINIC | Facility: CLINIC | Age: 64
End: 2024-01-11
Payer: COMMERCIAL

## 2024-01-11 DIAGNOSIS — F43.22 ADJUSTMENT DISORDER WITH ANXIOUS MOOD: Primary | ICD-10-CM

## 2024-01-11 DIAGNOSIS — M51.36 DISC DEGENERATION, LUMBAR: ICD-10-CM

## 2024-01-11 PROCEDURE — 90832 PSYTX W PT 30 MINUTES: CPT | Performed by: SOCIAL WORKER

## 2024-01-11 NOTE — PSYCH
"Behavioral Health Psychotherapy Progress Note    Psychotherapy Provided: Individual Psychotherapy     No diagnosis found.    Goals addressed in session: Goal 1     DATA:   Client is feeling better and observably looks more kempt; is not tearful and distressed despite getting lost in building as has happened in past appointments.   During this session, this clinician used the following therapeutic modalities: Cognitive Behavioral Therapy    Substance Abuse was not addressed during this session. If the client is diagnosed with a co-occurring substance use disorder, please indicate any changes in the frequency or amount of use: . Stage of change for addressing substance use diagnoses:     ASSESSMENT:  Lola Wade presents with a Euthymic/ normal mood.     her affect is Normal range and intensity, which is congruent, with her mood and the content of the session. The client has made progress on their goals.     Lola Wade presents with a none risk of suicide, none risk of self-harm, and none risk of harm to others.    For any risk assessment that surpasses a \"low\" rating, a safety plan must be developed.    A safety plan was indicated: no - will write as a component of our next session   If yes, describe in detail     PLAN: Between sessions, Lola Wade will bring in Impact Statement  At the next session, the therapist will use Cognitive Processing Therapy to address Stuck Points beliefs    Behavioral Health Treatment Plan and Discharge Planning: Lola Wade is aware of and agrees to continue to work on their treatment plan. They have identified and are working toward their discharge goals. yes    Visit start and stop times:    01/11/24  Start Time: 1630  Stop Time: 1648  Total Visit Time: 18 minutes  "

## 2024-01-16 ENCOUNTER — TELEMEDICINE (OUTPATIENT)
Dept: BEHAVIORAL/MENTAL HEALTH CLINIC | Facility: CLINIC | Age: 64
End: 2024-01-16
Payer: COMMERCIAL

## 2024-01-16 DIAGNOSIS — F43.22 ADJUSTMENT DISORDER WITH ANXIOUS MOOD: Primary | ICD-10-CM

## 2024-01-16 PROCEDURE — 90834 PSYTX W PT 45 MINUTES: CPT | Performed by: SOCIAL WORKER
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
## 2024-01-17 ENCOUNTER — TELEPHONE (OUTPATIENT)
Dept: PAIN MEDICINE | Facility: CLINIC | Age: 64
End: 2024-01-17

## 2024-01-17 ENCOUNTER — TELEPHONE (OUTPATIENT)
Dept: FAMILY MEDICINE CLINIC | Facility: HOSPITAL | Age: 64
End: 2024-01-17

## 2024-01-17 ENCOUNTER — TELEPHONE (OUTPATIENT)
Dept: GYNECOLOGY | Facility: CLINIC | Age: 64
End: 2024-01-17

## 2024-01-17 NOTE — TELEPHONE ENCOUNTER
Message on VM:    Hi, this is Lola Wade. Birth date [birth year], phone number [phone]. I just had a question for Doctor Sandeep Harper and or Ramonita. I seem to be having some kind of vaginal infection discharge, something that's very out of the ordinary. So if you can call me back, we can talk about it. Thanks. Parish.

## 2024-01-17 NOTE — TELEPHONE ENCOUNTER
Patient has been having some brown discharge, described it as old blood, however, has had a hysterectomy. Advised to follow-up with GYN.

## 2024-01-17 NOTE — TELEPHONE ENCOUNTER
Patient called with malodorous brown discharge for approximately one week, no itching. She has not been seen since 2015 for GYN care, although she was a patient at our office. She is requesting appointment in [facility] office for evaluation with any provider. Appointment for tomorrow given.

## 2024-01-17 NOTE — TELEPHONE ENCOUNTER
----- Message from Bogdan Tavares DO sent at 1/16/2024  2:07 PM EST -----  Please schedule follow-up visit with NP/PA to review MRI and discuss next step in treatment plan

## 2024-01-18 ENCOUNTER — OFFICE VISIT (OUTPATIENT)
Dept: GYNECOLOGY | Facility: CLINIC | Age: 64
End: 2024-01-18
Payer: COMMERCIAL

## 2024-01-18 VITALS
WEIGHT: 125.4 LBS | SYSTOLIC BLOOD PRESSURE: 102 MMHG | HEIGHT: 62 IN | DIASTOLIC BLOOD PRESSURE: 64 MMHG | BODY MASS INDEX: 23.08 KG/M2

## 2024-01-18 DIAGNOSIS — Z90.710 STATUS POST HYSTERECTOMY WITH OOPHORECTOMY: ICD-10-CM

## 2024-01-18 DIAGNOSIS — N89.8 VAGINAL DISCHARGE: ICD-10-CM

## 2024-01-18 DIAGNOSIS — Z90.721 STATUS POST HYSTERECTOMY WITH OOPHORECTOMY: ICD-10-CM

## 2024-01-18 DIAGNOSIS — F11.20 CONTINUOUS OPIOID DEPENDENCE (HCC): ICD-10-CM

## 2024-01-18 DIAGNOSIS — M54.16 RADICULOPATHY, LUMBAR REGION: ICD-10-CM

## 2024-01-18 DIAGNOSIS — Z98.84 HISTORY OF ROUX-EN-Y GASTRIC BYPASS: ICD-10-CM

## 2024-01-18 DIAGNOSIS — N76.0 BV (BACTERIAL VAGINOSIS): ICD-10-CM

## 2024-01-18 DIAGNOSIS — B96.89 BV (BACTERIAL VAGINOSIS): ICD-10-CM

## 2024-01-18 PROCEDURE — 99204 OFFICE O/P NEW MOD 45 MIN: CPT | Performed by: OBSTETRICS & GYNECOLOGY

## 2024-01-18 RX ORDER — CLINDAMYCIN HYDROCHLORIDE 300 MG/1
300 CAPSULE ORAL 2 TIMES DAILY
Qty: 14 CAPSULE | Refills: 0 | Status: SHIPPED | OUTPATIENT
Start: 2024-01-18 | End: 2024-01-25

## 2024-01-18 NOTE — PROGRESS NOTES
"Assessment/Plan:    Diagnoses and all orders for this visit:    Vaginal discharge    Status post hysterectomy with oophorectomy    History of Lissette-en-Y gastric bypass    Radiculopathy, lumbar region    BV (bacterial vaginosis)  -     clindamycin (CLEOCIN) 300 MG capsule; Take 1 capsule (300 mg total) by mouth 2 (two) times a day for 7 days    Continuous opioid dependence (HCC)        Subjective: Vaginal discharge with odor     Patient ID: Lola Wade is a 63 y.o. female.    HPI  63-year-old female  1 para 1 status post total laparoscopic hysterectomy with bilateral salpingo-oophorectomy.  Been seen in this office in some time.  Called complaining of vaginal discharge with odor.  She is currently undergoing treatment for low back pain and radiculopathy of her lumbar spine.  Has not been in for an annual exam in more than 5 years.    Review of Systems unchanged    Objective: No acute distress  /64 (BP Location: Left arm, Patient Position: Sitting, Cuff Size: Standard)   Ht 5' 2\" (1.575 m)   Wt 56.9 kg (125 lb 6.4 oz)   BMI 22.94 kg/m²      Physical Exam  Vitals and nursing note reviewed.   Constitutional:       Appearance: Normal appearance. She is normal weight.   HENT:      Head: Normocephalic.   Eyes:      Extraocular Movements: Extraocular movements intact.      Pupils: Pupils are equal, round, and reactive to light.   Pulmonary:      Effort: Pulmonary effort is normal.   Abdominal:      General: Abdomen is flat.      Palpations: Abdomen is soft.   Genitourinary:     General: Normal vulva.      Comments: Speculum exam   Brownish discharge noted wet mount positive for clue cells consistent with bacterial vaginosis.  Musculoskeletal:         General: Normal range of motion.      Cervical back: Normal range of motion.   Neurological:      Mental Status: She is alert and oriented to person, place, and time.   Psychiatric:         Mood and Affect: Mood normal.         Behavior: Behavior normal.         " Thought Content: Thought content normal.         Judgment: Judgment normal.        Please note    In addition to the time spent discussing the findings and results of today's visit and exam, I spent approximately 30 minutes of face-to-face time with the patient, greater than 50% of which was spent in counseling and coordination of care for this patient.

## 2024-01-19 ENCOUNTER — TELEPHONE (OUTPATIENT)
Dept: BARIATRICS | Facility: CLINIC | Age: 64
End: 2024-01-19

## 2024-01-19 ENCOUNTER — OFFICE VISIT (OUTPATIENT)
Dept: PAIN MEDICINE | Facility: CLINIC | Age: 64
End: 2024-01-19
Payer: COMMERCIAL

## 2024-01-19 VITALS
DIASTOLIC BLOOD PRESSURE: 68 MMHG | TEMPERATURE: 97.8 F | BODY MASS INDEX: 23.19 KG/M2 | SYSTOLIC BLOOD PRESSURE: 114 MMHG | HEIGHT: 62 IN | WEIGHT: 126 LBS | HEART RATE: 78 BPM

## 2024-01-19 DIAGNOSIS — M51.36 DISC DEGENERATION, LUMBAR: ICD-10-CM

## 2024-01-19 DIAGNOSIS — M51.16 LUMBAR DISC DISEASE WITH RADICULOPATHY: Primary | ICD-10-CM

## 2024-01-19 PROCEDURE — 99214 OFFICE O/P EST MOD 30 MIN: CPT | Performed by: PHYSICIAN ASSISTANT

## 2024-01-19 NOTE — TELEPHONE ENCOUNTER
Pt called stating she is currently taking Clindamycin for the next 7 days and currently her stomach is hurting off of the medication and wanted to know what would help, pt also stated its normal for her to get a stomach ache when antibiotics. Informed pt while being on thuis med to make sure she stays hydrated for she can have diarrhea from the Medicaine, pt can try hot drinks to help with the stomach pain, if pt gets diarrhea to reach out to her PCP.

## 2024-01-19 NOTE — PATIENT INSTRUCTIONS
Epidural Steroid Injection   WHAT YOU NEED TO KNOW:   What do I need to know about an epidural steroid injection (ZULAY)?  An ZULAY is a procedure to inject steroid medicine into the epidural space. The epidural space is between your spinal cord and vertebrae. Steroids reduce inflammation and fluid buildup in your spine that may be causing pain. You may be given pain medicine along with the steroids.        How do I prepare for an ZULAY?  Your provider will talk to you about how to prepare for your procedure. He or she will tell you what medicines to take or not take on the day of your procedure. You may need to stop taking blood thinners or other medicines several days before your procedure. You may need to adjust any diabetes medicine you take on the day of your procedure. Steroid medicine can increase your blood sugar level. Arrange for someone to drive you home when you are discharged.  What will happen during an ZULAY?   You will be given medicine to numb the procedure area. You will be awake for the procedure, but you will not feel pain. You may also be given medicine to help you relax. Contrast liquid will be used to help your provider see the area better. Tell the provider if you have ever had an allergic reaction to contrast liquid.    Your provider may place the needle into your neck area, middle of your back, or tailbone area. He or she may inject the medicine next to the nerves that are causing your pain. He or she may instead inject the medicine into a larger area of the epidural space. This helps the medicine spread to more nerves. Your provider will use a fluoroscope to help guide the needle to the right place. A fluoroscope is a type of x-ray. After the procedure, a bandage will be placed over the injection site to prevent infection.    What will happen after an ZULAY?  You will have a bandage over the injection site to prevent infection. Your provider will tell you when you can bathe and any activity  guidelines. You will be able to go home.  What are the risks of an ZULAY?  You may have temporary or permanent nerve damage or paralysis. You may have bleeding or develop a serious infection, such as meningitis (swelling of the brain coverings). An abscess may also develop. An abscess is a pus-filled area under the skin. You may need surgery to fix the abscess. You may have a seizure, anxiety, or trouble sleeping. If you are a man, you may have temporary erectile dysfunction (not able to have an erection).   CARE AGREEMENT:   You have the right to help plan your care. Learn about your health condition and how it may be treated. Discuss treatment options with your healthcare providers to decide what care you want to receive. You always have the right to refuse treatment. The above information is an  only. It is not intended as medical advice for individual conditions or treatments. Talk to your doctor, nurse or pharmacist before following any medical regimen to see if it is safe and effective for you.  © Copyright [employer] 2023 Information is for End User's use only and may not be sold, redistributed or otherwise used for commercial purposes.

## 2024-01-19 NOTE — PROGRESS NOTES
Assessment:  1. Lumbar disc disease with radiculopathy    2. Disc degeneration, lumbar        Plan:  While the patient was in the office today, I did have a thorough conversation regarding their chronic pain syndrome, medication management, and treatment plan options.    After discussing options and reviewing her recent MRI, I have recommended the patient proceed with a left L4 and L5 transforaminal epidural steroid injection to address the radicular component of her pain pattern.  She was made aware that injections may need to be repeated.    Complete risks and benefits including bleeding, infection, tissue reaction, nerve injury and allergic reaction were discussed. The approach was demonstrated using models and literature was provided. Verbal and written consent was obtained.    I recommended we hold off on lumbar medial branch blocks given that the radicular component is so profound at this time.    The patient was advised to contact the office should their symptoms worsen in the interim. The patient was agreeable and verbalized an understanding.        History of Present Illness:    The patient is a 63 y.o. female last seen on 12/20/2023 who presents for a follow up office visit in regards to low back pain secondary to lumbar degenerative disc disease with radiculopathy.  The patient currently reports increasing pain in the left low back with radiation into the lateral aspect of the left lower extremity.  She rates her current pain score an 8 out of 10 and describes it as a constant sharp and shooting pain with associated numbness.  Patient had radiofrequency ablation done in 2021 which significantly reduced her low back pain and she is inquiring about repeating that procedure.  At that time however the patient had no radicular pain complaints and all of her pain was localized to her low back.    I have personally reviewed and/or updated the patient's past medical history, past surgical history, family history,  social history, current medications, allergies, and vital signs today.       Review of Systems:    Review of Systems   Respiratory:  Negative for shortness of breath.    Cardiovascular:  Negative for chest pain.   Gastrointestinal:  Negative for constipation, diarrhea, nausea and vomiting.   Musculoskeletal:  Positive for gait problem. Negative for arthralgias, joint swelling and myalgias.   Skin:  Negative for rash.   Neurological:  Negative for dizziness, seizures and weakness.   All other systems reviewed and are negative.        Past Medical History:   Diagnosis Date   • Allergic 2002   • Anxiety    • Arthritis    • Bariatric surgery status    • Chronic pain disorder    • Depression     last assessed: 2018    • Disturbance of memory     last assessed: 2016    • Endometriosis    • GERD (gastroesophageal reflux disease)    • Hiatal hernia    • Hirsutism    • History of sleeve gastrectomy    • Lumbar herniated disc    • Menometrorrhagia    • Morbid obesity (HCC)     last assessed: Dec 5, 2014    • Motion sickness    • Postgastrectomy malabsorption    • Sclerosing adenosis of breast    • Symptomatic menopausal or female climacteric states    • Uterine leiomyoma        Past Surgical History:   Procedure Laterality Date   • APPENDECTOMY     • BREAST EXCISIONAL BIOPSY Left     Benign   • COLONOSCOPY      complete - 7 year repeat recommended - Resolved:     • DILATION AND CURETTAGE OF UTERUS     • FRACTURE SURGERY     • GASTRECTOMY SLEEVE LAPAROSCOPIC  2018   • GYNECOLOGIC CRYOSURGERY      Cervix - 's for abnormal paps    • HYSTERECTOMY  2006   • INDUCED       x3   • LAPAROSCOPIC SALPINGOOPHERECTOMY Right    • LAPAROSCOPIC TOTAL HYSTERECTOMY      06, Laparoscopic hysterectomy with LSO with vaginal closure of the vaginal cuff    • SD REVJ GSTR/JJ ANAST W/RCNSTJ W/O VGTMY N/A 2021    Procedure: LAPAROSCOPIC REVISION CONVERSION  JENN-EN-Y GASTRIC BYPASS WITH  ROBOTICS AND INTRAOPERATIVE EGD, PARAESOPHAGEAL HERNIA REPAIR;  Surgeon: Sheldon Núñez MD;  Location: [facility];  Service: Bariatrics   • SALPINGOOPHORECTOMY Left 2006    Right ovary removed at age 15 and the left one was removed with uterus.   • STOMACH SURGERY  2014    for morbid obesity - Managed by: Sheldon Hendricks -    • TONSILLECTOMY AND ADENOIDECTOMY     • TOOTH EXTRACTION         Family History   Problem Relation Age of Onset   • Hypertension Mother    • Alzheimer's disease Mother    • Stroke Mother         several mini strokes   • Dementia Mother         Alzheimers   • Thyroid disease Mother    • Arthritis Mother    • Hyperlipidemia Father    • Diabetes Father         mellitus    • Prostate cancer Father 70   • Rectal cancer Father         80's   • Lung cancer Father 90   • Liver cancer Father 90   • Heart attack Father    • Colon cancer Father         80's   • Cancer Father         Rectal, metastisized   • Hearing loss Father    • Skin cancer Father         Multiple times-unknown age   • Hypertension Sister    • Colon polyps Sister    • No Known Problems Daughter    • Breast cancer Maternal Grandmother         Unknown age   • No Known Problems Maternal Grandfather    • No Known Problems Paternal Grandmother    • No Known Problems Paternal Grandfather    • Hyperlipidemia Brother         no more       Social History     Occupational History   • Not on file   Tobacco Use   • Smoking status: Former     Current packs/day: 0.00     Average packs/day: 0.5 packs/day for 17.8 years (8.9 ttl pk-yrs)     Types: Cigarettes     Start date: 3/4/1971     Quit date: 1989     Years since quittin.0   • Smokeless tobacco: Never   • Tobacco comments:     33 years smoke free!   Vaping Use   • Vaping status: Some Days   • Substances: THC   Substance and Sexual Activity   • Alcohol use: Not Currently     Comment: occasional   • Drug use: Yes     Types: Marijuana     Comment: medical   • Sexual activity: Not  Currently     Partners: Male     Birth control/protection: Post-menopausal, Surgical, Female Sterilization, None         Current Outpatient Medications:   •  BIOTIN PO, Take by mouth Two tablets once day, Disp: , Rfl:   •  Calcium 250 MG CAPS, Take 500 mg by mouth daily, Disp: , Rfl:   •  Cholecalciferol (VITAMIN D3) 2000 units TABS, Take 2 tablets by mouth daily, Disp: , Rfl:   •  clindamycin (CLEOCIN) 300 MG capsule, Take 1 capsule (300 mg total) by mouth 2 (two) times a day for 7 days, Disp: 14 capsule, Rfl: 0  •  famotidine (PEPCID) 20 mg tablet, Take 20 mg by mouth 2 (two) times a day as needed for heartburn, Disp: , Rfl:   •  gabapentin (NEURONTIN) 400 mg capsule, Take 1 capsule (400 mg total) by mouth 3 (three) times a day, Disp: 270 capsule, Rfl: 1  •  hydrOXYzine HCL (ATARAX) 10 mg tablet, Take 1 tablet (10 mg total) by mouth every 8 (eight) hours as needed for anxiety, Disp: 30 tablet, Rfl: 0  •  levothyroxine (Synthroid) 150 mcg tablet, TAKE 1 TABLET BY MOUTH 6 DAYS OF THE WEEK THEN 1 & 1/2 TABS ON SUNDAY, Disp: 96 tablet, Rfl: 1  •  Magnesium 200 MG TABS, Take 2 tablets by mouth daily, Disp: , Rfl:   •  methocarbamol (ROBAXIN) 500 mg tablet, Take 1 tablet (500 mg total) by mouth 2 (two) times a day, Disp: 20 tablet, Rfl: 0  •  Multiple Vitamins-Minerals (Bariatric Multivitamins/Iron) CAPS, Take 2 capsules by mouth in the morning, Disp: , Rfl:   •  tiZANidine (ZANAFLEX) 4 mg tablet, TAKE 1 TABLET BY MOUTH EVERY 8 HOURS IF NEEDED FOR MUSCLE SPASM OR PAIN, Disp: 60 tablet, Rfl: 2  •  traMADol-acetaminophen (ULTRACET) 37.5-325 mg per tablet, Take 1 tablet by mouth every 6 (six) hours as needed for moderate pain, Disp: 120 tablet, Rfl: 0  •  venlafaxine (EFFEXOR) 100 MG tablet, TAKE 2 TABLETS BY MOUTH EVERY MORNING AND 1 TABLET EVERY EVENING, Disp: 90 tablet, Rfl: 1    No Known Allergies    Physical Exam:    /68 (BP Location: Left arm, Patient Position: Sitting, Cuff Size: Standard)   Pulse 78   Temp  "97.8 °F (36.6 °C)   Ht 5' 2\" (1.575 m)   Wt 57.2 kg (126 lb)   BMI 23.05 kg/m²     Constitutional:normal, well developed, well nourished, alert, in no distress and non-toxic and no overt pain behavior.  Eyes:anicteric  HEENT:grossly intact  Neck:supple, symmetric, trachea midline and no masses   Pulmonary:even and unlabored  Cardiovascular:No edema or pitting edema present  Skin:Normal without rashes or lesions and well hydrated  Psychiatric:Mood and affect appropriate  Neurologic:Cranial Nerves II-XII grossly intact  Musculoskeletal: Gait is antalgic, favoring the right side, lumbar spine is nontender to palpation, positive straight leg raise test left side only.      Imaging      MRI LUMBAR SPINE WITHOUT CONTRAST 1/10/2024 @ [facility]     INDICATION: M54.16: Radiculopathy, lumbar region.     COMPARISON: MRI lumbar spine 10/15/2014. Lumbar radiographs 12/12/2023.     TECHNIQUE:  Multiplanar, multisequence imaging of the lumbar spine was performed. .        IMAGE QUALITY:  Diagnostic     FINDINGS:     VERTEBRAL BODIES:  There are 5 lumbar type vertebral bodies.  Normal alignment of the lumbar spine.  No spondylolysis or spondylolisthesis. No scoliosis.  No compression fracture.    Normal marrow signal is identified within the visualized bony   structures.  No discrete marrow lesion.     SACRUM:  Normal signal within the sacrum. No evidence of insufficiency or stress fracture.     DISTAL CORD AND CONUS:  Normal size and signal within the distal cord and conus. The conus terminates at the T12 level.  The cauda equina nerve roots appear within normal limits.     PARASPINAL SOFT TISSUES:  Paraspinal soft tissues are unremarkable.     LOWER THORACIC DISC SPACES: Mild disc bulges are noted at the T11-12 and T12-L1 levels.     LUMBAR DISC SPACES:     L1-2: Tiny left paracentral disc protrusion, better delineated on the current examination.  No central canal or  subarticular/lateral recess narrowing.  No neural foraminal " stenosis.     L2-3: Stable mild diffuse disc bulge extending into the foraminal regions without significant central canal or neural foraminal narrowing.     L3-4: Stable mild disc bulge without significant central canal or neural foraminal narrowing.     L4-5: Stable diffuse disc bulge and superimposed small to moderate left paracentral disc protrusion. Mild central canal stenosis with severe left subarticular/lateral recess narrowing causing encroachment of the traversing left L5 nerve root.  No neural   foraminal stenosis.     L5-S1: No focal disc herniation, central canal stenosis, or neural foraminal narrowing.     OTHER FINDINGS:  None.     IMPRESSION:     Multilevel lumbar degenerative disc disease as similar to 10/14/2014 with no new disc herniation.  Persistent small to moderate left paracentral disc protrusion at the L4-5 level with severe subarticular/lateral recess narrowing causing encroachment of the traversing left L5 nerve root.

## 2024-01-22 DIAGNOSIS — G89.29 CHRONIC BILATERAL LOW BACK PAIN WITHOUT SCIATICA: ICD-10-CM

## 2024-01-22 DIAGNOSIS — M54.50 CHRONIC BILATERAL LOW BACK PAIN WITHOUT SCIATICA: ICD-10-CM

## 2024-01-22 RX ORDER — TIZANIDINE 4 MG/1
TABLET ORAL
Qty: 60 TABLET | Refills: 1 | Status: SHIPPED | OUTPATIENT
Start: 2024-01-22

## 2024-01-23 ENCOUNTER — OFFICE VISIT (OUTPATIENT)
Dept: FAMILY MEDICINE CLINIC | Facility: HOSPITAL | Age: 64
End: 2024-01-23
Payer: COMMERCIAL

## 2024-01-23 VITALS
TEMPERATURE: 98.7 F | SYSTOLIC BLOOD PRESSURE: 92 MMHG | HEIGHT: 62 IN | HEART RATE: 70 BPM | DIASTOLIC BLOOD PRESSURE: 58 MMHG | BODY MASS INDEX: 22.86 KG/M2 | WEIGHT: 124.2 LBS

## 2024-01-23 DIAGNOSIS — R26.9 GAIT DISTURBANCE: ICD-10-CM

## 2024-01-23 DIAGNOSIS — M54.50 CHRONIC BILATERAL LOW BACK PAIN WITHOUT SCIATICA: ICD-10-CM

## 2024-01-23 DIAGNOSIS — Z11.59 NEED FOR HEPATITIS C SCREENING TEST: ICD-10-CM

## 2024-01-23 DIAGNOSIS — E03.8 HYPOTHYROIDISM DUE TO HASHIMOTO'S THYROIDITIS: ICD-10-CM

## 2024-01-23 DIAGNOSIS — F41.9 ANXIETY: ICD-10-CM

## 2024-01-23 DIAGNOSIS — G89.29 CHRONIC BILATERAL LOW BACK PAIN WITHOUT SCIATICA: ICD-10-CM

## 2024-01-23 DIAGNOSIS — E06.3 HYPOTHYROIDISM DUE TO HASHIMOTO'S THYROIDITIS: ICD-10-CM

## 2024-01-23 DIAGNOSIS — E78.5 DYSLIPIDEMIA: ICD-10-CM

## 2024-01-23 DIAGNOSIS — F33.41 RECURRENT MAJOR DEPRESSIVE DISORDER, IN PARTIAL REMISSION (HCC): Primary | ICD-10-CM

## 2024-01-23 DIAGNOSIS — Z00.00 WELCOME TO MEDICARE PREVENTIVE VISIT: ICD-10-CM

## 2024-01-23 PROCEDURE — 99214 OFFICE O/P EST MOD 30 MIN: CPT | Performed by: INTERNAL MEDICINE

## 2024-01-23 PROCEDURE — G0438 PPPS, INITIAL VISIT: HCPCS | Performed by: INTERNAL MEDICINE

## 2024-01-23 NOTE — ASSESSMENT & PLAN NOTE
Mood still up and down, better with increase in Effexor and feels better since stopping Wellbutrin d/t SE, still scoring + on depression screening, deferring needs to adjust mood meds, call with new/worse mood, re-eval in 3-4 mos

## 2024-01-23 NOTE — ASSESSMENT & PLAN NOTE
FLP annually - order given to be done with Endo labs in April, con't to encourage healthy diet and regular exercise

## 2024-01-23 NOTE — ASSESSMENT & PLAN NOTE
Recent flare with 3 ED visits, has seen Pain Mgt and had MRI of lumbar spine, going for TFESI, on Gabapentin, Tizanidine and Ultracet, call with new/worse symptoms

## 2024-01-23 NOTE — ASSESSMENT & PLAN NOTE
TSH was a bit low and Endo decreased her Levothyroxine - med list reviewed and UTD, con't meds/labs and f/u as per Endo

## 2024-01-23 NOTE — ASSESSMENT & PLAN NOTE
Pt noting anxiety much better, still with some down mood at times, con't current Effexor, call with new/worse mood/panic attacks

## 2024-01-23 NOTE — PROGRESS NOTES
" Assessment and Plan:     Problem List Items Addressed This Visit          Endocrine    Hypothyroidism     TSH was a bit low and Endo decreased her Levothyroxine - med list reviewed and UTD, con't meds/labs and f/u as per Endo         Relevant Orders    CBC and differential    Comprehensive metabolic panel    Lipid panel    Hepatitis C antibody       Other    Anxiety     Pt noting anxiety much better, still with some down mood at times, con't current Effexor, call with new/worse mood/panic attacks         Recurrent major depressive disorder, in partial remission (HCC) - Primary     Mood still up and down, better with increase in Effexor and feels better since stopping Wellbutrin d/t SE, still scoring + on depression screening, deferring needs to adjust mood meds, call with new/worse mood, re-eval in 3-4 mos         Relevant Orders    CBC and differential    Comprehensive metabolic panel    Lipid panel    Hepatitis C antibody    Dyslipidemia     FLP annually - order given to be done with Endo labs in April, con't to encourage healthy diet and regular exercise         Relevant Orders    CBC and differential    Comprehensive metabolic panel    Lipid panel    Hepatitis C antibody    Chronic bilateral low back pain without sciatica     Recent flare with 3 ED visits, has seen Pain Mgt and had MRI of lumbar spine, going for TFESI, on Gabapentin, Tizanidine and Ultracet, call with new/worse symptoms          Other Visit Diagnoses       Need for hepatitis C screening test        Relevant Orders    Hepatitis C antibody    Welcome to Medicare preventive visit        Gait disturbance        Feels \"off balance\" at times, would benefit from PT - referral placed, call with new/worse symptoms/recurrent falls, to ED with red flag Neuro symptoms    Relevant Orders    Ambulatory referral to Physical Therapy (*VB)            Depression Screening and Follow-up Plan: Patient's depression screening was positive with a PHQ-9 score of 10. " Patient assessed for underlying major depression. Brief counseling provided and recommend additional follow-up/re-evaluation next office visit.     Falls Plan of Care: referral to physical therapy. Home safety education provided.       Preventive health issues were discussed with patient, and age appropriate screening tests were ordered as noted in patient's After Visit Summary.    Colonoscopy 3/20 - 5 yrs  Mammo 12/23  Dexa 12/23 - osteopenia  PAP s/p hysterectomy    BW 9/23  FLP 4/23    Personalized health advice and appropriate referrals for health education or preventive services given if needed, as noted in patient's After Visit Summary.     History of Present Illness:     Patient presents for a Medicare Wellness Visit    HPI Pt here for follow up appt and AWV    Pt called in the end of Sept for request to increase mood meds.  We subsequently increased Effexor to 100 mg 2 tab am and 1 tab pm. She was told to con't Wellbutrin.  Pt called in oct with continued SE with Wellbutrin. She was given directions to wean off Wellbutrin. She has been off the med for a few mos. She notes she can still cry easily but she feels less down.  She is seeing a therapist (Lazara) and does not feel any meds needs to be changed at this time. She notes no recent anxiety and has only had to take the hydroxyzine once since last visit. She still feels down at times and sleep is still up and down.  Once she gets up she has pain and can't get back to sleep.     Pt saw Endo (Dr. Joel) in Oct for f/u hypothyroidism - OV note reviewed.  Her Synthroid was decreased to 150 mcg 1 tab 6 days of the week and 1/2 tab on Sundays.  She was given an order to recheck TSH in 4-6 wks. Tsh in Dec back to normal at 3.190.      Pt was in the ED three times in Dec for back and leg pain. She saw Pain Mgt (Dr. Gray) in Dec as well - MRI lumbar spine ordered.  She saw Pain mgt NP 1/19/24 for review of MRI results.  OV notes reviewed.  She is going to  have a L L4/L5 TFESI.  She has decreased Tramadol back down to 1 tab bid. She feels her pain is better then what it was that caused ED visit.      Pt saw GYN steff Painter and was dx with BV.  She was tx with Clindamycin.        Patient Care Team:  Lucia Lord DO as PCP - General  Finesse Joel DO as PCP - Endocrinology (Endocrinology)  DO Sheldon Alcaraz MD David Hanes, MD Scott Loev, DO (Pain Medicine)     Review of Systems:     Review of Systems   Constitutional:  Negative for chills, fever and unexpected weight change.   HENT:  Negative for congestion, hearing loss and trouble swallowing.    Eyes:  Negative for pain and visual disturbance.   Respiratory:  Negative for cough, shortness of breath and wheezing.    Cardiovascular:  Negative for chest pain, palpitations and leg swelling.   Gastrointestinal:  Negative for abdominal pain, blood in stool, constipation, diarrhea, nausea and vomiting.   Genitourinary:  Positive for vaginal discharge. Negative for difficulty urinating, dysuria, hematuria, vaginal bleeding and vaginal pain.   Musculoskeletal:  Positive for back pain. Negative for gait problem.   Skin:  Negative for rash and wound.   Neurological:  Negative for dizziness, weakness, light-headedness, numbness and headaches.   Hematological:  Does not bruise/bleed easily.   Psychiatric/Behavioral:  Positive for dysphoric mood and sleep disturbance. Negative for suicidal ideas. The patient is not nervous/anxious.         Problem List:     Patient Active Problem List   Diagnosis    Anxiety    Atrophy of vagina    Recurrent major depressive disorder, in partial remission (HCC)    Disc degeneration, lumbar    Disturbance of memory    Dyslipidemia    Hypothyroidism    Osteoarthritis    Postgastrectomy malabsorption    Arthritis    Symptomatic menopausal or female climacteric states    History of sleeve gastrectomy    Primary thunderclap headache    Chronic bilateral low back pain without  sciatica    Radiculopathy, lumbar region    GERD (gastroesophageal reflux disease)    Obesity, Class I, BMI 30-34.9    Encounter for surgical aftercare following surgery of digestive system    Lumbar spondylosis    Continuous opioid dependence (HCC)    Cervicalgia    History of Lissette-en-Y gastric bypass    Status post hysterectomy with oophorectomy      Past Medical and Surgical History:     Past Medical History:   Diagnosis Date    Allergic     Anxiety     Arthritis     Bariatric surgery status     Chronic pain disorder     Depression     last assessed: 2018     Disturbance of memory     last assessed: 2016     Endometriosis     GERD (gastroesophageal reflux disease)     Hiatal hernia     Hirsutism     History of sleeve gastrectomy     Lumbar herniated disc     Menometrorrhagia     Morbid obesity (HCC)     last assessed: Dec 5, 2014     Motion sickness     Postgastrectomy malabsorption     Sclerosing adenosis of breast     Symptomatic menopausal or female climacteric states     Uterine leiomyoma      Past Surgical History:   Procedure Laterality Date    APPENDECTOMY      BREAST EXCISIONAL BIOPSY Left 2010    Benign    COLONOSCOPY      complete - 7 year repeat recommended - Resolved:      DILATION AND CURETTAGE OF UTERUS      FRACTURE SURGERY  1993    GASTRECTOMY SLEEVE LAPAROSCOPIC  2018    GYNECOLOGIC CRYOSURGERY      Cervix -  for abnormal paps     HYSTERECTOMY  2006    INDUCED       x3    LAPAROSCOPIC SALPINGOOPHERECTOMY Right     LAPAROSCOPIC TOTAL HYSTERECTOMY      06, Laparoscopic hysterectomy with LSO with vaginal closure of the vaginal cuff     SC REVJ GSTR/JJ ANAST W/RCNSTJ W/O VGTMY N/A 2021    Procedure: LAPAROSCOPIC REVISION CONVERSION  LISSETTE-EN-Y GASTRIC BYPASS WITH ROBOTICS AND INTRAOPERATIVE EGD, PARAESOPHAGEAL HERNIA REPAIR;  Surgeon: Sheldon Núñez MD;  Location: [facility];  Service: Bariatrics    SALPINGOOPHORECTOMY Left 2006    Right ovary  removed at age 15 and the left one was removed with uterus.    STOMACH SURGERY  2014    for morbid obesity - Managed by: Sheldon Hendricks -     TONSILLECTOMY AND ADENOIDECTOMY      TOOTH EXTRACTION        Family History:     Family History   Problem Relation Age of Onset    Hypertension Mother     Alzheimer's disease Mother     Stroke Mother         several mini strokes    Dementia Mother         Alzheimers    Thyroid disease Mother     Arthritis Mother     Hyperlipidemia Father     Diabetes Father         mellitus     Prostate cancer Father 70    Rectal cancer Father         80's    Lung cancer Father 90    Liver cancer Father 90    Heart attack Father     Colon cancer Father         80's    Cancer Father         Rectal, metastisized    Hearing loss Father     Skin cancer Father         Multiple times-unknown age    Hypertension Sister     Colon polyps Sister     No Known Problems Daughter     Breast cancer Maternal Grandmother         Unknown age    No Known Problems Maternal Grandfather     No Known Problems Paternal Grandmother     No Known Problems Paternal Grandfather     Hyperlipidemia Brother         no more      Social History:     Social History     Socioeconomic History    Marital status:      Spouse name: None    Number of children: 1    Years of education: None    Highest education level: None   Occupational History    None   Tobacco Use    Smoking status: Former     Current packs/day: 0.00     Average packs/day: 0.5 packs/day for 17.8 years (8.9 ttl pk-yrs)     Types: Cigarettes     Start date: 3/4/1971     Quit date: 1989     Years since quittin.0    Smokeless tobacco: Never    Tobacco comments:     33 years smoke free!   Vaping Use    Vaping status: Some Days    Substances: THC   Substance and Sexual Activity    Alcohol use: Not Currently     Comment: occasional    Drug use: Yes     Types: Marijuana     Comment: medical    Sexual activity: Not Currently     Partners: Male      Birth control/protection: Post-menopausal, Surgical, Female Sterilization, None   Other Topics Concern    None   Social History Narrative    Always uses seat belt     Caffeine use     [employer] ([employer])      Social Determinants of Health     Financial Resource Strain: Medium Risk (1/23/2024)    Overall Financial Resource Strain (CARDIA)     Difficulty of Paying Living Expenses: Somewhat hard   Food Insecurity: Not on file   Transportation Needs: No Transportation Needs (1/23/2024)    PRAPARE - Transportation     Lack of Transportation (Medical): No     Lack of Transportation (Non-Medical): No   Physical Activity: Not on file   Stress: Not on file   Social Connections: Not on file   Intimate Partner Violence: Not on file   Housing Stability: Not on file      Medications and Allergies:     Current Outpatient Medications   Medication Sig Dispense Refill    BIOTIN PO Take by mouth Two tablets once day      Calcium 250 MG CAPS Take 500 mg by mouth daily      Cholecalciferol (VITAMIN D3) 2000 units TABS Take 2 tablets by mouth daily      clindamycin (CLEOCIN) 300 MG capsule Take 1 capsule (300 mg total) by mouth 2 (two) times a day for 7 days 14 capsule 0    famotidine (PEPCID) 20 mg tablet Take 20 mg by mouth 2 (two) times a day as needed for heartburn      gabapentin (NEURONTIN) 400 mg capsule Take 1 capsule (400 mg total) by mouth 3 (three) times a day 270 capsule 1    hydrOXYzine HCL (ATARAX) 10 mg tablet Take 1 tablet (10 mg total) by mouth every 8 (eight) hours as needed for anxiety 30 tablet 0    levothyroxine (Synthroid) 150 mcg tablet TAKE 1 TABLET BY MOUTH 6 DAYS OF THE WEEK THEN 1 & 1/2 TABS ON SUNDAY 96 tablet 1    Magnesium 200 MG TABS Take 2 tablets by mouth daily      methocarbamol (ROBAXIN) 500 mg tablet Take 1 tablet (500 mg total) by mouth 2 (two) times a day 20 tablet 0    Multiple Vitamins-Minerals (Bariatric Multivitamins/Iron) CAPS Take 2 capsules by mouth in the morning       tiZANidine (ZANAFLEX) 4 mg tablet TAKE 1 TABLET BY MOUTH EVERY 8 HOURS IF NEEDED FOR MUSCLE SPASM OR PAIN 60 tablet 1    traMADol-acetaminophen (ULTRACET) 37.5-325 mg per tablet Take 1 tablet by mouth every 6 (six) hours as needed for moderate pain 120 tablet 0    venlafaxine (EFFEXOR) 100 MG tablet TAKE 2 TABLETS BY MOUTH EVERY MORNING AND 1 TABLET EVERY EVENING 90 tablet 1     No current facility-administered medications for this visit.     No Known Allergies   Immunizations:     Immunization History   Administered Date(s) Administered    Influenza Quadrivalent Preservative Free 3 years and older IM 10/08/2014    Influenza, recombinant, quadrivalent,injectable, preservative free 10/29/2018, 01/14/2020, 11/24/2020, 10/11/2021, 10/11/2022    Influenza, seasonal, injectable 01/15/2014    Pneumococcal Conjugate Vaccine 20-valent (Pcv20), Polysace 04/11/2023    Tdap 11/24/2020      Health Maintenance:         Topic Date Due    Hepatitis C Screening  Never done    HIV Screening  Never done    Breast Cancer Screening: Mammogram  12/21/2024    Colorectal Cancer Screening  03/13/2025    DXA SCAN  12/21/2025         Topic Date Due    COVID-19 Vaccine (1) Never done    Hepatitis A Vaccine (1 of 2 - Risk 2-dose series) Never done    Influenza Vaccine (1) 09/01/2023      Medicare Screening Tests and Risk Assessments:     Lola is here for her Welcome to Medicare visit.     Health Risk Assessment:   Patient rates overall health as very good. Patient feels that their physical health rating is slightly worse. Patient is very satisfied with their life. Eyesight was rated as slightly worse. Hearing was rated as same. Patient feels that their emotional and mental health rating is slightly worse. Patients states they are never, rarely angry. Patient states they are never, rarely unusually tired/fatigued. Pain experienced in the last 7 days has been none. Patient states that she has experienced no weight loss or gain in last 6 months.  Physical health worse with back pain - going for epidural injection  Mental health worse in a lot of ways but better in some ways  Vision slightly worse on exam - pt notes no pain/double vision, she wears glasses    Depression Screening:   PHQ-9 Score: 10      Fall Risk Screening:   In the past year, patient has experienced: history of falling in past year    Number of falls: 2 or more  Injured during fall?: No    Feels unsteady when standing or walking?: Yes    Worried about falling?: No      Urinary Incontinence Screening:   Patient has not leaked urine accidently in the last six months.     Home Safety:  Patient has trouble with stairs inside or outside of their home. Patient has working smoke alarms and has working carbon monoxide detector. Home safety hazards include: none.     Nutrition:   Current diet is Regular.     Medications:   Patient is currently taking over-the-counter supplements. OTC medications include: see medication list. Patient is able to manage medications.     Activities of Daily Living (ADLs)/Instrumental Activities of Daily Living (IADLs):   Walk and transfer into and out of bed and chair?: Yes  Dress and groom yourself?: Yes    Bathe or shower yourself?: Yes    Feed yourself? Yes  Do your laundry/housekeeping?: Yes  Manage your money, pay your bills and track your expenses?: Yes  Make your own meals?: Yes    Do your own shopping?: Yes    Previous Hospitalizations:   Any hospitalizations or ED visits within the last 12 months?: Yes    How many hospitalizations have you had in the last year?: 3-4    Hospitalization Comments: 3 ER visits    Advance Care Planning:   Living will: No    Durable POA for healthcare: No    Advanced directive: No    ACP document given: Yes      Cognitive Screening:   Provider or family/friend/caregiver concerned regarding cognition?: Yes    PREVENTIVE SCREENINGS      Cardiovascular Screening:    General: Screening Current and Risks and Benefits Discussed       "Diabetes Screening:     General: Screening Current and Risks and Benefits Discussed      Colorectal Cancer Screening:     General: Screening Current      Breast Cancer Screening:     General: Screening Current and Risks and Benefits Discussed      Cervical Cancer Screening:    General: Risks and Benefits Discussed and Screening Not Indicated      Osteoporosis Screening:    General: Screening Current and Risks and Benefits Discussed      Abdominal Aortic Aneurysm (AAA) Screening:        General: Risks and Benefits Discussed and Screening Not Indicated      Lung Cancer Screening:     General: Screening Not Indicated and Risks and Benefits Discussed      Hepatitis C Screening:    General: Risks and Benefits Discussed    Hep C Screening Accepted: Yes      Screening, Brief Intervention, and Referral to Treatment (SBIRT)    Screening  Typical number of drinks in a day: 0  Typical number of drinks in a week: 0  Interpretation: Low risk drinking behavior.    Single Item Drug Screening:  How often have you used an illegal drug (including marijuana) or a prescription medication for non-medical reasons in the past year? never    Single Item Drug Screen Score: 0  Interpretation: Negative screen for possible drug use disorder    Review of Current Opioid Use    Opioid Risk Tool (ORT) Interpretation: Complete Opioid Risk Tool (ORT)    Other Counseling Topics:   Car/seat belt/driving safety and regular weightbearing exercise and calcium and vitamin D intake.     Vision Screening    Right eye Left eye Both eyes   Without correction 20/50 20/50 20/50   With correction           Physical Exam:     BP 92/58   Pulse 70   Temp 98.7 °F (37.1 °C) (Tympanic)   Ht 5' 2\" (1.575 m)   Wt 56.3 kg (124 lb 3.2 oz)   BMI 22.72 kg/m²     Physical Exam  Vitals and nursing note reviewed.   Constitutional:       General: She is not in acute distress.     Appearance: She is well-developed. She is not ill-appearing.   HENT:      Head: Normocephalic " and atraumatic.      Right Ear: Tympanic membrane and external ear normal. There is no impacted cerumen.      Left Ear: Tympanic membrane and external ear normal. There is no impacted cerumen.      Mouth/Throat:      Mouth: Mucous membranes are moist.      Pharynx: Oropharynx is clear. No oropharyngeal exudate.   Eyes:      General:         Right eye: No discharge.         Left eye: No discharge.      Conjunctiva/sclera: Conjunctivae normal.   Neck:      Thyroid: No thyromegaly.      Vascular: No carotid bruit.      Trachea: No tracheal deviation.   Cardiovascular:      Rate and Rhythm: Normal rate and regular rhythm.      Heart sounds: Normal heart sounds. No murmur heard.  Pulmonary:      Effort: Pulmonary effort is normal. No respiratory distress.      Breath sounds: Normal breath sounds. No wheezing, rhonchi or rales.   Abdominal:      General: There is no distension.      Palpations: Abdomen is soft.      Tenderness: There is no abdominal tenderness. There is no guarding or rebound.   Musculoskeletal:         General: No deformity or signs of injury.      Cervical back: Neck supple.   Lymphadenopathy:      Cervical: No cervical adenopathy.   Skin:     General: Skin is warm and dry.      Coloration: Skin is not pale.      Findings: No bruising or rash.   Neurological:      General: No focal deficit present.      Mental Status: She is alert. Mental status is at baseline.      Motor: No abnormal muscle tone.      Gait: Gait normal.   Psychiatric:         Mood and Affect: Mood normal.         Behavior: Behavior normal.         Thought Content: Thought content normal.         Judgment: Judgment normal.          Lucia Lord DO  Depression Screening Follow-up Plan: Patient's depression screening was positive with a PHQ-2 score of . Their PHQ-9 score was 10. Patient assessed for underlying major depression. They have no active suicidal ideations. Brief counseling provided and recommend additional  follow-up/re-evaluation next office visit.  Falls Plan of Care: Patient referred to physical therapy.

## 2024-01-23 NOTE — PATIENT INSTRUCTIONS
Medicare Preventive Visit Patient Instructions  Thank you for completing your Welcome to Medicare Visit or Medicare Annual Wellness Visit today. Your next wellness visit will be due in one year (1/23/2025).  The screening/preventive services that you may require over the next 5-10 years are detailed below. Some tests may not apply to you based off risk factors and/or age. Screening tests ordered at today's visit but not completed yet may show as past due. Also, please note that scanned in results may not display below.  Preventive Screenings:  Service Recommendations Previous Testing/Comments   Colorectal Cancer Screening  * Colonoscopy    * Fecal Occult Blood Test (FOBT)/Fecal Immunochemical Test (FIT)  * Fecal DNA/Cologuard Test  * Flexible Sigmoidoscopy Age: 45-75 years old   Colonoscopy: every 10 years (may be performed more frequently if at higher risk)  OR  FOBT/FIT: every 1 year  OR  Cologuard: every 3 years  OR  Sigmoidoscopy: every 5 years  Screening may be recommended earlier than age 45 if at higher risk for colorectal cancer. Also, an individualized decision between you and your healthcare provider will decide whether screening between the ages of 76-85 would be appropriate. Colonoscopy: 03/13/2020  FOBT/FIT: Not on file  Cologuard: Not on file  Sigmoidoscopy: Not on file    Screening Current     Breast Cancer Screening Age: 40+ years old  Frequency: every 1-2 years  Not required if history of left and right mastectomy Mammogram: 12/21/2023    Screening Current  Risks and Benefits Discussed   Cervical Cancer Screening Between the ages of 21-29, pap smear recommended once every 3 years.   Between the ages of 30-65, can perform pap smear with HPV co-testing every 5 years.   Recommendations may differ for women with a history of total hysterectomy, cervical cancer, or abnormal pap smears in past. Pap Smear: 08/19/2015    Risks and Benefits Discussed  Screening Not Indicated   Hepatitis C Screening Once for  adults born between 1945 and 1965  More frequently in patients at high risk for Hepatitis C Hep C Antibody: Not on file    Risks and Benefits Discussed  Due for Hepatitis C screening   Diabetes Screening 1-2 times per year if you're at risk for diabetes or have pre-diabetes Fasting glucose: 101 mg/dL (9/28/2021)  A1C: 5.5 % (10/12/2021)  Screening Current  Risks and Benefits Discussed   Cholesterol Screening Once every 5 years if you don't have a lipid disorder. May order more often based on risk factors. Lipid panel: 04/21/2023    Screening Current  Risks and Benefits Discussed     Other Preventive Screenings Covered by Medicare:  Abdominal Aortic Aneurysm (AAA) Screening: covered once if your at risk. You're considered to be at risk if you have a family history of AAA.  Lung Cancer Screening: covers low dose CT scan once per year if you meet all of the following conditions: (1) Age 55-77; (2) No signs or symptoms of lung cancer; (3) Current smoker or have quit smoking within the last 15 years; (4) You have a tobacco smoking history of at least 20 pack years (packs per day multiplied by number of years you smoked); (5) You get a written order from a healthcare provider.  Glaucoma Screening: covered annually if you're considered high risk: (1) You have diabetes OR (2) Family history of glaucoma OR (3)  aged 50 and older OR (4)  American aged 65 and older  Osteoporosis Screening: covered every 2 years if you meet one of the following conditions: (1) You're estrogen deficient and at risk for osteoporosis based off medical history and other findings; (2) Have a vertebral abnormality; (3) On glucocorticoid therapy for more than 3 months; (4) Have primary hyperparathyroidism; (5) On osteoporosis medications and need to assess response to drug therapy.   Last bone density test (DXA Scan): 12/21/2023.  HIV Screening: covered annually if you're between the age of 15-65. Also covered annually if you  are younger than 15 and older than 65 with risk factors for HIV infection. For pregnant patients, it is covered up to 3 times per pregnancy.    Immunizations:  Immunization Recommendations   Influenza Vaccine Annual influenza vaccination during flu season is recommended for all persons aged >= 6 months who do not have contraindications   Pneumococcal Vaccine   * Pneumococcal conjugate vaccine = PCV13 (Prevnar 13), PCV15 (Vaxneuvance), PCV20 (Prevnar 20)  * Pneumococcal polysaccharide vaccine = PPSV23 (Pneumovax) Adults 19-65 yo with certain risk factors or if 65+ yo  If never received any pneumonia vaccine: recommend Prevnar 20 (PCV20)  Give PCV20 if previously received 1 dose of PCV13 or PPSV23   Hepatitis B Vaccine 3 dose series if at intermediate or high risk (ex: diabetes, end stage renal disease, liver disease)   Respiratory syncytial virus (RSV) Vaccine - COVERED BY MEDICARE PART D  * RSVPreF3 (Arexvy) CDC recommends that adults 60 years of age and older may receive a single dose of RSV vaccine using shared clinical decision-making (SCDM)   Tetanus (Td) Vaccine - COST NOT COVERED BY MEDICARE PART B Following completion of primary series, a booster dose should be given every 10 years to maintain immunity against tetanus. Td may also be given as tetanus wound prophylaxis.   Tdap Vaccine - COST NOT COVERED BY MEDICARE PART B Recommended at least once for all adults. For pregnant patients, recommended with each pregnancy.   Shingles Vaccine (Shingrix) - COST NOT COVERED BY MEDICARE PART B  2 shot series recommended in those 19 years and older who have or will have weakened immune systems or those 50 years and older     Health Maintenance Due:      Topic Date Due   • Hepatitis C Screening  Never done   • HIV Screening  Never done   • Breast Cancer Screening: Mammogram  12/21/2024   • Colorectal Cancer Screening  03/13/2025   • DXA SCAN  12/21/2025     Immunizations Due:      Topic Date Due   • COVID-19 Vaccine (1)  Never done   • Hepatitis A Vaccine (1 of 2 - Risk 2-dose series) Never done   • Influenza Vaccine (1) 09/01/2023     Advance Directives   What are advance directives?  Advance directives are legal documents that state your wishes and plans for medical care. These plans are made ahead of time in case you lose your ability to make decisions for yourself. Advance directives can apply to any medical decision, such as the treatments you want, and if you want to donate organs.   What are the types of advance directives?  There are many types of advance directives, and each state has rules about how to use them. You may choose a combination of any of the following:  Living will:  This is a written record of the treatment you want. You can also choose which treatments you do not want, which to limit, and which to stop at a certain time. This includes surgery, medicine, IV fluid, and tube feedings.   Durable power of  for healthcare (DPAHC):  This is a written record that states who you want to make healthcare choices for you when you are unable to make them for yourself. This person, called a proxy, is usually a family member or a friend. You may choose more than 1 proxy.  Do not resuscitate (DNR) order:  A DNR order is used in case your heart stops beating or you stop breathing. It is a request not to have certain forms of treatment, such as CPR. A DNR order may be included in other types of advance directives.  Medical directive:  This covers the care that you want if you are in a coma, near death, or unable to make decisions for yourself. You can list the treatments you want for each condition. Treatment may include pain medicine, surgery, blood transfusions, dialysis, IV or tube feedings, and a ventilator (breathing machine).  Values history:  This document has questions about your views, beliefs, and how you feel and think about life. This information can help others choose the care that you would choose.  Why  are advance directives important?  An advance directive helps you control your care. Although spoken wishes may be used, it is better to have your wishes written down. Spoken wishes can be misunderstood, or not followed. Treatments may be given even if you do not want them. An advance directive may make it easier for your family to make difficult choices about your care.   Fall Prevention    Fall prevention  includes ways to make your home and other areas safer. It also includes ways you can move more carefully to prevent a fall. Health conditions that cause changes in your blood pressure, vision, or muscle strength and coordination may increase your risk for falls. Medicines may also increase your risk for falls if they make you dizzy, weak, or sleepy.   Fall prevention tips:   Stand or sit up slowly.    Use assistive devices as directed.    Wear shoes that fit well and have soles that .    Wear a personal alarm.    Stay active.    Manage your medical conditions.    Home Safety Tips:  Add items to prevent falls in the bathroom.    Keep paths clear.    Install bright lights in your home.    Keep items you use often on shelves within reach.    Paint or place reflective tape on the edges of your stairs.    Narcotic (Opioid) Safety    Use narcotics safely:  Take prescribed narcotics exactly as directed  Do not give narcotics to others or take narcotics that belong to someone else  Do not mix narcotics without medicines or alcohol  Do not drive or operate heavy machinery after you take the narcotic  Monitor for side effects and notify your healthcare provider if you experienced side effects such as nausea, sleepiness, itching, or trouble thinking clearly.    Manage constipation:    Constipation is the most common side effect of narcotic medicine. Constipation is when you have hard, dry bowel movements, or you go longer than usual between bowel movements. Tell your healthcare provider about all changes in your bowel  movements while you are taking narcotics. He or she may recommend laxative medicine to help you have a bowel movement. He or she may also change the kind of narcotic you are taking, or change when you take it. The following are more ways you can prevent or relieve constipation:    Drink liquids as directed.  You may need to drink extra liquids to help soften and move your bowels. Ask how much liquid to drink each day and which liquids are best for you.  Eat high-fiber foods.  This may help decrease constipation by adding bulk to your bowel movements. High-fiber foods include fruits, vegetables, whole-grain breads and cereals, and beans. Your healthcare provider or dietitian can help you create a high-fiber meal plan. Your provider may also recommend a fiber supplement if you cannot get enough fiber from food.  Exercise regularly.  Regular physical activity can help stimulate your intestines. Walking is a good exercise to prevent or relieve constipation. Ask which exercises are best for you.  Schedule a time each day to have a bowel movement.  This may help train your body to have regular bowel movements. Bend forward while you are on the toilet to help move the bowel movement out. Sit on the toilet for at least 10 minutes, even if you do not have a bowel movement.    Store narcotics safely:   Store narcotics where others cannot easily get them.  Keep them in a locked cabinet or secure area. Do not  keep them in a purse or other bag you carry with you. A person may be looking for something else and find the narcotics.  Make sure narcotics are stored out of the reach of children.  A child can easily overdose on narcotics. Narcotics may look like candy to a small child.    The best way to dispose of narcotics:      The laws vary by country and area. In the [place], the best way is to return the narcotics through a take-back program. This program is offered by the [place] Drug Enforcement Agency (GLENDA). The following  are options for using the program:  Take the narcotics to a GLENDA collection site.  The site is often a law enforcement center. Call your local law enforcement center for scheduled take-back days in your area. You will be given information on where to go if the collection site is in a different location.  Take the narcotics to an approved pharmacy or hospital.  A pharmacy or hospital may be set up as a collection site. You will need to ask if it is a GLENDA collection site if you were not directed there. A pharmacy or doctor's office may not be able to take back narcotics unless it is a GLENDA site.  Use a mail-back system.  This means you are given containers to put the narcotics into. You will then mail them in the containers.  Use a take-back drop box.  This is a place to leave the narcotics at any time. People and animals will not be able to get into the box. Your local law enforcement agency can tell you where to find a drop box in your area.    Other ways to manage pain:   Ask your healthcare provider about non-narcotic medicines to control pain.  Nonprescription medicines include NSAIDs (such as ibuprofen) and acetaminophen. Prescription medicines include muscle relaxers, antidepressants, and steroids.  Pain may be managed without any medicines.  Some ways to relieve pain include massage, aromatherapy, or meditation. Physical or occupational therapy may also help.    For more information:   Drug Enforcement Administration  [address] [place] [address]  Phone: 9- [phone] - [phone]  Web Address: https://www.deadiversion.[facility].gov/drug_disposal/    [place] Food and Drug Administration  [address] [place] [address]  Phone: 3- 655 - 040-7363  Web Address: http://www.fda.gov     © Copyright [employer] 2018 Information is for End User's use only and may not be sold, redistributed or otherwise used for commercial purposes. All illustrations and images included in CareNotes® are the  copyrighted property of [employer] Inc[employer] or [employer]      Medicare Preventive Visit Patient Instructions  Thank you for completing your Welcome to Medicare Visit or Medicare Annual Wellness Visit today. Your next wellness visit will be due in one year (1/23/2025).  The screening/preventive services that you may require over the next 5-10 years are detailed below. Some tests may not apply to you based off risk factors and/or age. Screening tests ordered at today's visit but not completed yet may show as past due. Also, please note that scanned in results may not display below.  Preventive Screenings:  Service Recommendations Previous Testing/Comments   Colorectal Cancer Screening  * Colonoscopy    * Fecal Occult Blood Test (FOBT)/Fecal Immunochemical Test (FIT)  * Fecal DNA/Cologuard Test  * Flexible Sigmoidoscopy Age: 45-75 years old   Colonoscopy: every 10 years (may be performed more frequently if at higher risk)  OR  FOBT/FIT: every 1 year  OR  Cologuard: every 3 years  OR  Sigmoidoscopy: every 5 years  Screening may be recommended earlier than age 45 if at higher risk for colorectal cancer. Also, an individualized decision between you and your healthcare provider will decide whether screening between the ages of 76-85 would be appropriate. Colonoscopy: 03/13/2020  FOBT/FIT: Not on file  Cologuard: Not on file  Sigmoidoscopy: Not on file    Screening Current     Breast Cancer Screening Age: 40+ years old  Frequency: every 1-2 years  Not required if history of left and right mastectomy Mammogram: 12/21/2023    Screening Current  Risks and Benefits Discussed   Cervical Cancer Screening Between the ages of 21-29, pap smear recommended once every 3 years.   Between the ages of 30-65, can perform pap smear with HPV co-testing every 5 years.   Recommendations may differ for women with a history of total hysterectomy, cervical cancer, or abnormal pap smears in past. Pap Smear: 08/19/2015    Risks and Benefits  Discussed  Screening Not Indicated   Hepatitis C Screening Once for adults born between 1945 and 1965  More frequently in patients at high risk for Hepatitis C Hep C Antibody: Not on file    Risks and Benefits Discussed  Due for Hepatitis C screening   Diabetes Screening 1-2 times per year if you're at risk for diabetes or have pre-diabetes Fasting glucose: 101 mg/dL (9/28/2021)  A1C: 5.5 % (10/12/2021)  Screening Current  Risks and Benefits Discussed   Cholesterol Screening Once every 5 years if you don't have a lipid disorder. May order more often based on risk factors. Lipid panel: 04/21/2023    Screening Current  Risks and Benefits Discussed     Other Preventive Screenings Covered by Medicare:  Abdominal Aortic Aneurysm (AAA) Screening: covered once if your at risk. You're considered to be at risk if you have a family history of AAA.  Lung Cancer Screening: covers low dose CT scan once per year if you meet all of the following conditions: (1) Age 55-77; (2) No signs or symptoms of lung cancer; (3) Current smoker or have quit smoking within the last 15 years; (4) You have a tobacco smoking history of at least 20 pack years (packs per day multiplied by number of years you smoked); (5) You get a written order from a healthcare provider.  Glaucoma Screening: covered annually if you're considered high risk: (1) You have diabetes OR (2) Family history of glaucoma OR (3)  aged 50 and older OR (4)  American aged 65 and older  Osteoporosis Screening: covered every 2 years if you meet one of the following conditions: (1) You're estrogen deficient and at risk for osteoporosis based off medical history and other findings; (2) Have a vertebral abnormality; (3) On glucocorticoid therapy for more than 3 months; (4) Have primary hyperparathyroidism; (5) On osteoporosis medications and need to assess response to drug therapy.   Last bone density test (DXA Scan): 12/21/2023.  HIV Screening: covered  annually if you're between the age of 15-65. Also covered annually if you are younger than 15 and older than 65 with risk factors for HIV infection. For pregnant patients, it is covered up to 3 times per pregnancy.    Immunizations:  Immunization Recommendations   Influenza Vaccine Annual influenza vaccination during flu season is recommended for all persons aged >= 6 months who do not have contraindications   Pneumococcal Vaccine   * Pneumococcal conjugate vaccine = PCV13 (Prevnar 13), PCV15 (Vaxneuvance), PCV20 (Prevnar 20)  * Pneumococcal polysaccharide vaccine = PPSV23 (Pneumovax) Adults 19-63 yo with certain risk factors or if 65+ yo  If never received any pneumonia vaccine: recommend Prevnar 20 (PCV20)  Give PCV20 if previously received 1 dose of PCV13 or PPSV23   Hepatitis B Vaccine 3 dose series if at intermediate or high risk (ex: diabetes, end stage renal disease, liver disease)   Respiratory syncytial virus (RSV) Vaccine - COVERED BY MEDICARE PART D  * RSVPreF3 (Arexvy) CDC recommends that adults 60 years of age and older may receive a single dose of RSV vaccine using shared clinical decision-making (SCDM)   Tetanus (Td) Vaccine - COST NOT COVERED BY MEDICARE PART B Following completion of primary series, a booster dose should be given every 10 years to maintain immunity against tetanus. Td may also be given as tetanus wound prophylaxis.   Tdap Vaccine - COST NOT COVERED BY MEDICARE PART B Recommended at least once for all adults. For pregnant patients, recommended with each pregnancy.   Shingles Vaccine (Shingrix) - COST NOT COVERED BY MEDICARE PART B  2 shot series recommended in those 19 years and older who have or will have weakened immune systems or those 50 years and older     Health Maintenance Due:      Topic Date Due   • Hepatitis C Screening  Never done   • HIV Screening  Never done   • Breast Cancer Screening: Mammogram  12/21/2024   • Colorectal Cancer Screening  03/13/2025   • DXA SCAN   12/21/2025     Immunizations Due:      Topic Date Due   • COVID-19 Vaccine (1) Never done   • Hepatitis A Vaccine (1 of 2 - Risk 2-dose series) Never done   • Influenza Vaccine (1) 09/01/2023     Advance Directives   What are advance directives?  Advance directives are legal documents that state your wishes and plans for medical care. These plans are made ahead of time in case you lose your ability to make decisions for yourself. Advance directives can apply to any medical decision, such as the treatments you want, and if you want to donate organs.   What are the types of advance directives?  There are many types of advance directives, and each state has rules about how to use them. You may choose a combination of any of the following:  Living will:  This is a written record of the treatment you want. You can also choose which treatments you do not want, which to limit, and which to stop at a certain time. This includes surgery, medicine, IV fluid, and tube feedings.   Durable power of  for healthcare (DPAHC):  This is a written record that states who you want to make healthcare choices for you when you are unable to make them for yourself. This person, called a proxy, is usually a family member or a friend. You may choose more than 1 proxy.  Do not resuscitate (DNR) order:  A DNR order is used in case your heart stops beating or you stop breathing. It is a request not to have certain forms of treatment, such as CPR. A DNR order may be included in other types of advance directives.  Medical directive:  This covers the care that you want if you are in a coma, near death, or unable to make decisions for yourself. You can list the treatments you want for each condition. Treatment may include pain medicine, surgery, blood transfusions, dialysis, IV or tube feedings, and a ventilator (breathing machine).  Values history:  This document has questions about your views, beliefs, and how you feel and think about  life. This information can help others choose the care that you would choose.  Why are advance directives important?  An advance directive helps you control your care. Although spoken wishes may be used, it is better to have your wishes written down. Spoken wishes can be misunderstood, or not followed. Treatments may be given even if you do not want them. An advance directive may make it easier for your family to make difficult choices about your care.   Fall Prevention    Fall prevention  includes ways to make your home and other areas safer. It also includes ways you can move more carefully to prevent a fall. Health conditions that cause changes in your blood pressure, vision, or muscle strength and coordination may increase your risk for falls. Medicines may also increase your risk for falls if they make you dizzy, weak, or sleepy.   Fall prevention tips:   Stand or sit up slowly.    Use assistive devices as directed.    Wear shoes that fit well and have soles that .    Wear a personal alarm.    Stay active.    Manage your medical conditions.    Home Safety Tips:  Add items to prevent falls in the bathroom.    Keep paths clear.    Install bright lights in your home.    Keep items you use often on shelves within reach.    Paint or place reflective tape on the edges of your stairs.    Narcotic (Opioid) Safety    Use narcotics safely:  Take prescribed narcotics exactly as directed  Do not give narcotics to others or take narcotics that belong to someone else  Do not mix narcotics without medicines or alcohol  Do not drive or operate heavy machinery after you take the narcotic  Monitor for side effects and notify your healthcare provider if you experienced side effects such as nausea, sleepiness, itching, or trouble thinking clearly.    Manage constipation:    Constipation is the most common side effect of narcotic medicine. Constipation is when you have hard, dry bowel movements, or you go longer than usual  between bowel movements. Tell your healthcare provider about all changes in your bowel movements while you are taking narcotics. He or she may recommend laxative medicine to help you have a bowel movement. He or she may also change the kind of narcotic you are taking, or change when you take it. The following are more ways you can prevent or relieve constipation:    Drink liquids as directed.  You may need to drink extra liquids to help soften and move your bowels. Ask how much liquid to drink each day and which liquids are best for you.  Eat high-fiber foods.  This may help decrease constipation by adding bulk to your bowel movements. High-fiber foods include fruits, vegetables, whole-grain breads and cereals, and beans. Your healthcare provider or dietitian can help you create a high-fiber meal plan. Your provider may also recommend a fiber supplement if you cannot get enough fiber from food.  Exercise regularly.  Regular physical activity can help stimulate your intestines. Walking is a good exercise to prevent or relieve constipation. Ask which exercises are best for you.  Schedule a time each day to have a bowel movement.  This may help train your body to have regular bowel movements. Bend forward while you are on the toilet to help move the bowel movement out. Sit on the toilet for at least 10 minutes, even if you do not have a bowel movement.    Store narcotics safely:   Store narcotics where others cannot easily get them.  Keep them in a locked cabinet or secure area. Do not  keep them in a purse or other bag you carry with you. A person may be looking for something else and find the narcotics.  Make sure narcotics are stored out of the reach of children.  A child can easily overdose on narcotics. Narcotics may look like candy to a small child.    The best way to dispose of narcotics:      The laws vary by country and area. In the [place], the best way is to return the narcotics through a take-back  program. This program is offered by the [place] Drug Enforcement Agency (GLENDA). The following are options for using the program:  Take the narcotics to a GLENDA collection site.  The site is often a law enforcement center. Call your local law enforcement center for scheduled take-back days in your area. You will be given information on where to go if the collection site is in a different location.  Take the narcotics to an approved pharmacy or hospital.  A pharmacy or hospital may be set up as a collection site. You will need to ask if it is a GLENDA collection site if you were not directed there. A pharmacy or doctor's office may not be able to take back narcotics unless it is a GLENDA site.  Use a mail-back system.  This means you are given containers to put the narcotics into. You will then mail them in the containers.  Use a take-back drop box.  This is a place to leave the narcotics at any time. People and animals will not be able to get into the box. Your local law enforcement agency can tell you where to find a drop box in your area.    Other ways to manage pain:   Ask your healthcare provider about non-narcotic medicines to control pain.  Nonprescription medicines include NSAIDs (such as ibuprofen) and acetaminophen. Prescription medicines include muscle relaxers, antidepressants, and steroids.  Pain may be managed without any medicines.  Some ways to relieve pain include massage, aromatherapy, or meditation. Physical or occupational therapy may also help.    For more information:   Drug Enforcement Administration  [address] [place] [address]  Phone: 6- 962 - 384-5474  Web Address: https://www.deadiversion.[facility].gov/drug_disposal/    US Food and Drug Administration  [address] [place] [address]  Phone: 8- 514 - 156-8898  Web Address: http://www.fda.gov     © Copyright [employer] 2018 Information is for End User's use only and may not be sold, redistributed or otherwise used  for commercial purposes. All illustrations and images included in CareNotes® are the copyrighted property of [employer] or Member Desk      Depression   AMBULATORY CARE:   Depression  is a mood disorder that causes feelings of sadness or hopelessness that do not go away. Depression may cause you to lose interest in things you used to enjoy. These feelings may interfere with your daily life.  Common signs and symptoms:   Appetite changes, or weight gain or loss    Trouble falling or staying asleep, or sleeping too much    Fatigue (being mentally and physically tired) or lack of energy    Feeling restless, irritable, or withdrawn    Feeling worthless, hopeless, discouraged, or guilty    Trouble concentrating, remembering things, doing daily tasks, or making decisions    Thoughts about hurting or killing yourself    Call your local emergency number (911 in the [place]) if:   You think about hurting yourself or someone else.    You have done something on purpose to hurt yourself.    Call your therapist or doctor if:   Your symptoms get worse or do not get better with treatment.    Your depression keeps you from doing your regular daily activities.    You have new symptoms since your last visit.    You have questions or concerns about your condition or care.    The following resources are available at any time to help you, if needed:   Contact a suicide prevention organization:        For the [employer] Suicide and Crisis Lifeline:     Call or text Womenalia.com     Send a chat on [email or web address].org/chat     Call 6-687-557-5181 (1-800-273-TALK)    For the Suicide Hotline, call 3-965-917-5841 (5-519-XFQYSWN)    For a list of international numbers: https://save.org/find-help/international-resources/  Treatment for depression  depends on how severe your symptoms are. You may need any of the following:  Cognitive behavioral therapy (CBT)  teaches you how to identify and change negative thought patterns.    Antidepressant medicine   may be given to decrease or manage symptoms. You may need to take this medicine for several weeks before they start working.    Self-care:   Talk to someone about your depression.  Your healthcare provider may suggest counseling. You might feel more comfortable talking with a friend or family member about your depression. Choose someone you know will be supportive and encouraging.    Get regular physical activity.  Physical activity can lower your stress, improve your mood, and help you sleep better. Work with your healthcare provider to develop a plan that you enjoy.         Create a regular sleep schedule.  A routine can help you relax before bed. Listen to music, read, or do yoga. Try to go to bed and wake up at the same time every day. Sleep is important for emotional health.    Eat a variety of healthy foods.  Healthy foods include fruits, vegetables, whole-grain breads, low-fat dairy products, lean meats, fish, and cooked beans. A healthy meal plan is low in fat, salt, and added sugar.         Do not use alcohol, drugs, or nicotine products.  These can worsen depression or make it hard to manage. Talk to your therapist or healthcare provider if you use any of these products and need help to quit.    Follow up with your therapist or doctor as directed:  Your healthcare provider will monitor your progress at follow-up visits. Your provider will also monitor your medicine if you take antidepressants and ask if the medicine is helping. Tell your provider about any side effects or problems you have with your medicine. The type or amount of medicine may need to be changed. Write down your questions so you remember to ask them during your visits.  For more information or support:   [employer]  [address]  [place] [address]  Phone: 5- 340 - 535-0658  Phone: 1- 377 - 650-6990  Web Address: http://www.jennifer.[employer]  988 Suicide and Crisis Lifeline  [address]  [place]  [address]  Phone: 9- 599 - 743  Web Address: http://www.suicidepreventionCultivate IT Solutions & Management Pvt[employer].line.org OR https://[employer].org/chat/    © Copyright [employer] 2023 Information is for End User's use only and may not be sold, redistributed or otherwise used for commercial purposes.  The above information is an  only. It is not intended as medical advice for individual conditions or treatments. Talk to your doctor, nurse or pharmacist before following any medical regimen to see if it is safe and effective for you.    Fall Prevention   AMBULATORY CARE:   Fall prevention  includes ways to make your home and other areas safer. Prevention also includes ways you can move more carefully to prevent a fall. Health conditions that cause changes in your blood pressure, vision, or muscle strength and coordination may increase your risk for falls. Medicines may also increase your risk for falls if they make you dizzy, weak, or sleepy.  Arrange to have someone call your local emergency number (911 in the ) if:   You have fallen and are found unconscious.    You have fallen and cannot move part of your body.    Call your doctor if:   You have fallen and have pain or a headache.    You have questions or concerns about your condition or care.    Fall prevention tips:   Stand or sit up slowly.  This may help you keep your balance and prevent falls.    Use assistive devices as directed.  Your healthcare provider may suggest that you use a cane or walker to help you keep your balance. You may need to have grab bars put in your bathroom near the toilet or in the shower.    Wear shoes that fit well and have soles that .  Wear shoes both inside and outside. Use slippers with good . Do not wear shoes with high heels.    Stay active.  Exercise can help strengthen your muscles and improve your balance. Your healthcare provider may recommend water aerobics or walking. He or she may also recommend physical therapy to improve your  coordination. Never start an exercise program without talking to your healthcare provider first.         Manage your medical conditions.  Keep all appointments with your healthcare providers. Visit your eye doctor as directed.    Home safety tips:       Wear a personal alarm.  This is a device that allows you to call for help if you fall. Ask your healthcare provider for more information.    Add items to prevent falls in the bathroom.  Put nonslip strips on your bath or shower floor to prevent you from slipping. Use a bath mat if you do not have carpet in the bathroom. This will prevent you from falling when you step out of the bath or shower. Use a shower seat so you do not need to stand while you shower. Sit on the toilet or a chair in your bathroom to dry yourself and put on clothing. This will prevent you from losing your balance from drying or dressing yourself while you are standing.    Keep paths clear.  Remove books, shoes, and other objects from walkways and stairs. Place cords for telephones and lamps out of the way so that you do not need to walk over them. Tape them down if you cannot move them. Remove small rugs. If you cannot remove a rug, secure it with double-sided tape. This will prevent you from tripping.    Install bright lights in your home.  Use night lights to help light paths to the bathroom or kitchen. Always turn on the light before you start walking.    Keep items you use often on shelves within reach.  Do not use a step stool to help you reach an item.    Paint or place reflective tape on the edges of your stairs.  This will help you see the stairs better.  Plan ahead in case you do fall:  Talk with family members, friends, and neighbors to create a fall plan. Someone will need to call for emergency help if you are injured or found unconscious. If possible, keep a mobile phone with you at all times, or wear an emergency alert device. You can contact emergency services by pressing a button  on the device. Ask your healthcare provider for more information.  Follow up with your doctor as directed:  Write down your questions so you remember to ask them during your visits.  © Copyright [employer] 2023 Information is for End User's use only and may not be sold, redistributed or otherwise used for commercial purposes.  The above information is an  only. It is not intended as medical advice for individual conditions or treatments. Talk to your doctor, nurse or pharmacist before following any medical regimen to see if it is safe and effective for you.    Weakness   AMBULATORY CARE:   Weakness  is a loss of muscle strength. It may be caused by brain, nerve, or muscle problems. Physical and mental conditions such as heart problems, pregnancy, dehydration, or depression may also cause weakness. Reactions to certain drugs can cause weakness. Parts of your body may become weak if you need to wear a cast or splint or have been on bed rest for a long time.  Call 911 for any of the following:   You have any of the following signs of a stroke:      Numbness or drooping on one side of your face     Weakness in an arm or leg    Confusion or difficulty speaking    Dizziness, a severe headache, or vision loss    You lose feeling in your weakened body area.    You have electric shock-like feelings down your arms and legs when you flex or move your neck.    You have sudden or increased trouble speaking, swallowing, or breathing.    Seek care immediately if:   You have severe pain in your back, arms, or legs that worsens.    You have sudden or worsened muscle weakness or loss of movement.    You are not able to control when you urinate or have a bowel movement.    Contact your healthcare provider if:   You feel depressed or anxious.     You have questions or concerns about your condition or care.    Manage weakness:   Use assistive devices as directed.  These help protect you from injury. Examples include a  walker or cane. Have someone install handrails in your home. These will help you get out of a bathtub or stand up from a toilet. Use a shower chair so you can sit while you shower. Sit down on the toilet or another chair to dry off and put on your clothes. Get help going up and down stairs if your legs are weak.     Go to physical or occupational therapy if directed.  A physical therapist can teach you exercises to help strengthen weak muscles. An occupational therapist can show you ways to do your daily activities more easily. For example, light forks and spoons can be easier to use if you have hand weakness. You may also learn ways to organize your household items so you are not moving heavy items.    Balance rest with exercise.  Exercise can help increase your muscle strength and energy. Do not exercise for long periods at a time. Take breaks often to rest. Too much exercise can cause muscle strain or make you more tired. Ask your healthcare provider how much exercise is right for you.    Eat a variety of healthy foods.  Too much or too little food may cause weakness or tiredness. Ask your healthcare provider what a healthy amount of food is for you. Healthy foods include fruits, vegetables, whole-grain breads, low-fat dairy products, lean meats and fish, nuts, and cooked beans.    Do not smoke.  Nicotine and other chemicals in cigarettes and cigars can make your symptoms worse, and can cause lung damage. Ask your healthcare provider for information if you currently smoke and need help to quit. E-cigarettes or smokeless tobacco still contain nicotine. Talk to your healthcare provider before you use these products.     Do not use caffeine, alcohol, or illegal drugs.  These may cause muscle twitching, which could lead to worsened weakness.    Follow up with your healthcare provider as directed:  Write down your questions so you remember to ask them during your visits.  © Copyright [employer] 2023 Information is for  End User's use only and may not be sold, redistributed or otherwise used for commercial purposes.  The above information is an  only. It is not intended as medical advice for individual conditions or treatments. Talk to your doctor, nurse or pharmacist before following any medical regimen to see if it is safe and effective for you.

## 2024-01-24 NOTE — TELEPHONE ENCOUNTER
Both rx sent, needs to be seen in the office in 6 wks to re-eval and for med check for newly started Gabapentin Price (Do Not Change): 0.00 Instructions: This plan will send the code FBSE to the PM system.  DO NOT or CHANGE the price. Detail Level: Simple

## 2024-01-29 ENCOUNTER — SOCIAL WORK (OUTPATIENT)
Dept: BEHAVIORAL/MENTAL HEALTH CLINIC | Facility: CLINIC | Age: 64
End: 2024-01-29

## 2024-01-29 DIAGNOSIS — F43.22 ADJUSTMENT DISORDER WITH ANXIOUS MOOD: Primary | ICD-10-CM

## 2024-01-29 NOTE — BH CRISIS PLAN
Client Name: Lola Wade       Client YOB: 1960    [facility]      Creation Date: 1/16/24 Update Date: 1/29/24   Created By: Lazara Mott LCSW Last Updated By: Lazara Mott LCSW      Step 1: Warning Signs:   Warning Signs   Uncontrollable crying; shaking/anxiety/loss of control; can't catch my breath; stress impacting my memory            Step 2: Internal Coping Strategies:   Internal Coping Strategies   Breathing excercise; awareness of emotions; go into nature; play computer/phone games            Step 3: People and social settings that provide distraction:   Name Contact Information   Friends (Sarita Cynthia) In cell    Places   Casinos           Step 4: People whom I can ask for help during a crisis:      Name Contact Information    Brother and daughter In cell      Step 5: Professionals or agencies I can contact during a crisis:      [employer]ican/Agency Name Phone Emergency Contact    [employer]/Dr. Lord In cell         Crisis Phone Numbers:   Suicide Prevention Lifeline: Call or Text  985 Crisis Text Line: Text HOME to 015-674   Please note: Some [facility] do not have a separate number for Child/Adolescent specific crisis. If your county is not listed under Child/Adolescent, please call the adult number for your county      Adult Crisis Numbers: Child/Adolescent Crisis Numbers   [facility]: [phone] [facility]: [phone]   [facility]: [phone] [facility]: [phone]   [facility]: [phone] [place]: [phone]   [facility]: [phone] [place]: [phone]   [facility]: [phone]   [facility]: [phone]   [facility]: [phone]   [facility]: [phone] (daytime) [phone] (after hours, weekends, holidays)      Step 6: Making the environment safer (plan for lethal means safety):   Patient did not identify any lethal methods: Yes     Optional:  What is most important to me and worth living for?   My daughter      Mark Safety Plan. Vikki Tena and Juliano Resendiz. Used with permission of the authors.

## 2024-01-29 NOTE — PSYCH
"Behavioral Health Psychotherapy Progress Note    Psychotherapy Provided: Individual Psychotherapy     No diagnosis found.    Goals addressed in session: Goal 1     DATA:   Ms. Wade is observably more calm and appears rested; is not tearful in session and did not have difficulty finding office as with prior three sessions. She endorses that she has largely met her treatment goal of wishing to return to \"her usual self\" with the exception of still having some distractability. During this session, this clinician used the following therapeutic modalities: Cognitive Behavioral Therapy    Substance Abuse was not addressed during this session. If the client is diagnosed with a co-occurring substance use disorder, please indicate any changes in the frequency or amount of use: . Stage of change for addressing substance use diagnoses: No substance use/Not applicable    ASSESSMENT:  Lola Wade presents with a Euthymic/ normal mood.     her affect is Normal range and intensity, which is congruent, with her mood and the content of the session. The client has made progress on their goals.     Lola Wade presents with a none risk of suicide, none risk of self-harm, and none risk of harm to others.    For any risk assessment that surpasses a \"low\" rating, a safety plan must be developed.    A safety plan was indicated: no  If yes, describe in detail     PLAN: Between sessions, Lola Wade will set intention to focus on one task at a time through completion. At the next session, the therapist will use Cognitive Behavioral Therapy to address the results of these efforts.    Behavioral Health Treatment Plan and Discharge Planning: Lloa Wade is aware of and agrees to continue to work on their treatment plan. They have identified and are working toward their discharge goals. yes    Visit start and stop times:    01/29/24     "

## 2024-01-31 ENCOUNTER — NURSE TRIAGE (OUTPATIENT)
Age: 64
End: 2024-01-31

## 2024-01-31 NOTE — TELEPHONE ENCOUNTER
"Pt calling.  Pt states she has been having abdominal pain x 3 weeks.  Pain is located in the middle of the abdomen and is intermittent.  Pt states pain is a 5/10 and usually occurs around 2-3 AM and wakes her from sleep. Pt states she takes a Pepcid and sips gingerale and pain is relieved after this and she can return to sleep.  Pt thought maybe she was eating too late or taking meds too late and \"experimented' with eating at different times, and taking meds at different times, but no improvement.  Pt was recently on steroids for sciatica and then an antibiotic, but she is concerned that something may be going on related to previous gastric bypass surgery 3 yrs ago.  Pt denies any nausea, vomiting, diarrhea or constipation.   10 weeks of sciatica pain, excruciating.  Sent with rx for steroid-had abdominal pain while on the steroid.  Seen at ED twice after this for sciatica and no abd pain.    RN offered to schedule an appointment.  Pt states she would prefer to check with provider and see if any recommendations before scheduling an appointment.      Note routed to  Management clin pool for provider review.      Additional Information   Negative: Passed out (i.e., fainted, collapsed and was not responding)   Negative: Shock suspected (e.g., cold/pale/clammy skin, too weak to stand, low BP, rapid pulse)   Negative: Sounds like a life-threatening emergency to the triager   Negative: Chest pain   Negative: Pain is mainly in upper abdomen (if needed ask: 'is it mainly above the belly button?')   Negative: Abdominal pain and pregnant < 20 weeks   Negative: Abdominal pain and pregnant 20 or more weeks   Negative: SEVERE abdominal pain (e.g., excruciating)   Negative: Vomiting red blood or black (coffee ground) material   Negative: Bloody, black, or tarry bowel movements (Exception: chronic-unchanged black-grey bowel movements and is taking iron pills or Pepto-Bismol)   Negative: Constant abdominal pain lasting > 2 " "hours   Negative: Vomiting bile (green color)   Negative: Patient sounds very sick or weak to the triager   Negative: Vomiting and abdomen looks much more swollen than usual   Negative: White of the eyes have turned yellow (i.e., jaundice)   Negative: Blood in urine (red, pink, or tea-colored)   Negative: Fever > 103 F (39.4 C)   Negative: Fever > 101 F (38.3 C) and over 60 years of age   Negative: Fever > 100.0 F (37.8 C) and has diabetes mellitus or a weak immune system (e.g., HIV positive, cancer chemotherapy, organ transplant, splenectomy, chronic steroids)   Negative: Fever > 100.0 F (37.8 C) and bedridden (e.g., nursing home patient, stroke, chronic illness, recovering from surgery)   Negative: Pregnant or could be pregnant (i.e., missed last menstrual period)   Negative: MODERATE pain (e.g., interferes with normal activities that comes and goes (cramps) lasts > 24 hours (Exception: pain with Vomiting or Diarrhea - see that Protocol)   Negative: Unusual vaginal discharge   Negative: Age > 60 years   Negative: Patient wants to be seen   Negative: MILD pain (e.g., does not interfere with normal activities) and pain comes and goes (cramps) lasts > 48 hours (Exception: this same abdominal pain is a chronic symptom recurrent or ongoing AND present > 4 weeks)   Negative: Abdominal pain is a chronic symptom (recurrent or ongoing AND lasting > 4 weeks)   Negative: Pain with sexual intercourse (dyspareunia)   Negative: Mild abdominal pain    Answer Assessment - Initial Assessment Questions  1. LOCATION: \"Where does it hurt?\"       Mid abd  2. RADIATION: \"Does the pain shoot anywhere else?\" (e.g., chest, back)      no  3. ONSET: \"When did the pain begin?\" (e.g., minutes, hours or days ago)       3 wks ago  4. SUDDEN: \"Gradual or sudden onset?\"      Unsure   5. PATTERN \"Does the pain come and go, or is it constant?\"     - If constant: \"Is it getting better, staying the same, or worsening?\"       (Note: Constant means the " "pain never goes away completely; most serious pain is constant and it progresses)      - If intermittent: \"How long does it last?\" \"Do you have pain now?\"      (Note: Intermittent means the pain goes away completely between bouts)      Don't get pain when awake  6. SEVERITY: \"How bad is the pain?\"  (e.g., Scale 1-10; mild, moderate, or severe)    - MILD (1-3): doesn't interfere with normal activities, abdomen soft and not tender to touch     - MODERATE (4-7): interferes with normal activities or awakens from sleep, tender to touch     - SEVERE (8-10): excruciating pain, doubled over, unable to do any normal activities       5-6/10 when it wakes  7. RECURRENT SYMPTOM: \"Have you ever had this type of stomach pain before?\" If Yes, ask: \"When was the last time?\" and \"What happened that time?\"       no  8. CAUSE: \"What do you think is causing the stomach pain?\"      Medication may have irritated   9. RELIEVING/AGGRAVATING FACTORS: \"What makes it better or worse?\" (e.g., movement, antacids, bowel movement)      Pepcid and gingerale effective after one hour of taking them.n  10. OTHER SYMPTOMS: \"Has there been any vomiting, diarrhea, constipation, or urine problems?\"        no    Protocols used: Abdominal Pain - Female-ADULT-[place]    "

## 2024-01-31 NOTE — TELEPHONE ENCOUNTER
Reached out to Pt re: message. Explained to Pt since she has not been seen in the office since 9/2022 we need to schedule an appt for Pt to be seen by provider. Scheduled OD annual appt for 2/1 with Bryan.

## 2024-02-01 ENCOUNTER — OFFICE VISIT (OUTPATIENT)
Dept: BARIATRICS | Facility: CLINIC | Age: 64
End: 2024-02-01

## 2024-02-01 VITALS
SYSTOLIC BLOOD PRESSURE: 116 MMHG | HEIGHT: 64 IN | DIASTOLIC BLOOD PRESSURE: 68 MMHG | TEMPERATURE: 97.4 F | BODY MASS INDEX: 21.08 KG/M2 | WEIGHT: 123.5 LBS | HEART RATE: 70 BPM

## 2024-02-01 DIAGNOSIS — Z48.815 ENCOUNTER FOR SURGICAL AFTERCARE FOLLOWING SURGERY OF DIGESTIVE SYSTEM: Primary | ICD-10-CM

## 2024-02-01 DIAGNOSIS — Z98.84 BARIATRIC SURGERY STATUS: ICD-10-CM

## 2024-02-01 DIAGNOSIS — K91.2 POSTSURGICAL MALABSORPTION: ICD-10-CM

## 2024-02-01 DIAGNOSIS — R10.9 ABDOMINAL PAIN: ICD-10-CM

## 2024-02-01 RX ORDER — OMEPRAZOLE 20 MG/1
20 CAPSULE, DELAYED RELEASE ORAL 2 TIMES DAILY
Qty: 180 CAPSULE | Refills: 0 | Status: SHIPPED | OUTPATIENT
Start: 2024-02-01

## 2024-02-01 RX ORDER — SUCRALFATE ORAL 1 G/10ML
1 SUSPENSION ORAL 4 TIMES DAILY
Qty: 3600 ML | Refills: 0 | Status: SHIPPED | OUTPATIENT
Start: 2024-02-01

## 2024-02-01 NOTE — PROGRESS NOTES
"Assessment/Plan:     Patient ID: Lola Wade is a 63 y.o. female.     Bariatric Surgery Status/abdominal pain    -s/p conversion to RNYGB with Dr. Jeff Núñez on 09/28/2021. Presents to the office today for OD annual visit and abdominal pain.     Started on steroids for sciatica pain approximately in December and shortly after this, she started having abdominal pain and burning sensation. Took pepcid twice a day at that time. Had associated nausea and vomiting when taking the steroids. The pain and burning sensation resolved after she finished the steroid course.     Subsequently had a GYN infection mid January; started clindamycin; she had a stomach ache with this medication too. She finished her course of medications about a week ago.     Now developed a \"stomach ache\" in the past 1.5 weeks - in middle of the night and woke her up from her sleep. Dull-ache pain; denies nausea and vomiting, diarrhea or constipation. When she does have a bowel movement she gets an associated diaphoresis. Has been tolerating PO. Denies use of NSAIDs. She used PO steroids for back pain; smokes medical marijuana - no nicotine products.     PLAN:     - suspect gastritis.   - start on omeprazole twice a day and carafate 1 gm PO QID.   - Routine follow up in 6 weeks for abdominal pain; follow up in 1 year for annual visit  - Continue with healthy lifestyle, adequate protein intake of 60 gm, fluid intake of at least 64 oz.   - Continue with MVI daily.   - Activity as tolerated.   - Labs ordered and will adjust accordingly if any deficiency.   - Follow up with RD and SW as needed.       Continued/Maintain healthy weight loss with good nutrition intakes.  Adequate hydration with at least 64oz. fluid intake.  Follow diet as discussed.  Follow vitamin and mineral recommendations as reviewed with you.  Exercise as tolerated.    Colonoscopy referral made: UTD  Mammogram - UTD    Follow-up in 6 weeks for pain follow up; follow up in 1 year for " routine annual. We kindly ask that your arrive 15 minutes before your scheduled appointment time with your provider to allow our staff to room you, get your vital signs and update your chart.    Get lab work done prior to annual visit. Please call the office if you need a script.  It is recommended to check with your insurance BEFORE getting labs done to make sure they are covered by your policy.      Call our office if you have any problems with abdominal pain especially associated with fever, chills, nausea, vomiting or any other concerns.    All  Post-bariatric surgery patients should be aware that very small quantities of any alcohol can cause impairment and it is very possible not to feel the effect. The effect can be in the system for several hours.  It is also a stomach irritant.     It is advised to AVOID alcohol, Nonsteroidal antiinflammatory drugs (NSAIDS) and nicotine of all forms . Any of these can cause stomach irritation/pain.    Discussed the effects of alcohol on a bariatric patient and the increased impairment risk.     Keep up the good work!     Postsurgical Malabsorption   -At risk for malabsorption of vitamins/minerals secondary to malabsorption and restriction of intake from bariatric surgery  -Currently taking adequate postop bariatric surgery vitamin supplementation  -Next set of bariatric labs ordered for approximately 2 weeks  -Patient received education about the importance of adhering to a lifelong supplementation regimen to avoid vitamin/mineral deficiencies      Diagnoses and all orders for this visit:    Encounter for surgical aftercare following surgery of digestive system  -     sucralfate (CARAFATE) 1 g/10 mL suspension; Take 10 mL (1 g total) by mouth 4 (four) times a day  -     omeprazole (PriLOSEC) 20 mg delayed release capsule; Take 1 capsule (20 mg total) by mouth 2 (two) times a day  -     Cancel: Iron Panel (Includes Ferritin, Iron Sat%, Iron, and TIBC); Future  -     Vitamin  A; Future  -     Vitamin B1, whole blood; Future  -     Vitamin D 25 hydroxy; Future  -     Zinc; Future  -     PTH, intact; Future  -     Vitamin A  -     Vitamin B1, whole blood  -     Vitamin D 25 hydroxy  -     Zinc  -     PTH, intact  -     Fe+TIBC+Gonzalo; Future  -     Fe+TIBC+Gonzalo    Bariatric surgery status  -     sucralfate (CARAFATE) 1 g/10 mL suspension; Take 10 mL (1 g total) by mouth 4 (four) times a day  -     omeprazole (PriLOSEC) 20 mg delayed release capsule; Take 1 capsule (20 mg total) by mouth 2 (two) times a day  -     Cancel: Iron Panel (Includes Ferritin, Iron Sat%, Iron, and TIBC); Future  -     Vitamin A; Future  -     Vitamin B1, whole blood; Future  -     Vitamin D 25 hydroxy; Future  -     Zinc; Future  -     PTH, intact; Future  -     Vitamin A  -     Vitamin B1, whole blood  -     Vitamin D 25 hydroxy  -     Zinc  -     PTH, intact  -     Fe+TIBC+Gonzalo; Future  -     Fe+TIBC+Gonzalo    Postsurgical malabsorption  -     sucralfate (CARAFATE) 1 g/10 mL suspension; Take 10 mL (1 g total) by mouth 4 (four) times a day  -     omeprazole (PriLOSEC) 20 mg delayed release capsule; Take 1 capsule (20 mg total) by mouth 2 (two) times a day  -     Cancel: Iron Panel (Includes Ferritin, Iron Sat%, Iron, and TIBC); Future  -     Vitamin A; Future  -     Vitamin B1, whole blood; Future  -     Vitamin D 25 hydroxy; Future  -     Zinc; Future  -     PTH, intact; Future  -     Vitamin A  -     Vitamin B1, whole blood  -     Vitamin D 25 hydroxy  -     Zinc  -     PTH, intact  -     Fe+TIBC+Gonzalo; Future  -     Fe+TIBC+Gonzalo    BMI 21.0-21.9, adult  -     sucralfate (CARAFATE) 1 g/10 mL suspension; Take 10 mL (1 g total) by mouth 4 (four) times a day  -     omeprazole (PriLOSEC) 20 mg delayed release capsule; Take 1 capsule (20 mg total) by mouth 2 (two) times a day  -     Cancel: Iron Panel (Includes Ferritin, Iron Sat%, Iron, and TIBC); Future  -     Vitamin A; Future  -     Vitamin B1, whole blood; Future  -      "Vitamin D 25 hydroxy; Future  -     Zinc; Future  -     PTH, intact; Future  -     Vitamin A  -     Vitamin B1, whole blood  -     Vitamin D 25 hydroxy  -     Zinc  -     PTH, intact  -     Fe+TIBC+Gonzalo; Future  -     Fe+TIBC+Gonzalo    Abdominal pain         Subjective:      Patient ID: Lola Wade is a 63 y.o. female.    -s/p conversion to RNYGB with Dr. Jeff Núñez on 09/28/2021. Presents to the office today for OD annual visit and abdominal pain.       Initial: 189 lbs   Current: 123.5 lbs   EWL: (Weight loss is ahead of schedule at this post surgical period.)  Charly: current   Current BMI is Body mass index is 21.53 kg/m².    Tolerating a regular diet-yes  Eating at least 60 grams of protein per day-yes  Following 30/60 minute rule with liquids-yes  Drinking at least 64 ounces of fluid per day-yes  Drinking carbonated beverages-no  Sufficient exercise-yes  Using NSAIDs regularly-no  Using nicotine-no but smokes medical marijuana.  Using alcohol-no  Supplements:  Multivitamins, Iron, and Calcium  + vitamin D3     EWL is 121%, which places the patient ahead of schedule for expected post surgical weight loss at this time.     The following portions of the patient's history were reviewed and updated as appropriate: allergies, current medications, past family history, past medical history, past social history, past surgical history and problem list.    Review of Systems   Constitutional: Negative.    Respiratory: Negative.     Cardiovascular: Negative.    Gastrointestinal:  Positive for abdominal pain.   Musculoskeletal:  Positive for arthralgias and back pain.   Neurological: Negative.    Psychiatric/Behavioral: Negative.           Objective:    /68   Pulse 70   Temp (!) 97.4 °F (36.3 °C) (Tympanic)   Ht 5' 3.5\" (1.613 m)   Wt 56 kg (123 lb 8 oz)   BMI 21.53 kg/m²      Physical Exam  Vitals and nursing note reviewed.   Constitutional:       Appearance: Normal appearance. She is normal weight.   Cardiovascular: "      Rate and Rhythm: Normal rate and regular rhythm.      Pulses: Normal pulses.      Heart sounds: Normal heart sounds.   Pulmonary:      Effort: Pulmonary effort is normal.      Breath sounds: Normal breath sounds.   Abdominal:      General: Bowel sounds are normal.      Palpations: Abdomen is soft.      Tenderness: There is no abdominal tenderness.   Musculoskeletal:         General: Normal range of motion.   Skin:     General: Skin is warm and dry.   Neurological:      General: No focal deficit present.      Mental Status: She is alert and oriented to person, place, and time.   Psychiatric:         Mood and Affect: Mood normal.         Behavior: Behavior normal.         Thought Content: Thought content normal.         Judgment: Judgment normal.

## 2024-02-01 NOTE — PATIENT INSTRUCTIONS
- stop pepcid; switch to omeprazole twice daily and add carafate 4 times per day.   - follow up in 6 weeks to reevaluate abdominal pain

## 2024-02-05 ENCOUNTER — HOSPITAL ENCOUNTER (OUTPATIENT)
Dept: RADIOLOGY | Facility: CLINIC | Age: 64
Discharge: HOME | End: 2024-02-05
Payer: COMMERCIAL

## 2024-02-05 VITALS
HEART RATE: 65 BPM | DIASTOLIC BLOOD PRESSURE: 61 MMHG | RESPIRATION RATE: 18 BRPM | OXYGEN SATURATION: 98 % | SYSTOLIC BLOOD PRESSURE: 117 MMHG | TEMPERATURE: 98 F

## 2024-02-05 DIAGNOSIS — M51.16 LUMBAR DISC DISEASE WITH RADICULOPATHY: ICD-10-CM

## 2024-02-05 PROCEDURE — 64484 NJX AA&/STRD TFRM EPI L/S EA: CPT | Performed by: ANESTHESIOLOGY

## 2024-02-05 PROCEDURE — 64483 NJX AA&/STRD TFRM EPI L/S 1: CPT | Performed by: ANESTHESIOLOGY

## 2024-02-05 RX ORDER — PAPAVERINE HCL 150 MG
15 CAPSULE, EXTENDED RELEASE ORAL ONCE
Status: COMPLETED | OUTPATIENT
Start: 2024-02-05 | End: 2024-02-05

## 2024-02-05 RX ADMIN — DEXAMETHASONE SODIUM PHOSPHATE 15 MG: 10 INJECTION, SOLUTION INTRAMUSCULAR; INTRAVENOUS at 16:04

## 2024-02-05 RX ADMIN — IOHEXOL 2 ML: 300 INJECTION, SOLUTION INTRAVENOUS at 16:03

## 2024-02-05 NOTE — DISCHARGE INSTRUCTIONS
Epidural Steroid Injection   WHAT YOU NEED TO KNOW:   An epidural steroid injection (ZULAY) is a procedure to inject steroid medicine into the epidural space. The epidural space is between your spinal cord and vertebrae. Steroids reduce inflammation and fluid buildup in your spine that may be causing pain. You may be given pain medicine along with the steroids.          ACTIVITY  Do not drive or operate machinery today.  No strenuous activity today - bending, lifting, etc.  You may resume normal activites starting tomorrow - start slowly and as tolerated.  You may shower today, but no tub baths or hot tubs.  You may have numbness for several hours from the local anesthetic. Please use caution and common sense, especially with weight-bearing activities.    CARE OF THE INJECTION SITE  If you have soreness or pain, apply ice to the area today (20 minutes on/20 minutes off).  Starting tomorrow, you may use warm, moist heat or ice if needed.  You may have an increase or change in your discomfort for 36-48 hours after your treatment.  Apply ice and continue with any pain medication you have been prescribed.  Notify the [facility] if you have any of the following: redness, drainage, swelling, headache, stiff neck or fever above 100°F.    SPECIAL INSTRUCTIONS  Our office will contact you in approximately 7 days for a progress report.    MEDICATIONS  Continue to take all routine medications.  Our office may have instructed you to hold some medications.    As no general anesthesia was used in today's procedure, you should not experience any side effects related to anesthesia.     If you are diabetic, the steroids used in today's injection may temporarily increase your blood sugar levels after the first few days after your injection. Please keep a close eye on your sugars and alert the doctor who manages your diabetes if your sugars are significantly high from your baseline or you are symptomatic.     If you have a  problem specifically related to your procedure, please call our office at [phone].  Problems not related to your procedure should be directed to your primary care physician.

## 2024-02-05 NOTE — H&P
History of Present Illness: The patient is a 63 y.o. female who presents with complaints of low back and leg pain.    Past Medical History:   Diagnosis Date    Allergic 2002    Anxiety     Arthritis     Bariatric surgery status     Chronic pain disorder     Depression     last assessed: 2018     Disturbance of memory     last assessed: 2016     Endometriosis     GERD (gastroesophageal reflux disease)     Hiatal hernia     Hirsutism     History of sleeve gastrectomy     Lumbar herniated disc     Menometrorrhagia     Morbid obesity (HCC)     last assessed: Dec 5, 2014     Motion sickness     Postgastrectomy malabsorption     Sclerosing adenosis of breast     Symptomatic menopausal or female climacteric states     Uterine leiomyoma        Past Surgical History:   Procedure Laterality Date    APPENDECTOMY      BREAST EXCISIONAL BIOPSY Left 2010    Benign    COLONOSCOPY      complete - 7 year repeat recommended - Resolved:      DILATION AND CURETTAGE OF UTERUS      FRACTURE SURGERY      GASTRECTOMY SLEEVE LAPAROSCOPIC  2018    GYNECOLOGIC CRYOSURGERY      Cervix -  for abnormal paps     HYSTERECTOMY  2006    INDUCED       x3    LAPAROSCOPIC SALPINGOOPHERECTOMY Right     LAPAROSCOPIC TOTAL HYSTERECTOMY      06, Laparoscopic hysterectomy with LSO with vaginal closure of the vaginal cuff     AL REVJ GSTR/JJ ANAST W/RCNSTJ W/O VGTMY N/A 2021    Procedure: LAPAROSCOPIC REVISION CONVERSION  JENN-EN-Y GASTRIC BYPASS WITH ROBOTICS AND INTRAOPERATIVE EGD, PARAESOPHAGEAL HERNIA REPAIR;  Surgeon: Sheldon Núñez MD;  Location: [facility];  Service: Bariatrics    SALPINGOOPHORECTOMY Left     Right ovary removed at age 15 and the left one was removed with uterus.    STOMACH SURGERY  2014    for morbid obesity - Managed by: Sheldon Hendricks -     TONSILLECTOMY AND ADENOIDECTOMY      TOOTH EXTRACTION           Current Outpatient Medications:     BIOTIN PO, Take by mouth  Two tablets once day, Disp: , Rfl:     Calcium 250 MG CAPS, Take 500 mg by mouth daily, Disp: , Rfl:     Cholecalciferol (VITAMIN D3) 2000 units TABS, Take 2 tablets by mouth daily, Disp: , Rfl:     famotidine (PEPCID) 20 mg tablet, Take 20 mg by mouth 2 (two) times a day as needed for heartburn, Disp: , Rfl:     gabapentin (NEURONTIN) 400 mg capsule, Take 1 capsule (400 mg total) by mouth 3 (three) times a day, Disp: 270 capsule, Rfl: 1    hydrOXYzine HCL (ATARAX) 10 mg tablet, Take 1 tablet (10 mg total) by mouth every 8 (eight) hours as needed for anxiety, Disp: 30 tablet, Rfl: 0    levothyroxine (Synthroid) 150 mcg tablet, TAKE 1 TABLET BY MOUTH 6 DAYS OF THE WEEK THEN 1 & 1/2 TABS ON SUNDAY, Disp: 96 tablet, Rfl: 1    Magnesium 200 MG TABS, Take 2 tablets by mouth daily, Disp: , Rfl:     methocarbamol (ROBAXIN) 500 mg tablet, Take 1 tablet (500 mg total) by mouth 2 (two) times a day, Disp: 20 tablet, Rfl: 0    Multiple Vitamins-Minerals (Bariatric Multivitamins/Iron) CAPS, Take 2 capsules by mouth in the morning, Disp: , Rfl:     omeprazole (PriLOSEC) 20 mg delayed release capsule, Take 1 capsule (20 mg total) by mouth 2 (two) times a day, Disp: 180 capsule, Rfl: 0    sucralfate (CARAFATE) 1 g/10 mL suspension, Take 10 mL (1 g total) by mouth 4 (four) times a day, Disp: 3600 mL, Rfl: 0    tiZANidine (ZANAFLEX) 4 mg tablet, TAKE 1 TABLET BY MOUTH EVERY 8 HOURS IF NEEDED FOR MUSCLE SPASM OR PAIN, Disp: 60 tablet, Rfl: 1    traMADol-acetaminophen (ULTRACET) 37.5-325 mg per tablet, Take 1 tablet by mouth every 6 (six) hours as needed for moderate pain, Disp: 120 tablet, Rfl: 0    venlafaxine (EFFEXOR) 100 MG tablet, TAKE 2 TABLETS BY MOUTH EVERY MORNING AND 1 TABLET EVERY EVENING, Disp: 90 tablet, Rfl: 1    Current Facility-Administered Medications:     dexamethasone (PF) (DECADRON) injection 15 mg, 15 mg, Epidural, Once, Bogdan Tavares DO    iohexol (OMNIPAQUE) 300 mg/mL injection 2 mL, 2 mL, Epidural, Once, Bogdan  Anusha, DO    No Known Allergies    Physical Exam:   General: Awake, Alert, Oriented x 3, Mood and affect appropriate  Respiratory: Respirations even and unlabored  Cardiovascular: Peripheral pulses intact; no edema  Musculoskeletal Exam: Decreased range of motion lumbar spine    ASA Score: II    Patient/Chart Verification  Patient ID Verified: Verbal  ID Band Applied: No  Consents Confirmed: Procedural, To be obtained in the Pre-Procedure area  Interval H&P(within 24 hr) Complete (required for Outpatients and Surgery Admit only): To be obtained in the Pre-Procedure area  Allergies Reviewed: Yes  Anticoag/NSAID held?: NA  Currently on antibiotics?: No  Pregnancy denied?: NA    Assessment:   1. Lumbar disc disease with radiculopathy        Plan: LT L4 and L5 TFESI

## 2024-02-06 ENCOUNTER — TELEPHONE (OUTPATIENT)
Age: 64
End: 2024-02-06

## 2024-02-06 NOTE — TELEPHONE ENCOUNTER
S/w pt, pt asked about how long till she notices relief from TFESI on 2/5, nurse informed pt her pain may feel worse 2 days after procedure, that day 3-5 she should appreciate some relief and that it takes 2 weeks to appreciate full effect to be achieved.  Pt verbalized understanding and appreciative of call.

## 2024-02-06 NOTE — TELEPHONE ENCOUNTER
Caller: patient    Doctor: malka    Reason for call: would like to speak with RN, has some questions regarding yesterday procedure    Call back#:

## 2024-02-08 ENCOUNTER — SOCIAL WORK (OUTPATIENT)
Dept: BEHAVIORAL/MENTAL HEALTH CLINIC | Facility: CLINIC | Age: 64
End: 2024-02-08

## 2024-02-08 DIAGNOSIS — F43.22 ADJUSTMENT DISORDER WITH ANXIOUS MOOD: Primary | ICD-10-CM

## 2024-02-08 NOTE — PSYCH
"Behavioral Health Psychotherapy Progress Note    Psychotherapy Provided: Individual Psychotherapy     1. Adjustment disorder with anxious mood            Goals addressed in session: Goal 1     DATA:   During this session, this clinician used the following therapeutic modalities: Cognitive Behavioral Therapy and MBSR; patient is continuing to do well and is less confused, tearful, and forgetful; she attributes this the mindfulness learned in session and we practiced this again today.     Substance Abuse was not addressed during this session. If the client is diagnosed with a co-occurring substance use disorder, please indicate any changes in the frequency or amount of use: . Stage of change for addressing substance use diagnoses: No substance use/Not applicable    ASSESSMENT:  Lola Wade presents with a Euthymic/ normal mood.     her affect is Normal range and intensity, which is congruent, with her mood and the content of the session. The client has made progress on their goals.     Lola Wade presents with a none risk of suicide, none risk of self-harm, and none risk of harm to others.    For any risk assessment that surpasses a \"low\" rating, a safety plan must be developed.    A safety plan was indicated: no  If yes, describe in detail     PLAN: Between sessions, Lola Wade will consider any final tx concerns. At the next session, the therapist will use Cognitive Behavioral Therapy to address results to tx, write discharge note, and discuss next steps.     Behavioral Health Treatment Plan and Discharge Planning: Lola Wade is aware of and agrees to continue to work on their treatment plan. They have identified and are working toward their discharge goals. yes    Visit start and stop times:    02/08/24  Start Time: 1212  Stop Time: 1250  Total Visit Time: 38 minutes  "

## 2024-02-10 LAB
FERRITIN SERPL-MCNC: 151 NG/ML (ref 15–150)
IRON SATN MFR SERPL: 36 % (ref 15–55)
IRON SERPL-MCNC: 105 UG/DL (ref 27–139)
TIBC SERPL-MCNC: 290 UG/DL (ref 250–450)
UIBC SERPL-MCNC: 185 UG/DL (ref 118–369)

## 2024-02-12 ENCOUNTER — TELEPHONE (OUTPATIENT)
Dept: PAIN MEDICINE | Facility: CLINIC | Age: 64
End: 2024-02-12

## 2024-02-12 ENCOUNTER — TELEPHONE (OUTPATIENT)
Age: 64
End: 2024-02-12

## 2024-02-12 NOTE — TELEPHONE ENCOUNTER
Caller: Patient    Doctor/Office: [facility]     Call regarding :  Missed call      Call was transferred to: [facility]

## 2024-02-12 NOTE — TELEPHONE ENCOUNTER
Pt called in with % improvement and pain level /10.    Sciatica is 2/10    Back is achy and stiff were the dr did the injection.  Pt stated not the injection site but the general area  Pt is using a heating pad for the back pain and she is using different OTC (creams, sprays on her butt cheek are)

## 2024-02-17 LAB
25(OH)D3+25(OH)D2 SERPL-MCNC: 56.2 NG/ML (ref 30–100)
PTH-INTACT SERPL-MCNC: 20 PG/ML (ref 15–65)
VIT A SERPL-MCNC: 61.1 UG/DL (ref 22–69.5)
VIT B1 BLD-SCNC: 97.9 NMOL/L (ref 66.5–200)
ZINC SERPL-MCNC: 92 UG/DL (ref 44–115)

## 2024-02-19 ENCOUNTER — TELEPHONE (OUTPATIENT)
Dept: BARIATRICS | Facility: CLINIC | Age: 64
End: 2024-02-19

## 2024-02-19 NOTE — TELEPHONE ENCOUNTER
----- Message from JANIE Valentine sent at 2/19/2024  9:16 AM EST -----  Please call pt to let her know her labs are all within limits. Keep up the great work!     Bryan

## 2024-02-22 ENCOUNTER — ANNUAL EXAM (OUTPATIENT)
Dept: GYNECOLOGY | Facility: CLINIC | Age: 64
End: 2024-02-22
Payer: COMMERCIAL

## 2024-02-22 ENCOUNTER — OFFICE VISIT (OUTPATIENT)
Dept: PAIN MEDICINE | Facility: CLINIC | Age: 64
End: 2024-02-22
Payer: COMMERCIAL

## 2024-02-22 VITALS
BODY MASS INDEX: 20.49 KG/M2 | SYSTOLIC BLOOD PRESSURE: 112 MMHG | DIASTOLIC BLOOD PRESSURE: 70 MMHG | WEIGHT: 120 LBS | HEIGHT: 64 IN

## 2024-02-22 VITALS
SYSTOLIC BLOOD PRESSURE: 118 MMHG | DIASTOLIC BLOOD PRESSURE: 78 MMHG | BODY MASS INDEX: 20.49 KG/M2 | HEIGHT: 64 IN | HEART RATE: 64 BPM | WEIGHT: 120 LBS | TEMPERATURE: 97.2 F

## 2024-02-22 DIAGNOSIS — E03.9 HYPOTHYROIDISM (ACQUIRED): ICD-10-CM

## 2024-02-22 DIAGNOSIS — Z90.710 STATUS POST HYSTERECTOMY WITH OOPHORECTOMY: ICD-10-CM

## 2024-02-22 DIAGNOSIS — N95.1 MENOPAUSAL SYNDROME (HOT FLASHES): ICD-10-CM

## 2024-02-22 DIAGNOSIS — M51.26 LUMBAR DISC HERNIATION: ICD-10-CM

## 2024-02-22 DIAGNOSIS — M54.16 LUMBAR RADICULOPATHY: Primary | ICD-10-CM

## 2024-02-22 DIAGNOSIS — F32.A ANXIETY AND DEPRESSION: ICD-10-CM

## 2024-02-22 DIAGNOSIS — F41.9 ANXIETY AND DEPRESSION: ICD-10-CM

## 2024-02-22 DIAGNOSIS — Z90.721 STATUS POST HYSTERECTOMY WITH OOPHORECTOMY: ICD-10-CM

## 2024-02-22 DIAGNOSIS — N95.1 SYMPTOMATIC MENOPAUSAL OR FEMALE CLIMACTERIC STATES: ICD-10-CM

## 2024-02-22 DIAGNOSIS — M48.061 LUMBAR FORAMINAL STENOSIS: ICD-10-CM

## 2024-02-22 DIAGNOSIS — Z01.419 WOMEN'S ANNUAL ROUTINE GYNECOLOGICAL EXAMINATION: ICD-10-CM

## 2024-02-22 DIAGNOSIS — Z12.31 ENCOUNTER FOR SCREENING MAMMOGRAM FOR BREAST CANCER: ICD-10-CM

## 2024-02-22 PROCEDURE — 99214 OFFICE O/P EST MOD 30 MIN: CPT | Performed by: PHYSICIAN ASSISTANT

## 2024-02-22 PROCEDURE — G0101 CA SCREEN;PELVIC/BREAST EXAM: HCPCS | Performed by: OBSTETRICS & GYNECOLOGY

## 2024-02-22 RX ORDER — ACETAMINOPHEN AND CODEINE PHOSPHATE 120; 12 MG/5ML; MG/5ML
1 SOLUTION ORAL DAILY
Qty: 120 TABLET | Refills: 1 | Status: SHIPPED | OUTPATIENT
Start: 2024-02-22 | End: 2024-06-21

## 2024-02-22 RX ORDER — ACETAMINOPHEN AND CODEINE PHOSPHATE 120; 12 MG/5ML; MG/5ML
1 SOLUTION ORAL DAILY
Qty: 90 TABLET | Refills: 1 | Status: SHIPPED | OUTPATIENT
Start: 2024-02-22 | End: 2024-08-20

## 2024-02-22 NOTE — PROGRESS NOTES
Assessment:  1. Lumbar radiculopathy    2. Lumbar foraminal stenosis    3. Lumbar disc herniation        Plan:  While the patient was in the office today, I did have a thorough conversation regarding their chronic pain syndrome, medication management, and treatment plan options.    The patient is able to report 65% reduction in pain following the left L4 and L5 transforaminal epidural steroid injection that was performed on 2/5/24.  She was made aware that injections can be repeated if needed in the future.    In the future she could consider transitioning from gabapentin over to Lyrica.  I told her this would need to be discussed with her PCP who is the prescriber.    I have recommended that she proceed with the physical therapy evaluation as ordered previously.    I will have the patient follow-up in 4 weeks for reevaluation to see if there is a need for a second injection.    Patient is not interested in a surgical consultation.    The patient was advised to contact the office should their symptoms worsen in the interim. The patient was agreeable and verbalized an understanding.        History of Present Illness:    The patient is a 63 y.o. female last seen on 2/5/2024 who presents for a follow up office visit in regards to low back pain secondary to lumbar disc herniation, lumbar foraminal stenosis with radiculopathy.  The patient currently reports left-sided low back pain with radiation down the left lower extremity that she presently rates a 5 out of 10 and describes it as a constant sharp and shooting pain with intermittent numbness.  On 2/5/2024 she underwent a left L4-L5 transforaminal epidural steroid injection and reports 65% relief that is still ongoing.  She feels that the residual pain is something she is able to manage at this point in time.  She is inquiring about different medication options however she is already on gabapentin, tizanidine and tramadol as per her PCP    I have personally reviewed  and/or updated the patient's past medical history, past surgical history, family history, social history, current medications, allergies, and vital signs today.       Review of Systems:    Review of Systems   Respiratory:  Negative for shortness of breath.    Cardiovascular:  Negative for chest pain.   Gastrointestinal:  Negative for constipation, diarrhea, nausea and vomiting.   Musculoskeletal:  Negative for arthralgias, gait problem, joint swelling (Joint stiffness) and myalgias.   Skin:  Negative for rash.   Neurological:  Negative for dizziness, seizures and weakness.   All other systems reviewed and are negative.        Past Medical History:   Diagnosis Date   • Allergic 2002   • Anxiety    • Arthritis    • Bariatric surgery status    • Chronic pain disorder    • Depression     last assessed: 2018    • Disturbance of memory     last assessed: 2016    • Endometriosis    • GERD (gastroesophageal reflux disease)    • Hiatal hernia    • Hirsutism    • History of sleeve gastrectomy    • Lumbar herniated disc    • Menometrorrhagia    • Morbid obesity (HCC)     last assessed: Dec 5, 2014    • Motion sickness    • Postgastrectomy malabsorption    • Sclerosing adenosis of breast    • Symptomatic menopausal or female climacteric states    • Uterine leiomyoma        Past Surgical History:   Procedure Laterality Date   • APPENDECTOMY     • BREAST EXCISIONAL BIOPSY Left     Benign   • COLONOSCOPY      complete - 7 year repeat recommended - Resolved:     • DILATION AND CURETTAGE OF UTERUS     • FRACTURE SURGERY     • GASTRECTOMY SLEEVE LAPAROSCOPIC  2018   • GYNECOLOGIC CRYOSURGERY      Cervix - 's for abnormal paps    • HYSTERECTOMY  2006   • INDUCED       x3   • LAPAROSCOPIC SALPINGOOPHERECTOMY Right    • LAPAROSCOPIC TOTAL HYSTERECTOMY      06, Laparoscopic hysterectomy with LSO with vaginal closure of the vaginal cuff    • SC REVJ GSTR/JJ ANAST W/RCNSTJ W/O VGTMY N/A  2021    Procedure: LAPAROSCOPIC REVISION CONVERSION  JENN-EN-Y GASTRIC BYPASS WITH ROBOTICS AND INTRAOPERATIVE EGD, PARAESOPHAGEAL HERNIA REPAIR;  Surgeon: Sheldon Núñez MD;  Location: [facility];  Service: Bariatrics   • SALPINGOOPHORECTOMY Left 2006    Right ovary removed at age 15 and the left one was removed with uterus.   • STOMACH SURGERY  2014    for morbid obesity - Managed by: Sheldon Hendricks -    • TONSILLECTOMY AND ADENOIDECTOMY     • TOOTH EXTRACTION         Family History   Problem Relation Age of Onset   • Hypertension Mother    • Alzheimer's disease Mother    • Stroke Mother         several mini strokes   • Dementia Mother         Alzheimers   • Thyroid disease Mother    • Arthritis Mother    • Hyperlipidemia Father    • Diabetes Father         mellitus    • Prostate cancer Father 70   • Rectal cancer Father         80's   • Lung cancer Father 90   • Liver cancer Father 90   • Heart attack Father    • Colon cancer Father         80's   • Cancer Father         Rectal, metastisized   • Hearing loss Father    • Skin cancer Father         Multiple times-unknown age   • Hypertension Sister    • Colon polyps Sister    • No Known Problems Daughter    • Breast cancer Maternal Grandmother         Unknown age   • No Known Problems Maternal Grandfather    • No Known Problems Paternal Grandmother    • No Known Problems Paternal Grandfather    • Hyperlipidemia Brother         no more       Social History     Occupational History   • Not on file   Tobacco Use   • Smoking status: Former     Current packs/day: 0.00     Average packs/day: 0.5 packs/day for 17.8 years (8.9 ttl pk-yrs)     Types: Cigarettes     Start date: 3/4/1971     Quit date: 1989     Years since quittin.1   • Smokeless tobacco: Never   • Tobacco comments:     33 years smoke free!   Vaping Use   • Vaping status: Some Days   • Substances: THC   Substance and Sexual Activity   • Alcohol use: Not Currently     Comment: occasional    • Drug use: Yes     Types: Marijuana     Comment: medical   • Sexual activity: Not Currently     Partners: Male     Birth control/protection: Post-menopausal, Surgical, Female Sterilization, None         Current Outpatient Medications:   •  BIOTIN PO, Take by mouth Two tablets once day, Disp: , Rfl:   •  Calcium 250 MG CAPS, Take 500 mg by mouth daily, Disp: , Rfl:   •  Cholecalciferol (VITAMIN D3) 2000 units TABS, Take 2 tablets by mouth daily, Disp: , Rfl:   •  famotidine (PEPCID) 20 mg tablet, Take 20 mg by mouth 2 (two) times a day as needed for heartburn, Disp: , Rfl:   •  gabapentin (NEURONTIN) 400 mg capsule, Take 1 capsule (400 mg total) by mouth 3 (three) times a day, Disp: 270 capsule, Rfl: 1  •  hydrOXYzine HCL (ATARAX) 10 mg tablet, Take 1 tablet (10 mg total) by mouth every 8 (eight) hours as needed for anxiety, Disp: 30 tablet, Rfl: 0  •  levothyroxine (Synthroid) 150 mcg tablet, TAKE 1 TABLET BY MOUTH 6 DAYS OF THE WEEK THEN 1 & 1/2 TABS ON SUNDAY, Disp: 96 tablet, Rfl: 1  •  Magnesium 200 MG TABS, Take 2 tablets by mouth daily, Disp: , Rfl:   •  Multiple Vitamins-Minerals (Bariatric Multivitamins/Iron) CAPS, Take 2 capsules by mouth in the morning, Disp: , Rfl:   •  omeprazole (PriLOSEC) 20 mg delayed release capsule, Take 1 capsule (20 mg total) by mouth 2 (two) times a day, Disp: 180 capsule, Rfl: 0  •  sucralfate (CARAFATE) 1 g/10 mL suspension, Take 10 mL (1 g total) by mouth 4 (four) times a day, Disp: 3600 mL, Rfl: 0  •  tiZANidine (ZANAFLEX) 4 mg tablet, TAKE 1 TABLET BY MOUTH EVERY 8 HOURS IF NEEDED FOR MUSCLE SPASM OR PAIN, Disp: 60 tablet, Rfl: 1  •  traMADol-acetaminophen (ULTRACET) 37.5-325 mg per tablet, Take 1 tablet by mouth every 6 (six) hours as needed for moderate pain, Disp: 120 tablet, Rfl: 0  •  venlafaxine (EFFEXOR) 100 MG tablet, TAKE 2 TABLETS BY MOUTH EVERY MORNING AND 1 TABLET EVERY EVENING, Disp: 90 tablet, Rfl: 1    No Known Allergies    Physical Exam:    /78 (BP  "Location: Left arm, Patient Position: Sitting, Cuff Size: Standard)   Pulse 64   Temp (!) 97.2 °F (36.2 °C)   Ht 5' 3.5\" (1.613 m)   Wt 54.4 kg (120 lb)   BMI 20.92 kg/m²     Constitutional:normal, well developed, well nourished, alert, in no distress and non-toxic and no overt pain behavior.  Eyes:anicteric  HEENT:grossly intact  Neck:supple, symmetric, trachea midline and no masses   Pulmonary:even and unlabored  Cardiovascular:No edema or pitting edema present  Skin:Normal without rashes or lesions and well hydrated  Psychiatric:Mood and affect appropriate  Neurologic:Cranial Nerves II-XII grossly intact  Musculoskeletal: Gait is antalgic, favoring the right side      Imaging  No orders to display         No orders of the defined types were placed in this encounter.      "

## 2024-02-22 NOTE — PROGRESS NOTES
"Assessment   Annual well woman exam  Status post laparoscopic hysterectomy with oophorectomy  History of torsion with salpingo-oophorectomy  Menopausal syndrome hot flashes and night sweats  Anxiety and depression  History of Lissette-en-Y gastric bypass  Lumbar radiculopathy          Plan   Screening mammogram ordered  New start norethindrone 0.35 mg daily  Follow-up in 6 weeks and.  All questions answered   All questions answered.  Breast self exam technique reviewed and patient encouraged to perform self-exam monthly.  Diagnosis explained in detail, including differential.  Discussed healthy lifestyle modifications.     Subjective no acute distress     Lola Wade is a 63 y.o. female who presents for annual exam.  Symptomatic menopausal state with hot flashes and worsening night sweats.  History of anxiety and depression status post Lissette-en-Y gastric bypass.  Status post abdominal hysterectomy with bilateral salpingo-oophorectomy.  New start norethindrone follow-up in 6 weeks time.  All questions answered. The patient reports that there is not domestic violence in her life.     Current contraception: status post hysterectomy  History of abnormal Pap smear: no  Family history of uterine or ovarian cancer: no  Regular self breast exam: yes  History of abnormal mammogram: no  Family history of breast cancer: no  History of abnormal lipids: no  Menstrual History:  OB History          8    Para   1    Term   1            AB   4    Living             SAB        IAB        Ectopic        Multiple        Live Births   1                Menarche age: 10  No LMP recorded. Patient has had a hysterectomy.     Review of Systems  Pertinent items are noted in HPI.      Objective no acute distress   /70 (BP Location: Left arm, Patient Position: Sitting, Cuff Size: Standard)   Ht 5' 3.5\" (1.613 m)   Wt 54.4 kg (120 lb)   BMI 20.92 kg/m²     General:   alert and oriented, in no acute distress, alert, appears " stated age, and cooperative   Heart: regular rate and rhythm, S1, S2 normal, no murmur, click, rub or gallop   Lungs: clear to auscultation bilaterally   Abdomen: soft, non-tender, without masses or organomegaly   Vulva: normal, Bartholin's, Urethra, [place]'s normal, female escutcheon   Vagina: normal mucosa, normal discharge, no palpable nodules   Cervix: surgically absent   Uterus: surgically absent   Adnexa: normal adnexa   Bilateral breast exam in the sitting and supine position with chaperone present, no visible or palpable breast lesions identified.  No breast masses noted.    No supraclavicular or axillary lymphadenopathy noted.  No nipple discharge.   Reviewed self-breast exam techniques.   Rectal exam,  deferred

## 2024-02-23 ENCOUNTER — TELEPHONE (OUTPATIENT)
Age: 64
End: 2024-02-23

## 2024-02-23 NOTE — TELEPHONE ENCOUNTER
Lola called to inquire about the rx she was prescribed yesterday. She was going to get a paper rx because system was down. Per review of chart, norethindrone Rx successfully transmitted at 4:18 pm. Pharmacy receipt confirmation in chart.  Lola will f/u with pharmacy and call back if needs further assistance

## 2024-02-24 DIAGNOSIS — M51.36 DISC DEGENERATION, LUMBAR: ICD-10-CM

## 2024-02-26 ENCOUNTER — SOCIAL WORK (OUTPATIENT)
Dept: BEHAVIORAL/MENTAL HEALTH CLINIC | Facility: CLINIC | Age: 64
End: 2024-02-26
Payer: COMMERCIAL

## 2024-02-26 DIAGNOSIS — F43.22 ADJUSTMENT DISORDER WITH ANXIOUS MOOD: Primary | ICD-10-CM

## 2024-02-26 PROCEDURE — 90832 PSYTX W PT 30 MINUTES: CPT | Performed by: SOCIAL WORKER

## 2024-02-26 NOTE — PSYCH
Psychotherapy Discharge Summary    Preferred Name: Lola Wade  YOB: 1960    Admission date to psychotherapy:     Referred by: Dr. Lord     Presenting Problem: Significant distress following divorce/moveout into new residence     Course of treatment included : individual therapy     Progress/Outcome of Treatment Goals (brief summary of course of treatment) Provided supportive treatment while helping client identify practical steps and engage in self-compassion.     Treatment Complications (if any): Client was late to several sessions due to being confused/lost as a component of clinical presentation; this resolved by mid-course of treatment.     Treatment Progress: good    Current [facility] Provider:     Discharge Medications include:     Discharge Date: 02/26/2024    Discharge Diagnosis:   1. Adjustment disorder with anxious mood            Criteria for Discharge: completed treatment goals and objectives and is no longer in need of services    Aftercare recommendations include (include specific referral names and phone numbers, if appropriate): I provided her with resources for self-help and growth including internal family systems; the body keeps score; and Codependent No More.     Prognosis: good    Visit start and stop times:    02/26/24  Start Time: 1105  Stop Time: 1130  Total Visit Time: 25 minutes

## 2024-03-01 DIAGNOSIS — F33.41 RECURRENT MAJOR DEPRESSIVE DISORDER, IN PARTIAL REMISSION (HCC): ICD-10-CM

## 2024-03-01 DIAGNOSIS — F41.9 ANXIETY: ICD-10-CM

## 2024-03-01 RX ORDER — VENLAFAXINE 100 MG/1
TABLET ORAL
Qty: 90 TABLET | Refills: 1 | Status: SHIPPED | OUTPATIENT
Start: 2024-03-01

## 2024-03-01 NOTE — TELEPHONE ENCOUNTER
Called asking to speak with Ayde regarding a medication.  Did not want me to place it as there is more involved.  Please call her.  Thinking she wanted Romero, but just realized she meant Ayde.

## 2024-03-04 ENCOUNTER — TELEPHONE (OUTPATIENT)
Dept: FAMILY MEDICINE CLINIC | Facility: HOSPITAL | Age: 64
End: 2024-03-04

## 2024-03-05 NOTE — TELEPHONE ENCOUNTER
If pain mgt wants her to try Lyrica then they can wean her off Gabapentin and prescribe it. If she wishes me to then I need an appt with her.

## 2024-03-14 ENCOUNTER — TELEPHONE (OUTPATIENT)
Age: 64
End: 2024-03-14

## 2024-03-14 ENCOUNTER — OFFICE VISIT (OUTPATIENT)
Dept: BARIATRICS | Facility: CLINIC | Age: 64
End: 2024-03-14
Payer: COMMERCIAL

## 2024-03-14 VITALS
WEIGHT: 121 LBS | SYSTOLIC BLOOD PRESSURE: 108 MMHG | DIASTOLIC BLOOD PRESSURE: 70 MMHG | HEIGHT: 64 IN | HEART RATE: 72 BPM | BODY MASS INDEX: 20.66 KG/M2

## 2024-03-14 DIAGNOSIS — Z48.815 ENCOUNTER FOR SURGICAL AFTERCARE FOLLOWING SURGERY OF DIGESTIVE SYSTEM: Primary | ICD-10-CM

## 2024-03-14 DIAGNOSIS — Z98.84 BARIATRIC SURGERY STATUS: ICD-10-CM

## 2024-03-14 DIAGNOSIS — R10.9 ABDOMINAL PAIN: ICD-10-CM

## 2024-03-14 PROCEDURE — 99213 OFFICE O/P EST LOW 20 MIN: CPT | Performed by: NURSE PRACTITIONER

## 2024-03-14 NOTE — PATIENT INSTRUCTIONS
- stop sucralfate; taper omeprazole to once daily for the next 3 months  - after 3 months, switch to pepcid twice a day.   - continue with pepcid as how you were taking before.   - Follow up as scheduled for your annual.   - come in sooner if having concerns/issues.

## 2024-03-14 NOTE — PROGRESS NOTES
"Assessment/Plan:    No problem-specific Assessment & Plan notes found for this encounter.       Diagnoses and all orders for this visit:    Encounter for surgical aftercare following surgery of digestive system    Bariatric surgery status    Abdominal pain     - stop sucralfate; taper omeprazole to once daily for the next 3 months  - after 3 months, switch to pepcid twice a day.   - continue with pepcid as how was taking before.   - Follow up as scheduled for annual.   - come in sooner if having concerns/issues.        Subjective:      Patient ID: Lola Wade is a 64 y.o. female.    HPI  S/p conversion to RNYGB with Dr. Jeff Núñez on 09/28/2021  here for abdominal pain follow up. Started on omeprazole twice daily, carafate 4 times per day and since initiation, pain has resolved. Tolerating a regular diet. Bowel movements has been regular. No other GI concerns.       The following portions of the patient's history were reviewed and updated as appropriate: allergies, current medications, past family history, past medical history, past social history, past surgical history, and problem list.    Review of Systems   Constitutional: Negative.    Cardiovascular: Negative.    Gastrointestinal: Negative.          Objective:      /70 (BP Location: Left arm, Patient Position: Sitting, Cuff Size: Large)   Pulse 72   Ht 5' 4\" (1.626 m)   Wt 54.9 kg (121 lb)   BMI 20.77 kg/m²          Physical Exam  Vitals and nursing note reviewed.   Constitutional:       Appearance: Normal appearance. She is normal weight.   Cardiovascular:      Rate and Rhythm: Normal rate and regular rhythm.      Pulses: Normal pulses.      Heart sounds: Normal heart sounds.   Pulmonary:      Effort: Pulmonary effort is normal.      Breath sounds: Normal breath sounds.   Abdominal:      General: Bowel sounds are normal.      Palpations: Abdomen is soft.      Tenderness: There is no abdominal tenderness.   Musculoskeletal:         General: Normal range " of motion.   Skin:     General: Skin is warm and dry.   Neurological:      General: No focal deficit present.      Mental Status: She is alert and oriented to person, place, and time.   Psychiatric:         Mood and Affect: Mood normal.         Behavior: Behavior normal.         Thought Content: Thought content normal.         Judgment: Judgment normal.

## 2024-03-14 NOTE — TELEPHONE ENCOUNTER
Pt called in regards to today's [facility] appt. Pt stated, provider would write down some notes for the pt, but pt does not have the notes and the after summary visit either. Pt needs to know instructions to f/u. Pt was told will receive a call from the office.

## 2024-03-21 ENCOUNTER — OFFICE VISIT (OUTPATIENT)
Dept: PAIN MEDICINE | Facility: CLINIC | Age: 64
End: 2024-03-21
Payer: COMMERCIAL

## 2024-03-21 VITALS
HEART RATE: 58 BPM | BODY MASS INDEX: 20.49 KG/M2 | WEIGHT: 120 LBS | HEIGHT: 64 IN | SYSTOLIC BLOOD PRESSURE: 114 MMHG | DIASTOLIC BLOOD PRESSURE: 70 MMHG | TEMPERATURE: 97.6 F

## 2024-03-21 DIAGNOSIS — M51.26 LUMBAR DISC HERNIATION: ICD-10-CM

## 2024-03-21 DIAGNOSIS — M47.816 LUMBAR SPONDYLOSIS: ICD-10-CM

## 2024-03-21 DIAGNOSIS — M54.16 LUMBAR RADICULOPATHY: Primary | ICD-10-CM

## 2024-03-21 PROCEDURE — G2211 COMPLEX E/M VISIT ADD ON: HCPCS | Performed by: PHYSICIAN ASSISTANT

## 2024-03-21 PROCEDURE — 99214 OFFICE O/P EST MOD 30 MIN: CPT | Performed by: PHYSICIAN ASSISTANT

## 2024-03-21 RX ORDER — PREGABALIN 75 MG/1
75 CAPSULE ORAL 3 TIMES DAILY
Qty: 90 CAPSULE | Refills: 2 | Status: SHIPPED | OUTPATIENT
Start: 2024-03-21

## 2024-03-21 NOTE — PROGRESS NOTES
Assessment:  1. Lumbar radiculopathy    2. Lumbar disc herniation    3. Lumbar spondylosis        Plan:  While the patient was in the office today, I did have a thorough conversation regarding their chronic pain syndrome, medication management, and treatment plan options.    The patient underwent a left L4 and L5 transforaminal epidural steroid injection on 2/5/2024 and is now able to report greater than 50% reduction in pain.  I did make her aware that injections can be repeated however she feels that her pain level at this point in time does not warrant repeating the injection.    In the meantime, the patient is not noting any improvement/benefit with the gabapentin.  I have provided her with instructions on how to wean off of the medication safely and slowly at which point we will start pregabalin 75 mg slowly titrating up to 3 times daily as long as she is able to tolerate it without side effects.  I discussed with the patient the type of medication it is, how it works, and that it requires a titration process that is specific to each individual. I reviewed with the patient that it may take 3-4 weeks for the medication's effects to be noticed and that it should never be abruptly stopped. Possible side effects include but are not limited to; vertigo, lethargy, nausea, and edema of the extremities. Advised the patient to call our office if they experience any side effects. The patient verbalized an understanding.    We discussed the importance of a lifelong commitment to daily exercises geared toward lumbar core stretching and strengthening. The patient was agreeable and verbalized an understanding.    The patient will follow-up in 8 weeks for medication prescription refill and reevaluation. The patient was advised to contact the office should their symptoms worsen in the interim. The patient was agreeable and verbalized an understanding.        History of Present Illness:    The patient is a 64 y.o. female last  seen on 2/22/2024 who presents for a follow up office visit in regards to chronic low back pain secondary to multilevel lumbar degenerative disc disease with a left L4-5 protrusion causing encroachment of the left L5 nerve root.  The patient currently reports moderate improvement of the low back and radicular symptoms following the left L4 and L5 transforaminal epidural steroid injection that was completed on 2/5/2024.  Initially she did not report any relief however once she gave it a bit more time, she did start to note improvement.  Her current pain level is a 2 out of 10 and she describes it as a constant dull, aching, cramping type of pain.  Pain is made worse with excessive bending and lifting.  Now that her pain is somewhat manageable, she would like to resume gym exercises carefully.  She also notes no improvement with the gabapentin and is wondering if there are any other medication options.    I have personally reviewed and/or updated the patient's past medical history, past surgical history, family history, social history, current medications, allergies, and vital signs today.       Review of Systems:    Review of Systems   Respiratory:  Negative for shortness of breath.    Cardiovascular:  Negative for chest pain.   Gastrointestinal:  Negative for constipation, diarrhea, nausea and vomiting.   Musculoskeletal:  Negative for arthralgias, gait problem, joint swelling (Joint stiffness) and myalgias.   Skin:  Negative for rash.   Neurological:  Negative for dizziness, seizures and weakness.   All other systems reviewed and are negative.        Past Medical History:   Diagnosis Date   • Allergic 2002   • Anxiety    • Arthritis    • Bariatric surgery status    • Chronic pain disorder    • Depression     last assessed: Jan 18, 2018    • Disturbance of memory     last assessed: June 23, 2016    • Endometriosis    • GERD (gastroesophageal reflux disease)    • Hiatal hernia    • Hirsutism    • History of sleeve  gastrectomy    • Lumbar herniated disc    • Menometrorrhagia    • Morbid obesity (HCC)     last assessed: Dec 5, 2014    • Motion sickness    • Postgastrectomy malabsorption    • Sclerosing adenosis of breast    • Symptomatic menopausal or female climacteric states    • Uterine leiomyoma        Past Surgical History:   Procedure Laterality Date   • APPENDECTOMY     • BREAST EXCISIONAL BIOPSY Left     Benign   • COLONOSCOPY      complete - 7 year repeat recommended - Resolved:     • DILATION AND CURETTAGE OF UTERUS     • FRACTURE SURGERY     • GASTRECTOMY SLEEVE LAPAROSCOPIC  2018   • GYNECOLOGIC CRYOSURGERY      Cervix - s for abnormal paps    • HYSTERECTOMY  2006   • INDUCED       x3   • LAPAROSCOPIC SALPINGOOPHERECTOMY Right    • LAPAROSCOPIC TOTAL HYSTERECTOMY      06, Laparoscopic hysterectomy with LSO with vaginal closure of the vaginal cuff    • OH REVJ GSTR/JJ ANAST W/RCNSTJ W/O VGTMY N/A 2021    Procedure: LAPAROSCOPIC REVISION CONVERSION  JENN-EN-Y GASTRIC BYPASS WITH ROBOTICS AND INTRAOPERATIVE EGD, PARAESOPHAGEAL HERNIA REPAIR;  Surgeon: Sheldon Núñez MD;  Location: [facility];  Service: Bariatrics   • SALPINGOOPHORECTOMY Left     Right ovary removed at age 15 and the left one was removed with uterus.   • STOMACH SURGERY  2014    for morbid obesity - Managed by: Sheldon Hendricks -    • TONSILLECTOMY AND ADENOIDECTOMY     • TOOTH EXTRACTION         Family History   Problem Relation Age of Onset   • Hypertension Mother    • Alzheimer's disease Mother    • Stroke Mother         several mini strokes   • Dementia Mother         Alzheimers   • Thyroid disease Mother    • Arthritis Mother    • Hyperlipidemia Father    • Diabetes Father         mellitus    • Prostate cancer Father 70   • Rectal cancer Father         80's   • Lung cancer Father 90   • Liver cancer Father 90   • Heart attack Father    • Colon cancer Father         80's   • Cancer Father          Rectal, metastisized   • Hearing loss Father    • Skin cancer Father         Multiple times-unknown age   • Hypertension Sister    • Colon polyps Sister    • No Known Problems Daughter    • Breast cancer Maternal Grandmother         Unknown age   • No Known Problems Maternal Grandfather    • No Known Problems Paternal Grandmother    • No Known Problems Paternal Grandfather    • Hyperlipidemia Brother         no more       Social History     Occupational History   • Not on file   Tobacco Use   • Smoking status: Former     Current packs/day: 0.00     Average packs/day: 0.5 packs/day for 17.8 years (8.9 ttl pk-yrs)     Types: Cigarettes     Start date: 3/4/1971     Quit date: 1989     Years since quittin.2   • Smokeless tobacco: Never   • Tobacco comments:     33 years smoke free!   Vaping Use   • Vaping status: Some Days   • Substances: THC, CBD   Substance and Sexual Activity   • Alcohol use: Not Currently     Comment: occasional   • Drug use: Yes     Types: Marijuana     Comment: medical   • Sexual activity: Not Currently     Partners: Male     Birth control/protection: Post-menopausal, Surgical, Female Sterilization, None         Current Outpatient Medications:   •  BIOTIN PO, Take by mouth Two tablets once day, Disp: , Rfl:   •  Calcium 250 MG CAPS, Take 500 mg by mouth daily, Disp: , Rfl:   •  Cholecalciferol (VITAMIN D3) 2000 units TABS, Take 2 tablets by mouth daily, Disp: , Rfl:   •  hydrOXYzine HCL (ATARAX) 10 mg tablet, Take 1 tablet (10 mg total) by mouth every 8 (eight) hours as needed for anxiety, Disp: 30 tablet, Rfl: 0  •  levothyroxine (Synthroid) 150 mcg tablet, TAKE 1 TABLET BY MOUTH 6 DAYS OF THE WEEK THEN 1 & 1/2 TABS ON , Disp: 96 tablet, Rfl: 1  •  Magnesium 200 MG TABS, Take 2 tablets by mouth daily, Disp: , Rfl:   •  Multiple Vitamins-Minerals (Bariatric Multivitamins/Iron) CAPS, Take 2 capsules by mouth in the morning, Disp: , Rfl:   •  omeprazole (PriLOSEC) 20 mg delayed  "release capsule, Take 1 capsule (20 mg total) by mouth 2 (two) times a day, Disp: 180 capsule, Rfl: 0  •  pregabalin (LYRICA) 75 mg capsule, Take 1 capsule (75 mg total) by mouth 3 (three) times a day Take 1 cap QHS x 1 week, then 1 cap BID x 1 week, then 1 cap TID, Disp: 90 capsule, Rfl: 2  •  tiZANidine (ZANAFLEX) 4 mg tablet, TAKE 1 TABLET BY MOUTH EVERY 8 HOURS IF NEEDED FOR MUSCLE SPASM OR PAIN, Disp: 60 tablet, Rfl: 1  •  traMADol-acetaminophen (ULTRACET) 37.5-325 mg per tablet, TAKE 1 TABLET BY MOUTH EVERY 6 HOURS AS NEEDED FOR MODERATE PAIN, Disp: 28 tablet, Rfl: 4  •  venlafaxine (EFFEXOR) 100 MG tablet, TAKE 2 TABLETS BY MOUTH EVERY MORNING AND 1 TABLET EVERY EVENING, Disp: 90 tablet, Rfl: 1  •  famotidine (PEPCID) 20 mg tablet, Take 20 mg by mouth 2 (two) times a day as needed for heartburn (Patient not taking: Reported on 3/14/2024), Disp: , Rfl:   •  norethindrone (MICRONOR) 0.35 MG tablet, Take 1 tablet (0.35 mg total) by mouth daily (Patient not taking: Reported on 3/14/2024), Disp: 90 tablet, Rfl: 1  •  norethindrone (MICRONOR) 0.35 MG tablet, Take 1 tablet (0.35 mg total) by mouth daily (Patient not taking: Reported on 3/14/2024), Disp: 120 tablet, Rfl: 1    No Known Allergies    Physical Exam:    /70 (BP Location: Left arm, Patient Position: Sitting, Cuff Size: Standard)   Pulse 58   Temp 97.6 °F (36.4 °C)   Ht 5' 4\" (1.626 m)   Wt 54.4 kg (120 lb)   BMI 20.60 kg/m²     Constitutional:normal, well developed, well nourished, alert, in no distress and non-toxic and no overt pain behavior.  Eyes:anicteric  HEENT:grossly intact  Neck:supple, symmetric, trachea midline and no masses   Pulmonary:even and unlabored  Cardiovascular:No edema or pitting edema present  Skin:Normal without rashes or lesions and well hydrated  Psychiatric:Mood and affect appropriate  Neurologic:Cranial Nerves II-XII grossly intact  Musculoskeletal: Gait is stable, tender left lumbar paraspinal " muscles      Imaging  MRI LUMBAR SPINE WITHOUT CONTRAST     INDICATION: M54.16: Radiculopathy, lumbar region.     COMPARISON: MRI lumbar spine 10/15/2014. Lumbar radiographs 12/12/2023.     TECHNIQUE:  Multiplanar, multisequence imaging of the lumbar spine was performed. .        IMAGE QUALITY:  Diagnostic     FINDINGS:     VERTEBRAL BODIES:  There are 5 lumbar type vertebral bodies.  Normal alignment of the lumbar spine.  No spondylolysis or spondylolisthesis. No scoliosis.  No compression fracture.    Normal marrow signal is identified within the visualized bony   structures.  No discrete marrow lesion.     SACRUM:  Normal signal within the sacrum. No evidence of insufficiency or stress fracture.     DISTAL CORD AND CONUS:  Normal size and signal within the distal cord and conus. The conus terminates at the T12 level.  The cauda equina nerve roots appear within normal limits.     PARASPINAL SOFT TISSUES:  Paraspinal soft tissues are unremarkable.     LOWER THORACIC DISC SPACES: Mild disc bulges are noted at the T11-12 and T12-L1 levels.     LUMBAR DISC SPACES:     L1-2: Tiny left paracentral disc protrusion, better delineated on the current examination.  No central canal or  subarticular/lateral recess narrowing.  No neural foraminal stenosis.     L2-3: Stable mild diffuse disc bulge extending into the foraminal regions without significant central canal or neural foraminal narrowing.     L3-4: Stable mild disc bulge without significant central canal or neural foraminal narrowing.     L4-5: Stable diffuse disc bulge and superimposed small to moderate left paracentral disc protrusion. Mild central canal stenosis with severe left subarticular/lateral recess narrowing causing encroachment of the traversing left L5 nerve root.  No neural   foraminal stenosis.     L5-S1: No focal disc herniation, central canal stenosis, or neural foraminal narrowing.     OTHER FINDINGS:  None.     IMPRESSION:     Multilevel lumbar  degenerative disc disease as similar to 10/14/2014 with no new disc herniation.  Persistent small to moderate left paracentral disc protrusion at the L4-5 level with severe subarticular/lateral recess narrowing causing encroachment of the traversing left L5 nerve root.  No orders to display         No orders of the defined types were placed in this encounter.

## 2024-03-21 NOTE — PATIENT INSTRUCTIONS
"Consider \"Functional Capacity Evaluation\"    Lower Back Exercises   AMBULATORY CARE:   Lower back exercises  help heal and strengthen your back muscles to prevent another injury. Ask your healthcare provider if you need to see a physical therapist for more advanced exercises.  Seek care immediately if:   You have severe pain that prevents you from moving.       Call your doctor if:   Your pain becomes worse.    You have new pain.    You have questions or concerns about your condition or care.    Do lower back exercises safely:   Do the exercises on a mat or firm surface (not on a bed).  A firm surface will support your spine and prevent low back pain.    Move slowly and smoothly.  Avoid fast or jerky motions.    Breathe normally.  Do not hold your breath.    Stop if you feel pain.  It is normal to feel some discomfort at first, but you should not feel pain. Regular exercise will help decrease your discomfort over time.    Lower back exercises:  Your healthcare provider may recommend that you do back exercises 10 to 30 minutes each day. He or she may also recommend that you do exercises 1 to 3 times each day. Ask your provider which exercises are best for you and how often to do them.  Ankle pumps:  Lie on your back. Move your foot up (with your toes pointing toward your head). Then, move your foot down (with your toes pointing away from you). Repeat this exercise 10 times on each side.         Heel slides:  Lie on your back. Slowly bend one leg and then straighten it. Next, bend the other leg and then straighten it. Repeat 10 times on each side.         Pelvic tilt:  Lie on your back with your knees bent and feet flat on the floor. Place your arms in a relaxed position beside your body. Tighten the muscles of your abdomen and flatten your back against the floor. Hold for 5 seconds. Repeat 5 times.         Back stretch:  Lie on your back with your hands behind your head. Bend your knees and turn the lower half of " your body to one side. Hold this position for 10 seconds. Repeat 3 times on each side.         Straight leg raises:  Lie on your back with one leg straight. Bend the other knee. Tighten your abdomen and then slowly lift the straight leg up about 6 to 12 inches off the floor. Hold for 1 to 5 seconds. Lower your leg slowly. Repeat 10 times on each leg.         Knee-to-chest:  Lie on your back with your knees bent and feet flat on the floor. Pull one of your knees toward your chest and hold it there for 5 seconds. Return your leg to the starting position. Lift the other knee toward your chest and hold for 5 seconds. Do this 5 times on each side.         Cat and camel:  Place your hands and knees on the floor. Arch your back upward toward the ceiling and lower your head. Round out your spine as much as you can. Hold for 5 seconds. Lift your head upward and push your chest downward toward the floor. Hold for 5 seconds. Do 3 sets or as directed.         Wall squats:  Stand with your back against a wall. Tighten the muscles of your abdomen. Slowly lower your body until your knees are bent at a 45 degree angle. Hold this position for 5 seconds. Slowly move back up to a standing position. Repeat 10 times.         Curl up:  Lie on your back with your knees bent and feet flat on the floor. Place your hands, palms down, underneath the curve in your lower back. Next, with your elbows on the floor, lift your shoulders and chest 2 to 3 inches. Keep your head in line with your shoulders. Hold this position for 5 seconds. When you can do this exercise without pain for 10 to 15 seconds, you may add a rotation. While your shoulders and chest are lifted off the ground, turn slightly to the left and hold. Repeat on the other side.         Bird dog:  Place your hands and knees on the floor. Keep your wrists directly below your shoulders and your knees directly below your hips. Pull your belly button in toward your spine. Do not flatten  or arch your back. Tighten your abdominal muscles. Raise one arm straight out so that it is aligned with your head. Next, raise the leg opposite your arm. Hold this position for 15 seconds. Lower your arm and leg slowly and change sides. Do 5 sets.       Follow up with your doctor as directed:  Write down your questions so you remember to ask them during your visits.  © Copyright [employer] 2023 Information is for End User's use only and may not be sold, redistributed or otherwise used for commercial purposes.  The above information is an  only. It is not intended as medical advice for individual conditions or treatments. Talk to your doctor, nurse or pharmacist before following any medical regimen to see if it is safe and effective for you.

## 2024-03-29 ENCOUNTER — TELEPHONE (OUTPATIENT)
Age: 64
End: 2024-03-29

## 2024-03-29 NOTE — TELEPHONE ENCOUNTER
Pt of [place] calling to report she has been having night sweats and was wondering if it was due to her B12 medication. Reviewed per the post op powerpoint that she should be getting 350-500mcg of B12 per day. Pt states she was not getting enough of this and is going to add more B12 to be within these limits. No other questions at this time. Pt thankful for the information.

## 2024-04-04 ENCOUNTER — OFFICE VISIT (OUTPATIENT)
Dept: GYNECOLOGY | Facility: CLINIC | Age: 64
End: 2024-04-04
Payer: COMMERCIAL

## 2024-04-04 VITALS
HEIGHT: 64 IN | DIASTOLIC BLOOD PRESSURE: 70 MMHG | BODY MASS INDEX: 21.1 KG/M2 | SYSTOLIC BLOOD PRESSURE: 114 MMHG | WEIGHT: 123.6 LBS

## 2024-04-04 DIAGNOSIS — N95.1 MENOPAUSAL SYNDROME (HOT FLASHES): Primary | ICD-10-CM

## 2024-04-04 DIAGNOSIS — Z90.710 STATUS POST HYSTERECTOMY WITH OOPHORECTOMY: ICD-10-CM

## 2024-04-04 DIAGNOSIS — Z98.84 HISTORY OF ROUX-EN-Y GASTRIC BYPASS: ICD-10-CM

## 2024-04-04 DIAGNOSIS — Z90.721 STATUS POST HYSTERECTOMY WITH OOPHORECTOMY: ICD-10-CM

## 2024-04-04 DIAGNOSIS — M54.40 BILATERAL LOW BACK PAIN WITH SCIATICA, SCIATICA LATERALITY UNSPECIFIED, UNSPECIFIED CHRONICITY: ICD-10-CM

## 2024-04-04 DIAGNOSIS — M51.16 LUMBAR DISC DISEASE WITH RADICULOPATHY: ICD-10-CM

## 2024-04-04 PROCEDURE — 99213 OFFICE O/P EST LOW 20 MIN: CPT | Performed by: OBSTETRICS & GYNECOLOGY

## 2024-04-04 NOTE — PROGRESS NOTES
"Assessment/Plan:    Diagnoses and all orders for this visit:    Menopausal syndrome (hot flashes)    Status post hysterectomy with oophorectomy    History of Lissette-en-Y gastric bypass    Lumbar disc disease with radiculopathy    Bilateral low back pain with sciatica, sciatica laterality unspecified, unspecified chronicity        Subjective: Here for med check     Patient ID: Lola Wade is a 64 y.o. female.    HPI  64-year-old female status post laparoscopic hysterectomy with bilateral salpingo-oophorectomy for pelvic pain syndrome several years ago.  Patiently recently here for annual exam complaining of hot flashes and night sweats.  She is also had a Lissette-en-Y gastric bypass has lumbar radiculopathy with ongoing chronic low back pain she uses Ultracet twice daily Zanaflex and Lyrica daily.  Recent lumbar epidural to help relieve her pain symptoms.  We started her on low-dose norethindrone 0.35 once daily past 6 weeks.  She has not noted any improvement with her hot flashes and night sweats which mainly occur early in the morning for her.  She is also on Effexor has been on for many years for anxiety and depression.  She is recently  a year ago does not have a current partner, considering dating?  She would like to continue the norethindrone for now.  I did offer her to try Prempro lo she was not inclined to do that at this time.  Rather wait and see.  Recommend follow-up in the next 2 to 3 months and as needed continue norethindrone 0.35 mg for now.    Review of Systems unchanged    Objective: No acute distress  /70 (BP Location: Left arm, Patient Position: Sitting, Cuff Size: Standard)   Ht 5' 4\" (1.626 m)   Wt 56.1 kg (123 lb 9.6 oz)   BMI 21.22 kg/m²      Physical Exam  Vitals and nursing note reviewed.   Constitutional:       Appearance: Normal appearance.   HENT:      Head: Normocephalic.   Eyes:      Pupils: Pupils are equal, round, and reactive to light.   Pulmonary:      Effort: Pulmonary " effort is normal.   Genitourinary:     Comments:  Pelvic exam deferred  Musculoskeletal:         General: Normal range of motion.      Cervical back: Normal range of motion.   Skin:     General: Skin is warm and dry.   Neurological:      Mental Status: She is alert and oriented to person, place, and time.   Psychiatric:         Mood and Affect: Mood normal.         Behavior: Behavior normal.         Thought Content: Thought content normal.         Judgment: Judgment normal.        Please note    In addition to the time spent discussing the findings and results of today's visit and exam, I spent approximately 20 minutes of face-to-face time with the patient, greater than 50% of which was spent in counseling and coordination of care for this patient.

## 2024-04-05 DIAGNOSIS — E83.52 HYPERCALCEMIA: Primary | ICD-10-CM

## 2024-04-05 LAB
ALBUMIN SERPL-MCNC: 4.6 G/DL (ref 3.9–4.9)
ALBUMIN/GLOB SERPL: 2 {RATIO} (ref 1.2–2.2)
ALP SERPL-CCNC: 69 IU/L (ref 44–121)
ALT SERPL-CCNC: 25 IU/L (ref 0–32)
AST SERPL-CCNC: 23 IU/L (ref 0–40)
BASOPHILS # BLD AUTO: 0.1 X10E3/UL (ref 0–0.2)
BASOPHILS NFR BLD AUTO: 1 %
BILIRUB SERPL-MCNC: 0.7 MG/DL (ref 0–1.2)
BUN SERPL-MCNC: 17 MG/DL (ref 8–27)
BUN/CREAT SERPL: 29 (ref 12–28)
CALCIUM SERPL-MCNC: 10.4 MG/DL (ref 8.7–10.3)
CHLORIDE SERPL-SCNC: 101 MMOL/L (ref 96–106)
CHOLEST SERPL-MCNC: 187 MG/DL (ref 100–199)
CHOLEST/HDLC SERPL: 3.2 RATIO (ref 0–4.4)
CO2 SERPL-SCNC: 29 MMOL/L (ref 20–29)
CREAT SERPL-MCNC: 0.59 MG/DL (ref 0.57–1)
EGFR: 99 ML/MIN/1.73
EOSINOPHIL # BLD AUTO: 0.2 X10E3/UL (ref 0–0.4)
EOSINOPHIL NFR BLD AUTO: 2 %
ERYTHROCYTE [DISTWIDTH] IN BLOOD BY AUTOMATED COUNT: 11.8 % (ref 11.7–15.4)
GLOBULIN SER-MCNC: 2.3 G/DL (ref 1.5–4.5)
GLUCOSE SERPL-MCNC: 81 MG/DL (ref 70–99)
HCT VFR BLD AUTO: 40.8 % (ref 34–46.6)
HCV AB S/CO SERPL IA: NON REACTIVE
HDLC SERPL-MCNC: 58 MG/DL
HGB BLD-MCNC: 13.7 G/DL (ref 11.1–15.9)
IMM GRANULOCYTES # BLD: 0 X10E3/UL (ref 0–0.1)
IMM GRANULOCYTES NFR BLD: 0 %
LDLC SERPL CALC-MCNC: 103 MG/DL (ref 0–99)
LYMPHOCYTES # BLD AUTO: 2.6 X10E3/UL (ref 0.7–3.1)
LYMPHOCYTES NFR BLD AUTO: 30 %
MCH RBC QN AUTO: 29 PG (ref 26.6–33)
MCHC RBC AUTO-ENTMCNC: 33.6 G/DL (ref 31.5–35.7)
MCV RBC AUTO: 86 FL (ref 79–97)
MONOCYTES # BLD AUTO: 0.6 X10E3/UL (ref 0.1–0.9)
MONOCYTES NFR BLD AUTO: 7 %
NEUTROPHILS # BLD AUTO: 5.2 X10E3/UL (ref 1.4–7)
NEUTROPHILS NFR BLD AUTO: 60 %
PLATELET # BLD AUTO: 327 X10E3/UL (ref 150–450)
POTASSIUM SERPL-SCNC: 4.4 MMOL/L (ref 3.5–5.2)
PROT SERPL-MCNC: 6.9 G/DL (ref 6–8.5)
RBC # BLD AUTO: 4.72 X10E6/UL (ref 3.77–5.28)
SL AMB VLDL CHOLESTEROL CALC: 26 MG/DL (ref 5–40)
SODIUM SERPL-SCNC: 144 MMOL/L (ref 134–144)
TRIGL SERPL-MCNC: 152 MG/DL (ref 0–149)
WBC # BLD AUTO: 8.7 X10E3/UL (ref 3.4–10.8)

## 2024-04-08 DIAGNOSIS — F41.9 ANXIETY: ICD-10-CM

## 2024-04-08 DIAGNOSIS — F33.41 RECURRENT MAJOR DEPRESSIVE DISORDER, IN PARTIAL REMISSION (HCC): ICD-10-CM

## 2024-04-08 RX ORDER — VENLAFAXINE 100 MG/1
TABLET ORAL
Qty: 90 TABLET | Refills: 1 | Status: SHIPPED | OUTPATIENT
Start: 2024-04-08

## 2024-04-22 ENCOUNTER — OFFICE VISIT (OUTPATIENT)
Dept: ENDOCRINOLOGY | Facility: CLINIC | Age: 64
End: 2024-04-22
Payer: COMMERCIAL

## 2024-04-22 VITALS
OXYGEN SATURATION: 99 % | DIASTOLIC BLOOD PRESSURE: 70 MMHG | HEIGHT: 64 IN | BODY MASS INDEX: 21.43 KG/M2 | WEIGHT: 125.5 LBS | SYSTOLIC BLOOD PRESSURE: 100 MMHG | HEART RATE: 60 BPM

## 2024-04-22 DIAGNOSIS — R79.9 ABNORMAL FINDING OF BLOOD CHEMISTRY, UNSPECIFIED: ICD-10-CM

## 2024-04-22 DIAGNOSIS — E03.9 HYPOTHYROIDISM, UNSPECIFIED TYPE: Primary | ICD-10-CM

## 2024-04-22 PROCEDURE — 99214 OFFICE O/P EST MOD 30 MIN: CPT | Performed by: INTERNAL MEDICINE

## 2024-04-22 RX ORDER — MULTIVITAMIN WITH IRON
TABLET ORAL DAILY
COMMUNITY

## 2024-04-22 NOTE — PROGRESS NOTES
4/22/2024    Assessment/Plan      Diagnoses and all orders for this visit:    Hypothyroidism, unspecified type  -     TSH, 3rd generation; Future  -     T4, free; Future  -     TSH, 3rd generation  -     T4, free  -     Hemoglobin A1C; Future  -     Hemoglobin A1C    Abnormal finding of blood chemistry, unspecified  -     Hemoglobin A1C; Future  -     Hemoglobin A1C    Other orders  -     vitamin B-12 (CYANOCOBALAMIN) 50 MCG tablet; Take by mouth daily        Assessment/Plan:  Hypothyroidism: Clinically biochemically overall doing well on current regimen.  We elected to continue current regimen and repeat labs prior to next appointment which we will arrange for in 6 months.  She will contact me sooner with any questions or concerns.      CC: Follow-up    History of Present Illness     HPI: Lola Wade is a 64 y.o. year old female with history of hypothyroidism due to Hashimoto's thyroiditis as well as history of gastric sleeve with revision to Lissette-en-Y gastric bypass surgery and hyperlipidemia who presents for a follow-up appointment.  She has hypothyroidism managed on Synthroid 150 mcg tabs and takes 1 tablet 6 days a week 1.5 tablets on Sundays.  She presents today overall feeling well.  She is following closely with her other providers including weight management.    Review of Systems   Constitutional:  Negative for fatigue.   HENT:  Negative for trouble swallowing and voice change.    Eyes:  Negative for visual disturbance.   Respiratory:  Negative for shortness of breath.    Cardiovascular:  Negative for palpitations and leg swelling.   Gastrointestinal:  Negative for abdominal pain, nausea and vomiting.   Endocrine: Negative for polydipsia and polyuria.   Musculoskeletal:  Negative for arthralgias and myalgias.   Skin:  Negative for rash.   Neurological:  Negative for dizziness, tremors and weakness.   Hematological:  Negative for adenopathy.   Psychiatric/Behavioral:  Negative for agitation and confusion.         Historical Information   Past Medical History:   Diagnosis Date    Allergic 2002    Anxiety     Arthritis     Bariatric surgery status     Chronic pain disorder     Depression     last assessed: 2018     Disturbance of memory     last assessed: 2016     Endometriosis     GERD (gastroesophageal reflux disease)     Hiatal hernia     Hirsutism     History of sleeve gastrectomy     Lumbar herniated disc     Menometrorrhagia     Morbid obesity (HCC)     last assessed: Dec 5, 2014     Motion sickness     Postgastrectomy malabsorption     Sclerosing adenosis of breast     Symptomatic menopausal or female climacteric states     Uterine leiomyoma      Past Surgical History:   Procedure Laterality Date    APPENDECTOMY      BREAST EXCISIONAL BIOPSY Left 2010    Benign    COLONOSCOPY      complete - 7 year repeat recommended - Resolved:      DILATION AND CURETTAGE OF UTERUS      FRACTURE SURGERY      GASTRECTOMY SLEEVE LAPAROSCOPIC  2018    GYNECOLOGIC CRYOSURGERY      Cervix -  for abnormal paps     HYSTERECTOMY  2006    INDUCED       x3    LAPAROSCOPIC SALPINGOOPHERECTOMY Right     LAPAROSCOPIC TOTAL HYSTERECTOMY      06, Laparoscopic hysterectomy with LSO with vaginal closure of the vaginal cuff     IN REVJ GSTR/JJ ANAST W/RCNSTJ W/O VGTMY N/A 2021    Procedure: LAPAROSCOPIC REVISION CONVERSION  JENN-EN-Y GASTRIC BYPASS WITH ROBOTICS AND INTRAOPERATIVE EGD, PARAESOPHAGEAL HERNIA REPAIR;  Surgeon: Sheldon Núñez MD;  Location: [facility] OR;  Service: Bariatrics    SALPINGOOPHORECTOMY Left     Right ovary removed at age 15 and the left one was removed with uterus.    STOMACH SURGERY  2014    for morbid obesity - Managed by: Sheldon Hendricks -     TONSILLECTOMY AND ADENOIDECTOMY      TOOTH EXTRACTION       Social History   Social History     Substance and Sexual Activity   Alcohol Use Not Currently    Comment: occasional     Social History     Substance and  Sexual Activity   Drug Use Yes    Types: Marijuana    Comment: medical     Social History     Tobacco Use   Smoking Status Former    Current packs/day: 0.00    Average packs/day: 0.5 packs/day for 17.8 years (8.9 ttl pk-yrs)    Types: Cigarettes    Start date: 3/4/1971    Quit date: 1989    Years since quittin.3   Smokeless Tobacco Never   Tobacco Comments    33 years smoke free!     Family History:   Family History   Problem Relation Age of Onset    Hypertension Mother     Alzheimer's disease Mother     Stroke Mother         several mini strokes    Dementia Mother         Alzheimers    Thyroid disease Mother     Arthritis Mother     Hyperlipidemia Father     Diabetes Father         mellitus     Prostate cancer Father 70    Rectal cancer Father         80's    Lung cancer Father 90    Liver cancer Father 90    Heart attack Father     Colon cancer Father         80's    Cancer Father         Rectal, metastisized    Hearing loss Father     Skin cancer Father         Multiple times-unknown age    Hypertension Sister     Colon polyps Sister     No Known Problems Daughter     Breast cancer Maternal Grandmother         Unknown age    No Known Problems Maternal Grandfather     No Known Problems Paternal Grandmother     No Known Problems Paternal Grandfather     Hyperlipidemia Brother         no more       Meds/Allergies   Current Outpatient Medications   Medication Sig Dispense Refill    BIOTIN PO Take by mouth Two tablets once day      Cholecalciferol (VITAMIN D3) 2000 units TABS Take 2 tablets by mouth daily      hydrOXYzine HCL (ATARAX) 10 mg tablet Take 1 tablet (10 mg total) by mouth every 8 (eight) hours as needed for anxiety 30 tablet 0    levothyroxine (Synthroid) 150 mcg tablet TAKE 1 TABLET BY MOUTH 6 DAYS OF THE WEEK THEN 1 & 1/2 TABS ON  96 tablet 1    Magnesium 200 MG TABS Take 2 tablets by mouth daily      Multiple Vitamins-Minerals (Bariatric Multivitamins/Iron) CAPS Take 2 capsules by mouth  "in the morning      omeprazole (PriLOSEC) 20 mg delayed release capsule Take 1 capsule (20 mg total) by mouth 2 (two) times a day 180 capsule 0    pregabalin (LYRICA) 75 mg capsule Take 1 capsule (75 mg total) by mouth 3 (three) times a day Take 1 cap QHS x 1 week, then 1 cap BID x 1 week, then 1 cap TID 90 capsule 2    tiZANidine (ZANAFLEX) 4 mg tablet TAKE 1 TABLET BY MOUTH EVERY 8 HOURS IF NEEDED FOR MUSCLE SPASM OR PAIN 60 tablet 1    traMADol-acetaminophen (ULTRACET) 37.5-325 mg per tablet TAKE 1 TABLET BY MOUTH EVERY 6 HOURS AS NEEDED FOR MODERATE PAIN 28 tablet 4    venlafaxine (EFFEXOR) 100 MG tablet TAKE 2 TABLETS BY MOUTH EVERY MORNING AND 1 TABLET EVERY EVENING 90 tablet 1    vitamin B-12 (CYANOCOBALAMIN) 50 MCG tablet Take by mouth daily      Calcium 250 MG CAPS Take 500 mg by mouth daily (Patient not taking: Reported on 4/22/2024)      famotidine (PEPCID) 20 mg tablet Take 20 mg by mouth 2 (two) times a day as needed for heartburn (Patient not taking: Reported on 3/14/2024)      norethindrone (MICRONOR) 0.35 MG tablet Take 1 tablet (0.35 mg total) by mouth daily (Patient not taking: Reported on 3/14/2024) 90 tablet 1    norethindrone (MICRONOR) 0.35 MG tablet Take 1 tablet (0.35 mg total) by mouth daily (Patient not taking: Reported on 3/14/2024) 120 tablet 1     No current facility-administered medications for this visit.     No Known Allergies    Objective   Vitals: Blood pressure 100/70, pulse 60, height 5' 4\" (1.626 m), weight 56.9 kg (125 lb 8 oz), SpO2 99%.  Invasive Devices       None                   Physical Exam  Vitals reviewed.   Constitutional:       General: She is not in acute distress.     Appearance: She is well-developed. She is not diaphoretic.   HENT:      Head: Normocephalic and atraumatic.   Eyes:      Conjunctiva/sclera: Conjunctivae normal.      Pupils: Pupils are equal, round, and reactive to light.   Neck:      Thyroid: No thyromegaly.   Cardiovascular:      Rate and Rhythm: " Normal rate and regular rhythm.   Pulmonary:      Effort: Pulmonary effort is normal. No respiratory distress.      Breath sounds: Normal breath sounds.   Abdominal:      General: Bowel sounds are normal.      Palpations: Abdomen is soft.   Musculoskeletal:         General: Normal range of motion.      Cervical back: Normal range of motion and neck supple.   Skin:     General: Skin is warm and dry.      Findings: No rash.   Neurological:      Mental Status: She is alert and oriented to person, place, and time.      Motor: No abnormal muscle tone.   Psychiatric:         Behavior: Behavior normal.         The history was obtained from the review of the chart and from the patient.    Lab Results:      Recent Results (from the past 70139 hour(s))   Zinc    Collection Time: 03/27/23  8:55 AM   Result Value Ref Range    Zinc 92 44 - 115 ug/dL   Vitamin B12    Collection Time: 03/27/23  8:55 AM   Result Value Ref Range    Vitamin B-12 702 232 - 1,245 pg/mL   Vitamin B1, whole blood    Collection Time: 03/27/23  8:55 AM   Result Value Ref Range    Vitamin B1, Whole Blood 125.3 66.5 - 200.0 nmol/L   Vitamin A    Collection Time: 03/27/23  8:55 AM   Result Value Ref Range    Vitamin A 49.6 22.0 - 69.5 ug/dL   CBC and Platelet    Collection Time: 03/27/23  8:55 AM   Result Value Ref Range    White Blood Cell Count 6.3 3.4 - 10.8 x10E3/uL    Red Blood Cell Count 4.83 3.77 - 5.28 x10E6/uL    Hemoglobin 13.5 11.1 - 15.9 g/dL    HCT 40.5 34.0 - 46.6 %    MCV 84 79 - 97 fL    MCH 28.0 26.6 - 33.0 pg    MCHC 33.3 31.5 - 35.7 g/dL    RDW 12.5 11.7 - 15.4 %    Platelet Count 331 150 - 450 x10E3/uL   Folate    Collection Time: 03/27/23  8:55 AM   Result Value Ref Range    FOLATE, SERUM >20.0 >3.0 ng/mL   Fe+TIBC+Gonzalo    Collection Time: 03/27/23  8:55 AM   Result Value Ref Range    Total Iron Binding Capacity (TIBC) 307 250 - 450 ug/dL    UIBC 227 118 - 369 ug/dL    Iron, Serum 80 27 - 139 ug/dL    Iron Saturation 26 15 - 55 %     Ferritin 89 15 - 150 ng/mL   Comprehensive metabolic panel    Collection Time: 04/21/23 11:18 AM   Result Value Ref Range    Glucose, Random 86 70 - 99 mg/dL    BUN 23 8 - 27 mg/dL    Creatinine 0.62 0.57 - 1.00 mg/dL    eGFR 99 >59 mL/min/1.73    SL AMB BUN/CREATININE RATIO 37 (H) 12 - 28    Sodium 144 134 - 144 mmol/L    Potassium 4.7 3.5 - 5.2 mmol/L    Chloride 101 96 - 106 mmol/L    CO2 29 20 - 29 mmol/L    CALCIUM 9.9 8.7 - 10.3 mg/dL    Protein, Total 7.4 6.0 - 8.5 g/dL    Albumin 4.9 (H) 3.8 - 4.8 g/dL    Globulin, Total 2.5 1.5 - 4.5 g/dL    Albumin/Globulin Ratio 2.0 1.2 - 2.2    TOTAL BILIRUBIN 0.5 0.0 - 1.2 mg/dL    Alk Phos Isoenzymes 74 44 - 121 IU/L    AST 23 0 - 40 IU/L    ALT 21 0 - 32 IU/L   Lipid panel    Collection Time: 04/21/23 11:18 AM   Result Value Ref Range    Cholesterol, Total 173 100 - 199 mg/dL    Triglycerides 79 0 - 149 mg/dL    HDL 71 >39 mg/dL    VLDL Cholesterol Calculated 15 5 - 40 mg/dL    LDL Calculated 87 0 - 99 mg/dL    T. Chol/HDL Ratio 2.4 0.0 - 4.4 ratio   TSH, 3rd generation    Collection Time: 04/21/23 11:19 AM   Result Value Ref Range    TSH 0.601 0.450 - 4.500 uIU/mL   T4, free    Collection Time: 04/21/23 11:19 AM   Result Value Ref Range    Free t4 1.70 0.82 - 1.77 ng/dL   TSH, 3rd generation with Free T4 reflex    Collection Time: 09/28/23  2:25 PM   Result Value Ref Range    TSH 0.080 (L) 0.450 - 4.500 uIU/mL   Vitamin B12    Collection Time: 09/28/23  2:25 PM   Result Value Ref Range    Vitamin B-12 839 232 - 1,245 pg/mL   Folate    Collection Time: 09/28/23  2:25 PM   Result Value Ref Range    FOLATE, SERUM >20.0 >3.0 ng/mL   RPR, Rfx Qn RPR/Confirm TP    Collection Time: 09/28/23  2:25 PM   Result Value Ref Range    RPR Non Reactive Non Reactive   TSH, 3rd generation    Collection Time: 12/20/23  1:35 PM   Result Value Ref Range    TSH 3.190 0.450 - 4.500 uIU/mL   T4, free    Collection Time: 12/20/23  1:35 PM   Result Value Ref Range    Free t4 1.23 0.82 - 1.77  ng/dL   Fe+TIBC+Gonzalo    Collection Time: 02/09/24  9:57 AM   Result Value Ref Range    Total Iron Binding Capacity (TIBC) 290 250 - 450 ug/dL    UIBC 185 118 - 369 ug/dL    Iron, Serum 105 27 - 139 ug/dL    Iron Saturation 36 15 - 55 %    Ferritin 151 (H) 15 - 150 ng/mL   Vitamin A    Collection Time: 02/09/24 10:00 AM   Result Value Ref Range    Vitamin A 61.1 22.0 - 69.5 ug/dL   Vitamin B1, whole blood    Collection Time: 02/09/24 10:00 AM   Result Value Ref Range    Vitamin B1, Whole Blood 97.9 66.5 - 200.0 nmol/L   Vitamin D 25 hydroxy    Collection Time: 02/09/24 10:00 AM   Result Value Ref Range    25-HYDROXY VIT D 56.2 30.0 - 100.0 ng/mL   Zinc    Collection Time: 02/09/24 10:00 AM   Result Value Ref Range    Zinc 92 44 - 115 ug/dL   PTH, intact    Collection Time: 02/09/24 10:00 AM   Result Value Ref Range    PTH, Intact 20 15 - 65 pg/mL   CBC and differential    Collection Time: 04/04/24  9:43 AM   Result Value Ref Range    White Blood Cell Count 8.7 3.4 - 10.8 x10E3/uL    Red Blood Cell Count 4.72 3.77 - 5.28 x10E6/uL    Hemoglobin 13.7 11.1 - 15.9 g/dL    HCT 40.8 34.0 - 46.6 %    MCV 86 79 - 97 fL    MCH 29.0 26.6 - 33.0 pg    MCHC 33.6 31.5 - 35.7 g/dL    RDW 11.8 11.7 - 15.4 %    Platelet Count 327 150 - 450 x10E3/uL    Neutrophils 60 Not Estab. %    Lymphocytes 30 Not Estab. %    Monocytes 7 Not Estab. %    Eosinophils 2 Not Estab. %    Basophils PCT 1 Not Estab. %    Neutrophils (Absolute) 5.2 1.4 - 7.0 x10E3/uL    Lymphocytes (Absolute) 2.6 0.7 - 3.1 x10E3/uL    Monocytes (Absolute) 0.6 0.1 - 0.9 x10E3/uL    Eosinophils (Absolute) 0.2 0.0 - 0.4 x10E3/uL    Basophils ABS 0.1 0.0 - 0.2 x10E3/uL    Immature Granulocytes 0 Not Estab. %    Immature Granulocytes (Absolute) 0.0 0.0 - 0.1 x10E3/uL   Comprehensive metabolic panel    Collection Time: 04/04/24  9:43 AM   Result Value Ref Range    Glucose, Random 81 70 - 99 mg/dL    BUN 17 8 - 27 mg/dL    Creatinine 0.59 0.57 - 1.00 mg/dL    eGFR 99 >59  "mL/min/1.73    SL AMB BUN/CREATININE RATIO 29 (H) 12 - 28    Sodium 144 134 - 144 mmol/L    Potassium 4.4 3.5 - 5.2 mmol/L    Chloride 101 96 - 106 mmol/L    CO2 29 20 - 29 mmol/L    CALCIUM 10.4 (H) 8.7 - 10.3 mg/dL    Protein, Total 6.9 6.0 - 8.5 g/dL    Albumin 4.6 3.9 - 4.9 g/dL    Globulin, Total 2.3 1.5 - 4.5 g/dL    Albumin/Globulin Ratio 2.0 1.2 - 2.2    TOTAL BILIRUBIN 0.7 0.0 - 1.2 mg/dL    Alk Phos Isoenzymes 69 44 - 121 IU/L    AST 23 0 - 40 IU/L    ALT 25 0 - 32 IU/L   Lipid panel    Collection Time: 04/04/24  9:43 AM   Result Value Ref Range    Cholesterol, Total 187 100 - 199 mg/dL    Triglycerides 152 (H) 0 - 149 mg/dL    HDL 58 >39 mg/dL    VLDL Cholesterol Calculated 26 5 - 40 mg/dL    LDL Calculated 103 (H) 0 - 99 mg/dL    T. Chol/HDL Ratio 3.2 0.0 - 4.4 ratio   Hepatitis C antibody    Collection Time: 04/04/24  9:43 AM   Result Value Ref Range    HEP C AB Non Reactive Non Reactive         Future Appointments   Date Time Provider Department Center   4/30/2024  6:00 PM Lucia Lord DO [facility]   5/2/2024  1:00 PM Marian Hatch PA-C SP [facility]   7/11/2024 12:45 PM C William Riedel, DO [facility]   12/30/2024 10:30 AM UB MAMMO UP 1 UB UP Mammo U[facility] UPPER PER   2/3/2025  2:00 PM JANIE Valentine WGT [facility]       Portions of the record may have been created with voice recognition software. Occasional wrong word or \"sound a like\" substitutions may have occurred due to the inherent limitations of voice recognition software. Read the chart carefully and recognize, using context, where substitutions have occurred.    "

## 2024-04-23 ENCOUNTER — OFFICE VISIT (OUTPATIENT)
Dept: PODIATRY | Facility: CLINIC | Age: 64
End: 2024-04-23
Payer: COMMERCIAL

## 2024-04-23 VITALS
WEIGHT: 125 LBS | BODY MASS INDEX: 21.34 KG/M2 | HEART RATE: 61 BPM | HEIGHT: 64 IN | DIASTOLIC BLOOD PRESSURE: 71 MMHG | SYSTOLIC BLOOD PRESSURE: 112 MMHG

## 2024-04-23 DIAGNOSIS — L60.3 ONYCHODYSTROPHY: ICD-10-CM

## 2024-04-23 DIAGNOSIS — S93.401A MILD SPRAIN OF RIGHT ANKLE, INITIAL ENCOUNTER: Primary | ICD-10-CM

## 2024-04-23 DIAGNOSIS — B35.1 ONYCHOMYCOSIS: ICD-10-CM

## 2024-04-23 PROCEDURE — 99202 OFFICE O/P NEW SF 15 MIN: CPT | Performed by: PODIATRIST

## 2024-04-26 ENCOUNTER — TELEPHONE (OUTPATIENT)
Age: 64
End: 2024-04-26

## 2024-04-26 NOTE — TELEPHONE ENCOUNTER
Caller: Lola Wade    Doctor: Eloy    Reason for call: Lola wanted to let Dr Lyons know that she re injured her ankle on Wed.  Will she need to be seen at the office?  She is not feeling discomfort and has no swelling.  Can someone reach out to her?  Thank you.    Call back#: [phone]

## 2024-04-27 DIAGNOSIS — M54.50 CHRONIC BILATERAL LOW BACK PAIN WITHOUT SCIATICA: ICD-10-CM

## 2024-04-27 DIAGNOSIS — G89.29 CHRONIC BILATERAL LOW BACK PAIN WITHOUT SCIATICA: ICD-10-CM

## 2024-04-28 RX ORDER — TIZANIDINE 4 MG/1
TABLET ORAL
Qty: 60 TABLET | Refills: 1 | Status: SHIPPED | OUTPATIENT
Start: 2024-04-28

## 2024-04-30 ENCOUNTER — OFFICE VISIT (OUTPATIENT)
Dept: FAMILY MEDICINE CLINIC | Facility: HOSPITAL | Age: 64
End: 2024-04-30
Payer: COMMERCIAL

## 2024-04-30 VITALS
HEART RATE: 75 BPM | DIASTOLIC BLOOD PRESSURE: 65 MMHG | HEIGHT: 64 IN | SYSTOLIC BLOOD PRESSURE: 111 MMHG | BODY MASS INDEX: 21.27 KG/M2 | TEMPERATURE: 98.4 F | WEIGHT: 124.6 LBS | OXYGEN SATURATION: 100 %

## 2024-04-30 DIAGNOSIS — N95.1 MENOPAUSAL SYNDROME (HOT FLASHES): ICD-10-CM

## 2024-04-30 DIAGNOSIS — M51.16 LUMBAR DISC DISEASE WITH RADICULOPATHY: ICD-10-CM

## 2024-04-30 DIAGNOSIS — E83.52 HYPERCALCEMIA: Primary | ICD-10-CM

## 2024-04-30 DIAGNOSIS — E03.9 HYPOTHYROIDISM (ACQUIRED): ICD-10-CM

## 2024-04-30 DIAGNOSIS — Z98.84 HISTORY OF ROUX-EN-Y GASTRIC BYPASS: ICD-10-CM

## 2024-04-30 DIAGNOSIS — E78.5 DYSLIPIDEMIA: ICD-10-CM

## 2024-04-30 DIAGNOSIS — K21.9 GASTROESOPHAGEAL REFLUX DISEASE, UNSPECIFIED WHETHER ESOPHAGITIS PRESENT: ICD-10-CM

## 2024-04-30 PROCEDURE — 1159F MED LIST DOCD IN RCRD: CPT | Performed by: INTERNAL MEDICINE

## 2024-04-30 PROCEDURE — G2211 COMPLEX E/M VISIT ADD ON: HCPCS | Performed by: INTERNAL MEDICINE

## 2024-04-30 PROCEDURE — 99214 OFFICE O/P EST MOD 30 MIN: CPT | Performed by: INTERNAL MEDICINE

## 2024-04-30 PROCEDURE — 1160F RVW MEDS BY RX/DR IN RCRD: CPT | Performed by: INTERNAL MEDICINE

## 2024-04-30 NOTE — ASSESSMENT & PLAN NOTE
Reflux improved with Omeprazole, on it for total of 3 mos and will then switch back to Famotidine as per bariatric note, call with relapse in symptoms

## 2024-04-30 NOTE — ASSESSMENT & PLAN NOTE
Saw bariatric office - has labs/vitamin levels and were wnl, wgt stable and unchanged from Jan 24 appt, urged healthy diet and regular exercise, con't f/u as per specialist

## 2024-04-30 NOTE — ASSESSMENT & PLAN NOTE
"No great benefit with norethindrone - pt doesn't feel her night sweats are true \"hot flashes\" she feels its more d/t her pancreas trying to flush all the sweets/cinnamon candy she was eating, has cut back on candy and notes some improvement in symptoms, can d/w GYN if can stop rx  "

## 2024-04-30 NOTE — ASSESSMENT & PLAN NOTE
LDL and TG a bit above goal, urged low fat/cholesterol diet and regular exercise, no statin at this time d/t low risk ASCVD risk score of 4.6%, will repeat FLP annually

## 2024-04-30 NOTE — ASSESSMENT & PLAN NOTE
Just saw Carolyn, TSH from Dec 23 was 3.190, has f/u labs and appt as per Carolyn, con't tx and f/u as per specialist

## 2024-04-30 NOTE — PROGRESS NOTES
"Name: Lola Wade      : 1960      MRN: [id number]  Encounter Provider: Lucia Lord DO  Encounter Date: 2024   Encounter department: [facility], [place] CARE [id number]     Assessment & Plan     1. Hypercalcemia  Comments:  has stopped calcium supplements, urged to do PTH/ICa as ordered, will follow    2. Dyslipidemia  Assessment & Plan:  LDL and TG a bit above goal, urged low fat/cholesterol diet and regular exercise, no statin at this time d/t low risk ASCVD risk score of 4.6%, will repeat FLP annually      3. History of Lissette-en-Y gastric bypass  Assessment & Plan:  Saw bariatric office - has labs/vitamin levels and were wnl, wgt stable and unchanged from  appt, urged healthy diet and regular exercise, con't f/u as per specialist      4. Gastroesophageal reflux disease, unspecified whether esophagitis present  Assessment & Plan:  Reflux improved with Omeprazole, on it for total of 3 mos and will then switch back to Famotidine as per bariatric note, call with relapse in symptoms      5. Lumbar disc disease with radiculopathy  Assessment & Plan:  Noting great benefit with Lyrica, has f/u with pain mgt, will follow      6. Menopausal syndrome (hot flashes)  Assessment & Plan:  No great benefit with norethindrone - pt doesn't feel her night sweats are true \"hot flashes\" she feels its more d/t her pancreas trying to flush all the sweets/cinnamon candy she was eating, has cut back on candy and notes some improvement in symptoms, can d/w GYN if can stop rx      7. Hypothyroidism (acquired)  Assessment & Plan:  Just saw Endo, TSH from Dec 23 was 3.190, has f/u labs and appt as per Endo, con't tx and f/u as per specialist         Colonoscopy 3/20 - 5 yrs    Mammo     Dexa  - osteopenia    PAP s/p hysterectomy - just had pelvic exam with Dr. Riedel 24    BW         Subjective      HPI Pt  here for follow up appt and BW results    BW results were d/w pt in detail: Ca 10.4, " rest of CMP/CBC/Hep C Ab were wnl, FLP with TG up a bit at 152 but TC/LDL/HDL all at goal.         Nml range for Ca reviewed.  Pt was notified of elevated Ca level via phone and was told to do an Ica/PTH - f/u labs not yet done.    Goal FLP was d/w pt in detail.  Diet/exercise reviewed. She does not eat a lot of red meat but admits her diet could be better.  She has not been exercising d/t spraining her ankle.   She has never been on a statin.  She notes no stroke/TIA symptoms/CP.     Pt saw her bariatric surgeon in Feb 24 for abd discomfort - OV note reviewed. Was felt her symptoms were likely d/t gastritis and she was told to take Omeprazole bid and Carafate qid.  She had a f/u appt 3/14/24 and was told to stop Carafate and could wean Omeprazole to once a day.  After 3 mos it was advised to switch to Pepcid bid.        Pt con't to follow with Pain Mgt and had a TFESI at L L4 and L5 on 2/5/24 with Dr. Gray.  She had f/u with Pain Mgt PA (Marian Hatch) on 2/22/24 and was mentioned may transition from Gabapentin to Lyrica in the future and to d/w PCP.  She called the office to discuss and was told she needed an appt. She then saw Pain Mgt NP again on 3/21/24 and was weaned off Gabapentin and started on Lyrica 75 mg and told to titrate up to tid.  She notes no SE with the Lyrica and feels much more benefit with the medication. She notes no limiting nerve pain since then.    Pt was started on norethindrone for hot flashes at GYN appt on 2/22/24. She saw Dr. Riedel for f/u 4/4/24.  She was not having great benefit with the rx and Prempro-lo was discussed but pt deferred. She was told to con't current rx for now.   She thinks her night sweats/hot flashes are d/t the cinnamon discs she is eating and that's how her pancreas in processing sugars.     Pt saw Endo (Dr. Joel) on 4/22/24 for f/u hypothyroidism - OV note reviewed.  She had no med changed.  Last TSH 12/20/23 was 3.190.  She has order for repeat TFT's for  Oct 2024.  She was told to f/u in 6 mos after BW done.      Review of Systems   Constitutional:  Negative for chills and fever.   HENT:  Negative for congestion and sore throat.    Eyes:  Negative for pain and visual disturbance.   Respiratory:  Negative for cough and shortness of breath.    Cardiovascular:  Negative for chest pain and palpitations.   Gastrointestinal:  Negative for abdominal pain, constipation, diarrhea, nausea and vomiting.   Genitourinary:  Negative for difficulty urinating and dysuria.   Musculoskeletal:  Positive for back pain. Negative for neck pain.   Skin:  Negative for rash and wound.   Neurological:  Negative for dizziness, light-headedness and headaches.   Hematological:  Does not bruise/bleed easily.   Psychiatric/Behavioral:  Negative for confusion and dysphoric mood.        Current Outpatient Medications on File Prior to Visit   Medication Sig    famotidine (PEPCID) 20 mg tablet Take 20 mg by mouth 2 (two) times a day as needed for heartburn    norethindrone (MICRONOR) 0.35 MG tablet Take 1 tablet (0.35 mg total) by mouth daily    BIOTIN PO Take by mouth Two tablets once day    Calcium 250 MG CAPS Take 500 mg by mouth daily (Patient not taking: Reported on 4/22/2024)    Cholecalciferol (VITAMIN D3) 2000 units TABS Take 2 tablets by mouth daily    hydrOXYzine HCL (ATARAX) 10 mg tablet Take 1 tablet (10 mg total) by mouth every 8 (eight) hours as needed for anxiety    levothyroxine (Synthroid) 150 mcg tablet TAKE 1 TABLET BY MOUTH 6 DAYS OF THE WEEK THEN 1 & 1/2 TABS ON SUNDAY    Magnesium 200 MG TABS Take 2 tablets by mouth daily    Multiple Vitamins-Minerals (Bariatric Multivitamins/Iron) CAPS Take 2 capsules by mouth in the morning    omeprazole (PriLOSEC) 20 mg delayed release capsule Take 1 capsule (20 mg total) by mouth 2 (two) times a day    pregabalin (LYRICA) 75 mg capsule Take 1 capsule (75 mg total) by mouth 3 (three) times a day Take 1 cap QHS x 1 week, then 1 cap BID x 1  "week, then 1 cap TID    tiZANidine (ZANAFLEX) 4 mg tablet TAKE 1 TABLET BY MOUTH EVERY 8 HOURS IF NEEDED FOR MUSCLE SPASM OR PAIN    traMADol-acetaminophen (ULTRACET) 37.5-325 mg per tablet TAKE 1 TABLET BY MOUTH EVERY 6 HOURS AS NEEDED FOR MODERATE PAIN    venlafaxine (EFFEXOR) 100 MG tablet TAKE 2 TABLETS BY MOUTH EVERY MORNING AND 1 TABLET EVERY EVENING    vitamin B-12 (CYANOCOBALAMIN) 50 MCG tablet Take by mouth daily    [DISCONTINUED] norethindrone (MICRONOR) 0.35 MG tablet Take 1 tablet (0.35 mg total) by mouth daily (Patient not taking: Reported on 3/14/2024)       Objective     /65 (BP Location: Left arm, Patient Position: Sitting, Cuff Size: Standard)   Pulse 75   Temp 98.4 °F (36.9 °C) (Tympanic)   Ht 5' 4\" (1.626 m)   Wt 56.5 kg (124 lb 9.6 oz)   SpO2 100%   BMI 21.39 kg/m²     Physical Exam  Vitals and nursing note reviewed.   Constitutional:       General: She is not in acute distress.     Appearance: She is well-developed. She is not ill-appearing.   HENT:      Head: Normocephalic and atraumatic.   Eyes:      General:         Right eye: No discharge.         Left eye: No discharge.      Conjunctiva/sclera: Conjunctivae normal.   Neck:      Trachea: No tracheal deviation.   Cardiovascular:      Rate and Rhythm: Normal rate and regular rhythm.      Heart sounds: Normal heart sounds. No murmur heard.     No friction rub.   Pulmonary:      Effort: Pulmonary effort is normal. No respiratory distress.      Breath sounds: Normal breath sounds. No wheezing, rhonchi or rales.   Abdominal:      General: There is no distension.      Palpations: Abdomen is soft.      Tenderness: There is no abdominal tenderness. There is no guarding or rebound.   Musculoskeletal:         General: No deformity or signs of injury.      Cervical back: Neck supple.   Skin:     General: Skin is warm.      Coloration: Skin is not pale.      Findings: No rash.   Neurological:      General: No focal deficit present.      " Mental Status: She is alert. Mental status is at baseline.      Motor: No abnormal muscle tone.      Gait: Gait normal.   Psychiatric:         Behavior: Behavior normal.         Thought Content: Thought content normal.         Judgment: Judgment normal.       Lucia Lord DO

## 2024-05-05 DIAGNOSIS — M51.36 DISC DEGENERATION, LUMBAR: ICD-10-CM

## 2024-05-05 DIAGNOSIS — Z48.815 ENCOUNTER FOR SURGICAL AFTERCARE FOLLOWING SURGERY OF DIGESTIVE SYSTEM: ICD-10-CM

## 2024-05-05 DIAGNOSIS — K91.2 POSTSURGICAL MALABSORPTION: ICD-10-CM

## 2024-05-05 DIAGNOSIS — Z98.84 BARIATRIC SURGERY STATUS: ICD-10-CM

## 2024-05-07 ENCOUNTER — OFFICE VISIT (OUTPATIENT)
Dept: PAIN MEDICINE | Facility: CLINIC | Age: 64
End: 2024-05-07
Payer: COMMERCIAL

## 2024-05-07 VITALS
BODY MASS INDEX: 20.83 KG/M2 | SYSTOLIC BLOOD PRESSURE: 110 MMHG | DIASTOLIC BLOOD PRESSURE: 70 MMHG | WEIGHT: 122 LBS | HEIGHT: 64 IN | TEMPERATURE: 98.4 F

## 2024-05-07 DIAGNOSIS — M51.26 LUMBAR DISC HERNIATION: ICD-10-CM

## 2024-05-07 DIAGNOSIS — M47.816 LUMBAR SPONDYLOSIS: ICD-10-CM

## 2024-05-07 DIAGNOSIS — M54.16 LUMBAR RADICULOPATHY: Primary | ICD-10-CM

## 2024-05-07 PROCEDURE — 99214 OFFICE O/P EST MOD 30 MIN: CPT | Performed by: PHYSICIAN ASSISTANT

## 2024-05-07 PROCEDURE — G2211 COMPLEX E/M VISIT ADD ON: HCPCS | Performed by: PHYSICIAN ASSISTANT

## 2024-05-07 RX ORDER — PREGABALIN 25 MG/1
25 CAPSULE ORAL 3 TIMES DAILY
Qty: 90 CAPSULE | Refills: 2 | Status: SHIPPED | OUTPATIENT
Start: 2024-05-07

## 2024-05-07 RX ORDER — OMEPRAZOLE 20 MG/1
20 CAPSULE, DELAYED RELEASE ORAL DAILY
Qty: 30 CAPSULE | Refills: 0 | Status: SHIPPED | OUTPATIENT
Start: 2024-05-07 | End: 2024-06-06

## 2024-05-07 NOTE — PROGRESS NOTES
Assessment:  1. Lumbar radiculopathy    2. Lumbar disc herniation    3. Lumbar spondylosis        Plan:  While the patient was in the office today, I did have a thorough conversation regarding their chronic pain syndrome, medication management, and treatment plan options.    After discussing options, I recommend the patient increase the pregabalin to 100 mg 3 times daily.  She recently filled a 75 mg prescription therefore I have prescribed a 25 mg dose to equal 100 mg.  I have advised her to contact our office when she has 1 week of medication left.  If she finds the 100 mg dose is effective then we will continue that regimen.    Repeat transforaminal epidural steroid injections in the future if indicated.    Restart physical therapy.  Patient has an order from her PCP.    Consider chiropractic therapy.    The patient will follow-up in 12 weeks for medication prescription refill and reevaluation. The patient was advised to contact the office should their symptoms worsen in the interim. The patient was agreeable and verbalized an understanding.        History of Present Illness:    The patient is a 64 y.o. female last seen on 3/21/2024 who presents for a follow up office visit in regards to chronic low back pain secondary to lumbar spondylosis/degenerative disc disease.  The patient currently reports chronic low back pain that she presently rates a 4 out of 10 and describes it as a constant dull, aching type of pain.  Patient was started on Lyrica 75 mg 3 times daily on her last visit and is able to note near complete resolution of the radicular symptoms that she was experiencing.  Unfortunately it has not improved any of her low back pain.  Patient is taking medication as prescribed no side effects pain is made worse with prolonged standing and bending.  She does remain active with her gym exercises but is struggling to do some of them due to the amount of pain.  She is going to call physical therapy and schedule  her evaluation.  She has a standing order in her chart from her PCP.    I have personally reviewed and/or updated the patient's past medical history, past surgical history, family history, social history, current medications, allergies, and vital signs today.       Review of Systems:    Review of Systems   Respiratory:  Negative for shortness of breath.    Cardiovascular:  Negative for chest pain.   Gastrointestinal:  Negative for constipation, diarrhea, nausea and vomiting.   Musculoskeletal:  Negative for arthralgias, gait problem, joint swelling (jOINT STIFFNESS) and myalgias.   Skin:  Negative for rash.   Neurological:  Positive for dizziness. Negative for seizures and weakness.   All other systems reviewed and are negative.        Past Medical History:   Diagnosis Date   • Allergic    • Anxiety    • Arthritis    • Bariatric surgery status    • Chronic pain disorder    • Disturbance of memory     last assessed: 2016    • Endometriosis    • GERD (gastroesophageal reflux disease)    • Hiatal hernia    • Hirsutism    • History of sleeve gastrectomy    • Lumbar herniated disc    • Menometrorrhagia    • Morbid obesity (HCC)     last assessed: Dec 5, 2014    • Motion sickness    • Postgastrectomy malabsorption    • Sclerosing adenosis of breast    • Symptomatic menopausal or female climacteric states    • Uterine leiomyoma        Past Surgical History:   Procedure Laterality Date   • APPENDECTOMY     • BREAST EXCISIONAL BIOPSY Left     Benign   • COLONOSCOPY      complete - 7 year repeat recommended - Resolved:     • DILATION AND CURETTAGE OF UTERUS     • FRACTURE SURGERY     • GASTRECTOMY SLEEVE LAPAROSCOPIC  2018   • GYNECOLOGIC CRYOSURGERY      Cervix - s for abnormal paps    • HYSTERECTOMY  2006   • INDUCED       x3   • LAPAROSCOPIC SALPINGOOPHERECTOMY Right    • LAPAROSCOPIC TOTAL HYSTERECTOMY      06, Laparoscopic hysterectomy with LSO with vaginal closure of the  vaginal cuff    • PA REVJ GSTR/JJ ANAST W/RCNSTJ W/O VGTMY N/A 2021    Procedure: LAPAROSCOPIC REVISION CONVERSION  JENN-EN-Y GASTRIC BYPASS WITH ROBOTICS AND INTRAOPERATIVE EGD, PARAESOPHAGEAL HERNIA REPAIR;  Surgeon: Sheldon Núñez MD;  Location: [facility];  Service: Bariatrics   • SALPINGOOPHORECTOMY Left 2006    Right ovary removed at age 15 and the left one was removed with uterus.   • STOMACH SURGERY  2014    for morbid obesity - Managed by: Sheldon Hendricks -    • TONSILLECTOMY AND ADENOIDECTOMY     • TOOTH EXTRACTION         Family History   Problem Relation Age of Onset   • Hypertension Mother    • Alzheimer's disease Mother    • Stroke Mother         several mini strokes   • Dementia Mother         Alzheimers   • Thyroid disease Mother    • Arthritis Mother    • Hyperlipidemia Father    • Diabetes Father         mellitus    • Prostate cancer Father 70   • Rectal cancer Father         80's   • Lung cancer Father 90   • Liver cancer Father 90   • Heart attack Father    • Colon cancer Father         80's   • Cancer Father         Rectal, metastisized   • Hearing loss Father    • Skin cancer Father         Multiple times-unknown age   • Hypertension Sister    • Colon polyps Sister    • No Known Problems Daughter    • Breast cancer Maternal Grandmother         Unknown age   • No Known Problems Maternal Grandfather    • No Known Problems Paternal Grandmother    • No Known Problems Paternal Grandfather    • Hyperlipidemia Brother         no more       Social History     Occupational History   • Not on file   Tobacco Use   • Smoking status: Former     Current packs/day: 0.00     Average packs/day: 0.5 packs/day for 17.8 years (8.9 ttl pk-yrs)     Types: Cigarettes     Start date: 3/4/1971     Quit date: 1989     Years since quittin.3     Passive exposure: Past   • Smokeless tobacco: Never   • Tobacco comments:     33 years smoke free!   Vaping Use   • Vaping status: Some Days   • Substances:  THC, CBD   Substance and Sexual Activity   • Alcohol use: Not Currently     Comment: occasional   • Drug use: Yes     Types: Marijuana     Comment: medical   • Sexual activity: Not Currently     Partners: Male     Birth control/protection: Post-menopausal, Surgical, Female Sterilization, None         Current Outpatient Medications:   •  BIOTIN PO, Take by mouth Two tablets once day, Disp: , Rfl:   •  Cholecalciferol (VITAMIN D3) 2000 units TABS, Take 2 tablets by mouth daily, Disp: , Rfl:   •  famotidine (PEPCID) 20 mg tablet, Take 20 mg by mouth 2 (two) times a day as needed for heartburn, Disp: , Rfl:   •  hydrOXYzine HCL (ATARAX) 10 mg tablet, Take 1 tablet (10 mg total) by mouth every 8 (eight) hours as needed for anxiety, Disp: 30 tablet, Rfl: 0  •  levothyroxine (Synthroid) 150 mcg tablet, TAKE 1 TABLET BY MOUTH 6 DAYS OF THE WEEK THEN 1 & 1/2 TABS ON SUNDAY, Disp: 96 tablet, Rfl: 1  •  Magnesium 200 MG TABS, Take 2 tablets by mouth daily, Disp: , Rfl:   •  Multiple Vitamins-Minerals (Bariatric Multivitamins/Iron) CAPS, Take 2 capsules by mouth in the morning, Disp: , Rfl:   •  omeprazole (PriLOSEC) 20 mg delayed release capsule, Take 1 capsule (20 mg total) by mouth daily, Disp: 30 capsule, Rfl: 0  •  pregabalin (LYRICA) 25 mg capsule, Take 1 capsule (25 mg total) by mouth 3 (three) times a day, Disp: 90 capsule, Rfl: 2  •  tiZANidine (ZANAFLEX) 4 mg tablet, TAKE 1 TABLET BY MOUTH EVERY 8 HOURS IF NEEDED FOR MUSCLE SPASM OR PAIN, Disp: 60 tablet, Rfl: 1  •  traMADol-acetaminophen (ULTRACET) 37.5-325 mg per tablet, Take 1 tablet by mouth every 6 (six) hours as needed for moderate pain, Disp: 120 tablet, Rfl: 0  •  venlafaxine (EFFEXOR) 100 MG tablet, TAKE 2 TABLETS BY MOUTH EVERY MORNING AND 1 TABLET EVERY EVENING, Disp: 90 tablet, Rfl: 1  •  vitamin B-12 (CYANOCOBALAMIN) 50 MCG tablet, Take by mouth daily, Disp: , Rfl:   •  Calcium 250 MG CAPS, Take 500 mg by mouth daily (Patient not taking: Reported on  "4/22/2024), Disp: , Rfl:   •  norethindrone (MICRONOR) 0.35 MG tablet, Take 1 tablet (0.35 mg total) by mouth daily, Disp: 90 tablet, Rfl: 1    No Known Allergies    Physical Exam:    /70 (BP Location: Left arm, Patient Position: Sitting, Cuff Size: Standard)   Temp 98.4 °F (36.9 °C)   Ht 5' 4\" (1.626 m)   Wt 55.3 kg (122 lb)   BMI 20.94 kg/m²     Constitutional:normal, well developed, well nourished, alert, in no distress and non-toxic and no overt pain behavior.  Eyes:anicteric  HEENT:grossly intact  Neck:supple, symmetric, trachea midline and no masses   Pulmonary:even and unlabored  Cardiovascular:No edema or pitting edema present  Skin:Normal without rashes or lesions and well hydrated  Psychiatric:Mood and affect appropriate  Neurologic:Cranial Nerves II-XII grossly intact  Musculoskeletal:normal      Imaging  No orders to display         No orders of the defined types were placed in this encounter.      "

## 2024-05-08 ENCOUNTER — TELEPHONE (OUTPATIENT)
Dept: PAIN MEDICINE | Facility: CLINIC | Age: 64
End: 2024-05-08

## 2024-05-08 DIAGNOSIS — M51.16 LUMBAR DISC DISEASE WITH RADICULOPATHY: Primary | ICD-10-CM

## 2024-05-08 RX ORDER — PREGABALIN 75 MG/1
CAPSULE ORAL
Qty: 90 CAPSULE | Refills: 2 | Status: SHIPPED | OUTPATIENT
Start: 2024-05-08

## 2024-05-08 NOTE — TELEPHONE ENCOUNTER
Caller: Lola    Doctor: Holden    Reason for call: patient went to pharmacy and Lyrica 75 mg was removed in error only 25 mg is there so please add it back and call her back she will let you know when she needs refill for 100 mg     [facility]/pharmacy #[id number] - [place] - [address] [place] [id number]  Phone: [phone]  Fax: [phone]  GLENDA #: [id number]     Thank you     Call back#: [phone]

## 2024-05-08 NOTE — TELEPHONE ENCOUNTER
S/w pt, stated that she was mistaken, she did not pu the lyrica 75 mg pills. Per pt, she has 1 pill on hand. Pt confirmed that she did pu the 25 mg pills today. Questioning if Marian can send a rx for 75 mg or should she take 25 mg, 4 pills at a time? Advised pt, the writer will d/w MG and cb to advise.

## 2024-05-08 NOTE — TELEPHONE ENCOUNTER
I have sent a script for 75mg.  She is to take 75mg with 25mg TID.  If this is effective, her next script I can just make for the 100mg capsules.

## 2024-05-09 NOTE — TELEPHONE ENCOUNTER
S/w pt, confirmed she picked up the lyrica 75 mg yesterday. Advised pt to cb if there is any question or if se's or issues arise. Pt verbalized understanding and appreciation.

## 2024-05-17 LAB
CA-I SERPL ISE-MCNC: 5.2 MG/DL (ref 4.5–5.6)
PTH-INTACT SERPL-MCNC: 34 PG/ML (ref 15–65)

## 2024-05-29 DIAGNOSIS — Z48.815 ENCOUNTER FOR SURGICAL AFTERCARE FOLLOWING SURGERY OF DIGESTIVE SYSTEM: ICD-10-CM

## 2024-05-29 DIAGNOSIS — Z98.84 BARIATRIC SURGERY STATUS: ICD-10-CM

## 2024-05-29 DIAGNOSIS — K91.2 POSTSURGICAL MALABSORPTION: ICD-10-CM

## 2024-05-29 RX ORDER — OMEPRAZOLE 20 MG/1
20 CAPSULE, DELAYED RELEASE ORAL DAILY
Qty: 90 CAPSULE | Refills: 1 | Status: SHIPPED | OUTPATIENT
Start: 2024-05-29

## 2024-05-31 NOTE — PROGRESS NOTES
Assessment/Plan:   Discussed with patient recommended treatment plan for ankle sprain which is in my estimate mild in nature.  Range of motion exercises reviewed.  Avoid reinjuring this area.  Treatment options for nail mycosis discussed.  Follow-up as needed.       Diagnoses and all orders for this visit:    Mild sprain of right ankle, initial encounter    Onychodystrophy    Onychomycosis          Subjective:     Patient ID: Lola Wade is a 64 y.o. female.    4/23/2024: 64-year-old female presents today after injuring her right ankle 1 week ago.  Reports she has minimal swelling but the pain and discomfort was quite significant few days ago.  Also has secondary concern of dystrophic thick nails.        Review of Systems   Constitutional: Negative.    HENT: Negative.     Eyes: Negative.    Respiratory: Negative.     Cardiovascular: Negative.    Gastrointestinal: Negative.    Endocrine: Negative.    Genitourinary: Negative.    Musculoskeletal:         Mild/minimal pain localized to outside of right ankle.   Skin: Negative.    Allergic/Immunologic: Negative.    Neurological: Negative.    Hematological: Negative.    Psychiatric/Behavioral: Negative.           Objective:     Physical Exam  Constitutional:       Appearance: Normal appearance.   HENT:      Head: Normocephalic.      Right Ear: External ear normal.      Left Ear: External ear normal.   Eyes:      Pupils: Pupils are equal, round, and reactive to light.   Cardiovascular:      Rate and Rhythm: Normal rate.      Pulses: Normal pulses.   Pulmonary:      Effort: Pulmonary effort is normal.   Musculoskeletal:         General: Tenderness present.      Cervical back: Normal range of motion.      Right ankle: Swelling (Minimal) present. Tenderness present over the lateral malleolus and ATF ligament.      Right Achilles Tendon: Normal.      Left ankle:      Left Achilles Tendon: Normal.   Feet:      Right foot:      Protective Sensation: 10 sites tested.  10 sites  sensed.      Skin integrity: Skin integrity normal.      Left foot:      Protective Sensation: 10 sites tested.  10 sites sensed.      Skin integrity: Skin integrity normal.   Skin:     General: Skin is warm and dry.      Capillary Refill: Capillary refill takes 2 to 3 seconds.   Neurological:      General: No focal deficit present.      Mental Status: She is alert.   Psychiatric:         Mood and Affect: Mood normal.

## 2024-06-10 ENCOUNTER — TELEPHONE (OUTPATIENT)
Age: 64
End: 2024-06-10

## 2024-06-10 DIAGNOSIS — M51.16 LUMBAR DISC DISEASE WITH RADICULOPATHY: Primary | ICD-10-CM

## 2024-06-10 RX ORDER — PREGABALIN 100 MG/1
100 CAPSULE ORAL 3 TIMES DAILY
Qty: 90 CAPSULE | Refills: 2 | Status: SHIPPED | OUTPATIENT
Start: 2024-06-10

## 2024-06-10 NOTE — TELEPHONE ENCOUNTER
S/w pt, confirmed lyrica 75 mg and 25 mg for a total of 100 mg tid with + relief,no se's. Pt is calling to request 100 mg pills, 1 pill po tid be called in to her pharmacy for pu. Advised pt, the writer will d/w Marian. Anticipate rx will be sent to the pharmacy for pu later today. The writer will cb if there is any question or change in the plan as discussed. Confirmed 7/30/2024 fu ov.

## 2024-06-10 NOTE — TELEPHONE ENCOUNTER
Pt contacted Call Center requested refill of their medication.        Doctor Name: Marian       Medication Name:   pregabalin (LYRICA)       Dosage of Med: 100 mg       Frequency of Med: zachery 75mg capsule with 25mg TID       Remaining Medication: few left      Pharmacy and Location:   [facility]/pharmacy #[id number]  [place]           Pt. Preferred Callback Phone Number: [phone]      Thank you.

## 2024-06-15 DIAGNOSIS — M54.50 CHRONIC BILATERAL LOW BACK PAIN WITHOUT SCIATICA: ICD-10-CM

## 2024-06-15 DIAGNOSIS — G89.29 CHRONIC BILATERAL LOW BACK PAIN WITHOUT SCIATICA: ICD-10-CM

## 2024-06-17 RX ORDER — TIZANIDINE 4 MG/1
TABLET ORAL
Qty: 60 TABLET | Refills: 0 | Status: SHIPPED | OUTPATIENT
Start: 2024-06-17

## 2024-06-26 ENCOUNTER — NURSE TRIAGE (OUTPATIENT)
Age: 64
End: 2024-06-26

## 2024-06-26 NOTE — TELEPHONE ENCOUNTER
Patient returning call. States she is no longer having abdominal pain and would like to cancel appointment for 2pm today.    Appointment cancelled and patient will return call with any changes.

## 2024-06-26 NOTE — TELEPHONE ENCOUNTER
"Pt called in.     Pt s/p conversion to [facility] with Dr. Jeff Núñez on 09/28/2021.     Pt reports stomach pain since Monday. Unsure if having fevers, endorses feeling sweaty yesterday. +nausea, -vomiting/diarrhea/constipation. LBM today.     Pt reported that plan with Bryan during LOV was to taper off omeprazole. Pt took x1 omeprazole today to try to help with pain, unsure if it helped. Pt also drank a little bit of ginger ale.     Reviewed LOV note with Pt and plan to start pepcid bid after tapering off omeprazole. Pt reports that she did not start pepcid yet and asking if she should start tonight.    Appt scheduled today with Nita. Pt thankful.     Reason for Disposition   MODERATE pain (e.g., interferes with normal activities that comes and goes (cramps) lasts > 24 hours (Exception: pain with Vomiting or Diarrhea - see that Protocol)    Answer Assessment - Initial Assessment Questions  1. LOCATION: \"Where does it hurt?\"       stomach  2. RADIATION: \"Does the pain shoot anywhere else?\" (e.g., chest, back)      denies  3. ONSET: \"When did the pain begin?\" (e.g., minutes, hours or days ago)       Monday  4. SUDDEN: \"Gradual or sudden onset?\"      sudden  5. PATTERN \"Does the pain come and go, or is it constant?\"     - If constant: \"Is it getting better, staying the same, or worsening?\"       (Note: Constant means the pain never goes away completely; most serious pain is constant and it progresses)      - If intermittent: \"How long does it last?\" \"Do you have pain now?\"      (Note: Intermittent means the pain goes away completely between bouts)      constant  6. SEVERITY: \"How bad is the pain?\"  (e.g., Scale 1-10; mild, moderate, or severe)    - MILD (1-3): doesn't interfere with normal activities, abdomen soft and not tender to touch     - MODERATE (4-7): interferes with normal activities or awakens from sleep, tender to touch     - SEVERE (8-10): excruciating pain, doubled over, unable to do any normal activities       " "severe  7. RECURRENT SYMPTOM: \"Have you ever had this type of stomach pain before?\" If Yes, ask: \"When was the last time?\" and \"What happened that time?\"       unsure  8. CAUSE: \"What do you think is causing the stomach pain?\"      unsure  9. RELIEVING/AGGRAVATING FACTORS: \"What makes it better or worse?\" (e.g., movement, antacids, bowel movement)      unsure  10. OTHER SYMPTOMS: \"Has there been any vomiting, diarrhea, constipation, or urine problems?\"        +nausea  11. PREGNANCY: \"Is there any chance you are pregnant?\" \"When was your last menstrual period?\"        N/a    Protocols used: Abdominal Pain - Female-ADULT-[place]    "

## 2024-06-30 DIAGNOSIS — F33.41 RECURRENT MAJOR DEPRESSIVE DISORDER, IN PARTIAL REMISSION (HCC): ICD-10-CM

## 2024-06-30 DIAGNOSIS — F41.9 ANXIETY: ICD-10-CM

## 2024-06-30 RX ORDER — VENLAFAXINE 100 MG/1
TABLET ORAL
Qty: 90 TABLET | Refills: 5 | Status: SHIPPED | OUTPATIENT
Start: 2024-06-30

## 2024-07-02 DIAGNOSIS — M51.36 DISC DEGENERATION, LUMBAR: ICD-10-CM

## 2024-07-02 NOTE — TELEPHONE ENCOUNTER
Reason for call:   [x] Refill   [] Prior Auth  [] Other:     Office: [facility] [address]   [x] PCP/Provider - Magalie   [] Specialty/Provider -     Medication: traMADol-acetaminophen     Dose/Frequency: 37.5-325 mg/ 1 tab every 6 hours PRN    Quantity: 30 day supply     Pharmacy: [facility]     Does the patient have enough for 3 days?   [] Yes   [x] No - Send as HP to POD

## 2024-07-29 DIAGNOSIS — F33.41 RECURRENT MAJOR DEPRESSIVE DISORDER, IN PARTIAL REMISSION (HCC): ICD-10-CM

## 2024-07-29 DIAGNOSIS — F41.9 ANXIETY: ICD-10-CM

## 2024-07-30 ENCOUNTER — OFFICE VISIT (OUTPATIENT)
Dept: PAIN MEDICINE | Facility: CLINIC | Age: 64
End: 2024-07-30
Payer: COMMERCIAL

## 2024-07-30 VITALS
WEIGHT: 119 LBS | HEIGHT: 64 IN | SYSTOLIC BLOOD PRESSURE: 122 MMHG | TEMPERATURE: 98 F | BODY MASS INDEX: 20.32 KG/M2 | HEART RATE: 60 BPM | DIASTOLIC BLOOD PRESSURE: 72 MMHG

## 2024-07-30 DIAGNOSIS — M48.061 LUMBAR FORAMINAL STENOSIS: ICD-10-CM

## 2024-07-30 DIAGNOSIS — M54.16 LUMBAR RADICULOPATHY: ICD-10-CM

## 2024-07-30 DIAGNOSIS — M47.816 LUMBAR SPONDYLOSIS: Primary | ICD-10-CM

## 2024-07-30 DIAGNOSIS — M51.16 LUMBAR DISC DISEASE WITH RADICULOPATHY: ICD-10-CM

## 2024-07-30 PROCEDURE — G2211 COMPLEX E/M VISIT ADD ON: HCPCS | Performed by: PHYSICIAN ASSISTANT

## 2024-07-30 PROCEDURE — 1160F RVW MEDS BY RX/DR IN RCRD: CPT | Performed by: PHYSICIAN ASSISTANT

## 2024-07-30 PROCEDURE — 99214 OFFICE O/P EST MOD 30 MIN: CPT | Performed by: PHYSICIAN ASSISTANT

## 2024-07-30 PROCEDURE — 1159F MED LIST DOCD IN RCRD: CPT | Performed by: PHYSICIAN ASSISTANT

## 2024-07-30 RX ORDER — PREGABALIN 100 MG/1
100 CAPSULE ORAL 3 TIMES DAILY
Qty: 90 CAPSULE | Refills: 2 | Status: SHIPPED | OUTPATIENT
Start: 2024-07-30

## 2024-07-30 RX ORDER — VENLAFAXINE 100 MG/1
TABLET ORAL
Qty: 270 TABLET | Refills: 1 | Status: SHIPPED | OUTPATIENT
Start: 2024-07-30

## 2024-07-30 NOTE — PROGRESS NOTES
Assessment:  1. Lumbar spondylosis    2. Lumbar foraminal stenosis    3. Lumbar disc disease with radiculopathy    4. Lumbar radiculopathy        Plan:  While the patient was in the office today, I did have a thorough conversation regarding their chronic pain syndrome, medication management, and treatment plan options.    The patient remains clinically stable and well-controlled on the current medication of pregabalin 100 mg 3 times daily.  She is taking the medication as prescribed without side effects and reports approximately 75% relief.  I feel it is reasonable to continue therefore I have electronically sent ongoing refills to her pharmacy.    Repeat injection therapy in the future if indicated.    The patient will follow-up in 4 months for medication prescription refill and reevaluation. The patient was advised to contact the office should their symptoms worsen in the interim. The patient was agreeable and verbalized an understanding.        History of Present Illness:    The patient is a 64 y.o. female last seen on 5/7/2024 who presents for a follow up office visit guards to chronic low back pain secondary to lumbar spondylosis, lumbar degenerative disc disease and lumbar foraminal stenosis with radiculopathy.  The patient currently reports low back pain with radiation into the left lower extremity that she rates 2 out of 10 and describes it as an intermittent dull, aching, pressure-like pain with associated numbness.  Since her last visit we have increased her pregabalin to 100 mg 3 times daily which has helped significantly.  She feels that her pain has been manageable with medication and does not feel the need to intervene with injections at this point.    Current pain medications includes: Pregabalin 100 mg 3 times daily .  The patient reports that this regimen is providing 75% pain relief.  The patient is reporting no side effects from this pain medication regimen.    I have personally reviewed and/or  updated the patient's past medical history, past surgical history, family history, social history, current medications, allergies, and vital signs today.       Review of Systems:    Review of Systems   Respiratory:  Negative for shortness of breath.    Cardiovascular:  Negative for chest pain.   Gastrointestinal:  Negative for constipation, diarrhea, nausea and vomiting.   Musculoskeletal:  Negative for arthralgias, gait problem, joint swelling (Joint stiffness) and myalgias.   Skin:  Negative for rash.   Neurological:  Negative for dizziness, seizures and weakness.   All other systems reviewed and are negative.        Past Medical History:   Diagnosis Date   • Allergic 2002   • Anxiety    • Arthritis    • Bariatric surgery status    • Chronic pain disorder    • Disturbance of memory     last assessed: 2016    • Endometriosis    • GERD (gastroesophageal reflux disease)    • Hiatal hernia    • Hirsutism    • History of sleeve gastrectomy    • Lumbar herniated disc    • Menometrorrhagia    • Morbid obesity (HCC)     last assessed: Dec 5, 2014    • Motion sickness    • Postgastrectomy malabsorption    • Sclerosing adenosis of breast    • Symptomatic menopausal or female climacteric states    • Uterine leiomyoma        Past Surgical History:   Procedure Laterality Date   • APPENDECTOMY     • BREAST EXCISIONAL BIOPSY Left     Benign   • COLONOSCOPY      complete - 7 year repeat recommended - Resolved:     • DILATION AND CURETTAGE OF UTERUS     • FRACTURE SURGERY     • GASTRECTOMY SLEEVE LAPAROSCOPIC  2018   • GYNECOLOGIC CRYOSURGERY      Cervix - s for abnormal paps    • HYSTERECTOMY  2006   • INDUCED       x3   • LAPAROSCOPIC SALPINGOOPHERECTOMY Right    • LAPAROSCOPIC TOTAL HYSTERECTOMY      06, Laparoscopic hysterectomy with LSO with vaginal closure of the vaginal cuff    • CA REVJ GSTR/JJ ANAST W/RCNSTJ W/O VGTMY N/A 2021    Procedure: LAPAROSCOPIC REVISION  CONVERSION  JENN-EN-Y GASTRIC BYPASS WITH ROBOTICS AND INTRAOPERATIVE EGD, PARAESOPHAGEAL HERNIA REPAIR;  Surgeon: Sheldon Núñez MD;  Location: [facility];  Service: Bariatrics   • SALPINGOOPHORECTOMY Left 2006    Right ovary removed at age 15 and the left one was removed with uterus.   • STOMACH SURGERY  2014    for morbid obesity - Managed by: Sheldon Hendricks -    • TONSILLECTOMY AND ADENOIDECTOMY     • TOOTH EXTRACTION         Family History   Problem Relation Age of Onset   • Hypertension Mother    • Alzheimer's disease Mother    • Stroke Mother         several mini strokes   • Dementia Mother         Alzheimers   • Thyroid disease Mother    • Arthritis Mother    • Hyperlipidemia Father    • Diabetes Father         mellitus    • Prostate cancer Father 70   • Rectal cancer Father         80's   • Lung cancer Father 90   • Liver cancer Father 90   • Heart attack Father    • Colon cancer Father         80's   • Cancer Father         Rectal, metastisized   • Hearing loss Father    • Skin cancer Father         Multiple times-unknown age   • Hypertension Sister    • Colon polyps Sister    • No Known Problems Daughter    • Breast cancer Maternal Grandmother         Unknown age   • No Known Problems Maternal Grandfather    • No Known Problems Paternal Grandmother    • No Known Problems Paternal Grandfather    • Hyperlipidemia Brother         no more       Social History     Occupational History   • Not on file   Tobacco Use   • Smoking status: Former     Current packs/day: 0.00     Average packs/day: 0.5 packs/day for 17.8 years (8.9 ttl pk-yrs)     Types: Cigarettes     Start date: 3/4/1971     Quit date: 1989     Years since quittin.6     Passive exposure: Past   • Smokeless tobacco: Never   • Tobacco comments:     33 years smoke free!   Vaping Use   • Vaping status: Some Days   • Substances: THC, CBD   Substance and Sexual Activity   • Alcohol use: Not Currently     Comment: occasional   • Drug use:  Yes     Types: Marijuana     Comment: medical   • Sexual activity: Not Currently     Partners: Male     Birth control/protection: Post-menopausal, Surgical, Female Sterilization, None         Current Outpatient Medications:   •  BIOTIN PO, Take by mouth Two tablets once day, Disp: , Rfl:   •  Cholecalciferol (VITAMIN D3) 2000 units TABS, Take 2 tablets by mouth daily, Disp: , Rfl:   •  famotidine (PEPCID) 20 mg tablet, Take 20 mg by mouth 2 (two) times a day as needed for heartburn, Disp: , Rfl:   •  hydrOXYzine HCL (ATARAX) 10 mg tablet, Take 1 tablet (10 mg total) by mouth every 8 (eight) hours as needed for anxiety, Disp: 30 tablet, Rfl: 0  •  levothyroxine (Synthroid) 150 mcg tablet, TAKE 1 TABLET BY MOUTH 6 DAYS OF THE WEEK THEN 1 & 1/2 TABS ON SUNDAY, Disp: 96 tablet, Rfl: 1  •  Magnesium 200 MG TABS, Take 2 tablets by mouth daily, Disp: , Rfl:   •  Multiple Vitamins-Minerals (Bariatric Multivitamins/Iron) CAPS, Take 2 capsules by mouth in the morning, Disp: , Rfl:   •  norethindrone (MICRONOR) 0.35 MG tablet, Take 1 tablet (0.35 mg total) by mouth daily, Disp: 90 tablet, Rfl: 1  •  omeprazole (PriLOSEC) 20 mg delayed release capsule, TAKE 1 CAPSULE BY MOUTH EVERY DAY, Disp: 90 capsule, Rfl: 1  •  pregabalin (LYRICA) 100 mg capsule, Take 1 capsule (100 mg total) by mouth 3 (three) times a day, Disp: 90 capsule, Rfl: 2  •  tiZANidine (ZANAFLEX) 4 mg tablet, TAKE 1 TABLET BY MOUTH EVERY 8 HOURS IF NEEDED FOR MUSCLE SPASM OR PAIN, Disp: 60 tablet, Rfl: 0  •  traMADol-acetaminophen (ULTRACET) 37.5-325 mg per tablet, Take 1 tablet by mouth every 6 (six) hours as needed for moderate pain, Disp: 120 tablet, Rfl: 0  •  venlafaxine (EFFEXOR) 100 MG tablet, TAKE 2 TABLETS BY MOUTH EVERY MORNING AND 1 TABLET EVERY EVENING, Disp: 90 tablet, Rfl: 5  •  vitamin B-12 (CYANOCOBALAMIN) 50 MCG tablet, Take by mouth daily, Disp: , Rfl:   •  Calcium 250 MG CAPS, Take 500 mg by mouth daily (Patient not taking: Reported on  "7/30/2024), Disp: , Rfl:     No Known Allergies    Physical Exam:    /72 (BP Location: Left arm, Patient Position: Sitting, Cuff Size: Standard)   Pulse 60   Temp 98 °F (36.7 °C)   Ht 5' 4\" (1.626 m)   Wt 54 kg (119 lb)   BMI 20.43 kg/m²     Constitutional:normal, well developed, well nourished, alert, in no distress and non-toxic and no overt pain behavior.  Eyes:anicteric  HEENT:grossly intact  Neck:supple, symmetric, trachea midline and no masses   Pulmonary:even and unlabored  Cardiovascular:No edema or pitting edema present  Skin:Normal without rashes or lesions and well hydrated  Psychiatric:Mood and affect appropriate  Neurologic:Cranial Nerves II-XII grossly intact  Musculoskeletal: Stable gait without the use of assistive devices      Imaging  No orders to display         No orders of the defined types were placed in this encounter.      "

## 2024-07-31 DIAGNOSIS — M54.50 CHRONIC BILATERAL LOW BACK PAIN WITHOUT SCIATICA: ICD-10-CM

## 2024-07-31 DIAGNOSIS — G89.29 CHRONIC BILATERAL LOW BACK PAIN WITHOUT SCIATICA: ICD-10-CM

## 2024-07-31 NOTE — TELEPHONE ENCOUNTER
Reason for call:   [x] Refill   [] Prior Auth  [] Other:     Office:   [x] PCP/Provider - Lucia Lord DO   [] Specialty/Provider -     Medication: tiZANidine (ZANAFLEX) 4 mg tablet     Dose/Frequency: TAKE 1 TABLET BY MOUTH EVERY 8 HOURS IF NEEDED FOR MUSCLE SPASM OR PAIN     Quantity: 60    Pharmacy: [facility]/pharm[facility] #[id number] - [place], [address] [phone]    Does the patient have enough for 3 days?   [x] Yes   [] No - Send as HP to POD

## 2024-08-01 RX ORDER — TIZANIDINE 4 MG/1
TABLET ORAL
Qty: 60 TABLET | Refills: 0 | Status: SHIPPED | OUTPATIENT
Start: 2024-08-01

## 2024-08-05 ENCOUNTER — TELEPHONE (OUTPATIENT)
Age: 64
End: 2024-08-05

## 2024-08-05 NOTE — TELEPHONE ENCOUNTER
Patient thinks that she possibly might have a UTI because her urine is cloudy and it burns when she urinates. She wanted to know if she needs to come into the office for an appointment or if the doctor just needs a urine sample and to call in medication. Can you please call back and advise     thank you

## 2024-08-06 ENCOUNTER — OFFICE VISIT (OUTPATIENT)
Dept: URGENT CARE | Facility: CLINIC | Age: 64
End: 2024-08-06
Payer: COMMERCIAL

## 2024-08-06 VITALS
HEART RATE: 79 BPM | HEIGHT: 64 IN | WEIGHT: 119 LBS | TEMPERATURE: 98.5 F | OXYGEN SATURATION: 99 % | BODY MASS INDEX: 20.32 KG/M2 | DIASTOLIC BLOOD PRESSURE: 64 MMHG | RESPIRATION RATE: 16 BRPM | SYSTOLIC BLOOD PRESSURE: 122 MMHG

## 2024-08-06 DIAGNOSIS — R30.0 DYSURIA: Primary | ICD-10-CM

## 2024-08-06 LAB
SL AMB  POCT GLUCOSE, UA: NEGATIVE
SL AMB LEUKOCYTE ESTERASE,UA: ABNORMAL
SL AMB POCT BILIRUBIN,UA: NEGATIVE
SL AMB POCT BLOOD,UA: ABNORMAL
SL AMB POCT CLARITY,UA: ABNORMAL
SL AMB POCT COLOR,UA: YELLOW
SL AMB POCT KETONES,UA: NEGATIVE
SL AMB POCT NITRITE,UA: NEGATIVE
SL AMB POCT PH,UA: 6
SL AMB POCT SPECIFIC GRAVITY,UA: 1.01
SL AMB POCT URINE PROTEIN: NEGATIVE
SL AMB POCT UROBILINOGEN: 0.2

## 2024-08-06 PROCEDURE — 99213 OFFICE O/P EST LOW 20 MIN: CPT | Performed by: PHYSICIAN ASSISTANT

## 2024-08-06 PROCEDURE — 81002 URINALYSIS NONAUTO W/O SCOPE: CPT | Performed by: PHYSICIAN ASSISTANT

## 2024-08-06 RX ORDER — NITROFURANTOIN 25; 75 MG/1; MG/1
100 CAPSULE ORAL 2 TIMES DAILY
Qty: 14 CAPSULE | Refills: 0 | Status: SHIPPED | OUTPATIENT
Start: 2024-08-06 | End: 2024-08-13

## 2024-08-06 NOTE — PROGRESS NOTES
[facility]        NAME: Lola Wade is a 64 y.o. female  : 1960    MRN: [id number]  DATE: 2024  TIME: 4:11 PM    Assessment and Plan   No primary diagnosis found.  No diagnosis found.      Patient Instructions       Follow up with PCP in 3-5 days.  Proceed to  ER if symptoms worsen.    If tests have been performed at [facility], our office will contact you with results if changes need to be made to the care plan discussed with you at the visit.  You can review your full results on [facility] MyChart.    Chief Complaint     Chief Complaint   Patient presents with    Possible UTI     Pt reports dysuria, hematuria and foul smelling urine with onset of symptoms Saturday with progression. Denies any fever. Not currently managing with otc medication.         History of Present Illness       HPI    Review of Systems   Review of Systems      Current Medications       Current Outpatient Medications:     BIOTIN PO, Take by mouth Two tablets once day, Disp: , Rfl:     Cholecalciferol (VITAMIN D3) 2000 units TABS, Take 2 tablets by mouth daily, Disp: , Rfl:     famotidine (PEPCID) 20 mg tablet, Take 20 mg by mouth 2 (two) times a day as needed for heartburn, Disp: , Rfl:     hydrOXYzine HCL (ATARAX) 10 mg tablet, Take 1 tablet (10 mg total) by mouth every 8 (eight) hours as needed for anxiety, Disp: 30 tablet, Rfl: 0    levothyroxine (Synthroid) 150 mcg tablet, TAKE 1 TABLET BY MOUTH 6 DAYS OF THE WEEK THEN 1 & 1/2 TABS ON , Disp: 96 tablet, Rfl: 1    Magnesium 200 MG TABS, Take 2 tablets by mouth daily, Disp: , Rfl:     Multiple Vitamins-Minerals (Bariatric Multivitamins/Iron) CAPS, Take 2 capsules by mouth in the morning, Disp: , Rfl:     norethindrone (MICRONOR) 0.35 MG tablet, Take 1 tablet (0.35 mg total) by mouth daily, Disp: 90 tablet, Rfl: 1    omeprazole (PriLOSEC) 20 mg delayed release capsule, TAKE 1 CAPSULE BY MOUTH EVERY DAY, Disp: 90 capsule, Rfl: 1    pregabalin (LYRICA) 100 mg  capsule, Take 1 capsule (100 mg total) by mouth 3 (three) times a day, Disp: 90 capsule, Rfl: 2    tiZANidine (ZANAFLEX) 4 mg tablet, TAKE 1 TABLET BY MOUTH EVERY 8 HOURS IF NEEDED FOR MUSCLE SPASM OR PAIN, Disp: 60 tablet, Rfl: 0    traMADol-acetaminophen (ULTRACET) 37.5-325 mg per tablet, Take 1 tablet by mouth every 6 (six) hours as needed for moderate pain, Disp: 120 tablet, Rfl: 0    venlafaxine (EFFEXOR) 100 MG tablet, TAKE 2 TABLETS BY MOUTH EVERY MORNING AND 1 TABLET EVERY EVENING, Disp: 270 tablet, Rfl: 1    vitamin B-12 (CYANOCOBALAMIN) 50 MCG tablet, Take by mouth daily, Disp: , Rfl:     Calcium 250 MG CAPS, Take 500 mg by mouth daily (Patient not taking: Reported on 7/30/2024), Disp: , Rfl:     Current Allergies     Allergies as of 08/06/2024    (No Known Allergies)            The following portions of the patient's history were reviewed and updated as appropriate: allergies, current medications, past family history, past medical history, past social history, past surgical history and problem list.     Past Medical History:   Diagnosis Date    Allergic 2002    Anxiety     Arthritis     Bariatric surgery status     Chronic pain disorder     Disturbance of memory     last assessed: June 23, 2016     Endometriosis     GERD (gastroesophageal reflux disease)     Hiatal hernia     Hirsutism     History of sleeve gastrectomy     Lumbar herniated disc     Menometrorrhagia     Morbid obesity (HCC)     last assessed: Dec 5, 2014     Motion sickness     Postgastrectomy malabsorption     Sclerosing adenosis of breast     Symptomatic menopausal or female climacteric states     Uterine leiomyoma        Past Surgical History:   Procedure Laterality Date    APPENDECTOMY      BREAST EXCISIONAL BIOPSY Left 2010    Benign    COLONOSCOPY      complete - 7 year repeat recommended - Resolved: 2007     DILATION AND CURETTAGE OF UTERUS      FRACTURE SURGERY  1993    GASTRECTOMY SLEEVE LAPAROSCOPIC  11/24/2018    GYNECOLOGIC  "CRYOSURGERY      Cervix -  for abnormal paps     HYSTERECTOMY  2006    INDUCED       x3    LAPAROSCOPIC SALPINGOOPHERECTOMY Right     LAPAROSCOPIC TOTAL HYSTERECTOMY      06, Laparoscopic hysterectomy with LSO with vaginal closure of the vaginal cuff     UT REVJ GSTR/JJ ANAST W/RCNSTJ W/O VGTMY N/A 2021    Procedure: LAPAROSCOPIC REVISION CONVERSION  JENN-EN-Y GASTRIC BYPASS WITH ROBOTICS AND INTRAOPERATIVE EGD, PARAESOPHAGEAL HERNIA REPAIR;  Surgeon: Sheldon Núñez MD;  Location: [facility];  Service: Bariatrics    SALPINGOOPHORECTOMY Left 2006    Right ovary removed at age 15 and the left one was removed with uterus.    STOMACH SURGERY  2014    for morbid obesity - Managed by: Sheldon Hendricks -     TONSILLECTOMY AND ADENOIDECTOMY      TOOTH EXTRACTION         Family History   Problem Relation Age of Onset    Hypertension Mother     Alzheimer's disease Mother     Stroke Mother         several mini strokes    Dementia Mother         Alzheimers    Thyroid disease Mother     Arthritis Mother     Hyperlipidemia Father     Diabetes Father         mellitus     Prostate cancer Father 70    Rectal cancer Father         80's    Lung cancer Father 90    Liver cancer Father 90    Heart attack Father     Colon cancer Father         80's    Cancer Father         Rectal, metastisized    Hearing loss Father     Skin cancer Father         Multiple times-unknown age    Hypertension Sister     Colon polyps Sister     No Known Problems Daughter     Breast cancer Maternal Grandmother         Unknown age    No Known Problems Maternal Grandfather     No Known Problems Paternal Grandmother     No Known Problems Paternal Grandfather     Hyperlipidemia Brother         no more         Medications have been verified.        Objective   /64 (BP Location: Left arm, Patient Position: Sitting)   Pulse 79   Temp 98.5 °F (36.9 °C)   Resp 16   Ht 5' 4\" (1.626 m)   Wt 54 kg (119 lb)   SpO2 99%   BMI 20.43 " kg/m²   No LMP recorded. Patient has had a hysterectomy.       Physical Exam     Physical Exam

## 2024-08-06 NOTE — PATIENT INSTRUCTIONS
Follow-up with your primary care provider in the next 3-5 days.  Any new or worsening symptoms develop get re-evaluated sooner or proceed to the ER.  Take antibiotics as prescribed.  Urine culture results to be available in a few days.

## 2024-08-06 NOTE — PROGRESS NOTES
[facility]        NAME: Lola Wade is a 64 y.o. female  : 1960    MRN: [id number]  DATE: 2024  TIME: 4:17 PM    Assessment and Plan   Dysuria [R30.0]  1. Dysuria  nitrofurantoin (MACROBID) 100 mg capsule            Patient Instructions       Follow up with PCP in 3-5 days.  Proceed to  ER if symptoms worsen.    If tests have been performed at [facility], our office will contact you with results if changes need to be made to the care plan discussed with you at the visit.  You can review your full results on [facility] MyChart.    Chief Complaint     Chief Complaint   Patient presents with    Possible UTI     Pt reports dysuria, hematuria and foul smelling urine with onset of symptoms Saturday with progression. Denies any fever. Not currently managing with otc medication.         History of Present Illness       Patient presents with burning with urination, mild aching in the abdomen starting 3 days ago.   Denies urinary frequency, back pains, flank pains, fevers, vaginal bleeding/discharge.         Review of Systems   Review of Systems   Constitutional:  Negative for fever.   Gastrointestinal:  Positive for abdominal pain.   Genitourinary:  Positive for dysuria. Negative for difficulty urinating, flank pain, frequency, urgency, vaginal bleeding and vaginal discharge.   Musculoskeletal:  Negative for back pain.         Current Medications       Current Outpatient Medications:     BIOTIN PO, Take by mouth Two tablets once day, Disp: , Rfl:     Cholecalciferol (VITAMIN D3) 2000 units TABS, Take 2 tablets by mouth daily, Disp: , Rfl:     famotidine (PEPCID) 20 mg tablet, Take 20 mg by mouth 2 (two) times a day as needed for heartburn, Disp: , Rfl:     hydrOXYzine HCL (ATARAX) 10 mg tablet, Take 1 tablet (10 mg total) by mouth every 8 (eight) hours as needed for anxiety, Disp: 30 tablet, Rfl: 0    levothyroxine (Synthroid) 150 mcg tablet, TAKE 1 TABLET BY MOUTH 6 DAYS OF THE WEEK THEN  &   TABS ON SUNDAY, Disp: 96 tablet, Rfl: 1    Magnesium 200 MG TABS, Take 2 tablets by mouth daily, Disp: , Rfl:     Multiple Vitamins-Minerals (Bariatric Multivitamins/Iron) CAPS, Take 2 capsules by mouth in the morning, Disp: , Rfl:     nitrofurantoin (MACROBID) 100 mg capsule, Take 1 capsule (100 mg total) by mouth 2 (two) times a day for 7 days, Disp: 14 capsule, Rfl: 0    norethindrone (MICRONOR) 0.35 MG tablet, Take 1 tablet (0.35 mg total) by mouth daily, Disp: 90 tablet, Rfl: 1    omeprazole (PriLOSEC) 20 mg delayed release capsule, TAKE 1 CAPSULE BY MOUTH EVERY DAY, Disp: 90 capsule, Rfl: 1    pregabalin (LYRICA) 100 mg capsule, Take 1 capsule (100 mg total) by mouth 3 (three) times a day, Disp: 90 capsule, Rfl: 2    tiZANidine (ZANAFLEX) 4 mg tablet, TAKE 1 TABLET BY MOUTH EVERY 8 HOURS IF NEEDED FOR MUSCLE SPASM OR PAIN, Disp: 60 tablet, Rfl: 0    traMADol-acetaminophen (ULTRACET) 37.5-325 mg per tablet, Take 1 tablet by mouth every 6 (six) hours as needed for moderate pain, Disp: 120 tablet, Rfl: 0    venlafaxine (EFFEXOR) 100 MG tablet, TAKE 2 TABLETS BY MOUTH EVERY MORNING AND 1 TABLET EVERY EVENING, Disp: 270 tablet, Rfl: 1    vitamin B-12 (CYANOCOBALAMIN) 50 MCG tablet, Take by mouth daily, Disp: , Rfl:     Calcium 250 MG CAPS, Take 500 mg by mouth daily (Patient not taking: Reported on 7/30/2024), Disp: , Rfl:     Current Allergies     Allergies as of 08/06/2024    (No Known Allergies)            The following portions of the patient's history were reviewed and updated as appropriate: allergies, current medications, past family history, past medical history, past social history, past surgical history and problem list.     Past Medical History:   Diagnosis Date    Allergic 2002    Anxiety     Arthritis     Bariatric surgery status     Chronic pain disorder     Disturbance of memory     last assessed: June 23, 2016     Endometriosis     GERD (gastroesophageal reflux disease)     Hiatal hernia      Hirsutism     History of sleeve gastrectomy     Lumbar herniated disc     Menometrorrhagia     Morbid obesity (HCC)     last assessed: Dec 5, 2014     Motion sickness     Postgastrectomy malabsorption     Sclerosing adenosis of breast     Symptomatic menopausal or female climacteric states     Uterine leiomyoma        Past Surgical History:   Procedure Laterality Date    APPENDECTOMY      BREAST EXCISIONAL BIOPSY Left 2010    Benign    COLONOSCOPY      complete - 7 year repeat recommended - Resolved:      DILATION AND CURETTAGE OF UTERUS      FRACTURE SURGERY  1993    GASTRECTOMY SLEEVE LAPAROSCOPIC  2018    GYNECOLOGIC CRYOSURGERY      Cervix -  for abnormal paps     HYSTERECTOMY  2006    INDUCED       x3    LAPAROSCOPIC SALPINGOOPHERECTOMY Right     LAPAROSCOPIC TOTAL HYSTERECTOMY      06, Laparoscopic hysterectomy with LSO with vaginal closure of the vaginal cuff     WI REVJ GSTR/JJ ANAST W/RCNSTJ W/O VGTMY N/A 2021    Procedure: LAPAROSCOPIC REVISION CONVERSION  JENN-EN-Y GASTRIC BYPASS WITH ROBOTICS AND INTRAOPERATIVE EGD, PARAESOPHAGEAL HERNIA REPAIR;  Surgeon: Sheldon Núñez MD;  Location: [facility];  Service: Bariatrics    SALPINGOOPHORECTOMY Left     Right ovary removed at age 15 and the left one was removed with uterus.    STOMACH SURGERY  2014    for morbid obesity - Managed by: Sheldon Hendricks -     TONSILLECTOMY AND ADENOIDECTOMY      TOOTH EXTRACTION         Family History   Problem Relation Age of Onset    Hypertension Mother     Alzheimer's disease Mother     Stroke Mother         several mini strokes    Dementia Mother         Alzheimers    Thyroid disease Mother     Arthritis Mother     Hyperlipidemia Father     Diabetes Father         mellitus     Prostate cancer Father 70    Rectal cancer Father         80's    Lung cancer Father 90    Liver cancer Father 90    Heart attack Father     Colon cancer Father         80's    Cancer Father         Rectal,  "metastisized    Hearing loss Father     Skin cancer Father         Multiple times-unknown age    Hypertension Sister     Colon polyps Sister     No Known Problems Daughter     Breast cancer Maternal Grandmother         Unknown age    No Known Problems Maternal Grandfather     No Known Problems Paternal Grandmother     No Known Problems Paternal Grandfather     Hyperlipidemia Brother         no more         Medications have been verified.        Objective   /64 (BP Location: Left arm, Patient Position: Sitting)   Pulse 79   Temp 98.5 °F (36.9 °C)   Resp 16   Ht 5' 4\" (1.626 m)   Wt 54 kg (119 lb)   SpO2 99%   BMI 20.43 kg/m²   No LMP recorded. Patient has had a hysterectomy.       Physical Exam     Physical Exam  Constitutional:       Appearance: Normal appearance.   Abdominal:      General: Abdomen is flat. Bowel sounds are normal.      Palpations: Abdomen is soft.      Tenderness: There is no abdominal tenderness. There is no right CVA tenderness, left CVA tenderness, guarding or rebound.   Neurological:      Mental Status: She is alert.   Psychiatric:         Mood and Affect: Mood normal.         Behavior: Behavior normal.                   "

## 2024-08-11 DIAGNOSIS — N95.1 MENOPAUSAL SYNDROME (HOT FLASHES): ICD-10-CM

## 2024-08-11 LAB
BACTERIA UR CULT: ABNORMAL
Lab: ABNORMAL
SL AMB ANTIMICROBIAL SUSCEPTIBILITY: ABNORMAL

## 2024-08-12 ENCOUNTER — TELEPHONE (OUTPATIENT)
Age: 64
End: 2024-08-12

## 2024-08-12 RX ORDER — ACETAMINOPHEN AND CODEINE PHOSPHATE 120; 12 MG/5ML; MG/5ML
1 SOLUTION ORAL DAILY
Qty: 84 TABLET | Refills: 1 | Status: SHIPPED | OUTPATIENT
Start: 2024-08-12

## 2024-08-12 NOTE — TELEPHONE ENCOUNTER
"Patient with hx of bariatric surgery in 2021 calling about recent \"stomachache\" she has been having.  Patient is trying to figure out what could be causing her discomfort.  She states that she notices a stomachache when she does not have a BM for a few days. States she normally has 2 Bowel movements per week. She stated that it is not constant between movements but once a few days go by she begins with discomfort. She notes that she had a normal BM this morning but that was the first time since last Wednesday. Notes that she vomited x 3 yesterday.  She notes relief of symptoms after BM today.  She has not been taking any stool softeners recently.    Recommended patient try to have BM 3x a week to decrease the amount of time between and avoid the stomachache.  Patient also stated she has been under stress planning a wedding recently. She is open to any recommendations on how to manage.    Annual visit is not until 2/25.    CB#[phone]  "

## 2024-08-14 ENCOUNTER — APPOINTMENT (EMERGENCY)
Dept: CT IMAGING | Facility: HOSPITAL | Age: 64
End: 2024-08-14
Payer: COMMERCIAL

## 2024-08-14 ENCOUNTER — HOSPITAL ENCOUNTER (EMERGENCY)
Facility: HOSPITAL | Age: 64
Discharge: HOME | End: 2024-08-14
Attending: EMERGENCY MEDICINE
Payer: COMMERCIAL

## 2024-08-14 VITALS
HEART RATE: 74 BPM | TEMPERATURE: 98 F | SYSTOLIC BLOOD PRESSURE: 111 MMHG | OXYGEN SATURATION: 94 % | RESPIRATION RATE: 18 BRPM | DIASTOLIC BLOOD PRESSURE: 60 MMHG

## 2024-08-14 DIAGNOSIS — K29.70 GASTRITIS: ICD-10-CM

## 2024-08-14 DIAGNOSIS — K44.9 HIATAL HERNIA: ICD-10-CM

## 2024-08-14 DIAGNOSIS — R93.89 ABNORMAL CT SCAN: ICD-10-CM

## 2024-08-14 DIAGNOSIS — K80.20 GALLSTONES: Primary | ICD-10-CM

## 2024-08-14 LAB
ALBUMIN SERPL BCG-MCNC: 4.7 G/DL (ref 3.5–5)
ALP SERPL-CCNC: 85 U/L (ref 34–104)
ALT SERPL W P-5'-P-CCNC: 22 U/L (ref 7–52)
ANION GAP SERPL CALCULATED.3IONS-SCNC: 9 MMOL/L (ref 4–13)
AST SERPL W P-5'-P-CCNC: 20 U/L (ref 13–39)
BACTERIA UR QL AUTO: ABNORMAL /HPF
BASOPHILS # BLD AUTO: 0.07 THOUSANDS/ÂΜL (ref 0–0.1)
BASOPHILS NFR BLD AUTO: 1 % (ref 0–1)
BILIRUB SERPL-MCNC: 0.86 MG/DL (ref 0.2–1)
BILIRUB UR QL STRIP: NEGATIVE
BUN SERPL-MCNC: 18 MG/DL (ref 5–25)
CALCIUM SERPL-MCNC: 10.3 MG/DL (ref 8.4–10.2)
CHLORIDE SERPL-SCNC: 102 MMOL/L (ref 96–108)
CLARITY UR: CLEAR
CO2 SERPL-SCNC: 28 MMOL/L (ref 21–32)
COLOR UR: YELLOW
CREAT SERPL-MCNC: 0.56 MG/DL (ref 0.6–1.3)
EOSINOPHIL # BLD AUTO: 0.06 THOUSAND/ÂΜL (ref 0–0.61)
EOSINOPHIL NFR BLD AUTO: 1 % (ref 0–6)
ERYTHROCYTE [DISTWIDTH] IN BLOOD BY AUTOMATED COUNT: 11.9 % (ref 11.6–15.1)
GFR SERPL CREATININE-BSD FRML MDRD: 97 ML/MIN/1.73SQ M
GLUCOSE SERPL-MCNC: 114 MG/DL (ref 65–140)
GLUCOSE UR STRIP-MCNC: NEGATIVE MG/DL
HCT VFR BLD AUTO: 41.4 % (ref 34.8–46.1)
HGB BLD-MCNC: 14.3 G/DL (ref 11.5–15.4)
HGB UR QL STRIP.AUTO: NEGATIVE
IMM GRANULOCYTES # BLD AUTO: 0.18 THOUSAND/UL (ref 0–0.2)
IMM GRANULOCYTES NFR BLD AUTO: 2 % (ref 0–2)
KETONES UR STRIP-MCNC: ABNORMAL MG/DL
LEUKOCYTE ESTERASE UR QL STRIP: ABNORMAL
LIPASE SERPL-CCNC: 8 U/L (ref 11–82)
LYMPHOCYTES # BLD AUTO: 2.46 THOUSANDS/ÂΜL (ref 0.6–4.47)
LYMPHOCYTES NFR BLD AUTO: 22 % (ref 14–44)
MCH RBC QN AUTO: 29.6 PG (ref 26.8–34.3)
MCHC RBC AUTO-ENTMCNC: 34.5 G/DL (ref 31.4–37.4)
MCV RBC AUTO: 86 FL (ref 82–98)
MONOCYTES # BLD AUTO: 0.84 THOUSAND/ÂΜL (ref 0.17–1.22)
MONOCYTES NFR BLD AUTO: 8 % (ref 4–12)
MUCOUS THREADS UR QL AUTO: ABNORMAL
NEUTROPHILS # BLD AUTO: 7.49 THOUSANDS/ÂΜL (ref 1.85–7.62)
NEUTS SEG NFR BLD AUTO: 66 % (ref 43–75)
NITRITE UR QL STRIP: NEGATIVE
NON-SQ EPI CELLS URNS QL MICRO: ABNORMAL /HPF
NRBC BLD AUTO-RTO: 0 /100 WBCS
PH UR STRIP.AUTO: 6.5 [PH]
PLATELET # BLD AUTO: 400 THOUSANDS/UL (ref 149–390)
PMV BLD AUTO: 9.5 FL (ref 8.9–12.7)
POTASSIUM SERPL-SCNC: 4.1 MMOL/L (ref 3.5–5.3)
PROT SERPL-MCNC: 7.7 G/DL (ref 6.4–8.4)
PROT UR STRIP-MCNC: NEGATIVE MG/DL
RBC # BLD AUTO: 4.83 MILLION/UL (ref 3.81–5.12)
RBC #/AREA URNS AUTO: ABNORMAL /HPF
SODIUM SERPL-SCNC: 139 MMOL/L (ref 135–147)
SP GR UR STRIP.AUTO: <1.005 (ref 1–1.03)
UROBILINOGEN UR STRIP-ACNC: <2 MG/DL
WBC # BLD AUTO: 11.1 THOUSAND/UL (ref 4.31–10.16)
WBC #/AREA URNS AUTO: ABNORMAL /HPF

## 2024-08-14 PROCEDURE — 99285 EMERGENCY DEPT VISIT HI MDM: CPT | Performed by: PHYSICIAN ASSISTANT

## 2024-08-14 PROCEDURE — 96374 THER/PROPH/DIAG INJ IV PUSH: CPT

## 2024-08-14 PROCEDURE — 83690 ASSAY OF LIPASE: CPT | Performed by: EMERGENCY MEDICINE

## 2024-08-14 PROCEDURE — 96375 TX/PRO/DX INJ NEW DRUG ADDON: CPT

## 2024-08-14 PROCEDURE — 36415 COLL VENOUS BLD VENIPUNCTURE: CPT

## 2024-08-14 PROCEDURE — 81001 URINALYSIS AUTO W/SCOPE: CPT | Performed by: PHYSICIAN ASSISTANT

## 2024-08-14 PROCEDURE — 96361 HYDRATE IV INFUSION ADD-ON: CPT

## 2024-08-14 PROCEDURE — 80053 COMPREHEN METABOLIC PANEL: CPT | Performed by: EMERGENCY MEDICINE

## 2024-08-14 PROCEDURE — 85025 COMPLETE CBC W/AUTO DIFF WBC: CPT | Performed by: EMERGENCY MEDICINE

## 2024-08-14 PROCEDURE — 99284 EMERGENCY DEPT VISIT MOD MDM: CPT

## 2024-08-14 PROCEDURE — 74177 CT ABD & PELVIS W/CONTRAST: CPT

## 2024-08-14 RX ORDER — SUCRALFATE 1 G/1
1 TABLET ORAL 4 TIMES DAILY
Qty: 40 TABLET | Refills: 0 | Status: SHIPPED | OUTPATIENT
Start: 2024-08-14 | End: 2024-08-28

## 2024-08-14 RX ORDER — ONDANSETRON 2 MG/ML
4 INJECTION INTRAMUSCULAR; INTRAVENOUS ONCE
Status: COMPLETED | OUTPATIENT
Start: 2024-08-14 | End: 2024-08-14

## 2024-08-14 RX ORDER — PANTOPRAZOLE SODIUM 40 MG/10ML
40 INJECTION, POWDER, LYOPHILIZED, FOR SOLUTION INTRAVENOUS ONCE
Status: COMPLETED | OUTPATIENT
Start: 2024-08-14 | End: 2024-08-14

## 2024-08-14 RX ORDER — MORPHINE SULFATE 4 MG/ML
4 INJECTION, SOLUTION INTRAMUSCULAR; INTRAVENOUS ONCE
Status: COMPLETED | OUTPATIENT
Start: 2024-08-14 | End: 2024-08-14

## 2024-08-14 RX ADMIN — SODIUM CHLORIDE 1000 ML: 0.9 INJECTION, SOLUTION INTRAVENOUS at 17:40

## 2024-08-14 RX ADMIN — IOHEXOL 50 ML: 240 INJECTION, SOLUTION INTRATHECAL; INTRAVASCULAR; INTRAVENOUS; ORAL at 20:22

## 2024-08-14 RX ADMIN — IOHEXOL 80 ML: 350 INJECTION, SOLUTION INTRAVENOUS at 20:22

## 2024-08-14 RX ADMIN — MORPHINE SULFATE 4 MG: 4 INJECTION INTRAVENOUS at 18:33

## 2024-08-14 RX ADMIN — ONDANSETRON 4 MG: 2 INJECTION, SOLUTION INTRAMUSCULAR; INTRAVENOUS at 17:44

## 2024-08-14 RX ADMIN — PANTOPRAZOLE SODIUM 40 MG: 40 INJECTION, POWDER, FOR SOLUTION INTRAVENOUS at 17:45

## 2024-08-14 NOTE — TELEPHONE ENCOUNTER
"Paatient of Dr. Jeff Núñez. Had conversion of RNYGB on 9/28/21. Was starting with stomach pain back on 6/26/24. Called again on 8/12/24 for same. Calling again today. Still with \"stomach ache\".Comes and does. Woke up at 4 AM. Drank some ginger ale and vomited. Sat on toilet and had small bowel movement. No relief. Unable to eat but does feel hungry. Emotional. Under stress. Planning daughter's wedding. Please advise.  "

## 2024-08-14 NOTE — ED PROVIDER NOTES
History  Chief Complaint   Patient presents with    Abdominal Pain     Since Monday with epigastric abd pain. Also c/o dry heaves. Pt is tearful in triage     Patient is a 63 y/o F with h/o gastric bypass that presents to the ED with epigastric pain.  She states she has been getting the pain for a couple months in the middle of the night.  She takes omeprazole and drinks gingerale and the pain goes away.  For the past 4 days the pain has been constant.  She has not been able to eat or drink for the past 3 days. She has nausea, no vomiting.  No constipation or diarrhea.  No black or bloody stools.  Her initial bariatric surgery was in 2014 with revision in 2021 by Dr. Jeff Núñez. She denies alcohol use.       Abdominal Pain  Associated symptoms: nausea    Associated symptoms: no chest pain, no chills, no cough, no diarrhea, no dysuria, no fever, no shortness of breath and no vomiting        Prior to Admission Medications   Prescriptions Last Dose Informant Patient Reported? Taking?   BIOTIN PO  Self Yes No   Sig: Take by mouth Two tablets once day   Calcium 250 MG CAPS  Self Yes No   Sig: Take 500 mg by mouth daily   Patient not taking: Reported on 7/30/2024   Cholecalciferol (VITAMIN D3) 2000 units TABS  Self Yes No   Sig: Take 2 tablets by mouth daily   Magnesium 200 MG TABS  Self Yes No   Sig: Take 2 tablets by mouth daily   Multiple Vitamins-Minerals (Bariatric Multivitamins/Iron) CAPS  Self Yes No   Sig: Take 2 capsules by mouth in the morning   famotidine (PEPCID) 20 mg tablet  Self Yes No   Sig: Take 20 mg by mouth 2 (two) times a day as needed for heartburn   hydrOXYzine HCL (ATARAX) 10 mg tablet  Self No No   Sig: Take 1 tablet (10 mg total) by mouth every 8 (eight) hours as needed for anxiety   levothyroxine (Synthroid) 150 mcg tablet   No No   Sig: TAKE 1 TABLET BY MOUTH 6 DAYS OF THE WEEK THEN 1 & 1/2 TABS ON SUNDAY   nitrofurantoin (MACROBID) 100 mg capsule   No No   Sig: Take 1 capsule (100 mg total) by  mouth 2 (two) times a day for 7 days   norethindrone (MICRONOR) 0.35 MG tablet   No No   Sig: TAKE 1 TABLET BY MOUTH EVERY DAY   omeprazole (PriLOSEC) 20 mg delayed release capsule   No No   Sig: TAKE 1 CAPSULE BY MOUTH EVERY DAY   pregabalin (LYRICA) 100 mg capsule   No No   Sig: Take 1 capsule (100 mg total) by mouth 3 (three) times a day   tiZANidine (ZANAFLEX) 4 mg tablet   No No   Sig: TAKE 1 TABLET BY MOUTH EVERY 8 HOURS IF NEEDED FOR MUSCLE SPASM OR PAIN   traMADol-acetaminophen (ULTRACET) 37.5-325 mg per tablet   No No   Sig: Take 1 tablet by mouth every 6 (six) hours as needed for moderate pain   venlafaxine (EFFEXOR) 100 MG tablet   No No   Sig: TAKE 2 TABLETS BY MOUTH EVERY MORNING AND 1 TABLET EVERY EVENING   vitamin B-12 (CYANOCOBALAMIN) 50 MCG tablet   Yes No   Sig: Take by mouth daily      Facility-Administered Medications: None       Past Medical History:   Diagnosis Date    Allergic 2002    Anxiety     Arthritis     Bariatric surgery status     Chronic pain disorder     Disturbance of memory     last assessed: 2016     Endometriosis     GERD (gastroesophageal reflux disease)     Hiatal hernia     Hirsutism     History of sleeve gastrectomy     Lumbar herniated disc     Menometrorrhagia     Morbid obesity (HCC)     last assessed: Dec 5, 2014     Motion sickness     Postgastrectomy malabsorption     Sclerosing adenosis of breast     Symptomatic menopausal or female climacteric states     Uterine leiomyoma        Past Surgical History:   Procedure Laterality Date    APPENDECTOMY      BREAST EXCISIONAL BIOPSY Left 2010    Benign    COLONOSCOPY      complete - 7 year repeat recommended - Resolved: 2007     DILATION AND CURETTAGE OF UTERUS      FRACTURE SURGERY  1993    GASTRECTOMY SLEEVE LAPAROSCOPIC  2018    GYNECOLOGIC CRYOSURGERY      Cervix -  for abnormal paps     HYSTERECTOMY  2006    INDUCED       x3    LAPAROSCOPIC SALPINGOOPHERECTOMY Right     LAPAROSCOPIC TOTAL  HYSTERECTOMY      1/16/06, Laparoscopic hysterectomy with LSO with vaginal closure of the vaginal cuff     MN REVJ GSTR/JJ ANAST W/RCNSTJ W/O VGTMY N/A 09/28/2021    Procedure: LAPAROSCOPIC REVISION CONVERSION  JENN-EN-Y GASTRIC BYPASS WITH ROBOTICS AND INTRAOPERATIVE EGD, PARAESOPHAGEAL HERNIA REPAIR;  Surgeon: Sheldon Núñez MD;  Location: [facility];  Service: Bariatrics    SALPINGOOPHORECTOMY Left 2006    Right ovary removed at age 15 and the left one was removed with uterus.    STOMACH SURGERY  11/24/2014    for morbid obesity - Managed by: Sheldon Hendricks -     TONSILLECTOMY AND ADENOIDECTOMY      TOOTH EXTRACTION         Family History   Problem Relation Age of Onset    Hypertension Mother     Alzheimer's disease Mother     Stroke Mother         several mini strokes    Dementia Mother         Alzheimers    Thyroid disease Mother     Arthritis Mother     Hyperlipidemia Father     Diabetes Father         mellitus     Prostate cancer Father 70    Rectal cancer Father         80's    Lung cancer Father 90    Liver cancer Father 90    Heart attack Father     Colon cancer Father         80's    Cancer Father         Rectal, metastisized    Hearing loss Father     Skin cancer Father         Multiple times-unknown age    Hypertension Sister     Colon polyps Sister     No Known Problems Daughter     Breast cancer Maternal Grandmother         Unknown age    No Known Problems Maternal Grandfather     No Known Problems Paternal Grandmother     No Known Problems Paternal Grandfather     Hyperlipidemia Brother         no more     I have reviewed and agree with the history as documented.    E-Cigarette/Vaping    E-Cigarette Use Current Some Day User      E-Cigarette/Vaping Substances    Nicotine No     THC Yes     CBD Yes     Flavoring No     Other No     Unknown No      Social History     Tobacco Use    Smoking status: Former     Current packs/day: 0.00     Average packs/day: 0.5 packs/day for 17.8 years (8.9 ttl pk-yrs)      Types: Cigarettes     Start date: 3/4/1971     Quit date: 1989     Years since quittin.6     Passive exposure: Past    Smokeless tobacco: Never    Tobacco comments:     33 years smoke free!   Vaping Use    Vaping status: Some Days    Substances: THC, CBD   Substance Use Topics    Alcohol use: Not Currently     Comment: occasional    Drug use: Yes     Types: Marijuana     Comment: medical       Review of Systems   Constitutional:  Negative for chills and fever.   HENT: Negative.     Respiratory:  Negative for cough and shortness of breath.    Cardiovascular:  Negative for chest pain, palpitations and leg swelling.   Gastrointestinal:  Positive for abdominal pain and nausea. Negative for blood in stool, diarrhea and vomiting.   Genitourinary:  Negative for dysuria.   Skin:  Negative for color change, pallor and rash.   Neurological:  Negative for dizziness, weakness, light-headedness and numbness.   Psychiatric/Behavioral:  Negative for confusion.    All other systems reviewed and are negative.      Physical Exam  Physical Exam  Vitals and nursing note reviewed.   Constitutional:       General: She is in acute distress (patient tearful).      Appearance: She is well-developed and well-groomed. She is not ill-appearing or diaphoretic.   HENT:      Head: Normocephalic and atraumatic.      Mouth/Throat:      Mouth: Mucous membranes are moist.   Eyes:      Conjunctiva/sclera: Conjunctivae normal.   Cardiovascular:      Rate and Rhythm: Normal rate and regular rhythm.      Heart sounds: Normal heart sounds.   Pulmonary:      Effort: Pulmonary effort is normal.      Breath sounds: Normal breath sounds. No wheezing, rhonchi or rales.   Abdominal:      General: Abdomen is flat. Bowel sounds are normal.      Palpations: Abdomen is soft.      Tenderness: There is abdominal tenderness in the epigastric area. There is no guarding or rebound.   Musculoskeletal:         General: Normal range of motion.      Cervical  back: Normal range of motion.      Right lower leg: No edema.      Left lower leg: No edema.   Skin:     General: Skin is warm and dry.      Coloration: Skin is not jaundiced or pale.      Findings: No rash.   Neurological:      General: No focal deficit present.      Mental Status: She is alert and oriented to person, place, and time.      Motor: No weakness.   Psychiatric:      Comments: Patient tearful.          Vital Signs  ED Triage Vitals [08/14/24 1650]   Temperature Pulse Respirations Blood Pressure SpO2   98 °F (36.7 °C) 78 20 130/86 99 %      Temp Source Heart Rate Source Patient Position - Orthostatic VS BP Location FiO2 (%)   Oral Monitor Sitting Right arm --      Pain Score       7           Vitals:    08/14/24 1650 08/14/24 2000 08/14/24 2100   BP: 130/86 105/64 111/60   Pulse: 78 72 74   Patient Position - Orthostatic VS: Sitting Sitting Lying         Visual Acuity      ED Medications  Medications   ondansetron (ZOFRAN) injection 4 mg (4 mg Intravenous Given 8/14/24 1744)   pantoprazole (PROTONIX) injection 40 mg (40 mg Intravenous Given 8/14/24 1745)   sodium chloride 0.9 % bolus 1,000 mL (0 mL Intravenous Stopped 8/14/24 1931)   morphine injection 4 mg (4 mg Intravenous Given 8/14/24 1833)   iohexol (OMNIPAQUE) 350 MG/ML injection (MULTI-DOSE) 80 mL (80 mL Intravenous Given 8/14/24 2022)   iohexol (OMNIPAQUE) 240 MG/ML solution 50 mL (50 mL Oral Given 8/14/24 2022)       Diagnostic Studies  Results Reviewed       Procedure Component Value Units Date/Time    Urine Microscopic [[id number]]  (Abnormal) Collected: 08/14/24 1935    Lab Status: Final result Specimen: Urine, Clean Catch Updated: 08/14/24 1959     RBC, UA None Seen /hpf      WBC, UA 2-4 /hpf      Epithelial Cells Occasional /hpf      Bacteria, UA None Seen /hpf      MUCUS THREADS Occasional    UA w Reflex to Microscopic w Reflex to Culture [[id number]]  (Abnormal) Collected: 08/14/24 1935    Lab Status: Final result Specimen: Urine, Clean  Catch Updated: 08/14/24 1943     Color, UA Yellow     Clarity, UA Clear     Specific Gravity, UA <1.005     pH, UA 6.5     Leukocytes, UA Small     Nitrite, UA Negative     Protein, UA Negative mg/dl      Glucose, UA Negative mg/dl      Ketones, UA Trace mg/dl      Urobilinogen, UA <2.0 mg/dl      Bilirubin, UA Negative     Occult Blood, UA Negative    Comprehensive metabolic panel [[id number]]  (Abnormal) Collected: 08/14/24 1704    Lab Status: Final result Specimen: Blood from Arm, Right Updated: 08/14/24 1728     Sodium 139 mmol/L      Potassium 4.1 mmol/L      Chloride 102 mmol/L      CO2 28 mmol/L      ANION GAP 9 mmol/L      BUN 18 mg/dL      Creatinine 0.56 mg/dL      Glucose 114 mg/dL      Calcium 10.3 mg/dL      AST 20 U/L      ALT 22 U/L      Alkaline Phosphatase 85 U/L      Total Protein 7.7 g/dL      Albumin 4.7 g/dL      Total Bilirubin 0.86 mg/dL      eGFR 97 ml/min/1.73sq m     Narrative:      National Kid[employer] Disease Foundation guidelines for Chronic Kidney Disease (CKD):     Stage 1 with normal or high GFR (GFR > 90 mL/min/1.73 square meters)    Stage 2 Mild CKD (GFR = 60-89 mL/min/1.73 square meters)    Stage 3A Moderate CKD (GFR = 45-59 mL/min/1.73 square meters)    Stage 3B Moderate CKD (GFR = 30-44 mL/min/1.73 square meters)    Stage 4 Severe CKD (GFR = 15-29 mL/min/1.73 square meters)    Stage 5 End Stage CKD (GFR <15 mL/min/1.73 square meters)  Note: GFR calculation is accurate only with a steady state creatinine    Lipase [[id number]]  (Abnormal) Collected: 08/14/24 1704    Lab Status: Final result Specimen: Blood from Arm, Right Updated: 08/14/24 1728     Lipase 8 u/L     CBC and differential [[id number]]  (Abnormal) Collected: 08/14/24 1704    Lab Status: Final result Specimen: Blood from Arm, Right Updated: 08/14/24 1714     WBC 11.10 Thousand/uL      RBC 4.83 Million/uL      Hemoglobin 14.3 g/dL      Hematocrit 41.4 %      MCV 86 fL      MCH 29.6 pg      MCHC 34.5 g/dL      RDW 11.9 %       MPV 9.5 fL      Platelets 400 Thousands/uL      nRBC 0 /100 WBCs      Segmented % 66 %      Immature Grans % 2 %      Lymphocytes % 22 %      Monocytes % 8 %      Eosinophils Relative 1 %      Basophils Relative 1 %      Absolute Neutrophils 7.49 Thousands/µL      Absolute Immature Grans 0.18 Thousand/uL      Absolute Lymphocytes 2.46 Thousands/µL      Absolute Monocytes 0.84 Thousand/µL      Eosinophils Absolute 0.06 Thousand/µL      Basophils Absolute 0.07 Thousands/µL                    CT abdomen pelvis with contrast   Final Result by David Moran MD (08/14 2059)      1.  Status post gastric bypass. No bowel obstruction.   2.  Small amount of contrast is seen within the excluded stomach which may be due to gastrogastric fistula versus enterogastric reflux.   3.  Small hiatal hernia. Mild thickening of the gastric pouch, correlate for gastritis.   4.  Cholelithiasis without evidence of cholecystitis.      Workstation performed: [id number]                    Procedures  Procedures         ED Course  ED Course as of 08/14/24 2225   Wed Aug 14, 2024   1732 Courtney from CT aware oral contrast was ordered.   2117 Bariatric surgeon messaged concerning CT results.    2124 Dr. Salvador suggested f/u in the office concerning CT results and carafate is ok to use for gastritis and H2 at night.                                   SBIRT 20yo+      Flowsheet Row Most Recent Value   Initial Alcohol Screen: US AUDIT-C     1. How often do you have a drink containing alcohol? 0 Filed at: 08/14/2024 1653   2. How many drinks containing alcohol do you have on a typical day you are drinking?  0 Filed at: 08/14/2024 1653   3b. FEMALE Any Age, or MALE 65+: How often do you have 4 or more drinks on one occassion? 0 Filed at: 08/14/2024 1653   Audit-C Score 0 Filed at: 08/14/2024 1653   JESSICA: How many times in the past year have you...    Used an illegal drug or used a prescription medication for non-medical reasons? Never Filed at: 08/14/2024  1653                      Medical Decision Making  Patient with epigastric pain, h/o gastric bypass will order labs, CT scan to r/o obstruction, pancreatitis, cholecystitis.  Patient with gastritis on CT scan and possible fistual, d/w bariatric surgeon, will start on carafate and H2 at night and have patient f/u with surgeon in the office.  Return precautions given.     Amount and/or Complexity of Data Reviewed  Labs: ordered.  Radiology: ordered.  Discussion of management or test interpretation with external provider(s): D/w bariatric surgeon.     Risk  Prescription drug management.                 Disposition  Final diagnoses:   Gallstones   Hiatal hernia   Gastritis   Abnormal CT scan -  Small amount of contrast is seen within the excluded stomach which may be due to gastrogastric fistula versus enterogastric reflux.     Time reflects when diagnosis was documented in both MDM as applicable and the Disposition within this note       Time User Action Codes Description Comment    8/14/2024  9:32 PM Ramonita Flores Add [K80.20] Gallstones     8/14/2024  9:32 PM Ramonita Flores Add [K44.9] Hiatal hernia     8/14/2024  9:32 PM Ramonita Flores Add [K29.70] Gastritis     8/14/2024  9:33 PM Ramonita Flores Add [R93.89] Abnormal CT scan     8/14/2024  9:33 PM Ramonita Flores Modify [R93.89] Abnormal CT scan  Small amount of contrast is seen within the excluded stomach which may be due to gastrogastric fistula versus enterogastric reflux.          ED Disposition       ED Disposition   Discharge    Condition   Stable    Date/Time   Wed Aug 14, 2024 2127    Comment   Lola Wade discharge to home/self care.                   Follow-up Information       Follow up With Specialties Details Why Contact Info    Sheldon Núñez MD Bariatrics, General Surgery Schedule an appointment as soon as possible for a visit  For recheck [address] [id number]  [phone]               Discharge Medication List as of 8/14/2024  9:38 PM        START taking these medications    Details   sucralfate (CARAFATE) 1 g tablet Take 1 tablet (1 g total) by mouth 4 (four) times a day for 10 days, Starting Wed 8/14/2024, Until Sat 8/24/2024, Normal           CONTINUE these medications which have NOT CHANGED    Details   BIOTIN PO Take by mouth Two tablets once day, Historical Med      Calcium 250 MG CAPS Take 500 mg by mouth daily, Historical Med      Cholecalciferol (VITAMIN D3) 2000 units TABS Take 2 tablets by mouth daily, Historical Med      famotidine (PEPCID) 20 mg tablet Take 20 mg by mouth 2 (two) times a day as needed for heartburn, Historical Med      hydrOXYzine HCL (ATARAX) 10 mg tablet Take 1 tablet (10 mg total) by mouth every 8 (eight) hours as needed for anxiety, Starting u 7/27/2023, Normal      levothyroxine (Synthroid) 150 mcg tablet TAKE 1 TABLET BY MOUTH 6 DAYS OF THE WEEK THEN 1 & 1/2 TABS ON SUNDAY, Normal      Magnesium 200 MG TABS Take 2 tablets by mouth daily, Historical Med      Multiple Vitamins-Minerals (Bariatric Multivitamins/Iron) CAPS Take 2 capsules by mouth in the morning, Historical Med      norethindrone (MICRONOR) 0.35 MG tablet TAKE 1 TABLET BY MOUTH EVERY DAY, Starting Mon 8/12/2024, Normal      omeprazole (PriLOSEC) 20 mg delayed release capsule TAKE 1 CAPSULE BY MOUTH EVERY DAY, Starting Wed 5/29/2024, Normal      pregabalin (LYRICA) 100 mg capsule Take 1 capsule (100 mg total) by mouth 3 (three) times a day, Starting Tue 7/30/2024, Normal      tiZANidine (ZANAFLEX) 4 mg tablet TAKE 1 TABLET BY MOUTH EVERY 8 HOURS IF NEEDED FOR MUSCLE SPASM OR PAIN, Normal      traMADol-acetaminophen (ULTRACET) 37.5-325 mg per tablet Take 1 tablet by mouth every 6 (six) hours as needed for moderate pain, Starting Tue 7/2/2024, Normal      venlafaxine (EFFEXOR) 100 MG tablet TAKE 2 TABLETS BY MOUTH EVERY MORNING AND 1 TABLET EVERY EVENING, Normal      vitamin B-12  (CYANOCOBALAMIN) 50 MCG tablet Take by mouth daily, Historical Med           STOP taking these medications       nitrofurantoin (MACROBID) 100 mg capsule Comments:   Reason for Stopping:               No discharge procedures on file.    PDMP Review         Value Time User    PDMP Reviewed  Yes 7/30/2024 11:00 AM Marian Hatch PA-C            ED Provider  Electronically Signed by             Ramonita Flores PA-C  08/14/24 [id number]

## 2024-08-14 NOTE — TELEPHONE ENCOUNTER
"Spoke w/ Pt re: abdominal pain. Pt currently rates pain as 7/10. Pt describes pain as a severe, intense, constant ache, as though she's been \"kicked in the stomach.\" Pt denies fever. Pt reported having a bowel movement this morning with no relief. Pt stated she is only sipping liquids/ eating extremely small amounts due to fear of vomiting. Per Pt, she has severe nausea and when she vomits it is mostly dry heaving and she throws up foam. Advised Pt to go to ED due to severity of symptoms/ inability to eat or keep hydrated.  "

## 2024-08-15 ENCOUNTER — TELEPHONE (OUTPATIENT)
Dept: FAMILY MEDICINE CLINIC | Facility: HOSPITAL | Age: 64
End: 2024-08-15

## 2024-08-15 ENCOUNTER — TELEPHONE (OUTPATIENT)
Age: 64
End: 2024-08-15

## 2024-08-15 DIAGNOSIS — K29.00 ACUTE GASTRITIS WITHOUT HEMORRHAGE, UNSPECIFIED GASTRITIS TYPE: Primary | ICD-10-CM

## 2024-08-15 NOTE — TELEPHONE ENCOUNTER
Hx RGYB, HHR    Patient is unable to take medications due to abd pain. Recent ED visit. CT without emergent findings. Possible GG fistula/HH on read - will need to confirm w/ additional diagnostics.    Patient is under stress due to recent up coming wedding.    Recommended PPI, H2, Tums, and Carafate. Will route note to schedulers for a clinic appt. Patient to reach out to PCP.

## 2024-08-15 NOTE — TELEPHONE ENCOUNTER
Patient called in to speak with Dr Jeff Núñez or the clinical team after an ER visit yesterday for a hiatal hernia.  Tried to warm tx but no one was available. Can someone please reach out to her at ?  Thanks.

## 2024-08-15 NOTE — TELEPHONE ENCOUNTER
Pt called in crying due to severe abdominal pain. Seen in the ED on 8/14 for the pain. Had Ct done and it showed gallstones, gastritis and hernia. Pt called bariatric doctor and awaiting a call back from. She is leaving next Tuesday for [place] because her daughter is getting  next weekend.  I spoke w/Sony and she advised me verbally to have her increase her omeprazole to bid and that I could put in a referral to GI. Please advise what to do about her gallstones and hernia.

## 2024-08-15 NOTE — DISCHARGE INSTRUCTIONS
Rest, increase fluids.  Take pepcid at night.  Take carafate as needed for pain.  Call bariatric surgeon tomorrow for a follow up appointment concerning possible fistula.

## 2024-08-15 NOTE — TELEPHONE ENCOUNTER
Patient calling in very upset and crying due to no one calling her back yet     Tried to call office clinical line no answer and triage line was busy with other calls     Can someone please call patient back and assist with her pain

## 2024-08-15 NOTE — TELEPHONE ENCOUNTER
Small hiatal hernia is incidental finding and unlikely contributing to any symptoms and no further tx or follow up is needed.  Gallstones also an incidental finding and no evidence of cholecystitis (NO acute gallbladder attack).  Avoid fatty foods but no other tx needed at this time.  CT did not gastritis - inflammation of stomach - increasing Omeprazole to bid would have been my recommendation as well.  If pain persists can be sen for eval of poss HIDA scan/further eval of gallstones

## 2024-08-16 ENCOUNTER — TELEPHONE (OUTPATIENT)
Age: 64
End: 2024-08-16

## 2024-08-16 ENCOUNTER — TELEPHONE (OUTPATIENT)
Dept: URGENT CARE | Facility: CLINIC | Age: 64
End: 2024-08-16

## 2024-08-16 DIAGNOSIS — N30.01 ACUTE CYSTITIS WITH HEMATURIA: Primary | ICD-10-CM

## 2024-08-16 RX ORDER — SULFAMETHOXAZOLE/TRIMETHOPRIM 800-160 MG
1 TABLET ORAL EVERY 12 HOURS SCHEDULED
Qty: 6 TABLET | Refills: 0 | Status: SHIPPED | OUTPATIENT
Start: 2024-08-16 | End: 2024-08-19

## 2024-08-16 NOTE — TELEPHONE ENCOUNTER
Pt calling in for appt with GI to discuss CT results- possible Gastritis- pt asking for EGD.   I scheduled with LISETH Marinelli for 8/28

## 2024-08-16 NOTE — TELEPHONE ENCOUNTER
No, patient can see GI if abd pain persists but nothing in CT was emergent and needed acute treatment right now as what it sounded what she as inquiring about.

## 2024-08-27 ENCOUNTER — OFFICE VISIT (OUTPATIENT)
Dept: FAMILY MEDICINE CLINIC | Facility: HOSPITAL | Age: 64
End: 2024-08-27
Payer: COMMERCIAL

## 2024-08-27 VITALS
DIASTOLIC BLOOD PRESSURE: 68 MMHG | OXYGEN SATURATION: 99 % | WEIGHT: 115.2 LBS | HEIGHT: 64 IN | TEMPERATURE: 97.9 F | HEART RATE: 77 BPM | BODY MASS INDEX: 19.67 KG/M2 | SYSTOLIC BLOOD PRESSURE: 108 MMHG

## 2024-08-27 DIAGNOSIS — F41.9 ANXIETY: ICD-10-CM

## 2024-08-27 DIAGNOSIS — Z13.31 POSITIVE DEPRESSION SCREENING: ICD-10-CM

## 2024-08-27 DIAGNOSIS — F33.41 RECURRENT MAJOR DEPRESSIVE DISORDER, IN PARTIAL REMISSION (HCC): Primary | ICD-10-CM

## 2024-08-27 PROCEDURE — 99214 OFFICE O/P EST MOD 30 MIN: CPT | Performed by: INTERNAL MEDICINE

## 2024-08-27 PROCEDURE — G2211 COMPLEX E/M VISIT ADD ON: HCPCS | Performed by: INTERNAL MEDICINE

## 2024-08-27 RX ORDER — BUSPIRONE HYDROCHLORIDE 7.5 MG/1
7.5 TABLET ORAL 2 TIMES DAILY
Qty: 60 TABLET | Refills: 2 | Status: SHIPPED | OUTPATIENT
Start: 2024-08-27

## 2024-08-27 NOTE — ASSESSMENT & PLAN NOTE
Anxiety not at goal and very worked up today with a panic attack while in the office, con't current Effexor, add Buspar and con't prn hydroxyzine, needs f/u with therapist and psych, denies SI and is not a threat to herself or others, call with SE/new/worse symptoms/inc in panic attacks

## 2024-08-27 NOTE — PROGRESS NOTES
Ambulatory Visit  Name: Lola Wade      : 1960      MRN: [id number]  Encounter Provider: Lucia Lord DO  Encounter Date: 2024   Encounter department: [facility] PRIMARY CARE [address]     Assessment & Plan   1. Recurrent major depressive disorder, in partial remission (HCC)  Assessment & Plan:  Depression not at goal, con't current Effexor, add Buspar and con't prn hydroxyzine, needs f/u with therapist and psych, denies SI and is not a threat to herself or others, call with SE/new/worse symptoms/SI  Orders:  -     busPIRone (BUSPAR) 7.5 mg tablet; Take 1 tablet (7.5 mg total) by mouth 2 (two) times a day  -     Ambulatory referral to Psych Services; Future  2. Anxiety  Assessment & Plan:  Anxiety not at goal and very worked up today with a panic attack while in the office, con't current Effexor, add Buspar and con't prn hydroxyzine, needs f/u with therapist and psych, denies SI and is not a threat to herself or others, call with SE/new/worse symptoms/inc in panic attacks  Orders:  -     busPIRone (BUSPAR) 7.5 mg tablet; Take 1 tablet (7.5 mg total) by mouth 2 (two) times a day  -     Ambulatory referral to Psych Services; Future  3. Positive depression screening      Pt with a lot of stressors: Dgtr just got  and had some disagreements and hurt feelings about the wedding, lost job in May and has not been able to find a new job, now having financial stressors and is going to be kicked out of her apartment -  has no where to go and no place to store her stuff    Depression Screening and Follow-up Plan: Patient's depression screening was positive with a PHQ-9 score of 16. Patient assessed for underlying major depression. Brief counseling provided and recommend additional follow-up/re-evaluation next office visit.       Colonoscopy 3/20 - 5 yrs     Mammo      Dexa  - osteopenia    PAP s/p hysterectomy - just had pelvic exam with Dr. Riedel 24     BW       History  "of Present Illness     HPI Pt here for an acute visit    She is having a lot of issues with anxiety and depression.  She called her landlord and told her she doesn't have the money to pay rent. The landlord told her she had to be out.  She has no money and no place to go. She has an apartment full of furniture and she is not sure where to go or what to do.  She lost her job in May and has had mult interviews but they haven't worked out.  She has no options of where to go.  She is in [facility] and is going to go later this week.  Pt very tearful and upset today.  She is taking her Effexor twice daily as directed w/o SE. She notes no SI.  She has had some panic attacks.  She states sleep is up and down.  She has hydroxyzine for prn use but has not used it.      Pt was in the ED 8/14/24 for abd pain - BW and CT A/P reviewed.  She was started back on Carafate and states that has helped.  She has f/u with GI tomorrow.        Review of Systems   Constitutional:  Negative for chills and fever.   Cardiovascular:  Negative for chest pain and palpitations.   Psychiatric/Behavioral:  Positive for dysphoric mood. Negative for sleep disturbance and suicidal ideas. The patient is nervous/anxious.        Objective     /68   Pulse 77   Temp 97.9 °F (36.6 °C)   Ht 5' 4\" (1.626 m)   Wt 52.3 kg (115 lb 3.2 oz)   SpO2 99%   BMI 19.77 kg/m²     Physical Exam  Vitals and nursing note reviewed.   Constitutional:       General: She is not in acute distress.     Appearance: She is not ill-appearing.   HENT:      Head: Normocephalic and atraumatic.   Eyes:      General:         Right eye: No discharge.         Left eye: No discharge.      Conjunctiva/sclera: Conjunctivae normal.   Pulmonary:      Effort: Pulmonary effort is normal. No respiratory distress.   Skin:     Coloration: Skin is not pale.      Findings: No rash.   Psychiatric:      Comments: Crying, tearful, hyperventilating and very diaphoretic, with water and " slow breathing exercising she brought heart rate down and was able to compose herself, agreed to call with new/worse mood/SI       Administrative Statements         Depression Screening Follow-up Plan: Patient's depression screening was positive with a PHQ-2 score of . Their PHQ-9 score was 16. Patient assessed for underlying major depression. They have no active suicidal ideations. Brief counseling provided and recommend additional follow-up/re-evaluation next office visit.

## 2024-08-27 NOTE — PATIENT INSTRUCTIONS
"Patient Education     Depression in adults   The Basics   Written by the doctors and editors at [facility]   What is depression? -- Depression is a disorder that makes you sad, but it is different from normal sadness. Depression can make it hard for you to work, study, or do everyday tasks.  What causes depression? -- Depression is caused by problems with chemicals in the brain called \"neurotransmitters.\" Some people might be more likely to have depression if it runs in their family. Other things might also play a role, including hormones, certain health problems, medicines, stress, being mistreated as a child, family problems, and problems with friends or at school or work.  How do I know if I am depressed? -- People with depression feel down most of the time for at least 2 weeks. They also have at least 1 of these 2 symptoms:   They no longer enjoy or care about doing the things that they used to like to do.   They feel sad, down, hopeless, or cranky most of the day, almost every day.  People with depression can also have other symptoms. Examples include:   Changes in your appetite or weight. You might eat too little or too much, or gain or lose weight without trying.   Sleeping too much or too little   Feeling tired or like you have no energy   Feeling guilty, helpless, or like you are worth nothing   Trouble with concentration or memory   Acting restless or have trouble staying still, or moving or speaking more slowly than normal   Repeated thoughts of death or killing yourself  If you think that you might be depressed, see your doctor or nurse. Only someone trained in mental health can tell for sure if you are depressed.  How is depression diagnosed? -- Your doctor or nurse will do a physical exam, ask you questions, and might order tests. Depression can have a big impact on your life. Luckily, depression can be treated, and the sooner treatment is started, the better it works.  Get help right away if you are " "thinking of hurting or killing yourself! -- If you ever feel like you might hurt yourself or someone else, help is available:   In the [place], contact the 468 Suicide & Crisis Lifeline:   To speak to someone, call or text 261.   To talk to someone online, go to www.ShiftPlanning.org/chat.   Call your doctor or nurse, and tell them it is urgent.   Call for an ambulance (in the [place] and [place], call 9-1-1).   Go to the emergency department at the nearest hospital.  What are the treatments for depression? -- Your doctor or nurse will work with you to make a treatment plan. Treatment can include:   Helping you learn more about depression   Counseling (with a psychiatrist, psychologist, nurse, or )   Medicines that relieve depression   Creating a plan to limit access to items that you might use to harm yourself   Other treatments that pass magnetic waves or electricity into the brain  In addition to treatment, getting regular physical activity can also help you feel better.  People with depression that is not too severe can get better by taking medicines or talking with a counselor. People with severe depression usually need medicines to get better, and might also need to see a counselor.  Another treatment involves placing a device against the scalp to pass magnetic waves into the brain. This is called \"transcranial magnetic stimulation\" (\"TMS\"). Doctors might suggest TMS if medicines and counseling have not helped.  Some people with severe depression might need a treatment called \"electroconvulsive therapy\" (\"ECT\"). During ECT, doctors pass an electric current through a person's brain in a safe way.  When will I feel better? -- Most treatment options take a little while to start working.   Many people who take medicines start to feel better within 2 weeks, but it might be 4 to 8 weeks before the medicine has its full effect.   Many people who see a counselor start to feel better within a few weeks, but it might " take 8 to 10 weeks to get the greatest benefit.  If the first treatment you try does not help you, tell your doctor or nurse, but do not give up. Some people need to try different treatments or combinations of treatments before they find an approach that works. Your doctor, nurse, or counselor can work with you to find the treatment that is right for you. They can also help you figure out how to cope while you search for the right treatment or are waiting for your treatment to start working.  How do I decide which treatment to have? -- You and your doctor or nurse will need to work together to choose a treatment for you. Medicines might work a little faster than counseling. But medicines can also cause side effects. Plus, some people do not like the idea of taking medicine.  Seeing a counselor involves talking about your feelings. That is also hard for some people.  What if I take medicine for depression and I want to have a baby? -- Some depression medicines can cause problems for an unborn baby. But having untreated depression during pregnancy can also cause problems. If you want to get pregnant, tell your doctor but do not stop taking your medicines. Together, you can plan the safest way for you to have your baby.  It's also important to talk with your doctor if you want to breastfeed after your baby is born. Breastfeeding has lots of benefits for both mother and baby. Some depression medicines are safer than others to use while breastfeeding. But having untreated depression after giving birth can also cause problems, so do not stop taking your medicines. Your doctor can work with you to plan the safest way for you to feed your baby.  All topics are updated as new evidence becomes available and our peer review process is complete.  This topic retrieved from [employer] on: Mar 06, 2024.  Topic 60630 Version 22.0  Release: 32.2.4 - C32.64  © 2024 UpToDate, Inc. and/or its affiliates. All rights reserved.  figure 1:  "Mood disorders caused by problems in the brain     Mooddisorders, such as depression and bipolar disorder, are caused by problems with\"neurotransmitters.\" These are chemicals in the brain that can affect your emotions.Treatments for mood disorders seem to work by changing the levels of certainneurotransmitters.  Graphic 94945 Version 4.0  Consumer Information Use and Disclaimer   Disclaimer: This generalized information is a limited summary of diagnosis, treatment, and/or medication information. It is not meant to be comprehensive and should be used as a tool to help the user understand and/or assess potential diagnostic and treatment options. It does NOT include all information about conditions, treatments, medications, side effects, or risks that may apply to a specific patient. It is not intended to be medical advice or a substitute for the medical advice, diagnosis, or treatment of a health care provider based on the health care provider's examination and assessment of a patient's specific and unique circumstances. Patients must speak with a health care provider for complete information about their health, medical questions, and treatment options, including any risks or benefits regarding use of medications. This information does not endorse any treatments or medications as safe, effective, or approved for treating a specific patient. UpToDate, Inc. and its affiliates disclaim any warranty or liability relating to this information or the use thereof.The use of this information is governed by the Terms of Use, available at https://www.Hippflowuwer.com/en/know/clinical-effectiveness-terms. 2024© UpToDate, Inc. and its affiliates and/or licensors. All rights reserved.  Copyright   © 2024 UpToDate, Inc. and/or its affiliates. All rights reserved.    "

## 2024-08-27 NOTE — ASSESSMENT & PLAN NOTE
Depression not at goal, con't current Effexor, add Buspar and con't prn hydroxyzine, needs f/u with therapist and psych, denies SI and is not a threat to herself or others, call with SE/new/worse symptoms/SI

## 2024-08-28 ENCOUNTER — TELEPHONE (OUTPATIENT)
Age: 64
End: 2024-08-28

## 2024-08-28 ENCOUNTER — OFFICE VISIT (OUTPATIENT)
Dept: GASTROENTEROLOGY | Facility: CLINIC | Age: 64
End: 2024-08-28
Payer: COMMERCIAL

## 2024-08-28 ENCOUNTER — OFFICE VISIT (OUTPATIENT)
Dept: BARIATRICS | Facility: CLINIC | Age: 64
End: 2024-08-28

## 2024-08-28 ENCOUNTER — TELEPHONE (OUTPATIENT)
Dept: GASTROENTEROLOGY | Facility: CLINIC | Age: 64
End: 2024-08-28

## 2024-08-28 VITALS
OXYGEN SATURATION: 93 % | HEIGHT: 63 IN | WEIGHT: 115 LBS | SYSTOLIC BLOOD PRESSURE: 100 MMHG | HEART RATE: 62 BPM | BODY MASS INDEX: 20.38 KG/M2 | RESPIRATION RATE: 18 BRPM | DIASTOLIC BLOOD PRESSURE: 58 MMHG

## 2024-08-28 VITALS
SYSTOLIC BLOOD PRESSURE: 106 MMHG | WEIGHT: 115.2 LBS | BODY MASS INDEX: 20.41 KG/M2 | HEIGHT: 63 IN | DIASTOLIC BLOOD PRESSURE: 62 MMHG

## 2024-08-28 DIAGNOSIS — K29.00 ACUTE GASTRITIS WITHOUT HEMORRHAGE, UNSPECIFIED GASTRITIS TYPE: ICD-10-CM

## 2024-08-28 DIAGNOSIS — Z98.84 HISTORY OF GASTRIC BYPASS: ICD-10-CM

## 2024-08-28 DIAGNOSIS — Z12.11 SCREENING FOR COLON CANCER: ICD-10-CM

## 2024-08-28 DIAGNOSIS — R10.13 EPIGASTRIC ABDOMINAL PAIN: ICD-10-CM

## 2024-08-28 DIAGNOSIS — R10.13 EPIGASTRIC PAIN: Primary | ICD-10-CM

## 2024-08-28 DIAGNOSIS — K80.20 GALLSTONES: Primary | ICD-10-CM

## 2024-08-28 PROCEDURE — 99204 OFFICE O/P NEW MOD 45 MIN: CPT | Performed by: INTERNAL MEDICINE

## 2024-08-28 NOTE — PROGRESS NOTES
[facility] - Outpatient Consultation  Lola Wade 64 y.o. female MRN: [id number]  Encounter: [id number]    ASSESSMENT AND PLAN:      1. Acute gastritis without hemorrhage, unspecified gastritis type  --With abnormalities in her gastric pouch post bariatric surgery.  There is some communication between either small intestine or stomach as contrast does enter the bypassed gastric remnant  -Doing much better on omeprazole 20 mg daily and famotidine at night.  Patient has decreased her Carafate  -Evaluate with EGD.  No history of nonsteroidal use  - Ambulatory Referral to Gastroenterology    2. Gallstones  -Patient with gallstones noted on CAT scan.  Patient with rare fatty food intolerance.  Acute gallbladder attack can present with similar way.  Evaluation for abdominal pain will include biliary imaging with a HIDA scan    3. History of gastric bypass  --Is post revision of a gastric sleeve.  Gastric sleeve 2014 and then revision to gastric bypass 2021    4. Screening for colon cancer  -Patient up-to-date with respect to colon cancer screening.  Does have family history of colon cancer and polyps.  Last exam 2020 by colorectal surgery group at [facility]    5. Epigastric abdominal pain  --Differential as noted    - NM hepatobiliary w rx; Future  - EGD; Future      Followup Appointment: 3 mo   ______________________________________________________________________    Chief Complaint   Patient presents with    ER SLUB 8-14-24    Abdominal Pain     upper    CT showed gallstone, and some other issues       HPI:   Lola Wade is a 64 y.o. year old female who presents valuation of abdominal pain.  Patient was well till about 2 weeks ago.  She woke up in the night with fairly severe epigastric abdominal pain.  This.  Her to tears.  She had some episodes of vomiting as well.  She was evaluated in [facility] ED and given pain medicine and had a CAT scan of the abdomen and pelvis.  Note  was made of some thickening in her Oestreich pouch.  Patient underwent gastric bypass surgery in  for revision of a gastric sleeve performed in .  There was some contrast within the bypass gastric remnant indicating either a fistula or antral gastric reflux note was made of gallstones as well.  Patient may have occasional very mild fatty food intolerance.  Patient was placed on omeprazole famotidine Carafate and has had very good relief of her discomfort.  It should be noted patient has lost about 60 pounds in the last 3 years.  Part of it is related to the surgery in part possibly from depression.  Patient has under a lot of stress and had a divorce to years ago.  She is having some trouble meeting her rent payments and her daughter moved away to [place]    Historical Information   Past Medical History:   Diagnosis Date    Anxiety     Arthritis     Bariatric surgery status     Chronic pain disorder     Disturbance of memory     last assessed: 2016     Endometriosis     GERD (gastroesophageal reflux disease)     Hiatal hernia     Hirsutism     History of sleeve gastrectomy     Lumbar herniated disc     Menometrorrhagia     Morbid obesity (HCC)     last assessed: Dec 5, 2014     Motion sickness     Postgastrectomy malabsorption     Sclerosing adenosis of breast     Symptomatic menopausal or female climacteric states     Uterine leiomyoma      Past Surgical History:   Procedure Laterality Date    APPENDECTOMY      BREAST EXCISIONAL BIOPSY Left 2010    Benign    COLONOSCOPY      complete - 7 year repeat recommended - Resolved: 2007     DILATION AND CURETTAGE OF UTERUS      FRACTURE SURGERY      GASTRECTOMY SLEEVE LAPAROSCOPIC  2018    GYNECOLOGIC CRYOSURGERY      Cervix - 's for abnormal paps     HYSTERECTOMY  2006    INDUCED       x3    LAPAROSCOPIC SALPINGOOPHERECTOMY Right     LAPAROSCOPIC TOTAL HYSTERECTOMY      06, Laparoscopic hysterectomy with LSO with vaginal closure  of the vaginal cuff     WI REVJ GSTR/JJ ANAST W/RCNSTJ W/O VGTMY N/A 2021    Procedure: LAPAROSCOPIC REVISION CONVERSION  JENN-EN-Y GASTRIC BYPASS WITH ROBOTICS AND INTRAOPERATIVE EGD, PARAESOPHAGEAL HERNIA REPAIR;  Surgeon: Sheldon Núñez MD;  Location: [facility];  Service: Bariatrics    SALPINGOOPHORECTOMY Left 2006    Right ovary removed at age 15 and the left one was removed with uterus.    STOMACH SURGERY  2014    for morbid obesity - Managed by: Sheldon Hendricks -     TONSILLECTOMY AND ADENOIDECTOMY      TOOTH EXTRACTION      UPPER GASTROINTESTINAL ENDOSCOPY       Social History     Substance and Sexual Activity   Alcohol Use Not Currently    Comment: occasional     Social History     Substance and Sexual Activity   Drug Use Yes    Types: Marijuana    Comment: medical     Social History     Tobacco Use   Smoking Status Former    Current packs/day: 0.00    Average packs/day: 0.5 packs/day for 17.8 years (8.9 ttl pk-yrs)    Types: Cigarettes    Start date: 3/4/1971    Quit date: 1989    Years since quittin.6    Passive exposure: Past   Smokeless Tobacco Never   Tobacco Comments    33 years smoke free!     Family History   Problem Relation Age of Onset    Hypertension Mother     Alzheimer's disease Mother     Stroke Mother         several mini strokes    Dementia Mother         Alzheimers    Thyroid disease Mother     Arthritis Mother     Hyperlipidemia Father     Diabetes Father         mellitus     Prostate cancer Father 70    Rectal cancer Father         80's    Lung cancer Father 90    Liver cancer Father 90    Heart attack Father     Colon cancer Father         80's    Cancer Father         Rectal, metastisized    Hearing loss Father     Skin cancer Father         Multiple times-unknown age    Hypertension Sister     Colon polyps Sister     No Known Problems Daughter     Breast cancer Maternal Grandmother         Unknown age    No Known Problems Maternal Grandfather     No Known  "Problems Paternal Grandmother     No Known Problems Paternal Grandfather     Hyperlipidemia Brother         no more       Meds/Allergies     Current Outpatient Medications:     BIOTIN PO    busPIRone (BUSPAR) 7.5 mg tablet    Cholecalciferol (VITAMIN D3) 2000 units TABS    famotidine (PEPCID) 20 mg tablet    hydrOXYzine HCL (ATARAX) 10 mg tablet    levothyroxine (Synthroid) 150 mcg tablet    Magnesium 200 MG TABS    Multiple Vitamins-Minerals (Bariatric Multivitamins/Iron) CAPS    norethindrone (MICRONOR) 0.35 MG tablet    omeprazole (PriLOSEC) 20 mg delayed release capsule    pregabalin (LYRICA) 100 mg capsule    tiZANidine (ZANAFLEX) 4 mg tablet    traMADol-acetaminophen (ULTRACET) 37.5-325 mg per tablet    venlafaxine (EFFEXOR) 100 MG tablet    vitamin B-12 (CYANOCOBALAMIN) 50 MCG tablet    No Known Allergies    PHYSICAL EXAM:    Blood pressure 106/62, height 5' 3\" (1.6 m), weight 52.3 kg (115 lb 3.2 oz). Body mass index is 20.41 kg/m².  General Appearance: NAD, cooperative, alert-thin  Eyes: Anicteric, conjunctiva pink   ENT:  Normocephalic, atraumatic, normal mucosa.    Neck:  Supple, symmetrical, trachea midline,   Resp:  Clear to auscultation bilaterally; no rales, rhonchi or wheezing; respirations unlabored   CV:  S1 S2, Regular rate and rhythm; no murmur, rub, or gallop.  GI:  Soft, non-tender, non-distended; normal bowel sounds; no masses, no organomegaly - mild epigastric tenderness   Rectal: Deferred  Musculoskeletal: No cyanosis, clubbing or edema. Normal ROM.  Skin:  No jaundice, rashes, or lesions   Heme/Lymph: No palpable cervical lymphadenopathy  Psych: --Somewhat tearful in relating recent life events, good eye contact,  thought process is goal-directed  Neuro: No gross deficits, AAOx3    Lab Results:   Lab Results   Component Value Date    WBC 11.10 (H) 08/14/2024    HGB 14.3 08/14/2024    HCT 41.4 08/14/2024    MCV 86 08/14/2024     (H) 08/14/2024     Lab Results   Component Value Date    "  10/07/2017    K 4.1 08/14/2024     08/14/2024    CO2 28 08/14/2024    ANIONGAP 5 11/03/2015    BUN 18 08/14/2024    CREATININE 0.56 (L) 08/14/2024    GLUCOSE 85 10/07/2017    GLUF 101 (H) 09/28/2021    CALCIUM 10.3 (H) 08/14/2024    AST 20 08/14/2024    ALT 22 08/14/2024    ALKPHOS 85 08/14/2024    PROT 7.1 10/07/2017    BILITOT 0.9 10/07/2017    EGFR 97 08/14/2024         Radiology Results:   CT abdomen pelvis with contrast    Result Date: 8/14/2024  Narrative: CT ABDOMEN AND PELVIS WITH IV CONTRAST INDICATION: epigastric pain. COMPARISON: None. TECHNIQUE: CT examination of the abdomen and pelvis was performed. Multiplanar 2D reformatted images were created from the source data. This examination, like all CT scans performed in the [facility], was performed utilizing techniques to minimize radiation dose exposure, including the use of iterative reconstruction and automated exposure control. Radiation dose length product (DLP) for this visit: 399.34 mGy-cm IV Contrast: 50 mL of iohexol (OMNIPAQUE) 80 mL of iohexol (OMNIPAQUE) Enteric Contrast: Not administered. FINDINGS: ABDOMEN LOWER CHEST: No clinically significant abnormality in the visualized lower chest. LIVER/BILIARY TREE: Unremarkable. GALLBLADDER: Cholelithiasis without findings of acute cholecystitis. SPLEEN: Unremarkable. PANCREAS: Unremarkable. ADRENAL GLANDS: Unremarkable. KIDNEYS/URETERS: Unremarkable. No hydronephrosis. STOMACH AND BOWEL: Small hiatal hernia. Mild thickening of the gastric pouch. Status post gastric bypass. Small amount of contrast is seen within the excluded stomach which may be due to gastrogastric fistula versus enterogastric reflux. No bowel obstruction. Moderate stool throughout the colon. APPENDIX: Status post appendectomy. ABDOMINOPELVIC CAVITY: No ascites. No pneumoperitoneum. No lymphadenopathy. VESSELS: Unremarkable for patient's age. PELVIS REPRODUCTIVE ORGANS: Post hysterectomy. URINARY BLADDER:  Unremarkable. ABDOMINAL WALL/INGUINAL REGIONS: Unremarkable. BONES: No acute fracture or suspicious osseous lesion. Spinal degenerative changes.     Impression: 1.  Status post gastric bypass. No bowel obstruction. 2.  Small amount of contrast is seen within the excluded stomach which may be due to gastrogastric fistula versus enterogastric reflux. 3.  Small hiatal hernia. Mild thickening of the gastric pouch, correlate for gastritis. 4.  Cholelithiasis without evidence of cholecystitis. Workstation performed: [id number]         REVIEW OF SYSTEMS:    CONSTITUTIONAL: Denies any fever, chills, rigors, positive for weight loss-60 pounds in nearly 3 years  HEENT: No earache or tinnitus. Denies hearing loss or visual disturbances.  CARDIOVASCULAR: No chest pain or palpitations.   RESPIRATORY: Denies any cough, hemoptysis, shortness of breath or dyspnea on exertion.  GASTROINTESTINAL: As noted in the History of Present Illness.   GENITOURINARY: No problems with urination. Denies any hematuria or dysuria.  NEUROLOGIC: No dizziness or vertigo, denies headaches.   MUSCULOSKELETAL: Denies any muscle or joint pain.   SKIN: Denies skin rashes or itching.   ENDOCRINE: Denies excessive thirst. Denies intolerance to heat or cold.  PSYCHOSOCIAL: Positive for depression and anxiety denies any recent memory loss.

## 2024-08-28 NOTE — TELEPHONE ENCOUNTER
Contacted the patient regarding a routine referral to verify service needs and inform pt of the current waitlist. Left a voicemail for patient to contact the intake department at [phone].

## 2024-08-28 NOTE — H&P (VIEW-ONLY)
[facility] - Outpatient Consultation  Lola Wade 64 y.o. female MRN: [id number]  Encounter: [id number]    ASSESSMENT AND PLAN:      1. Acute gastritis without hemorrhage, unspecified gastritis type  --With abnormalities in her gastric pouch post bariatric surgery.  There is some communication between either small intestine or stomach as contrast does enter the bypassed gastric remnant  -Doing much better on omeprazole 20 mg daily and famotidine at night.  Patient has decreased her Carafate  -Evaluate with EGD.  No history of nonsteroidal use  - Ambulatory Referral to Gastroenterology    2. Gallstones  -Patient with gallstones noted on CAT scan.  Patient with rare fatty food intolerance.  Acute gallbladder attack can present with similar way.  Evaluation for abdominal pain will include biliary imaging with a HIDA scan    3. History of gastric bypass  --Is post revision of a gastric sleeve.  Gastric sleeve 2014 and then revision to gastric bypass 2021    4. Screening for colon cancer  -Patient up-to-date with respect to colon cancer screening.  Does have family history of colon cancer and polyps.  Last exam 2020 by colorectal surgery group at [facility]    5. Epigastric abdominal pain  --Differential as noted    - NM hepatobiliary w rx; Future  - EGD; Future      Followup Appointment: 3 mo   ______________________________________________________________________    Chief Complaint   Patient presents with    ER SLUB 8-14-24    Abdominal Pain     upper    CT showed gallstone, and some other issues       HPI:   Lola Wade is a 64 y.o. year old female who presents valuation of abdominal pain.  Patient was well till about 2 weeks ago.  She woke up in the night with fairly severe epigastric abdominal pain.  This.  Her to tears.  She had some episodes of vomiting as well.  She was evaluated in [facility] ED and given pain medicine and had a CAT scan of the abdomen and pelvis.  Note  was made of some thickening in her Oestreich pouch.  Patient underwent gastric bypass surgery in  for revision of a gastric sleeve performed in .  There was some contrast within the bypass gastric remnant indicating either a fistula or antral gastric reflux note was made of gallstones as well.  Patient may have occasional very mild fatty food intolerance.  Patient was placed on omeprazole famotidine Carafate and has had very good relief of her discomfort.  It should be noted patient has lost about 60 pounds in the last 3 years.  Part of it is related to the surgery in part possibly from depression.  Patient has under a lot of stress and had a divorce to years ago.  She is having some trouble meeting her rent payments and her daughter moved away to [place]    Historical Information   Past Medical History:   Diagnosis Date    Anxiety     Arthritis     Bariatric surgery status     Chronic pain disorder     Disturbance of memory     last assessed: 2016     Endometriosis     GERD (gastroesophageal reflux disease)     Hiatal hernia     Hirsutism     History of sleeve gastrectomy     Lumbar herniated disc     Menometrorrhagia     Morbid obesity (HCC)     last assessed: Dec 5, 2014     Motion sickness     Postgastrectomy malabsorption     Sclerosing adenosis of breast     Symptomatic menopausal or female climacteric states     Uterine leiomyoma      Past Surgical History:   Procedure Laterality Date    APPENDECTOMY      BREAST EXCISIONAL BIOPSY Left 2010    Benign    COLONOSCOPY      complete - 7 year repeat recommended - Resolved: 2007     DILATION AND CURETTAGE OF UTERUS      FRACTURE SURGERY      GASTRECTOMY SLEEVE LAPAROSCOPIC  2018    GYNECOLOGIC CRYOSURGERY      Cervix - 's for abnormal paps     HYSTERECTOMY  2006    INDUCED       x3    LAPAROSCOPIC SALPINGOOPHERECTOMY Right     LAPAROSCOPIC TOTAL HYSTERECTOMY      06, Laparoscopic hysterectomy with LSO with vaginal closure  of the vaginal cuff     AR REVJ GSTR/JJ ANAST W/RCNSTJ W/O VGTMY N/A 2021    Procedure: LAPAROSCOPIC REVISION CONVERSION  JENN-EN-Y GASTRIC BYPASS WITH ROBOTICS AND INTRAOPERATIVE EGD, PARAESOPHAGEAL HERNIA REPAIR;  Surgeon: Sheldon Núñez MD;  Location: [facility];  Service: Bariatrics    SALPINGOOPHORECTOMY Left 2006    Right ovary removed at age 15 and the left one was removed with uterus.    STOMACH SURGERY  2014    for morbid obesity - Managed by: Sheldon Hendricks -     TONSILLECTOMY AND ADENOIDECTOMY      TOOTH EXTRACTION      UPPER GASTROINTESTINAL ENDOSCOPY       Social History     Substance and Sexual Activity   Alcohol Use Not Currently    Comment: occasional     Social History     Substance and Sexual Activity   Drug Use Yes    Types: Marijuana    Comment: medical     Social History     Tobacco Use   Smoking Status Former    Current packs/day: 0.00    Average packs/day: 0.5 packs/day for 17.8 years (8.9 ttl pk-yrs)    Types: Cigarettes    Start date: 3/4/1971    Quit date: 1989    Years since quittin.6    Passive exposure: Past   Smokeless Tobacco Never   Tobacco Comments    33 years smoke free!     Family History   Problem Relation Age of Onset    Hypertension Mother     Alzheimer's disease Mother     Stroke Mother         several mini strokes    Dementia Mother         Alzheimers    Thyroid disease Mother     Arthritis Mother     Hyperlipidemia Father     Diabetes Father         mellitus     Prostate cancer Father 70    Rectal cancer Father         80's    Lung cancer Father 90    Liver cancer Father 90    Heart attack Father     Colon cancer Father         80's    Cancer Father         Rectal, metastisized    Hearing loss Father     Skin cancer Father         Multiple times-unknown age    Hypertension Sister     Colon polyps Sister     No Known Problems Daughter     Breast cancer Maternal Grandmother         Unknown age    No Known Problems Maternal Grandfather     No Known  "Problems Paternal Grandmother     No Known Problems Paternal Grandfather     Hyperlipidemia Brother         no more       Meds/Allergies     Current Outpatient Medications:     BIOTIN PO    busPIRone (BUSPAR) 7.5 mg tablet    Cholecalciferol (VITAMIN D3) 2000 units TABS    famotidine (PEPCID) 20 mg tablet    hydrOXYzine HCL (ATARAX) 10 mg tablet    levothyroxine (Synthroid) 150 mcg tablet    Magnesium 200 MG TABS    Multiple Vitamins-Minerals (Bariatric Multivitamins/Iron) CAPS    norethindrone (MICRONOR) 0.35 MG tablet    omeprazole (PriLOSEC) 20 mg delayed release capsule    pregabalin (LYRICA) 100 mg capsule    tiZANidine (ZANAFLEX) 4 mg tablet    traMADol-acetaminophen (ULTRACET) 37.5-325 mg per tablet    venlafaxine (EFFEXOR) 100 MG tablet    vitamin B-12 (CYANOCOBALAMIN) 50 MCG tablet    No Known Allergies    PHYSICAL EXAM:    Blood pressure 106/62, height 5' 3\" (1.6 m), weight 52.3 kg (115 lb 3.2 oz). Body mass index is 20.41 kg/m².  General Appearance: NAD, cooperative, alert-thin  Eyes: Anicteric, conjunctiva pink   ENT:  Normocephalic, atraumatic, normal mucosa.    Neck:  Supple, symmetrical, trachea midline,   Resp:  Clear to auscultation bilaterally; no rales, rhonchi or wheezing; respirations unlabored   CV:  S1 S2, Regular rate and rhythm; no murmur, rub, or gallop.  GI:  Soft, non-tender, non-distended; normal bowel sounds; no masses, no organomegaly - mild epigastric tenderness   Rectal: Deferred  Musculoskeletal: No cyanosis, clubbing or edema. Normal ROM.  Skin:  No jaundice, rashes, or lesions   Heme/Lymph: No palpable cervical lymphadenopathy  Psych: --Somewhat tearful in relating recent life events, good eye contact,  thought process is goal-directed  Neuro: No gross deficits, AAOx3    Lab Results:   Lab Results   Component Value Date    WBC 11.10 (H) 08/14/2024    HGB 14.3 08/14/2024    HCT 41.4 08/14/2024    MCV 86 08/14/2024     (H) 08/14/2024     Lab Results   Component Value Date    "  10/07/2017    K 4.1 08/14/2024     08/14/2024    CO2 28 08/14/2024    ANIONGAP 5 11/03/2015    BUN 18 08/14/2024    CREATININE 0.56 (L) 08/14/2024    GLUCOSE 85 10/07/2017    GLUF 101 (H) 09/28/2021    CALCIUM 10.3 (H) 08/14/2024    AST 20 08/14/2024    ALT 22 08/14/2024    ALKPHOS 85 08/14/2024    PROT 7.1 10/07/2017    BILITOT 0.9 10/07/2017    EGFR 97 08/14/2024         Radiology Results:   CT abdomen pelvis with contrast    Result Date: 8/14/2024  Narrative: CT ABDOMEN AND PELVIS WITH IV CONTRAST INDICATION: epigastric pain. COMPARISON: None. TECHNIQUE: CT examination of the abdomen and pelvis was performed. Multiplanar 2D reformatted images were created from the source data. This examination, like all CT scans performed in the [facility], was performed utilizing techniques to minimize radiation dose exposure, including the use of iterative reconstruction and automated exposure control. Radiation dose length product (DLP) for this visit: 399.34 mGy-cm IV Contrast: 50 mL of iohexol (OMNIPAQUE) 80 mL of iohexol (OMNIPAQUE) Enteric Contrast: Not administered. FINDINGS: ABDOMEN LOWER CHEST: No clinically significant abnormality in the visualized lower chest. LIVER/BILIARY TREE: Unremarkable. GALLBLADDER: Cholelithiasis without findings of acute cholecystitis. SPLEEN: Unremarkable. PANCREAS: Unremarkable. ADRENAL GLANDS: Unremarkable. KIDNEYS/URETERS: Unremarkable. No hydronephrosis. STOMACH AND BOWEL: Small hiatal hernia. Mild thickening of the gastric pouch. Status post gastric bypass. Small amount of contrast is seen within the excluded stomach which may be due to gastrogastric fistula versus enterogastric reflux. No bowel obstruction. Moderate stool throughout the colon. APPENDIX: Status post appendectomy. ABDOMINOPELVIC CAVITY: No ascites. No pneumoperitoneum. No lymphadenopathy. VESSELS: Unremarkable for patient's age. PELVIS REPRODUCTIVE ORGANS: Post hysterectomy. URINARY BLADDER:  Unremarkable. ABDOMINAL WALL/INGUINAL REGIONS: Unremarkable. BONES: No acute fracture or suspicious osseous lesion. Spinal degenerative changes.     Impression: 1.  Status post gastric bypass. No bowel obstruction. 2.  Small amount of contrast is seen within the excluded stomach which may be due to gastrogastric fistula versus enterogastric reflux. 3.  Small hiatal hernia. Mild thickening of the gastric pouch, correlate for gastritis. 4.  Cholelithiasis without evidence of cholecystitis. Workstation performed: [id number]         REVIEW OF SYSTEMS:    CONSTITUTIONAL: Denies any fever, chills, rigors, positive for weight loss-60 pounds in nearly 3 years  HEENT: No earache or tinnitus. Denies hearing loss or visual disturbances.  CARDIOVASCULAR: No chest pain or palpitations.   RESPIRATORY: Denies any cough, hemoptysis, shortness of breath or dyspnea on exertion.  GASTROINTESTINAL: As noted in the History of Present Illness.   GENITOURINARY: No problems with urination. Denies any hematuria or dysuria.  NEUROLOGIC: No dizziness or vertigo, denies headaches.   MUSCULOSKELETAL: Denies any muscle or joint pain.   SKIN: Denies skin rashes or itching.   ENDOCRINE: Denies excessive thirst. Denies intolerance to heat or cold.  PSYCHOSOCIAL: Positive for depression and anxiety denies any recent memory loss.

## 2024-08-28 NOTE — PATIENT INSTRUCTIONS
[facility]astroenterology [facility]ists - Outpatient Consultation  Lola Wade 64 y.o. female MRN: [id number]  Encounter: [id number]    ASSESSMENT AND PLAN:      1. Acute gastritis without hemorrhage, unspecified gastritis type  --With abnormalities in her gastric pouch post bariatric surgery.  There is some communication between either small intestine or stomach as contrast does enter the bypassed gastric remnant  -Doing much better on omeprazole 20 mg daily and famotidine at night.  Patient has decreased her Carafate  -Evaluate with EGD.  No history of nonsteroidal use  - Ambulatory Referral to Gastroenterology    2. Gallstones  -Patient with gallstones noted on CAT scan.  Patient with rare fatty food intolerance.  Acute gallbladder attack can present with similar way.  Evaluation for abdominal pain will include biliary imaging with a HIDA scan    3. History of gastric bypass  --Is post revision of a gastric sleeve.  Gastric sleeve 2014 and then revision to gastric bypass 2021    4. Screening for colon cancer  -Patient up-to-date with respect to colon cancer screening.  Does have family history of colon cancer and polyps.  Last exam 2020 by colorectal surgery group at [facility]    5. Epigastric abdominal pain  --Differential as noted    - NM hepatobiliary w rx; Future  - EGD; Future      Followup Appointment: 3 mo

## 2024-08-28 NOTE — TELEPHONE ENCOUNTER
Scheduled date of EGD(as of today): 9/17/24  Physician performing EGD: NAZIA  Location of EGD: [facility]  Instructions reviewed with patient by: CLAIR  Clearances: N

## 2024-08-28 NOTE — PROGRESS NOTES
Date of surgery:  11/24/2014 09/28/2021  Procedure:             Sleeve              RNY Bypass  Performing surgeon:  Jeff Núñez    Initial Weight - 225.9  Current Weight - 115.0  Total Body Weight Loss (EWL)- 110.9  EWL% - 131%  TWB % - 49%

## 2024-08-28 NOTE — PROGRESS NOTES
OFFICE VISIT - BARIATRIC SURGERY  Lola Wade 64 y.o. female MRN: [id number]  Unit/Bed#:  Encounter: [id number]      HPI:  Lola Wade is a 64 y.o. female with history of Lissette-en-Y gastric bypass by Dr. Jeff Núñez on .  Patient is here for follow-up post ED visit for abdominal pain.    Subjective   Since her ED visit she was able to tolerate food on omeprazole.  She is able to attend a wedding in [place].    She feels fine on PPI, and caracte.  She is eating normally, but just not eating a lot. Reports losing 5 in the past two weeks. Able to keep liquids down.    Patient is seen by GI who plans to do an upper endoscopy and HIDA for cholecystitis.    Review of Systems   Constitutional:  Negative for chills and fever.   HENT:  Negative for sore throat.    Eyes:  Negative for visual disturbance.   Respiratory:  Negative for cough and shortness of breath.    Gastrointestinal:  Positive for abdominal pain. Negative for vomiting.   Musculoskeletal:  Negative for arthralgias.   Skin:  Negative for color change and rash.   Neurological:  Negative for seizures and syncope.   All other systems reviewed and are negative.      Historical Information   Past Medical History:   Diagnosis Date    Anxiety     Arthritis     Bariatric surgery status     Chronic pain disorder     Disturbance of memory     last assessed: June 23, 2016     Endometriosis     GERD (gastroesophageal reflux disease)     Hiatal hernia     Hirsutism     History of sleeve gastrectomy     Lumbar herniated disc     Menometrorrhagia     Morbid obesity (HCC)     last assessed: Dec 5, 2014     Motion sickness     Postgastrectomy malabsorption     Sclerosing adenosis of breast     Symptomatic menopausal or female climacteric states     Uterine leiomyoma      Past Surgical History:   Procedure Laterality Date    APPENDECTOMY      BREAST EXCISIONAL BIOPSY Left 2010    Benign    COLONOSCOPY      complete - 7 year repeat recommended - Resolved: 2007     DILATION AND  "CURETTAGE OF UTERUS      FRACTURE SURGERY  1993    GASTRECTOMY SLEEVE LAPAROSCOPIC  2018    GYNECOLOGIC CRYOSURGERY      Cervix -  for abnormal paps     HYSTERECTOMY  2006    INDUCED       x3    LAPAROSCOPIC SALPINGOOPHERECTOMY Right     LAPAROSCOPIC TOTAL HYSTERECTOMY      06, Laparoscopic hysterectomy with LSO with vaginal closure of the vaginal cuff     OR REVJ GSTR/JJ ANAST W/RCNSTJ W/O VGTMY N/A 2021    Procedure: LAPAROSCOPIC REVISION CONVERSION  JENN-EN-Y GASTRIC BYPASS WITH ROBOTICS AND INTRAOPERATIVE EGD, PARAESOPHAGEAL HERNIA REPAIR;  Surgeon: Sheldon Núñez MD;  Location: [facility];  Service: Bariatrics    SALPINGOOPHORECTOMY Left 2006    Right ovary removed at age 15 and the left one was removed with uterus.    STOMACH SURGERY  2014    for morbid obesity - Managed by: Sheldon Hendricks -     TONSILLECTOMY AND ADENOIDECTOMY      TOOTH EXTRACTION      UPPER GASTROINTESTINAL ENDOSCOPY       Social History   Social History     Substance and Sexual Activity   Alcohol Use Not Currently    Comment: occasional     Social History     Substance and Sexual Activity   Drug Use Yes    Types: Marijuana    Comment: medical     Social History     Tobacco Use   Smoking Status Former    Current packs/day: 0.00    Average packs/day: 0.5 packs/day for 17.8 years (8.9 ttl pk-yrs)    Types: Cigarettes    Start date: 3/4/1971    Quit date: 1989    Years since quittin.6    Passive exposure: Past   Smokeless Tobacco Never   Tobacco Comments    33 years smoke free!       Objective       Current Vitals:   Blood Pressure: 100/58 (24 1329)  Pulse: 62 (24 1329)  Respirations: 18 (24 1329)  Height: 5' 3\" (160 cm) (24 1329)  Weight - Scale: 52.2 kg (115 lb) (24 1329)  SpO2: 93 % (24 1329)    Invasive Devices       None                   Physical Exam  Vitals reviewed.   Constitutional:       Appearance: Normal appearance.   HENT:      Head: Atraumatic.    "   Nose: Nose normal.   Cardiovascular:      Rate and Rhythm: Normal rate.      Pulses: Normal pulses.   Pulmonary:      Effort: Pulmonary effort is normal.      Breath sounds: Normal breath sounds.   Abdominal:      Tenderness: There is abdominal tenderness.      Comments: Epigastric tenderness with deep palpation.   Musculoskeletal:         General: Normal range of motion.   Neurological:      General: No focal deficit present.   Psychiatric:         Behavior: Behavior normal.           Pathology, and Other Studies: I have personally reviewed pertinent reports.        Workup includes:     UGI    Pending    EGD        Pathology from EGD         MANOMETRY/pH Study        GASTRIC EMPTYING STUDY      --------------------------------------------------------------------    Assessment/PLAN:    Lola Wade is a 64 y.o. female Lissette-en-Y gastric bypass by Dr. Jeff Núñez in 2021.  CT with questionable GG fistula.  Patient seen by GI with plan for upper endoscopy and HIDA.    Overall she seems to be in better spirits with improvement in her abdominal pain on PPI and Carafate.    -Would recommend patient continuing PPI and Carafate.  -Patient to reach out to dietitian for diet recommendations  -Patient to return to clinic as needed pending upper endoscopy.     Randolph Salvador  Bariatric Surgery  8/28/2024  5:18 PM

## 2024-08-28 NOTE — TELEPHONE ENCOUNTER
Contacted patient in regards to Routine Referral in attempts to verify patient's needs of services. Writer verified Full Name, , Callback Number, Address, and Insurance. Writer spoke with pt stated interested in med mgmt and talk therapy due to major depression and anxiety. Pt added to wait list.    closed, Referral Completed    Outside Resource Guide Request  Email: [email or web address]

## 2024-08-29 ENCOUNTER — TELEPHONE (OUTPATIENT)
Dept: GASTROENTEROLOGY | Facility: CLINIC | Age: 64
End: 2024-08-29

## 2024-08-29 DIAGNOSIS — E03.9 HYPOTHYROIDISM, UNSPECIFIED TYPE: ICD-10-CM

## 2024-08-29 RX ORDER — LEVOTHYROXINE SODIUM 150 UG/1
TABLET ORAL
Qty: 96 TABLET | Refills: 1 | Status: SHIPPED | OUTPATIENT
Start: 2024-08-29

## 2024-09-03 ENCOUNTER — ANESTHESIA EVENT (OUTPATIENT)
Dept: ANESTHESIOLOGY | Facility: AMBULATORY SURGERY CENTER | Age: 64
End: 2024-09-03

## 2024-09-03 ENCOUNTER — ANESTHESIA (OUTPATIENT)
Dept: ANESTHESIOLOGY | Facility: AMBULATORY SURGERY CENTER | Age: 64
End: 2024-09-03

## 2024-09-03 DIAGNOSIS — M51.36 DISC DEGENERATION, LUMBAR: ICD-10-CM

## 2024-09-03 NOTE — TELEPHONE ENCOUNTER
Reason for call:   [x] Refill   [] Prior Auth  [] Other:     Office:   [x] PCP/Provider - [facility]   [] Specialty/Provider -     Medication: traMADol-acetaminophen (ULTRACET) 37.5-325 mg per tablet     Dose/Frequency: Take 1 tablet by mouth every 6 (six) hours as needed for moderate pain     Quantity: 120    Pharmacy: [facility]/pharmacy #[id number] [address]     Does the patient have enough for 3 days?   [x] Yes   [] No - Send as HP to POD

## 2024-09-03 NOTE — TELEPHONE ENCOUNTER
Can staff please call and ask Lola how she is doing mood wise since starting Buspar last visit?  Any significant improvement in her mood?

## 2024-09-04 NOTE — TELEPHONE ENCOUNTER
Pt reports she just started noticing a difference yesterday, reported she will call if it starts to not work.

## 2024-09-06 ENCOUNTER — HOSPITAL ENCOUNTER (OUTPATIENT)
Dept: NUCLEAR MEDICINE | Facility: HOSPITAL | Age: 64
End: 2024-09-06
Attending: INTERNAL MEDICINE
Payer: COMMERCIAL

## 2024-09-06 DIAGNOSIS — R10.13 EPIGASTRIC ABDOMINAL PAIN: ICD-10-CM

## 2024-09-06 PROCEDURE — A9537 TC99M MEBROFENIN: HCPCS

## 2024-09-06 PROCEDURE — 78227 HEPATOBIL SYST IMAGE W/DRUG: CPT

## 2024-09-06 RX ORDER — SINCALIDE 5 UG/5ML
0.02 INJECTION, POWDER, LYOPHILIZED, FOR SOLUTION INTRAVENOUS
Status: COMPLETED | OUTPATIENT
Start: 2024-09-06 | End: 2024-09-06

## 2024-09-06 RX ADMIN — SINCALIDE 1 MCG: 5 INJECTION, POWDER, LYOPHILIZED, FOR SOLUTION INTRAVENOUS at 14:34

## 2024-09-17 ENCOUNTER — HOSPITAL ENCOUNTER (OUTPATIENT)
Dept: GASTROENTEROLOGY | Facility: AMBULATORY SURGERY CENTER | Age: 64
Discharge: HOME | End: 2024-09-17
Payer: COMMERCIAL

## 2024-09-17 ENCOUNTER — ANESTHESIA (OUTPATIENT)
Dept: GASTROENTEROLOGY | Facility: AMBULATORY SURGERY CENTER | Age: 64
End: 2024-09-17

## 2024-09-17 ENCOUNTER — ANESTHESIA EVENT (OUTPATIENT)
Dept: GASTROENTEROLOGY | Facility: AMBULATORY SURGERY CENTER | Age: 64
End: 2024-09-17

## 2024-09-17 VITALS
BODY MASS INDEX: 20.38 KG/M2 | DIASTOLIC BLOOD PRESSURE: 53 MMHG | WEIGHT: 115 LBS | SYSTOLIC BLOOD PRESSURE: 93 MMHG | HEIGHT: 63 IN | OXYGEN SATURATION: 100 % | HEART RATE: 55 BPM | TEMPERATURE: 98 F | RESPIRATION RATE: 15 BRPM

## 2024-09-17 DIAGNOSIS — R10.13 EPIGASTRIC ABDOMINAL PAIN: ICD-10-CM

## 2024-09-17 PROCEDURE — 43239 EGD BIOPSY SINGLE/MULTIPLE: CPT | Performed by: INTERNAL MEDICINE

## 2024-09-17 PROCEDURE — 88305 TISSUE EXAM BY PATHOLOGIST: CPT | Performed by: PATHOLOGY

## 2024-09-17 RX ORDER — PROPOFOL 10 MG/ML
INJECTION, EMULSION INTRAVENOUS AS NEEDED
Status: DISCONTINUED | OUTPATIENT
Start: 2024-09-17 | End: 2024-09-17

## 2024-09-17 RX ORDER — SODIUM CHLORIDE, SODIUM LACTATE, POTASSIUM CHLORIDE, CALCIUM CHLORIDE 600; 310; 30; 20 MG/100ML; MG/100ML; MG/100ML; MG/100ML
50 INJECTION, SOLUTION INTRAVENOUS CONTINUOUS
Status: DISCONTINUED | OUTPATIENT
Start: 2024-09-17 | End: 2024-09-17

## 2024-09-17 RX ORDER — LIDOCAINE HYDROCHLORIDE 20 MG/ML
INJECTION, SOLUTION EPIDURAL; INFILTRATION; INTRACAUDAL; PERINEURAL AS NEEDED
Status: DISCONTINUED | OUTPATIENT
Start: 2024-09-17 | End: 2024-09-17

## 2024-09-17 RX ADMIN — PROPOFOL 30 MG: 10 INJECTION, EMULSION INTRAVENOUS at 08:07

## 2024-09-17 RX ADMIN — PROPOFOL 30 MG: 10 INJECTION, EMULSION INTRAVENOUS at 08:09

## 2024-09-17 RX ADMIN — SODIUM CHLORIDE, SODIUM LACTATE, POTASSIUM CHLORIDE, CALCIUM CHLORIDE: 600; 310; 30; 20 INJECTION, SOLUTION INTRAVENOUS at 07:50

## 2024-09-17 RX ADMIN — LIDOCAINE HYDROCHLORIDE 60 MG: 20 INJECTION, SOLUTION EPIDURAL; INFILTRATION; INTRACAUDAL; PERINEURAL at 08:03

## 2024-09-17 RX ADMIN — PROPOFOL 100 MG: 10 INJECTION, EMULSION INTRAVENOUS at 08:05

## 2024-09-17 RX ADMIN — SODIUM CHLORIDE, SODIUM LACTATE, POTASSIUM CHLORIDE, CALCIUM CHLORIDE 50 ML/HR: 600; 310; 30; 20 INJECTION, SOLUTION INTRAVENOUS at 07:57

## 2024-09-17 NOTE — ANESTHESIA POSTPROCEDURE EVALUATION
Post-Op Assessment Note    CV Status:  Stable  Pain Score: 0    Pain management: adequate       Mental Status:  Alert and awake   Hydration Status:  Euvolemic   PONV Controlled:  None   Airway Patency:  Patent     Post Op Vitals Reviewed: Yes    No anethesia notable event occurred.    Staff: Anesthesiologist, CRNA               BP      Temp      Pulse     Resp      SpO2

## 2024-09-17 NOTE — ANESTHESIA PREPROCEDURE EVALUATION
Procedure:  EGD    Relevant Problems   ANESTHESIA (within normal limits)      CARDIO (within normal limits)      ENDO   (+) Hypothyroidism (acquired)      GI/HEPATIC   (+) GERD (gastroesophageal reflux disease)      MUSCULOSKELETAL   (+) Chronic bilateral low back pain without sciatica      NEURO/PSYCH   (+) Anxiety   (+) Chronic bilateral low back pain without sciatica   (+) Continuous opioid dependence (HCC)   (+) Recurrent major depressive disorder, in partial remission (HCC)      PULMONARY  MJ use   (-) Sleep apnea (Hx MAEGAN prior to bariatric surgery; presumed resolved)   (-) Smoking   (-) URI (upper respiratory infection)      Surgery/Wound/Pain   (+) History of sleeve gastrectomy      Physical Exam    Airway    Mallampati score: I  TM Distance: >3 FB  Neck ROM: full     Dental   No notable dental hx     Cardiovascular      Pulmonary      Other Findings  post-pubertal.    Lab Results   Component Value Date    WBC 11.10 (H) 08/14/2024    HGB 14.3 08/14/2024     (H) 08/14/2024     Lab Results   Component Value Date    SODIUM 139 08/14/2024    K 4.1 08/14/2024    BUN 18 08/14/2024    CREATININE 0.56 (L) 08/14/2024    EGFR 97 08/14/2024    GLUCOSE 85 10/07/2017     Anesthesia Plan  ASA Score- 2     Anesthesia Type- IV sedation with anesthesia with ASA Monitors.         Additional Monitors:     Airway Plan:            Plan Factors-Exercise tolerance (METS): >4 METS.    Chart reviewed.   Existing labs reviewed. Patient summary reviewed.    Current smoker: MJ use.              Induction- intravenous.    Postoperative Plan-         Informed Consent- Anesthetic plan and risks discussed with patient.  I personally reviewed this patient with the CRNA. Discussed and agreed on the Anesthesia Plan with the CRNA..

## 2024-09-17 NOTE — INTERVAL H&P NOTE
H&P reviewed. After examining the patient I find no changes in the patients condition since the H&P had been written.    Vitals:    09/17/24 0743   Temp: 98 °F (36.7 °C)

## 2024-09-18 DIAGNOSIS — F33.41 RECURRENT MAJOR DEPRESSIVE DISORDER, IN PARTIAL REMISSION (HCC): ICD-10-CM

## 2024-09-18 DIAGNOSIS — F41.9 ANXIETY: ICD-10-CM

## 2024-09-19 RX ORDER — BUSPIRONE HYDROCHLORIDE 7.5 MG/1
7.5 TABLET ORAL 2 TIMES DAILY
Qty: 180 TABLET | Refills: 0 | Status: SHIPPED | OUTPATIENT
Start: 2024-09-19 | End: 2024-09-23 | Stop reason: SDUPTHER

## 2024-09-19 NOTE — PROGRESS NOTES
Bariatric Nutrition Assessment Note    Type of surgery  Vertical sleeve gastrectomy  Surgery Date: 11/24/2014  10 years  post-op  Surgeon: Dr. Sheldon Núñez  S/P gastric bypass  Surgery date : 9/28/2021  3 years post op   Surgeon Dr Sheldon Núñez     Per surgeon note : CT with questionable GG fistula.  Patient seen by GI with plan for upper endoscopy and HIDA.   Overall she seems to be in better spirits with improvement in her abdominal pain on PPI and Carafate.     -Would recommend patient continuing PPI and Carafate.  -Patient to reach out to dietitian for diet recommendations  -Patient to return to clinic as needed pending upper endoscopy.           Per GI Note       There was some contrast within the bypass gastric remnant indicating either a fistula or antral gastric reflux note was made of gallstones as well. Patient may have occasional very mild fatty food intolerance.     Nutrition Assessment   Lola Wade  64 y.o.  female    Wt 53.6 kg (118 lb 3.2 oz)   BMI 20.94 kg/m²      Wt Readings from Last 3 Encounters:   09/17/24 52.2 kg (115 lb)   08/28/24 52.2 kg (115 lb)   08/28/24 52.3 kg (115 lb 3.2 oz)       Estimated calories for weight maintenance =2325-8309 ( 25 kcal / kg body weight )  Estimated calories for weight gain = 2354-2923 kcal per day ( + 250-500 kcal per day )    Estimated protein needs 53- 81 grams per day (1.0-1.5 gms/kg IBW )   Estimated fluid needs 53-62 oz per day ( 30-35  ml/kg IBW )      Weight on Day of Weight Loss Surgery: 225.9#  Weight in (lb) to have BMI = 25: 141.1#  Charly  Wt: 148# at one year post op   Post-Op Wt Loss: 110.9#/131% EBWL/49% TBWL in 10 year(s)( from first surgery )    Pt attributes some of her weight loss to increased stress - lost her father during Covid, got  and moved out of the house and her daughter moved to [place].  Her landlord increased her rent so she is  moving again to a more economical place.     Review of History and Medications   Past  Medical History:   Diagnosis Date    Anxiety     Arthritis     Bariatric surgery status     Chronic pain disorder     Disease of thyroid gland     Disturbance of memory     last assessed: 2016     Endometriosis     GERD (gastroesophageal reflux disease)     Hiatal hernia     Hirsutism     History of sleeve gastrectomy     Lumbar herniated disc     Menometrorrhagia     Morbid obesity (HCC)     last assessed: Dec 5, 2014     Motion sickness     Postgastrectomy malabsorption     Sclerosing adenosis of breast     Symptomatic menopausal or female climacteric states     Uterine leiomyoma      Past Surgical History:   Procedure Laterality Date    APPENDECTOMY      BREAST EXCISIONAL BIOPSY Left 2010    Benign    COLONOSCOPY      complete - 7 year repeat recommended - Resolved:      DILATION AND CURETTAGE OF UTERUS      FRACTURE SURGERY  1993    GASTRECTOMY SLEEVE LAPAROSCOPIC  2018    GYNECOLOGIC CRYOSURGERY      Cervix - s for abnormal paps     HYSTERECTOMY  2006    INDUCED       x3    LAPAROSCOPIC SALPINGOOPHERECTOMY Right     LAPAROSCOPIC TOTAL HYSTERECTOMY      06, Laparoscopic hysterectomy with LSO with vaginal closure of the vaginal cuff     DC REVJ GSTR/JJ ANAST W/RCNSTJ W/O VGTMY N/A 2021    Procedure: LAPAROSCOPIC REVISION CONVERSION  JENN-EN-Y GASTRIC BYPASS WITH ROBOTICS AND INTRAOPERATIVE EGD, PARAESOPHAGEAL HERNIA REPAIR;  Surgeon: Sheldon Núñez MD;  Location: [facility] OR;  Service: Bariatrics    SALPINGOOPHORECTOMY Left     Right ovary removed at age 15 and the left one was removed with uterus.    STOMACH SURGERY  2014    for morbid obesity - Managed by: Sheldon Hendricks -     TONSILLECTOMY AND ADENOIDECTOMY      TOOTH EXTRACTION      UPPER GASTROINTESTINAL ENDOSCOPY       Social History     Socioeconomic History    Marital status:      Spouse name: Not on file    Number of children: 1    Years of education: Not on file    Highest education level:  Not on file   Occupational History    Not on file   Tobacco Use    Smoking status: Former     Current packs/day: 0.00     Average packs/day: 0.5 packs/day for 17.8 years (8.9 ttl pk-yrs)     Types: Cigarettes     Start date: 3/4/1971     Quit date: 1989     Years since quittin.7     Passive exposure: Past    Smokeless tobacco: Never    Tobacco comments:     33 years smoke free!   Vaping Use    Vaping status: Some Days    Substances: THC, CBD   Substance and Sexual Activity    Alcohol use: Yes     Comment: occasional    Drug use: Yes     Types: Marijuana     Comment: medical    Sexual activity: Not Currently     Partners: Male     Birth control/protection: Post-menopausal, Surgical, Female Sterilization, None   Other Topics Concern    Not on file   Social History Narrative    Always uses seat belt     Caffeine use     Oriental orthodox Rastafari ([employer])      Social Determinants of Health     Financial Resource Strain: Medium Risk (2024)    Overall Financial Resource Strain (CARDIA)     Difficulty of Paying Living Expenses: Somewhat hard   Food Insecurity: Not on file   Transportation Needs: No Transportation Needs (2024)    PRAPARE - Transportation     Lack of Transportation (Medical): No     Lack of Transportation (Non-Medical): No   Physical Activity: Not on file   Stress: Not on file   Social Connections: Not on file   Intimate Partner Violence: Not on file   Housing Stability: Not on file       Current Outpatient Medications:     BIOTIN PO, Take by mouth Two tablets once day, Disp: , Rfl:     busPIRone (BUSPAR) 7.5 mg tablet, TAKE 1 TABLET BY MOUTH 2 TIMES A DAY., Disp: 180 tablet, Rfl: 0    Cholecalciferol (VITAMIN D3) 2000 units TABS, Take 2 tablets by mouth daily, Disp: , Rfl:     famotidine (PEPCID) 20 mg tablet, Take 20 mg by mouth 2 (two) times a day as needed for heartburn, Disp: , Rfl:     hydrOXYzine HCL (ATARAX) 10 mg tablet, Take 1 tablet (10 mg total) by mouth every 8 (eight)  hours as needed for anxiety, Disp: 30 tablet, Rfl: 0    levothyroxine (Synthroid) 150 mcg tablet, TAKE 1 TABLET BY MOUTH 6 DAYS OF THE WEEK THEN 1 & 1/2 TABS ON SUNDAY, Disp: 96 tablet, Rfl: 1    Magnesium 200 MG TABS, Take 2 tablets by mouth daily, Disp: , Rfl:     Multiple Vitamins-Minerals (Bariatric Multivitamins/Iron) CAPS, Take 2 capsules by mouth in the morning, Disp: , Rfl:     norethindrone (MICRONOR) 0.35 MG tablet, TAKE 1 TABLET BY MOUTH EVERY DAY, Disp: 84 tablet, Rfl: 1    omeprazole (PriLOSEC) 20 mg delayed release capsule, TAKE 1 CAPSULE BY MOUTH EVERY DAY, Disp: 90 capsule, Rfl: 1    pregabalin (LYRICA) 100 mg capsule, Take 1 capsule (100 mg total) by mouth 3 (three) times a day, Disp: 90 capsule, Rfl: 2    tiZANidine (ZANAFLEX) 4 mg tablet, TAKE 1 TABLET BY MOUTH EVERY 8 HOURS IF NEEDED FOR MUSCLE SPASM OR PAIN, Disp: 60 tablet, Rfl: 0    traMADol-acetaminophen (ULTRACET) 37.5-325 mg per tablet, Take 1 tablet by mouth every 6 (six) hours as needed for moderate pain, Disp: 120 tablet, Rfl: 0    venlafaxine (EFFEXOR) 100 MG tablet, TAKE 2 TABLETS BY MOUTH EVERY MORNING AND 1 TABLET EVERY EVENING, Disp: 270 tablet, Rfl: 1    vitamin B-12 (CYANOCOBALAMIN) 50 MCG tablet, Take by mouth daily, Disp: , Rfl:     Food Intake and Lifestyle Assessment   Food Intake Assessment completed via usual diet recall  Breakfast: 9/9:30 am  Cottage cheese with pineapple and flaxseed OR   cereal with a banana ( rarely )  Eggs with toast bread   Lunch  1:30 pm or 2 pm   Salad with turkey or sandwiches - ham and cheese on rye with mustard and lettuce   Snack: 0  Dinner: 6 : 30  Or 7: 30 pm   Lean protein, vegetables and starch for dinner   Snack: Comfort foods - pop corners , late July sea salt tortilla chips, pretzels   Beverage intake: water  Diet texture/stage: regular  Protein supplement: Premier protein most days 150 luis/ 30 grams of protein   Estimated protein intake per day: 60-70 grams   Estimated fluid intake per  day: 60-80 ounces on typical day  Meals eaten away from home: rarely   Typical meal pattern: 3 meals per day and 1-2 snacks per day  Eating Behaviors: Appropriate diet advancement, Appropriate portion sizes, Does not drink with meals and waits 30-minutes after meal before resuming drinking, Frequent snacking/ grazing, Mindless eating, Emotional eating, Craves sweet foods and Craves salty foods    Food allergies or intolerances:avoids beef due to cholesterol and tolerance   Cultural or Congregation considerations: n/a    Vitamin and mineral regimen adequate     Physical Assessment  Nutrition Related Findings  Stomach pain that has been relieved with omeprazole   Currently is not taking carafate     Physical Activity  Types of exercise: activities of daily living  Belongs to [facility]  Does strength training in the home  Walks at park with friend, does exercise stations at the park.   Current physical limitations: experiencing balance issues - scheduled for PT       Psychosocial Assessment   Support systems: Adult daughter   Socioeconomic factors: Patient has been under a lot of stress.  Her landlord raised the rent and she is moving to a smaller place. She will be renting an room and bathroom and sharing a kitchen.  She lost her father during Covid , got  about 2 years ago ,her daughter moved to [place] and recently got . She reports that she  had significant depression and is now getting out of it.  Feels better emotionally now.  Did go to [facility] but did not find it helpful. Reports that she has local friends for support and speaks frequently to her daughter on the phone.   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      Nutrition Diagnosis  Diagnosis: Unintentional Weight Loss (NC-3.4) and Inadequate protein intake (NI-5.7.3)  Related to: Altered GI function  As Evidenced by: Unintentional weight loss,  lost 8.5#(7% of body weight ) x 6  "months   Lost a total of 25# over 2 years     Nutrition Prescription: Recommend the following diet  Small frequent meals   Avoid gastric irritants- high fat foods, caffeine, chocolate, peppermint ,coffee, tea, citrus , tomato products and alcohol   Aim for 3188-2457 calories per day for weight gain and 75-85 grams protein per day     Meal plan   Breakfast: 300/15  Snack:  350 / 30    One Ensure complete   Lunch: 300/20  Snack: 150/>5  Dinner: 300/20  Snack: 150/>5    ~ 1500 calories/  grams protein     Interventions and Teaching   Patient educated on post-op nutrition guidelines.       Patient educated and handouts provided.  Surgical changes to stomach / GI  Capacity of post-surgery stomach  Diet progression  Adequate hydration  Sugar and fat restriction to decrease \"dumping syndrome\"  Fat restriction to decrease steatorrhea  Expected weight loss  Weight loss plateaus/ possibility of weight regain  Exercise  Suggestions for pre-op diet  Nutrition considerations after surgery  Protein supplements  Meal planning and preparation  Appropriate carbohydrate, protein, and fat intake, and food/fluid choices to maximize safe weight loss, nutrient intake, and tolerance   Dietary and lifestyle changes  Possible problems with poor eating habits  Intuitive eating  Techniques for self monitoring and keeping daily food journal  Potential for food intolerance after surgery, and ways to deal with them including: lactose intolerance, nausea, reflux, vomiting, diarrhea, food intolerance, appetite changes, gas  Vitamin / Mineral supplementation of Multivitamin with minerals, Calcium, Vitamin B12, Iron, Fat Soluble vitamins and Vitamin D    Education provided to: patient    Barriers to learning: No barriers identified    Readiness to change: action    Comprehension: verbalizes understanding     Expected Compliance: good    Evaluation/Monitoring   Eating pattern as discussed Tolerance of nutrition prescription Body weight Lab " values Physical activity Bowel pattern    Goals  Food journal, Exercise 30 minutes 5 times per week, Complete lession plans 1-6, Eat 3 meals per day and Eliminate mindless snacking   Small frequent meals   Avoid gastric irritants   Recommend one Ensure Complete ( 350/30 grams protein ) per day OR home made protein/calories shakes ( recipes provided for 250-300 calories each made with protein powder - 25 grams protein )  Eat 5 meals per day in addition to Ensure Complete  Aim for 1500 calories/ 95 grams protein - for weight gain   Meal grids provided   Continue with strength training so will gain LBM and not just fat mass   Consider body comp to assess  Food log - books provided as patient's phone will not download more apps     Pt will f/u prn        Time Spent:   45 Minutes

## 2024-09-20 ENCOUNTER — CLINICAL SUPPORT (OUTPATIENT)
Dept: BARIATRICS | Facility: CLINIC | Age: 64
End: 2024-09-20

## 2024-09-20 VITALS — BODY MASS INDEX: 20.94 KG/M2 | WEIGHT: 118.2 LBS

## 2024-09-20 DIAGNOSIS — R10.13 EPIGASTRIC PAIN: ICD-10-CM

## 2024-09-20 DIAGNOSIS — R63.4 UNINTENTIONAL WEIGHT LOSS: Primary | ICD-10-CM

## 2024-09-20 DIAGNOSIS — Z98.84 HISTORY OF ROUX-EN-Y GASTRIC BYPASS: ICD-10-CM

## 2024-09-20 DIAGNOSIS — K91.2 POSTSURGICAL MALABSORPTION: ICD-10-CM

## 2024-09-20 PROCEDURE — RECHECK: Performed by: DIETITIAN, REGISTERED

## 2024-09-20 PROCEDURE — 88305 TISSUE EXAM BY PATHOLOGIST: CPT | Performed by: PATHOLOGY

## 2024-09-20 PROCEDURE — WMDI30: Performed by: DIETITIAN, REGISTERED

## 2024-09-23 ENCOUNTER — OFFICE VISIT (OUTPATIENT)
Dept: FAMILY MEDICINE CLINIC | Facility: HOSPITAL | Age: 64
End: 2024-09-23
Payer: COMMERCIAL

## 2024-09-23 VITALS
TEMPERATURE: 98.6 F | OXYGEN SATURATION: 98 % | HEART RATE: 63 BPM | SYSTOLIC BLOOD PRESSURE: 94 MMHG | HEIGHT: 63 IN | DIASTOLIC BLOOD PRESSURE: 60 MMHG | WEIGHT: 121 LBS | BODY MASS INDEX: 21.44 KG/M2

## 2024-09-23 DIAGNOSIS — K80.20 GALLSTONES: ICD-10-CM

## 2024-09-23 DIAGNOSIS — F41.9 ANXIETY: ICD-10-CM

## 2024-09-23 DIAGNOSIS — Z98.84 HISTORY OF ROUX-EN-Y GASTRIC BYPASS: ICD-10-CM

## 2024-09-23 DIAGNOSIS — M54.50 CHRONIC BILATERAL LOW BACK PAIN WITHOUT SCIATICA: ICD-10-CM

## 2024-09-23 DIAGNOSIS — K29.70 GASTRITIS WITHOUT BLEEDING, UNSPECIFIED CHRONICITY, UNSPECIFIED GASTRITIS TYPE: ICD-10-CM

## 2024-09-23 DIAGNOSIS — G89.29 CHRONIC BILATERAL LOW BACK PAIN WITHOUT SCIATICA: ICD-10-CM

## 2024-09-23 DIAGNOSIS — F33.41 RECURRENT MAJOR DEPRESSIVE DISORDER, IN PARTIAL REMISSION (HCC): Primary | ICD-10-CM

## 2024-09-23 PROCEDURE — 99214 OFFICE O/P EST MOD 30 MIN: CPT | Performed by: INTERNAL MEDICINE

## 2024-09-23 PROCEDURE — G2211 COMPLEX E/M VISIT ADD ON: HCPCS | Performed by: INTERNAL MEDICINE

## 2024-09-23 RX ORDER — BUSPIRONE HYDROCHLORIDE 10 MG/1
10 TABLET ORAL 2 TIMES DAILY
Qty: 60 TABLET | Refills: 1 | Status: SHIPPED | OUTPATIENT
Start: 2024-09-23

## 2024-09-23 NOTE — ASSESSMENT & PLAN NOTE
At baseline, on Lyrica bid and has Tramadol and Zanaflex prn, Zanaflex refilled upon request, call with new/worse symptoms  Orders:    tiZANidine (ZANAFLEX) 4 mg tablet; TAKE 1 TABLET BY MOUTH EVERY 8 HOURS IF NEEDED FOR MUSCLE SPASM OR PAIN

## 2024-09-23 NOTE — ASSESSMENT & PLAN NOTE
Better with addition of Buspirone but mood could still be better - will increase form 7.5 mg to 10 mg bid, will con't current Effexor,  d/w pt that it takes 4-6 wks to get maximum benefit of med and that med has to be taken every day and to not miss doses of med, call with SE/new/worse mood/panic attacks, may con't hydroxyzine prn, re-eval in 6 wks  Orders:    busPIRone (BUSPAR) 10 mg tablet; Take 1 tablet (10 mg total) by mouth 2 (two) times a day

## 2024-09-23 NOTE — PROGRESS NOTES
Ambulatory Visit  Name: Lola Wade      : 1960      MRN: [id number]  Encounter Provider: Lucia Lord DO  Encounter Date: 2024   Encounter department: [facility], [place] [facility] [address]     Assessment & Plan  Recurrent major depressive disorder, in partial remission (HCC)  Better with addition of Buspirone but mood could still be better - will increase form 7.5 mg to 10 mg bid, will con't current Effexor,  d/w pt that it takes 4-6 wks to get maximum benefit of med and that med has to be taken every day and to not miss doses of med, call with SE/new/worse mood/SI, re-eval in 6 wks      Orders:    busPIRone (BUSPAR) 10 mg tablet; Take 1 tablet (10 mg total) by mouth 2 (two) times a day    Anxiety  Better with addition of Buspirone but mood could still be better - will increase form 7.5 mg to 10 mg bid, will con't current Effexor,  d/w pt that it takes 4-6 wks to get maximum benefit of med and that med has to be taken every day and to not miss doses of med, call with SE/new/worse mood/panic attacks, may con't hydroxyzine prn, re-eval in 6 wks  Orders:    busPIRone (BUSPAR) 10 mg tablet; Take 1 tablet (10 mg total) by mouth 2 (two) times a day    Gallstones  HIDA scan neg, avoid triggers, call with red flag GI symptoms       Gastritis without bleeding, unspecified chronicity, unspecified gastritis type  Had EGD - small hiatal hernia and mod gastritis noted, has seen GI, on PPI and H2 blocker, con't tx and f/u with GI       History of Lissette-en-Y gastric bypass  Just saw dietician and bariatric office, con't f/u as directed       Chronic bilateral low back pain without sciatica  At baseline, on Lyrica bid and has Tramadol and Zanaflex prn, Zanaflex refilled upon request, call with new/worse symptoms  Orders:    tiZANidine (ZANAFLEX) 4 mg tablet; TAKE 1 TABLET BY MOUTH EVERY 8 HOURS IF NEEDED FOR MUSCLE SPASM OR PAIN         Colonoscopy 3/20 - 5 yrs     Mammo  - scheduled for      [place]  "12/23 - osteopenia    PAP s/p hysterectomy - just had pelvic exam with Dr. Riedel 2/22/24     BW 4/24      History of Present Illness     HPI Pt here for a f/u appt    Last visit in Aug pt was noting depression and anxiety were uncontrolled and we subsequently added Buspar 7.5 mg bid to her Effexor.  She is here for a mood/med check.  She has been taking the Buspar as directed since last visit w/o significant SE.  She notes still has some down/sad mood and has \"a little anxiety\" but not enough that she has had to take the hydroxyzine at all. She does feel the Buspar can go up.     Pt saw GI (Dr. Leon) in late Aug for f/u h/o acute gastritis and gall stones - OV note reviewed. She was told to con't Omeprazole and Famotidine and an EGD was scheduled.  HIDA scan was ordered.  HIDA was normal.  Her EGD was done 9/17/24 and a small hiatal hernia and moderate gastritis were noted. Biopsies taken showed no H pylori or dysplasia. NO f/u EGD was advised. She is watching her diet but is feeling better unless she eats something spicy.      Pt saw Bariatric office (Dr. Salvador) in late Aug for f/u of her RYGB in 2021 - OV note reviewed.  She was told to con't PPI and Carafate. She was advised to f/u with nutritionist. She saw dietician last week.      She con't to use Tizanidine and Tramadol prn back pain. She needs a refill on the Tizanidine. She notes significant benefit with both meds.         Review of Systems   Constitutional:  Negative for chills and fever.   HENT:  Negative for congestion and trouble swallowing.    Eyes:  Negative for pain and visual disturbance.   Respiratory:  Negative for cough, shortness of breath and wheezing.    Cardiovascular:  Negative for chest pain and palpitations.   Gastrointestinal:  Negative for abdominal pain, diarrhea and nausea.   Musculoskeletal:  Negative for back pain and neck pain.   Skin:  Negative for rash and wound.   Neurological:  Negative for dizziness and headaches. " "  Hematological:  Does not bruise/bleed easily.   Psychiatric/Behavioral:  Positive for dysphoric mood. The patient is nervous/anxious.            Objective     BP 94/60   Pulse 63   Temp 98.6 °F (37 °C)   Ht 5' 3\" (1.6 m)   Wt 54.9 kg (121 lb)   SpO2 98%   BMI 21.43 kg/m²     Physical Exam  Vitals and nursing note reviewed.   Constitutional:       General: She is not in acute distress.     Appearance: She is not ill-appearing.   HENT:      Head: Normocephalic and atraumatic.   Eyes:      General:         Right eye: No discharge.         Left eye: No discharge.      Conjunctiva/sclera: Conjunctivae normal.   Pulmonary:      Effort: Pulmonary effort is normal. No respiratory distress.   Skin:     Coloration: Skin is not pale.      Findings: No rash.   Psychiatric:         Behavior: Behavior normal.         Thought Content: Thought content normal.         Judgment: Judgment normal.         "

## 2024-09-23 NOTE — ASSESSMENT & PLAN NOTE
Better with addition of Buspirone but mood could still be better - will increase form 7.5 mg to 10 mg bid, will con't current Effexor,  d/w pt that it takes 4-6 wks to get maximum benefit of med and that med has to be taken every day and to not miss doses of med, call with SE/new/worse mood/SI, re-eval in 6 wks      Orders:    busPIRone (BUSPAR) 10 mg tablet; Take 1 tablet (10 mg total) by mouth 2 (two) times a day

## 2024-10-07 DIAGNOSIS — M54.50 CHRONIC BILATERAL LOW BACK PAIN WITHOUT SCIATICA: ICD-10-CM

## 2024-10-07 DIAGNOSIS — G89.29 CHRONIC BILATERAL LOW BACK PAIN WITHOUT SCIATICA: ICD-10-CM

## 2024-10-15 LAB
EST. AVERAGE GLUCOSE BLD GHB EST-MCNC: 97 MG/DL
HBA1C MFR BLD: 5 % (ref 4.8–5.6)
T4 FREE SERPL-MCNC: 1.71 NG/DL (ref 0.82–1.77)
TSH SERPL DL<=0.005 MIU/L-ACNC: 0.01 UIU/ML (ref 0.45–4.5)

## 2024-10-16 DIAGNOSIS — F33.41 RECURRENT MAJOR DEPRESSIVE DISORDER, IN PARTIAL REMISSION (HCC): ICD-10-CM

## 2024-10-16 DIAGNOSIS — F41.9 ANXIETY: ICD-10-CM

## 2024-10-17 RX ORDER — BUSPIRONE HYDROCHLORIDE 10 MG/1
10 TABLET ORAL 2 TIMES DAILY
Qty: 180 TABLET | Refills: 1 | Status: SHIPPED | OUTPATIENT
Start: 2024-10-17

## 2024-10-31 ENCOUNTER — OFFICE VISIT (OUTPATIENT)
Dept: ENDOCRINOLOGY | Facility: CLINIC | Age: 64
End: 2024-10-31
Payer: COMMERCIAL

## 2024-10-31 VITALS
SYSTOLIC BLOOD PRESSURE: 108 MMHG | OXYGEN SATURATION: 98 % | HEART RATE: 61 BPM | HEIGHT: 63 IN | BODY MASS INDEX: 21.51 KG/M2 | DIASTOLIC BLOOD PRESSURE: 62 MMHG | WEIGHT: 121.4 LBS

## 2024-10-31 DIAGNOSIS — E03.9 HYPOTHYROIDISM (ACQUIRED): Primary | ICD-10-CM

## 2024-10-31 DIAGNOSIS — E03.9 HYPOTHYROIDISM, UNSPECIFIED TYPE: ICD-10-CM

## 2024-10-31 PROCEDURE — 99214 OFFICE O/P EST MOD 30 MIN: CPT

## 2024-10-31 RX ORDER — LEVOTHYROXINE SODIUM 150 MCG
TABLET ORAL
Qty: 96 TABLET | Refills: 1 | Status: SHIPPED | OUTPATIENT
Start: 2024-10-31

## 2024-10-31 NOTE — PROGRESS NOTES
Established Patient Progress Note    CC: Follow up for hypothyroidism    Impression & Plan:    Problem List Items Addressed This Visit       Hypothyroidism (acquired) - Primary     TSH low  Reduce Synthroid to 1 tablet 7 days per week and repeat lab work in 6 weeks         Relevant Medications    Synthroid 150 MCG tablet    Other Relevant Orders    TSH, 3rd generation with Free T4 reflex     Other Visit Diagnoses       Hypothyroidism, unspecified type        Relevant Medications    Synthroid 150 MCG tablet    Other Relevant Orders    TSH, 3rd generation with Free T4 reflex            Orders Placed This Encounter   Procedures    TSH, 3rd generation with Free T4 reflex     Standing Status:   Future     Number of Occurrences:   1     Standing Expiration Date:   10/31/2025    TSH, 3rd generation with Free T4 reflex     Standing Status:   Future     Number of Occurrences:   1     Standing Expiration Date:   10/31/2025       History of Present Illness:   Lola Wade is a 64 y.o. 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     She is currently taking Synthroid 1 tablet 6 days per week and 1.5 tablets on Sunday. She reports hot flashes and palpations. Most recent TSH is low. She reports weight loss but no other changes to her history.       Patient Active Problem List   Diagnosis    Anxiety    Atrophy of vagina    Recurrent major depressive disorder, in partial remission (HCC)    Disc degeneration, lumbar    Disturbance of memory    Dyslipidemia    Hypothyroidism (acquired)    Osteoarthritis    Postgastrectomy malabsorption    Arthritis    Menopausal syndrome (hot flashes)    History of sleeve gastrectomy    Primary thunderclap headache    Chronic bilateral low back pain without sciatica    Lumbar disc disease with radiculopathy    GERD (gastroesophageal reflux disease)    Obesity, Class I, BMI  30-34.9    Encounter for surgical aftercare following surgery of digestive system    Lumbar spondylosis    Continuous opioid dependence (HCC)    Cervicalgia    History of Lissette-en-Y gastric bypass    Status post hysterectomy with oophorectomy    Gallstones      Past Medical History:   Diagnosis Date    Anxiety     Arthritis     Bariatric surgery status     Disturbance of memory     last assessed: 2016     Endometriosis     GERD (gastroesophageal reflux disease)     Hiatal hernia     Hirsutism     History of sleeve gastrectomy     Menometrorrhagia     Motion sickness     Postgastrectomy malabsorption     Sclerosing adenosis of breast     Symptomatic menopausal or female climacteric states     Uterine leiomyoma       Past Surgical History:   Procedure Laterality Date    APPENDECTOMY      BREAST EXCISIONAL BIOPSY Left 2010    Benign    COLONOSCOPY      complete - 7 year repeat recommended - Resolved:      DILATION AND CURETTAGE OF UTERUS      FRACTURE SURGERY      GASTRECTOMY SLEEVE LAPAROSCOPIC  2018    GYNECOLOGIC CRYOSURGERY      Cervix -  for abnormal paps     HYSTERECTOMY  2006    INDUCED       x3    LAPAROSCOPIC SALPINGOOPHERECTOMY Right     LAPAROSCOPIC TOTAL HYSTERECTOMY      06, Laparoscopic hysterectomy with LSO with vaginal closure of the vaginal cuff     [place] REVJ GSTR/JJ ANAST W/RCNSTJ W/O VGTMY N/A 2021    Procedure: LAPAROSCOPIC REVISION CONVERSION  LISSETTE-EN-Y GASTRIC BYPASS WITH ROBOTICS AND INTRAOPERATIVE EGD, PARAESOPHAGEAL HERNIA REPAIR;  Surgeon: Sheldon Núñez MD;  Location: [facility];  Service: Bariatrics    SALPINGOOPHORECTOMY Left     Right ovary removed at age 15 and the left one was removed with uterus.    STOMACH SURGERY  2014    for morbid obesity - Managed by: Sheldon Hendricks -     TONSILLECTOMY AND ADENOIDECTOMY      TOOTH EXTRACTION      UPPER GASTROINTESTINAL ENDOSCOPY        Family History   Problem Relation Age of Onset     Hypertension Mother     Alzheimer's disease Mother     Stroke Mother         several mini strokes    Dementia Mother         Alzheimers    Thyroid disease Mother     Arthritis Mother     Hyperlipidemia Father     Diabetes Father         mellitus     Prostate cancer Father 70    Rectal cancer Father         80's    Lung cancer Father 90    Liver cancer Father 90    Heart attack Father     Colon cancer Father         80's    Cancer Father         Rectal, metastisized    Hearing loss Father     Skin cancer Father         Multiple times-unknown age    Hypertension Sister     Colon polyps Sister     No Known Problems Daughter     Breast cancer Maternal Grandmother         Unknown age    No Known Problems Maternal Grandfather     No Known Problems Paternal Grandmother     No Known Problems Paternal Grandfather     Hyperlipidemia Brother         no more     Social History     Tobacco Use    Smoking status: Former     Current packs/day: 0.00     Average packs/day: 0.5 packs/day for 17.8 years (8.9 ttl pk-yrs)     Types: Cigarettes     Start date: 3/4/1971     Quit date: 1989     Years since quittin.8     Passive exposure: Past    Smokeless tobacco: Never    Tobacco comments:     33 years smoke free!   Substance Use Topics    Alcohol use: Yes     Comment: occasional     No Known Allergies      Current Outpatient Medications:     BIOTIN PO, Take by mouth Two tablets once day, Disp: , Rfl:     busPIRone (BUSPAR) 10 mg tablet, TAKE 1 TABLET BY MOUTH TWICE A DAY, Disp: 180 tablet, Rfl: 1    Cholecalciferol (VITAMIN D3) 2000 units TABS, Take 2 tablets by mouth daily, Disp: , Rfl:     famotidine (PEPCID) 20 mg tablet, Take 20 mg by mouth 2 (two) times a day as needed for heartburn, Disp: , Rfl:     hydrOXYzine HCL (ATARAX) 10 mg tablet, Take 1 tablet (10 mg total) by mouth every 8 (eight) hours as needed for anxiety, Disp: 30 tablet, Rfl: 0    Magnesium 200 MG TABS, Take 2 tablets by mouth daily, Disp: , Rfl:     Multiple  "Vitamins-Minerals (Bariatric Multivitamins/Iron) CAPS, Take 2 capsules by mouth in the morning, Disp: , Rfl:     omeprazole (PriLOSEC) 20 mg delayed release capsule, TAKE 1 CAPSULE BY MOUTH EVERY DAY, Disp: 90 capsule, Rfl: 1    pregabalin (LYRICA) 100 mg capsule, Take 1 capsule (100 mg total) by mouth 3 (three) times a day, Disp: 90 capsule, Rfl: 2    Synthroid 150 MCG tablet, TAKE 1 TABLET BY MOUTH DAILY, Disp: 96 tablet, Rfl: 1    tiZANidine (ZANAFLEX) 4 mg tablet, TAKE 1 TABLET BY MOUTH EVERY 12 HOURS IF NEEDED FOR MUSCLE SPASM OR PAIN, Disp: 60 tablet, Rfl: 0    traMADol-acetaminophen (ULTRACET) 37.5-325 mg per tablet, Take 1 tablet by mouth every 6 (six) hours as needed for moderate pain, Disp: 120 tablet, Rfl: 0    venlafaxine (EFFEXOR) 100 MG tablet, TAKE 2 TABLETS BY MOUTH EVERY MORNING AND 1 TABLET EVERY EVENING, Disp: 270 tablet, Rfl: 1    vitamin B-12 (CYANOCOBALAMIN) 50 MCG tablet, Take by mouth daily, Disp: , Rfl:     norethindrone (MICRONOR) 0.35 MG tablet, TAKE 1 TABLET BY MOUTH EVERY DAY (Patient not taking: Reported on 10/31/2024), Disp: 84 tablet, Rfl: 1    Review of Systems   Constitutional:  Negative for chills and fever.   HENT:  Negative for ear pain and sore throat.    Eyes:  Negative for pain and visual disturbance.   Respiratory:  Negative for cough and shortness of breath.    Cardiovascular:  Negative for chest pain and palpitations.   Gastrointestinal:  Negative for abdominal pain and vomiting.   Genitourinary:  Negative for dysuria and hematuria.   Musculoskeletal:  Negative for arthralgias and back pain.   Skin:  Negative for color change and rash.   Neurological:  Negative for seizures and syncope.   All other systems reviewed and are negative.      Physical Exam:  Body mass index is 21.51 kg/m².  /62   Pulse 61   Ht 5' 3\" (1.6 m)   Wt 55.1 kg (121 lb 6.4 oz)   SpO2 98%   BMI 21.51 kg/m²    Wt Readings from Last 3 Encounters:   10/31/24 55.1 kg (121 lb 6.4 oz)   09/23/24 " 54.9 kg (121 lb)   09/20/24 53.6 kg (118 lb 3.2 oz)       Physical Exam  Vitals reviewed.   Constitutional:       Appearance: Normal appearance.   HENT:      Head: Normocephalic and atraumatic.   Cardiovascular:      Rate and Rhythm: Normal rate.   Pulmonary:      Effort: Pulmonary effort is normal.   Musculoskeletal:      Cervical back: Neck supple. No tenderness.   Lymphadenopathy:      Cervical: No cervical adenopathy.   Neurological:      Mental Status: She is alert and oriented to person, place, and time.   Psychiatric:         Mood and Affect: Mood normal.         Behavior: Behavior normal.         Labs:   Lab Results   Component Value Date    HGBA1C 5.0 10/14/2024    HGBA1C 5.5 10/12/2021    HGBA1C 5.3 09/29/2020     Lab Results   Component Value Date    CREATININE 0.56 (L) 08/14/2024    CREATININE 0.59 04/04/2024    CREATININE 0.62 04/21/2023    BUN 18 08/14/2024     10/07/2017    K 4.1 08/14/2024     08/14/2024    CO2 28 08/14/2024     eGFR   Date Value Ref Range Status   08/14/2024 97 ml/min/1.73sq m Final     Lab Results   Component Value Date    CHOL 218 (H) 10/07/2017    HDL 58 04/04/2024    TRIG 152 (H) 04/04/2024    CHOLHDL 3.2 04/04/2024     Lab Results   Component Value Date    ALT 22 08/14/2024    AST 20 08/14/2024    ALKPHOS 85 08/14/2024    BILITOT 0.9 10/07/2017     Lab Results   Component Value Date    MMN9WKHRSDEG 2.570 09/09/2016    WTD7ZQMOKTLD 1.760 03/08/2016    VGW3VYIWUOUA 3.880 (H) 11/03/2015     Lab Results   Component Value Date    FREET4 1.71 10/14/2024         There are no Patient Instructions on file for this visit.      Discussed with the patient and all questioned fully answered. She will call me if any problems arise.

## 2024-11-03 DIAGNOSIS — G89.29 CHRONIC BILATERAL LOW BACK PAIN WITHOUT SCIATICA: ICD-10-CM

## 2024-11-03 DIAGNOSIS — M54.50 CHRONIC BILATERAL LOW BACK PAIN WITHOUT SCIATICA: ICD-10-CM

## 2024-11-04 DIAGNOSIS — M51.369 DISC DEGENERATION, LUMBAR: ICD-10-CM

## 2024-11-04 NOTE — TELEPHONE ENCOUNTER
Reason for call:   [x] Refill   [] Prior Auth  [] Other:     Office:   [x] PCP/Provider - [facility] - Lucia Lord DO   [] Specialty/Provider -     Medication:  traMADol-acetaminophen (ULTRACET) 37.5-325 mg per tablet    Dose/Frequency: Take 1 tablet by mouth every 6 (six) hours as needed for moderate pain,     Quantity: 120 tablet     Pharmacy: [id number] [address] [phone]    Does the patient have enough for 3 days?   [x] Yes   [] No - Send as HP to POD

## 2024-11-11 ENCOUNTER — TELEPHONE (OUTPATIENT)
Age: 64
End: 2024-11-11

## 2024-11-12 DIAGNOSIS — F33.41 RECURRENT MAJOR DEPRESSIVE DISORDER, IN PARTIAL REMISSION (HCC): ICD-10-CM

## 2024-11-12 DIAGNOSIS — F41.9 ANXIETY: ICD-10-CM

## 2024-11-13 RX ORDER — VENLAFAXINE 100 MG/1
TABLET ORAL
Qty: 270 TABLET | Refills: 1 | Status: SHIPPED | OUTPATIENT
Start: 2024-11-13

## 2024-11-30 DIAGNOSIS — M54.50 CHRONIC BILATERAL LOW BACK PAIN WITHOUT SCIATICA: ICD-10-CM

## 2024-11-30 DIAGNOSIS — G89.29 CHRONIC BILATERAL LOW BACK PAIN WITHOUT SCIATICA: ICD-10-CM

## 2024-12-02 ENCOUNTER — OFFICE VISIT (OUTPATIENT)
Dept: PAIN MEDICINE | Facility: CLINIC | Age: 64
End: 2024-12-02
Payer: COMMERCIAL

## 2024-12-02 VITALS
BODY MASS INDEX: 20.91 KG/M2 | DIASTOLIC BLOOD PRESSURE: 80 MMHG | HEIGHT: 63 IN | TEMPERATURE: 98.2 F | HEART RATE: 64 BPM | SYSTOLIC BLOOD PRESSURE: 106 MMHG | WEIGHT: 118 LBS

## 2024-12-02 DIAGNOSIS — M51.16 LUMBAR DISC DISEASE WITH RADICULOPATHY: ICD-10-CM

## 2024-12-02 DIAGNOSIS — M47.816 LUMBAR SPONDYLOSIS: Primary | ICD-10-CM

## 2024-12-02 PROCEDURE — 99214 OFFICE O/P EST MOD 30 MIN: CPT | Performed by: PHYSICIAN ASSISTANT

## 2024-12-02 RX ORDER — PREGABALIN 150 MG/1
150 CAPSULE ORAL 3 TIMES DAILY
Qty: 90 CAPSULE | Refills: 3 | Status: SHIPPED | OUTPATIENT
Start: 2024-12-02

## 2024-12-02 NOTE — PROGRESS NOTES
Assessment:  1. Lumbar spondylosis    2. Lumbar disc disease with radiculopathy        Plan:  While the patient was in the office today, I did have a thorough conversation regarding their chronic pain syndrome, medication management, and treatment plan options.    After discussing options, we will increase the pregabalin to 150 mg 3 times daily.  Potential side effects reviewed with the patient on today's visit.  I have electronically sent refills to her pharmacy.    Continue tizanidine and tramadol as per PCP.    Consider repeating transforaminal epidural steroid injections in the future if indicated.    [place] Prescription Drug Monitoring Program report was reviewed and was appropriate     The patient will follow-up in 4 months for medication prescription refill and reevaluation. The patient was advised to contact the office should their symptoms worsen in the interim. The patient was agreeable and verbalized an understanding.        History of Present Illness:    The patient is a 64 y.o. female last seen on 7/30/2024 who presents for a follow up office visit in regards to chronic low back pain secondary to lumbar spondylosis and lumbar degenerative disc disease.  The patient currently reports left-sided low back pain with intermittent radiation left lower extremity.  Her current pain score is a 6 out of 10 and described as an intermittent cramping, pressure-like pain with associated paresthesias.  She is maintained on pregabalin 100 mg 3 times daily with about 70% relief and no side effects.  She still feels that she would like more pain relief but does not feel that her pain is severe enough to proceed with a repeat injection.    I have personally reviewed and/or updated the patient's past medical history, past surgical history, family history, social history, current medications, allergies, and vital signs today.       Review of Systems:    Review of Systems   Respiratory:  Negative for shortness of  breath.    Cardiovascular:  Negative for chest pain.   Gastrointestinal:  Negative for constipation, diarrhea, nausea and vomiting.   Musculoskeletal:  Negative for arthralgias, gait problem, joint swelling (Joint stiffness) and myalgias.   Skin:  Negative for rash.   Neurological:  Negative for dizziness, seizures and weakness.   All other systems reviewed and are negative.        Past Medical History:   Diagnosis Date    Anxiety     Disturbance of memory     last assessed: 2016     GERD (gastroesophageal reflux disease)     Hiatal hernia     Hirsutism     History of sleeve gastrectomy     Postgastrectomy malabsorption     Sclerosing adenosis of breast     Symptomatic menopausal or female climacteric states        Past Surgical History:   Procedure Laterality Date    APPENDECTOMY      BREAST EXCISIONAL BIOPSY Left 2010    Benign    COLONOSCOPY      complete - 7 year repeat recommended - Resolved:      DILATION AND CURETTAGE OF UTERUS      FRACTURE SURGERY      GASTRECTOMY SLEEVE LAPAROSCOPIC  2018    GYNECOLOGIC CRYOSURGERY      Cervix - s for abnormal paps     HYSTERECTOMY  2006    INDUCED       x3    LAPAROSCOPIC SALPINGOOPHERECTOMY Right     LAPAROSCOPIC TOTAL HYSTERECTOMY      06, Laparoscopic hysterectomy with LSO with vaginal closure of the vaginal cuff     MT REVJ GSTR/JJ ANAST W/RCNSTJ W/O VGTMY N/A 2021    Procedure: LAPAROSCOPIC REVISION CONVERSION  JENN-EN-Y GASTRIC BYPASS WITH ROBOTICS AND INTRAOPERATIVE EGD, PARAESOPHAGEAL HERNIA REPAIR;  Surgeon: Sheldon Núñez MD;  Location: [facility];  Service: Bariatrics    SALPINGOOPHORECTOMY Left     Right ovary removed at age 15 and the left one was removed with uterus.    STOMACH SURGERY  2014    for morbid obesity - Managed by: Sheldon Hendricks -     TONSILLECTOMY AND ADENOIDECTOMY      TOOTH EXTRACTION      UPPER GASTROINTESTINAL ENDOSCOPY         Family History   Problem Relation Age of Onset     Hypertension Mother     Alzheimer's disease Mother     Stroke Mother         several mini strokes    Dementia Mother         Alzheimers    Thyroid disease Mother     Arthritis Mother     Hyperlipidemia Father     Diabetes Father         mellitus     Prostate cancer Father 70    Rectal cancer Father         80's    Lung cancer Father 90    Liver cancer Father 90    Heart attack Father     Colon cancer Father         80's    Cancer Father         Rectal, metastisized    Hearing loss Father     Skin cancer Father         Multiple times-unknown age    Hypertension Sister     Colon polyps Sister     No Known Problems Daughter     Breast cancer Maternal Grandmother         Unknown age    No Known Problems Maternal Grandfather     No Known Problems Paternal Grandmother     No Known Problems Paternal Grandfather     Hyperlipidemia Brother         no more       Social History     Occupational History    Not on file   Tobacco Use    Smoking status: Former     Current packs/day: 0.00     Average packs/day: 0.5 packs/day for 17.8 years (8.9 ttl pk-yrs)     Types: Cigarettes     Start date: 3/4/1971     Quit date: 1989     Years since quittin.9     Passive exposure: Past    Smokeless tobacco: Never    Tobacco comments:     33 years smoke free!   Vaping Use    Vaping status: Some Days    Substances: THC, CBD   Substance and Sexual Activity    Alcohol use: Yes     Comment: occasional    Drug use: Yes     Types: Marijuana     Comment: medical    Sexual activity: Not Currently     Partners: Male     Birth control/protection: Post-menopausal, Surgical, Female Sterilization, None         Current Outpatient Medications:     BIOTIN PO, Take by mouth Two tablets once day, Disp: , Rfl:     busPIRone (BUSPAR) 10 mg tablet, TAKE 1 TABLET BY MOUTH TWICE A DAY, Disp: 180 tablet, Rfl: 1    Cholecalciferol (VITAMIN D3) 2000 units TABS, Take 2 tablets by mouth daily, Disp: , Rfl:     famotidine (PEPCID) 20 mg tablet, Take 20 mg by mouth  "2 (two) times a day as needed for heartburn, Disp: , Rfl:     hydrOXYzine HCL (ATARAX) 10 mg tablet, Take 1 tablet (10 mg total) by mouth every 8 (eight) hours as needed for anxiety, Disp: 30 tablet, Rfl: 0    Magnesium 200 MG TABS, Take 2 tablets by mouth daily, Disp: , Rfl:     Multiple Vitamins-Minerals (Bariatric Multivitamins/Iron) CAPS, Take 2 capsules by mouth in the morning, Disp: , Rfl:     omeprazole (PriLOSEC) 20 mg delayed release capsule, TAKE 1 CAPSULE BY MOUTH EVERY DAY, Disp: 90 capsule, Rfl: 1    pregabalin (LYRICA) 150 mg capsule, Take 1 capsule (150 mg total) by mouth 3 (three) times a day, Disp: 90 capsule, Rfl: 3    Synthroid 150 MCG tablet, TAKE 1 TABLET BY MOUTH DAILY, Disp: 96 tablet, Rfl: 1    tiZANidine (ZANAFLEX) 4 mg tablet, TAKE 1 TABLET BY MOUTH EVERY 12 HOURS IF NEEDED FOR MUSCLE SPASM OR PAIN, Disp: 60 tablet, Rfl: 0    traMADol-acetaminophen (ULTRACET) 37.5-325 mg per tablet, Take 1 tablet by mouth every 6 (six) hours as needed for moderate pain, Disp: 120 tablet, Rfl: 0    venlafaxine (EFFEXOR) 100 MG tablet, TAKE 2 TABLETS BY MOUTH EVERY MORNING AND 1 TABLET EVERY EVENING, Disp: 270 tablet, Rfl: 1    vitamin B-12 (CYANOCOBALAMIN) 50 MCG tablet, Take by mouth daily, Disp: , Rfl:     norethindrone (MICRONOR) 0.35 MG tablet, TAKE 1 TABLET BY MOUTH EVERY DAY (Patient not taking: Reported on 10/31/2024), Disp: 84 tablet, Rfl: 1    No Known Allergies    Physical Exam:    /80 (BP Location: Left arm, Patient Position: Sitting, Cuff Size: Standard)   Pulse 64   Temp 98.2 °F (36.8 °C)   Ht 5' 3\" (1.6 m)   Wt 53.5 kg (118 lb)   BMI 20.90 kg/m²     Constitutional:normal, well developed, well nourished, alert, in no distress and non-toxic and no overt pain behavior.  Eyes:anicteric  HEENT:grossly intact  Neck:supple, symmetric, trachea midline and no masses   Pulmonary:even and unlabored  Cardiovascular:No edema or pitting edema present  Skin:Normal without rashes or lesions and well " hydrated  Psychiatric:Mood and affect appropriate  Neurologic:Cranial Nerves II-XII grossly intact  Musculoskeletal:normal      Imaging  No orders to display         No orders of the defined types were placed in this encounter.

## 2024-12-10 NOTE — TELEPHONE ENCOUNTER
Medication Refill Request     Name venlafaxine (EFFEXOR) 100 MG tablet  Dose/Frequency TAKE 1 TABLET BY MOUTH TWICE A DAY  Quantity 90  Verified pharmacy   [x]  Verified ordering Provider   [x]  Does patient have enough for the next 3 days?  Yes [x] No []
23:00

## 2024-12-14 LAB — TSH SERPL DL<=0.005 MIU/L-ACNC: 0.01 UIU/ML (ref 0.45–4.5)

## 2024-12-17 ENCOUNTER — RESULTS FOLLOW-UP (OUTPATIENT)
Dept: ENDOCRINOLOGY | Facility: CLINIC | Age: 64
End: 2024-12-17

## 2024-12-17 DIAGNOSIS — E03.9 HYPOTHYROIDISM, UNSPECIFIED TYPE: Primary | ICD-10-CM

## 2024-12-17 RX ORDER — LEVOTHYROXINE SODIUM 125 MCG
125 TABLET ORAL DAILY
Qty: 30 TABLET | Refills: 2 | Status: SHIPPED | OUTPATIENT
Start: 2024-12-17

## 2024-12-27 NOTE — TELEPHONE ENCOUNTER
Patient returning call and made aware:    JANIE Briones to [facility]  Regarding result: TSH, 3rd generation with Free T4 reflex       12/17/24  1:12 PM  Result Note  TSH still low Reduce to 125 mcg daily and repeat labs in 6 weeks    Patient verbalized understanding

## 2024-12-30 ENCOUNTER — HOSPITAL ENCOUNTER (OUTPATIENT)
Dept: MAMMOGRAPHY | Facility: CLINIC | Age: 64
Discharge: HOME | End: 2024-12-30
Payer: COMMERCIAL

## 2024-12-30 VITALS — WEIGHT: 118 LBS | HEIGHT: 63 IN | BODY MASS INDEX: 20.91 KG/M2

## 2024-12-30 DIAGNOSIS — Z12.31 ENCOUNTER FOR SCREENING MAMMOGRAM FOR BREAST CANCER: ICD-10-CM

## 2024-12-30 PROCEDURE — 77063 BREAST TOMOSYNTHESIS BI: CPT

## 2024-12-30 PROCEDURE — 77067 SCR MAMMO BI INCL CAD: CPT

## 2025-01-01 DIAGNOSIS — M51.369 DISC DEGENERATION, LUMBAR: ICD-10-CM

## 2025-01-04 ENCOUNTER — RESULTS FOLLOW-UP (OUTPATIENT)
Dept: GYNECOLOGY | Facility: CLINIC | Age: 65
End: 2025-01-04

## 2025-01-04 DIAGNOSIS — R92.30 DENSE BREAST TISSUE ON MAMMOGRAM, UNSPECIFIED TYPE: Primary | ICD-10-CM

## 2025-01-04 DIAGNOSIS — Z12.39 SCREENING FOR BREAST CANCER USING NON-MAMMOGRAM MODALITY: ICD-10-CM

## 2025-01-13 ENCOUNTER — OFFICE VISIT (OUTPATIENT)
Dept: FAMILY MEDICINE CLINIC | Facility: HOSPITAL | Age: 65
End: 2025-01-13
Payer: COMMERCIAL

## 2025-01-13 VITALS
HEART RATE: 68 BPM | OXYGEN SATURATION: 98 % | HEIGHT: 63 IN | SYSTOLIC BLOOD PRESSURE: 113 MMHG | BODY MASS INDEX: 21.44 KG/M2 | WEIGHT: 121 LBS | DIASTOLIC BLOOD PRESSURE: 79 MMHG | TEMPERATURE: 98.8 F

## 2025-01-13 DIAGNOSIS — F11.20 CONTINUOUS OPIOID DEPENDENCE (HCC): ICD-10-CM

## 2025-01-13 DIAGNOSIS — E03.9 HYPOTHYROIDISM (ACQUIRED): ICD-10-CM

## 2025-01-13 DIAGNOSIS — E78.5 DYSLIPIDEMIA: ICD-10-CM

## 2025-01-13 DIAGNOSIS — Z13.31 POSITIVE DEPRESSION SCREENING: ICD-10-CM

## 2025-01-13 DIAGNOSIS — Z00.00 MEDICARE ANNUAL WELLNESS VISIT, SUBSEQUENT: ICD-10-CM

## 2025-01-13 DIAGNOSIS — M51.16 LUMBAR DISC DISEASE WITH RADICULOPATHY: ICD-10-CM

## 2025-01-13 DIAGNOSIS — F41.9 ANXIETY: ICD-10-CM

## 2025-01-13 DIAGNOSIS — R79.89 LOW TSH LEVEL: ICD-10-CM

## 2025-01-13 DIAGNOSIS — F33.41 RECURRENT MAJOR DEPRESSIVE DISORDER, IN PARTIAL REMISSION (HCC): Primary | ICD-10-CM

## 2025-01-13 PROCEDURE — 99214 OFFICE O/P EST MOD 30 MIN: CPT | Performed by: INTERNAL MEDICINE

## 2025-01-13 PROCEDURE — G0439 PPPS, SUBSEQ VISIT: HCPCS | Performed by: INTERNAL MEDICINE

## 2025-01-13 RX ORDER — BUSPIRONE HYDROCHLORIDE 15 MG/1
15 TABLET ORAL 2 TIMES DAILY
Qty: 60 TABLET | Refills: 2 | Status: SHIPPED | OUTPATIENT
Start: 2025-01-13

## 2025-01-13 NOTE — ASSESSMENT & PLAN NOTE
Depression Screening Follow-up Plan: Patient's depression screening was positive with a PHQ-9 score of 8.   Mood still up and down - had some benefit with increase in Buspar early on but not long lived - no SE noted - increase Buspar further from 10 mg bid to 15 mg bid, pt is gong to check with insurance and see if ZOLOFT is covered as she felt this worked better then Effexor (brand ZOLOFT was NOT covered by insurance last year), d/w pt that it takes 4-6 wks to get maximum benefit of med and that med has to be taken every day and to not miss doses of med, call with SE/new/worse mood/SI, re-eval in 3 mos  Orders:    busPIRone (BUSPAR) 15 mg tablet; Take 1 tablet (15 mg total) by mouth 2 (two) times a day    CBC and differential; Future    Comprehensive metabolic panel; Future    Lipid panel; Future

## 2025-01-13 NOTE — PROGRESS NOTES
Name: Lola Wade      : 1960      MRN: [id number]  Encounter Provider: Lucia Lord DO  Encounter Date: 2025   Encounter department: [facility] [address]     Assessment & Plan  Recurrent major depressive disorder, in partial remission (HCC)  Depression Screening Follow-up Plan: Patient's depression screening was positive with a PHQ-9 score of 8.   Mood still up and down - had some benefit with increase in Buspar early on but not long lived - no SE noted - increase Buspar further from 10 mg bid to 15 mg bid, pt is gong to check with insurance and see if ZOLOFT is covered as she felt this worked better then Effexor (brand ZOLOFT was NOT covered by insurance last year), d/w pt that it takes 4-6 wks to get maximum benefit of med and that med has to be taken every day and to not miss doses of med, call with SE/new/worse mood/SI, re-eval in 3 mos  Orders:    busPIRone (BUSPAR) 15 mg tablet; Take 1 tablet (15 mg total) by mouth 2 (two) times a day    CBC and differential; Future    Comprehensive metabolic panel; Future    Lipid panel; Future    Anxiety  Mood still up and down - had some benefit with increase in Buspar early on but not long lived - no SE noted - increase Buspar further from 10 mg bid to 15 mg bid, con't hydroxyzine prn, pt is gong to check with insurance and see if ZOLOFT is covered as she felt this worked better then Effexor (brand ZOLOFT was NOT covered by insurance last year), d/w pt that it takes 4-6 wks to get maximum benefit of med and that med has to be taken every day and to not miss doses of med, call with SE/new/worse mood/panic attacks, re-eval in 3 mos  Orders:    busPIRone (BUSPAR) 15 mg tablet; Take 1 tablet (15 mg total) by mouth 2 (two) times a day    CBC and differential; Future    Comprehensive metabolic panel; Future    Lipid panel; Future    Positive depression screening  Increase Buspar from 10 mg to 15 mg bid, re-eval in 3 mos or sooner  with new/worse mood       Hypothyroidism (acquired)  TSH still low, Synthroid recently adjusted by Endo, has f/u TSH ordered, will follow       Low TSH level  Synthroid recently adjusted by Endo, has f/u TSH ordered, will follow       Lumbar disc disease with radiculopathy  Has seen Pain Mgt recently, Lyrica has been increased - med list UTD, con't Tizanidine and Tramadol as well, call with new/worse symptoms       Continuous opioid dependence (HCC)  PDMP Rx website reviewed and no red flag use noted, con't Tramado prn, following with Pain Mgt as well       Medicare annual wellness visit, subsequent         Dyslipidemia  FLP annually - due in April - order given, will follow  Orders:    Lipid panel; Future      Depression Screening and Follow-up Plan: Patient's depression screening was positive with a PHQ-9 score of 8.   Patient assessed for underlying major depression. Brief counseling provided and recommend additional follow-up/re-evaluation next office visit.   Falls Plan of Care: balance, strength, and gait training instructions were provided and referral to physical therapy. Recommended assistive device to help with gait and balance.       Preventive health issues were discussed with patient, and age appropriate screening tests were ordered as noted in patient's After Visit Summary. Personalized health advice and appropriate referrals for health education or preventive services given if needed, as noted in patient's After Visit Summary.    Colonoscopy 3/20 - 5yrs    Mammo 12/24    Dexa 12/23 - osteopenia    PAP s/p hysterectomy but follows with Dr Yeh    BW 10/24 (A1C 5.0)      History of Present Illness     HPI Pt here for follow up appt and AWV    Pt was noting improvement in depression/anxiety with Buspar but it was not yet at goal.  At last visit in Sept we subsequently increased the Buspar from 7.5 mg bid to 10 mg bid.  She was told to con't her current Effexor.  She  missed her f/u appt in Nov. She is  here for a mood/med check.  She has been taking the higher dose of Buspar w/o significant SE.  She feels mood was a bit better early on but that has worn off with the stress of the holidays. She has been using hydroxyzine as needed w/benefit. She has noted more benefit with 10 mg 1.5 tabs.       Pt saw Endo NP (Becky Loja) in Oct for f/u hypothyroidism - OV note reviewed.  Her TSH had been low so her Synthroid was decreased from 1 tab 7 days a week to 1 tab 6 days a week.  Her TSH was repeated in Dec and was still low and pt was told to decrease Synthroid to 125 mcg daily. Repeat TSH is ordered in Epic.  She has not started the new dose yet d/t cost of picking up the rx.  She has had some tremor and feels anxious.  She has had no diarrhea/hair loss or skin changes.  She feels fatigued despite 8 hrs of sleep.  Wgt stable.      Pt saw Pain Mgt PA (Marian Hatch) in Dec for f/u lumbar disc disease - OV note reviewed.  Her pain was not at goal and Lyrica was increased to 150 mg tid.  She was told to con't Tizanidine and Tramadol as per our office. Was mentioned a LESI may be considered in the future if no improvement in pain was noted. She admits she does sometimes forget to take the middle of the day dose but is doing it more so now then in the past.  She notes no SE with the increase in the dose.         Patient Care Team:  Lucia Lord DO as PCP - General  Finesse Joel DO as PCP - Endocrinology (Endocrinology)  MD Maverick Baker MD Scott Loev, DO (Pain Medicine)  JANIE Briones as Nurse Practitioner (Endocrinology)    Review of Systems   Constitutional:  Negative for chills and fever.   HENT:  Negative for congestion and sore throat.    Eyes:  Negative for pain and visual disturbance.   Respiratory:  Negative for cough, shortness of breath and wheezing.    Cardiovascular:  Negative for chest pain, palpitations and leg swelling.   Gastrointestinal:  Negative for abdominal  pain, blood in stool, constipation, diarrhea, nausea and vomiting.   Genitourinary:  Negative for difficulty urinating, dysuria, vaginal bleeding and vaginal pain.   Musculoskeletal:  Positive for back pain. Negative for neck pain.   Skin:  Negative for rash and wound.   Neurological:  Positive for light-headedness. Negative for dizziness and headaches.   Hematological:  Does not bruise/bleed easily.   Psychiatric/Behavioral:  Positive for confusion and dysphoric mood. The patient is nervous/anxious.      Medical History Reviewed by provider this encounter:  Tobacco  Allergies  Meds  Problems  Med Hx  Surg Hx  Fam Hx       Annual Wellness Visit Questionnaire   Lola is here for her Subsequent Wellness visit. Last Medicare Wellness visit information reviewed, patient interviewed and updates made to the record today.      Health Risk Assessment:   Patient rates overall health as very good. Patient feels that their physical health rating is same. Patient is dissatisfied with their life. Eyesight was rated as same. Hearing was rated as same. Patient feels that their emotional and mental health rating is same. Patients states they are never, rarely angry. Patient states they are often unusually tired/fatigued. Pain experienced in the last 7 days has been a lot. Patient's pain rating has been 2/10. Patient states that she has experienced no weight loss or gain in last 6 months.     Depression Screening:   PHQ-9 Score: 8      Fall Risk Screening:   In the past year, patient has experienced: history of falling in past year    Number of falls: 2 or more  Injured during fall?: No    Feels unsteady when standing or walking?: No    Worried about falling?: No      Urinary Incontinence Screening:   Patient has not leaked urine accidently in the last six months.     Home Safety:  Patient does not have trouble with stairs inside or outside of their home. Patient has working smoke alarms and has working carbon monoxide  detector. Home safety hazards include: none.     Nutrition:   Current diet is Regular.     Medications:   Patient is not currently taking any over-the-counter supplements. Patient is able to manage medications.     Activities of Daily Living (ADLs)/Instrumental Activities of Daily Living (IADLs):   Walk and transfer into and out of bed and chair?: Yes  Dress and groom yourself?: Yes    Bathe or shower yourself?: Yes    Feed yourself? Yes  Do your laundry/housekeeping?: Yes  Manage your money, pay your bills and track your expenses?: Yes  Make your own meals?: Yes    Do your own shopping?: Yes    Previous Hospitalizations:   Any hospitalizations or ED visits within the last 12 months?: No      Advance Care Planning:   Living will: No    Durable POA for healthcare: No    Advanced directive: No      Cognitive Screening:   Provider or family/friend/caregiver concerned regarding cognition?: Yes  Mini-Mental Status Exam (MMSE) Score: 28  Interpretation: MMSE Score 24-30: Normal Cognition     PREVENTIVE SCREENINGS      Cardiovascular Screening:    General: Screening Current and Risks and Benefits Discussed      Diabetes Screening:     General: Screening Current and Risks and Benefits Discussed      Colorectal Cancer Screening:     General: Screening Current and Risks and Benefits Discussed      Breast Cancer Screening:     General: Screening Current and Risks and Benefits Discussed      Cervical Cancer Screening:    General: Screening Not Indicated and Risks and Benefits Discussed      Osteoporosis Screening:    General: Screening Current and Risks and Benefits Discussed      Abdominal Aortic Aneurysm (AAA) Screening:        General: Risks and Benefits Discussed and Screening Not Indicated      Lung Cancer Screening:     General: Screening Not Indicated and Risks and Benefits Discussed      Hepatitis C Screening:    General: Screening Current and Risks and Benefits Discussed    Screening, Brief Intervention, and Referral  "to Treatment (SBIRT)    Screening  Typical number of drinks in a day: 0  Typical number of drinks in a week: 0  Interpretation: Low risk drinking behavior.    Single Item Drug Screening:  How often have you used an illegal drug (including marijuana) or a prescription medication for non-medical reasons in the past year? never    Single Item Drug Screen Score: 0  Interpretation: Negative screen for possible drug use disorder    Review of Current Opioid Use  Opioid Risk Tool (ORT) Score: 0  Opioid Risk Tool (ORT) Interpretation: Score 0-3: Low risk for opioid misuse    Other Counseling Topics:   Car/seat belt/driving safety and regular weightbearing exercise.     Social Drivers of Health     Financial Resource Strain: Medium Risk (1/23/2024)    Overall Financial Resource Strain (CARDIA)     Difficulty of Paying Living Expenses: Somewhat hard   Food Insecurity: No Food Insecurity (1/13/2025)    Hunger Vital Sign     Worried About Running Out of Food in the Last Year: Never true     Ran Out of Food in the Last Year: Never true   Transportation Needs: No Transportation Needs (1/13/2025)    PRAPARE - Transportation     Lack of Transportation (Medical): No     Lack of Transportation (Non-Medical): No   Housing Stability: Low Risk  (1/13/2025)    Housing Stability Vital Sign     Unable to Pay for Housing in the Last Year: No     Number of Times Moved in the Last Year: 0     Homeless in the Last Year: No   Utilities: Not At Risk (1/13/2025)    [facility] Utilities     Threatened with loss of utilities: No     Vision Screening    Right eye Left eye Both eyes   Without correction 20/40 20/40 20/30   With correction          Objective   /79 (BP Location: Left arm, Patient Position: Sitting, Cuff Size: Standard)   Pulse 68   Temp 98.8 °F (37.1 °C) (Tympanic)   Ht 5' 3\" (1.6 m)   Wt 54.9 kg (121 lb)   SpO2 98%   BMI 21.43 kg/m²     Physical Exam  Vitals and nursing note reviewed.   Constitutional:       General: She is not in " acute distress.     Appearance: She is well-developed. She is not ill-appearing.   HENT:      Head: Normocephalic and atraumatic.      Right Ear: Tympanic membrane and external ear normal. There is no impacted cerumen.      Left Ear: Tympanic membrane and external ear normal. There is no impacted cerumen.      Mouth/Throat:      Mouth: Mucous membranes are moist.      Pharynx: Oropharynx is clear. No oropharyngeal exudate.   Eyes:      General:         Right eye: No discharge.         Left eye: No discharge.      Conjunctiva/sclera: Conjunctivae normal.   Neck:      Thyroid: No thyromegaly.      Vascular: No carotid bruit.      Trachea: No tracheal deviation.   Cardiovascular:      Rate and Rhythm: Normal rate and regular rhythm.      Heart sounds: Normal heart sounds. No murmur heard.  Pulmonary:      Effort: Pulmonary effort is normal. No respiratory distress.      Breath sounds: Normal breath sounds. No wheezing, rhonchi or rales.   Abdominal:      General: There is no distension.      Palpations: Abdomen is soft.      Tenderness: There is no abdominal tenderness. There is no guarding or rebound.   Musculoskeletal:         General: No deformity or signs of injury.      Cervical back: Neck supple.   Lymphadenopathy:      Cervical: No cervical adenopathy.   Skin:     General: Skin is warm and dry.      Coloration: Skin is not pale.      Findings: No bruising or rash.   Neurological:      General: No focal deficit present.      Mental Status: She is alert. Mental status is at baseline.      Motor: No abnormal muscle tone.      Gait: Gait normal.   Psychiatric:         Behavior: Behavior normal.         Thought Content: Thought content normal.         Judgment: Judgment normal.      Comments: Anxious and tearful       Depression Screening Follow-up Plan: Patient's depression screening was positive with a PHQ-9 score of 8. Patient assessed for underlying major depression. They have no active suicidal ideations. Brief  counseling provided and recommend additional follow-up/re-evaluation next office visit.

## 2025-01-13 NOTE — ASSESSMENT & PLAN NOTE
Mood still up and down - had some benefit with increase in Buspar early on but not long lived - no SE noted - increase Buspar further from 10 mg bid to 15 mg bid, con't hydroxyzine prn, pt is gong to check with insurance and see if ZOLOFT is covered as she felt this worked better then Effexor (brand ZOLOFT was NOT covered by insurance last year), d/w pt that it takes 4-6 wks to get maximum benefit of med and that med has to be taken every day and to not miss doses of med, call with SE/new/worse mood/panic attacks, re-eval in 3 mos  Orders:    busPIRone (BUSPAR) 15 mg tablet; Take 1 tablet (15 mg total) by mouth 2 (two) times a day    CBC and differential; Future    Comprehensive metabolic panel; Future    Lipid panel; Future

## 2025-01-13 NOTE — ASSESSMENT & PLAN NOTE
Has seen Pain Mgt recently, Lyrica has been increased - med list UTD, con't Tizanidine and Tramadol as well, call with new/worse symptoms

## 2025-01-13 NOTE — PATIENT INSTRUCTIONS
Medicare Preventive Visit Patient Instructions  Thank you for completing your Welcome to Medicare Visit or Medicare Annual Wellness Visit today. Your next wellness visit will be due in one year (1/14/2026).  The screening/preventive services that you may require over the next 5-10 years are detailed below. Some tests may not apply to you based off risk factors and/or age. Screening tests ordered at today's visit but not completed yet may show as past due. Also, please note that scanned in results may not display below.  Preventive Screenings:  Service Recommendations Previous Testing/Comments   Colorectal Cancer Screening  * Colonoscopy    * Fecal Occult Blood Test (FOBT)/Fecal Immunochemical Test (FIT)  * Fecal DNA/Cologuard Test  * Flexible Sigmoidoscopy Age: 45-75 years old   Colonoscopy: every 10 years (may be performed more frequently if at higher risk)  OR  FOBT/FIT: every 1 year  OR  Cologuard: every 3 years  OR  Sigmoidoscopy: every 5 years  Screening may be recommended earlier than age 45 if at higher risk for colorectal cancer. Also, an individualized decision between you and your healthcare provider will decide whether screening between the ages of 76-85 would be appropriate. Colonoscopy: 03/13/2020  FOBT/FIT: Not on file  Cologuard: Not on file  Sigmoidoscopy: Not on file    Screening Current     Breast Cancer Screening Age: 40+ years old  Frequency: every 1-2 years  Not required if history of left and right mastectomy Mammogram: 12/30/2024    Screening Current   Cervical Cancer Screening Between the ages of 21-29, pap smear recommended once every 3 years.   Between the ages of 30-65, can perform pap smear with HPV co-testing every 5 years.   Recommendations may differ for women with a history of total hysterectomy, cervical cancer, or abnormal pap smears in past. Pap Smear: 02/22/2024    Screening Not Indicated   Hepatitis C Screening Once for adults born between 1945 and 1965  More frequently in  patients at high risk for Hepatitis C Hep C Antibody: 04/04/2024    Screening Current   Diabetes Screening 1-2 times per year if you're at risk for diabetes or have pre-diabetes Fasting glucose: 101 mg/dL (9/28/2021)  A1C: 5.0 % (10/14/2024)  Screening Current   Cholesterol Screening Once every 5 years if you don't have a lipid disorder. May order more often based on risk factors. Lipid panel: 04/04/2024    Screening Current     Other Preventive Screenings Covered by Medicare:  Abdominal Aortic Aneurysm (AAA) Screening: covered once if your at risk. You're considered to be at risk if you have a family history of AAA.  Lung Cancer Screening: covers low dose CT scan once per year if you meet all of the following conditions: (1) Age 55-77; (2) No signs or symptoms of lung cancer; (3) Current smoker or have quit smoking within the last 15 years; (4) You have a tobacco smoking history of at least 20 pack years (packs per day multiplied by number of years you smoked); (5) You get a written order from a healthcare provider.  Glaucoma Screening: covered annually if you're considered high risk: (1) You have diabetes OR (2) Family history of glaucoma OR (3)  aged 50 and older OR (4)  American aged 65 and older  Osteoporosis Screening: covered every 2 years if you meet one of the following conditions: (1) You're estrogen deficient and at risk for osteoporosis based off medical history and other findings; (2) Have a vertebral abnormality; (3) On glucocorticoid therapy for more than 3 months; (4) Have primary hyperparathyroidism; (5) On osteoporosis medications and need to assess response to drug therapy.   Last bone density test (DXA Scan): 12/21/2023.  HIV Screening: covered annually if you're between the age of 15-65. Also covered annually if you are younger than 15 and older than 65 with risk factors for HIV infection. For pregnant patients, it is covered up to 3 times per  pregnancy.    Immunizations:  Immunization Recommendations   Influenza Vaccine Annual influenza vaccination during flu season is recommended for all persons aged >= 6 months who do not have contraindications   Pneumococcal Vaccine   * Pneumococcal conjugate vaccine = PCV13 (Prevnar 13), PCV15 (Vaxneuvance), PCV20 (Prevnar 20)  * Pneumococcal polysaccharide vaccine = PPSV23 (Pneumovax) Adults 19-65 yo with certain risk factors or if 65+ yo  If never received any pneumonia vaccine: recommend Prevnar 20 (PCV20)  Give PCV20 if previously received 1 dose of PCV13 or PPSV23   Hepatitis B Vaccine 3 dose series if at intermediate or high risk (ex: diabetes, end stage renal disease, liver disease)   Respiratory syncytial virus (RSV) Vaccine - COVERED BY MEDICARE PART D  * RSVPreF3 (Arexvy) CDC recommends that adults 60 years of age and older may receive a single dose of RSV vaccine using shared clinical decision-making (SCDM)   Tetanus (Td) Vaccine - COST NOT COVERED BY MEDICARE PART B Following completion of primary series, a booster dose should be given every 10 years to maintain immunity against tetanus. Td may also be given as tetanus wound prophylaxis.   Tdap Vaccine - COST NOT COVERED BY MEDICARE PART B Recommended at least once for all adults. For pregnant patients, recommended with each pregnancy.   Shingles Vaccine (Shingrix) - COST NOT COVERED BY MEDICARE PART B  2 shot series recommended in those 19 years and older who have or will have weakened immune systems or those 50 years and older     Health Maintenance Due:      Topic Date Due   • Colorectal Cancer Screening  03/13/2025   • DXA SCAN  12/21/2025   • Breast Cancer Screening: Mammogram  12/30/2025   • Hepatitis C Screening  Completed     Immunizations Due:      Topic Date Due   • COVID-19 Vaccine (1 - 2024-25 season) Never done     Advance Directives   What are advance directives?  Advance directives are legal documents that state your wishes and plans for  medical care. These plans are made ahead of time in case you lose your ability to make decisions for yourself. Advance directives can apply to any medical decision, such as the treatments you want, and if you want to donate organs.   What are the types of advance directives?  There are many types of advance directives, and each state has rules about how to use them. You may choose a combination of any of the following:  Living will:  This is a written record of the treatment you want. You can also choose which treatments you do not want, which to limit, and which to stop at a certain time. This includes surgery, medicine, IV fluid, and tube feedings.   Durable power of  for healthcare (DPAHC):  This is a written record that states who you want to make healthcare choices for you when you are unable to make them for yourself. This person, called a proxy, is usually a family member or a friend. You may choose more than 1 proxy.  Do not resuscitate (DNR) order:  A DNR order is used in case your heart stops beating or you stop breathing. It is a request not to have certain forms of treatment, such as CPR. A DNR order may be included in other types of advance directives.  Medical directive:  This covers the care that you want if you are in a coma, near death, or unable to make decisions for yourself. You can list the treatments you want for each condition. Treatment may include pain medicine, surgery, blood transfusions, dialysis, IV or tube feedings, and a ventilator (breathing machine).  Values history:  This document has questions about your views, beliefs, and how you feel and think about life. This information can help others choose the care that you would choose.  Why are advance directives important?  An advance directive helps you control your care. Although spoken wishes may be used, it is better to have your wishes written down. Spoken wishes can be misunderstood, or not followed. Treatments may be given  even if you do not want them. An advance directive may make it easier for your family to make difficult choices about your care.   Fall Prevention    Fall prevention  includes ways to make your home and other areas safer. It also includes ways you can move more carefully to prevent a fall. Health conditions that cause changes in your blood pressure, vision, or muscle strength and coordination may increase your risk for falls. Medicines may also increase your risk for falls if they make you dizzy, weak, or sleepy.   Fall prevention tips:   Stand or sit up slowly.    Use assistive devices as directed.    Wear shoes that fit well and have soles that .    Wear a personal alarm.    Stay active.    Manage your medical conditions.    Home Safety Tips:  Add items to prevent falls in the bathroom.    Keep paths clear.    Install bright lights in your home.    Keep items you use often on shelves within reach.    Paint or place reflective tape on the edges of your stairs.    Narcotic (Opioid) Safety    Use narcotics safely:  Take prescribed narcotics exactly as directed  Do not give narcotics to others or take narcotics that belong to someone else  Do not mix narcotics without medicines or alcohol  Do not drive or operate heavy machinery after you take the narcotic  Monitor for side effects and notify your healthcare provider if you experienced side effects such as nausea, sleepiness, itching, or trouble thinking clearly.    Manage constipation:    Constipation is the most common side effect of narcotic medicine. Constipation is when you have hard, dry bowel movements, or you go longer than usual between bowel movements. Tell your healthcare provider about all changes in your bowel movements while you are taking narcotics. He or she may recommend laxative medicine to help you have a bowel movement. He or she may also change the kind of narcotic you are taking, or change when you take it. The following are more ways you can  prevent or relieve constipation:    Drink liquids as directed.  You may need to drink extra liquids to help soften and move your bowels. Ask how much liquid to drink each day and which liquids are best for you.  Eat high-fiber foods.  This may help decrease constipation by adding bulk to your bowel movements. High-fiber foods include fruits, vegetables, whole-grain breads and cereals, and beans. Your healthcare provider or dietitian can help you create a high-fiber meal plan. Your provider may also recommend a fiber supplement if you cannot get enough fiber from food.  Exercise regularly.  Regular physical activity can help stimulate your intestines. Walking is a good exercise to prevent or relieve constipation. Ask which exercises are best for you.  Schedule a time each day to have a bowel movement.  This may help train your body to have regular bowel movements. Bend forward while you are on the toilet to help move the bowel movement out. Sit on the toilet for at least 10 minutes, even if you do not have a bowel movement.    Store narcotics safely:   Store narcotics where others cannot easily get them.  Keep them in a locked cabinet or secure area. Do not  keep them in a purse or other bag you carry with you. A person may be looking for something else and find the narcotics.  Make sure narcotics are stored out of the reach of children.  A child can easily overdose on narcotics. Narcotics may look like candy to a small child.    The best way to dispose of narcotics:      The laws vary by country and area. In the [place], the best way is to return the narcotics through a take-back program. This program is offered by the [place] Drug Enforcement Agency (GLENDA). The following are options for using the program:  Take the narcotics to a GLENDA collection site.  The site is often a law enforcement center. Call your local law enforcement center for scheduled take-back days in your area. You will be given information on where  to go if the collection site is in a different location.  Take the narcotics to an approved pharmacy or hospital.  A pharmacy or hospital may be set up as a collection site. You will need to ask if it is a GLENDA collection site if you were not directed there. A pharmacy or doctor's office may not be able to take back narcotics unless it is a GLENDA site.  Use a mail-back system.  This means you are given containers to put the narcotics into. You will then mail them in the containers.  Use a take-back drop box.  This is a place to leave the narcotics at any time. People and animals will not be able to get into the box. Your local law enforcement agency can tell you where to find a drop box in your area.    Other ways to manage pain:   Ask your healthcare provider about non-narcotic medicines to control pain.  Nonprescription medicines include NSAIDs (such as ibuprofen) and acetaminophen. Prescription medicines include muscle relaxers, antidepressants, and steroids.  Pain may be managed without any medicines.  Some ways to relieve pain include massage, aromatherapy, or meditation. Physical or occupational therapy may also help.    For more information:   Drug Enforcement Administration  [address] [place] [address]  Phone: 3- [phone] - [phone]  Web Address: https://www.deadiversion.[facility].gov/drug_disposal/    [place] Food and Drug Administration  [address] [place] [address]  Phone: 7- 580 - 542-2596  Web Address: http://www.fda.gov     © Copyright [employer] 2018 Information is for End User's use only and may not be sold, redistributed or otherwise used for commercial purposes. All illustrations and images included in CareNotes® are the copyrighted property of A.D.A.M., Inc. or [employer]      Patient Education     Depression in adults   The Basics   Written by the doctors and editors at UpToDate   What is depression? -- Depression is a disorder that makes you sad, but it is  "different from normal sadness. Depression can make it hard for you to work, study, or do everyday tasks.  What causes depression? -- Depression is caused by problems with chemicals in the brain called \"neurotransmitters.\" Some people might be more likely to have depression if it runs in their family. Other things might also play a role, including hormones, certain health problems, medicines, stress, being mistreated as a child, family problems, and problems with friends or at school or work.  How do I know if I am depressed? -- People with depression feel down most of the time for at least 2 weeks. They also have at least 1 of these 2 symptoms:   They no longer enjoy or care about doing the things that they used to like to do.   They feel sad, down, hopeless, or cranky most of the day, almost every day.  People with depression can also have other symptoms. Examples include:   Changes in your appetite or weight. You might eat too little or too much, or gain or lose weight without trying.   Sleeping too much or too little   Feeling tired or like you have no energy   Feeling guilty, helpless, or like you are worth nothing   Trouble with concentration or memory   Acting restless or have trouble staying still, or moving or speaking more slowly than normal   Repeated thoughts of death or killing yourself  If you think that you might be depressed, see your doctor or nurse. Only someone trained in mental health can tell for sure if you are depressed.  How is depression diagnosed? -- Your doctor or nurse will do a physical exam, ask you questions, and might order tests. Depression can have a big impact on your life. Luckily, depression can be treated, and the sooner treatment is started, the better it works.  Get help right away if you are thinking of hurting or killing yourself! -- If you ever feel like you might hurt yourself or someone else, help is available:   In the [place], contact the 850 Suicide & Crisis Lifeline:   To " "speak to someone, call or text 757.   To talk to someone online, go to www.[employer].org/chat.   Call your doctor or nurse, and tell them it is urgent.   Call for an ambulance (in the [place] and [place], call 9-1-1).   Go to the emergency department at the nearest hospital.  What are the treatments for depression? -- Your doctor or nurse will work with you to make a treatment plan. Treatment can include:   Helping you learn more about depression   Counseling (with a psychiatrist, psychologist, nurse, or )   Medicines that relieve depression   Creating a plan to limit access to items that you might use to harm yourself   Other treatments that pass magnetic waves or electricity into the brain  In addition to treatment, getting regular physical activity can also help you feel better.  People with depression that is not too severe can get better by taking medicines or talking with a counselor. People with severe depression usually need medicines to get better, and might also need to see a counselor.  Another treatment involves placing a device against the scalp to pass magnetic waves into the brain. This is called \"transcranial magnetic stimulation\" (\"TMS\"). Doctors might suggest TMS if medicines and counseling have not helped.  Some people with severe depression might need a treatment called \"electroconvulsive therapy\" (\"ECT\"). During ECT, doctors pass an electric current through a person's brain in a safe way.  When will I feel better? -- Most treatment options take a little while to start working.   Many people who take medicines start to feel better within 2 weeks, but it might be 4 to 8 weeks before the medicine has its full effect.   Many people who see a counselor start to feel better within a few weeks, but it might take 8 to 10 weeks to get the greatest benefit.  If the first treatment you try does not help you, tell your doctor or nurse, but do not give up. Some people need to try different " "treatments or combinations of treatments before they find an approach that works. Your doctor, nurse, or counselor can work with you to find the treatment that is right for you. They can also help you figure out how to cope while you search for the right treatment or are waiting for your treatment to start working.  How do I decide which treatment to have? -- You and your doctor or nurse will need to work together to choose a treatment for you. Medicines might work a little faster than counseling. But medicines can also cause side effects. Plus, some people do not like the idea of taking medicine.  Seeing a counselor involves talking about your feelings. That is also hard for some people.  What if I take medicine for depression and I want to have a baby? -- Some depression medicines can cause problems for an unborn baby. But having untreated depression during pregnancy can also cause problems. If you want to get pregnant, tell your doctor but do not stop taking your medicines. Together, you can plan the safest way for you to have your baby.  It's also important to talk with your doctor if you want to breastfeed after your baby is born. Breastfeeding has lots of benefits for both mother and baby. Some depression medicines are safer than others to use while breastfeeding. But having untreated depression after giving birth can also cause problems, so do not stop taking your medicines. Your doctor can work with you to plan the safest way for you to feed your baby.  All topics are updated as new evidence becomes available and our peer review process is complete.  This topic retrieved from [employer] on: Mar 06, 2024.  Topic 15179 Version 22.0  Release: 32.2.4 - C32.64  © 2024 UpToDate, Inc. and/or its affiliates. All rights reserved.  figure 1: Mood disorders caused by problems in the brain     Mooddisorders, such as depression and bipolar disorder, are caused by problems with\"neurotransmitters.\" These are chemicals in the " brain that can affect your emotions.Treatments for mood disorders seem to work by changing the levels of certainneurotransmitters.  Graphic 74388 Version 4.0  Consumer Information Use and Disclaimer   Disclaimer: This generalized information is a limited summary of diagnosis, treatment, and/or medication information. It is not meant to be comprehensive and should be used as a tool to help the user understand and/or assess potential diagnostic and treatment options. It does NOT include all information about conditions, treatments, medications, side effects, or risks that may apply to a specific patient. It is not intended to be medical advice or a substitute for the medical advice, diagnosis, or treatment of a health care provider based on the health care provider's examination and assessment of a patient's specific and unique circumstances. Patients must speak with a health care provider for complete information about their health, medical questions, and treatment options, including any risks or benefits regarding use of medications. This information does not endorse any treatments or medications as safe, effective, or approved for treating a specific patient. UpToDate, Inc. and its affiliates disclaim any warranty or liability relating to this information or the use thereof.The use of this information is governed by the Terms of Use, available at https://www.woltersiDoc24uwer.com/en/know/clinical-effectiveness-terms. 2024© UpToDate, Inc. and its affiliates and/or licensors. All rights reserved.  Copyright   © 2024 UpToDate, Inc. and/or its affiliates. All rights reserved.

## 2025-01-13 NOTE — ASSESSMENT & PLAN NOTE
PDMP Rx website reviewed and no red flag use noted, con't [facility] prn, following with [facility] as well

## 2025-01-19 DIAGNOSIS — Z00.6 ENCOUNTER FOR EXAMINATION FOR NORMAL COMPARISON OR CONTROL IN CLINICAL RESEARCH PROGRAM: ICD-10-CM

## 2025-01-24 ENCOUNTER — NURSE TRIAGE (OUTPATIENT)
Age: 65
End: 2025-01-24

## 2025-01-24 NOTE — TELEPHONE ENCOUNTER
"Patient called office complaining of yellow discharge that is sometimes brown or black in color. She also states there is an odor. She has had these symptoms intermittently since April. She states she has back pain she rates an 8, but she has herniated discs. She denies vaginal itching, redness, abdominal pain, fever or any other symptoms to note at this time. Attempted to schedule patient an appointment, offered her 2 appointments today, she declined as she wants to be seen by Dr. Yeh. No appointment available in an appropriate time frame with provider. Dali transferred to [facility] in office to schedule patient. Advised patient to call back with worsening symptoms or new symptoms.         Reason for Disposition   Bad smelling vaginal discharge    Answer Assessment - Initial Assessment Questions  1. DISCHARGE: \"Describe the discharge.\" (e.g., white, yellow, green, gray, foamy, cottage cheese-like)      Yellow /brown / black  discharge   2. ODOR: \"Is there a bad odor?\"      Yes   3. ONSET: \"When did the discharge begin?\"      Intermittent since April   4. RASH: \"Is there a rash in the genital area?\" If Yes, ask: \"Describe it.\" (e.g., redness, blisters, sores, bumps)      None   5. ABDOMEN PAIN: \"Are you having any abdomen pain?\" If Yes, ask: \"What does it feel like? \" (e.g., crampy, dull, intermittent, constant)       None   6. ABDOMEN PAIN SEVERITY: If present, ask: \"How bad is it?\" (e.g., Scale 1-10; mild, moderate, or severe)      None   7. CAUSE: \"What do you think is causing the discharge?\" \"Have you had the same problem before?\" \"What happened then?\"      Unknown   8. OTHER SYMPTOMS: \"Do you have any other symptoms?\" (e.g., fever, itching, vaginal bleeding, pain with urination, injury to genital area, vaginal foreign body)      back pain - 8 (patient states she has herniated discs) .    Protocols used: Vaginal Discharge-Adult-[place]    "

## 2025-01-24 NOTE — TELEPHONE ENCOUNTER
"Called patient and provided results and recommendations per Dr. Yeh :    \"Please let this patient know her mammogram is normal.  She has dense breast tissue.  Sh is a candidate for additional breast cancer screening with Automated breast ultrasound.  If she is interested, she should check on coverage and I will place an order. \"    She states she will call her insurance company regarding ultrasound. She states she applied for \"DNA answers program\" and would like to know if BRCA gene test can be ordered for her.   "

## 2025-01-29 NOTE — PROGRESS NOTES
AMB US Pelvic Non OB    Date/Time: 1/30/2025 11:30 AM    Performed by: Ramonita Cummings  Authorized by: Tala Yeh DO  Universal Protocol:  Consent: Verbal consent obtained.  Consent given by: patient  Timeout called at: 1/30/2025 11:31 AM.  Patient understanding: patient states understanding of the procedure being performed  Patient identity confirmed: verbally with patient    Procedure details:     SIS Procedure: No    Technique:  Transvaginal US, Non-OB    Position: lithotomy exam    Other findings:     Free pelvic fluid: not identified      Free peritoneal fluid: not identified    Post-Procedure Details:     Impression:  The uterus and bilateral ovaries have been surgically removed. There are no notable masses or fluid collections. There is no free fluid present. There is excessive fecal material and peristalsis noted.     Tolerance:  Tolerated well, no immediate complications    Complications: no complications    Additional Procedure Comments:      datapine F8 E8C-RS transvaginal transducer Serial # [id number] was used to perform the examination today and subsequently followed with high level disinfection utilizing Trophon EPR procedure.     Ultrasound performed at:     [facility]  [facility] [address] [place] [id number]  Phone:  [phone]  Fax:  [phone]

## 2025-01-30 ENCOUNTER — OFFICE VISIT (OUTPATIENT)
Dept: GYNECOLOGY | Facility: CLINIC | Age: 65
End: 2025-01-30
Payer: COMMERCIAL

## 2025-01-30 ENCOUNTER — TELEPHONE (OUTPATIENT)
Age: 65
End: 2025-01-30

## 2025-01-30 ENCOUNTER — ULTRASOUND (OUTPATIENT)
Dept: GYNECOLOGY | Facility: CLINIC | Age: 65
End: 2025-01-30
Payer: COMMERCIAL

## 2025-01-30 VITALS — WEIGHT: 123 LBS | DIASTOLIC BLOOD PRESSURE: 70 MMHG | BODY MASS INDEX: 21.79 KG/M2 | SYSTOLIC BLOOD PRESSURE: 124 MMHG

## 2025-01-30 DIAGNOSIS — N95.2 VAGINAL ATROPHY: ICD-10-CM

## 2025-01-30 DIAGNOSIS — N89.8 VAGINAL DISCHARGE: Primary | ICD-10-CM

## 2025-01-30 DIAGNOSIS — R10.2 PELVIC PAIN IN FEMALE: Primary | ICD-10-CM

## 2025-01-30 LAB
BV WHIFF TEST VAG QL: NORMAL
CLUE CELLS SPEC QL WET PREP: NORMAL
PH SMN: 5.5 [PH]
SL AMB POCT WET MOUNT: NORMAL
T VAGINALIS VAG QL WET PREP: NORMAL
YEAST VAG QL WET PREP: NORMAL

## 2025-01-30 PROCEDURE — 99213 OFFICE O/P EST LOW 20 MIN: CPT | Performed by: OBSTETRICS & GYNECOLOGY

## 2025-01-30 PROCEDURE — 76830 TRANSVAGINAL US NON-OB: CPT | Performed by: OBSTETRICS & GYNECOLOGY

## 2025-01-30 PROCEDURE — 87210 SMEAR WET MOUNT SALINE/INK: CPT | Performed by: OBSTETRICS & GYNECOLOGY

## 2025-01-30 RX ORDER — CALCIUM CARBONATE/VITAMIN D3 500 MG-10
TABLET ORAL
COMMUNITY

## 2025-01-30 NOTE — TELEPHONE ENCOUNTER
Yes, please stay OFF Calcium supplements.  Last labs Aug 2024 the calcium was still high. She has BW ordered which as repeat levels

## 2025-01-30 NOTE — PROGRESS NOTES
Name: Lola Wade      : 1960      MRN: [id number]  Encounter Provider: Tala Yeh DO  Encounter Date: 2025   Encounter department: [facility]  :  Assessment & Plan  Vaginal discharge    Orders:    POCT wet mount    Vaginal atrophy           Vaginal discharge - this has resolved, Normal wet mount    Vaginal atrophy - she would benefit from vaginal moisturizer and possible vaginal estrogen.  Will start with OTC moisturizer, she was given a list of options.  Can proceed with vaginal estrogen if needed.    History of Present Illness   HPI  Lola Wade is a 64 y.o. female who presents for evaluation of discharge.  Yellow to brown.  She has odor,  no itching.  It lasted 4-5 days but now seems better.  She is not  sexually active.    US today is normal    S/p hysterectomy and BSO        Review of Systems   Constitutional: Negative.    Gastrointestinal: Negative.    Genitourinary:  Positive for vaginal discharge.          Objective   /70 (BP Location: Left arm, Patient Position: Sitting)   Wt 55.8 kg (123 lb)   BMI 21.79 kg/m²      Physical Exam  Genitourinary:     General: Normal vulva.      Vagina: Normal.      Uterus: Absent.       Adnexa: Right adnexa normal and left adnexa normal.      Comments: Vaginal cuff is very atrophic, scant yellow discharge

## 2025-01-30 NOTE — TELEPHONE ENCOUNTER
Pt called states she was told by PCP to stop taking Calcium a year ago due to levels being elevated. Pt inquiring if she should still remain not taking calcium supplement.      Please review and advise.  Thank you

## 2025-01-30 NOTE — TELEPHONE ENCOUNTER
Pt called to inquire advice regarding Calcium. Relayed providers message stating that her Calcium level was still high on 8/2024 labs, continue to NOT take supplement, and provider has orders in chart to recheck ahead of f/u appt. Pt stated she understood providers message and had no further questions.

## 2025-02-03 ENCOUNTER — OFFICE VISIT (OUTPATIENT)
Dept: BARIATRICS | Facility: CLINIC | Age: 65
End: 2025-02-03
Payer: COMMERCIAL

## 2025-02-03 VITALS
BODY MASS INDEX: 21.62 KG/M2 | TEMPERATURE: 97.5 F | SYSTOLIC BLOOD PRESSURE: 96 MMHG | HEIGHT: 63 IN | DIASTOLIC BLOOD PRESSURE: 58 MMHG | HEART RATE: 63 BPM | WEIGHT: 122 LBS

## 2025-02-03 DIAGNOSIS — K29.70 GASTRITIS: ICD-10-CM

## 2025-02-03 DIAGNOSIS — Z48.815 ENCOUNTER FOR SURGICAL AFTERCARE FOLLOWING SURGERY OF DIGESTIVE SYSTEM: Primary | ICD-10-CM

## 2025-02-03 DIAGNOSIS — R63.4 UNINTENTIONAL WEIGHT LOSS: ICD-10-CM

## 2025-02-03 DIAGNOSIS — K91.2 POSTSURGICAL MALABSORPTION: ICD-10-CM

## 2025-02-03 DIAGNOSIS — Z98.84 BARIATRIC SURGERY STATUS: ICD-10-CM

## 2025-02-03 PROCEDURE — 99214 OFFICE O/P EST MOD 30 MIN: CPT | Performed by: NURSE PRACTITIONER

## 2025-02-03 NOTE — PROGRESS NOTES
Assessment/Plan:     Patient ID: Lola Wade is a 64 y.o. female.     Bariatric Surgery Status/BMI 21/gastritis     -s/p Conversion to RNYGB with Dr. Jeff Núñez on 09/28/2021. Presents to the office today for annual visit. Overall doing better - she got a  and was undergoing significant amount of stress. She developed abdominal pain and started on omeprazole and carafate which helped with her symptoms. She had followed with GI and had an EGD which showed gastritis. Currently on omeprazole for now and is tolerating this well without any issues. She denies any current abdominal pan, N/V/D/C, regurgitation, reflux or dysphagia. Taking her multivitamins daily. Had unintentional weight loss since her divorce. She has been working on increasing her caloric intake and has gained about 7 lbs since last visit.     PLAN:     - recommended to continue with omeprazole for now.   - Routine follow up in 1 YEAR for annual visit.   - Continue with healthy lifestyle, adequate protein intake of 60 gm, fluid intake of at least 64 oz.   - Continue with MVI daily.   - Activity as tolerated.   - Labs ordered and will adjust accordingly if any deficiency.   - Follow up with RD and SW as needed.     .    Continued/Maintain healthy weight loss with good nutrition intakes.  Adequate hydration with at least 64oz. fluid intake.  Follow diet as discussed.  Follow vitamin and mineral recommendations as reviewed with you.  Exercise as tolerated.    Colonoscopy referral made: utd   Mammogram - UTD     Follow-up in 1 year for annual visit. We kindly ask that your arrive 15 minutes before your scheduled appointment time with your provider to allow our staff to room you, get your vital signs and update your chart.    Get lab work done prior to annual visit. Please call the office if you need a script.  It is recommended to check with your insurance BEFORE getting labs done to make sure they are covered by your policy.      Call our office if  you have any problems with abdominal pain especially associated with fever, chills, nausea, vomiting or any other concerns.    All  Post-bariatric surgery patients should be aware that very small quantities of any alcohol can cause impairment and it is very possible not to feel the effect. The effect can be in the system for several hours.  It is also a stomach irritant.     It is advised to AVOID alcohol, Nonsteroidal antiinflammatory drugs (NSAIDS) and nicotine of all forms . Any of these can cause stomach irritation/pain.    Discussed the effects of alcohol on a bariatric patient and the increased impairment risk.     Keep up the good work!     Postsurgical Malabsorption   -At risk for malabsorption of vitamins/minerals secondary to malabsorption and restriction of intake from bariatric surgery  -Currently taking adequate postop bariatric surgery vitamin supplementation  -Last set of bariatric labs completed on 02/2024 and showed WNL   -Next set of bariatric labs ordered for approximately 2 months  -Patient received education about the importance of adhering to a lifelong supplementation regimen to avoid vitamin/mineral deficiencies      Diagnoses and all orders for this visit:    Encounter for surgical aftercare following surgery of digestive system  -     CBC; Future  -     Comprehensive metabolic panel; Future  -     Folate; Future  -     PTH, intact; Future  -     Vitamin A; Future  -     Vitamin B1, whole blood; Future  -     Vitamin B12; Future  -     Vitamin D 25 hydroxy; Future  -     Zinc; Future  -     Fe+TIBC+Gonzalo; Future  -     CBC  -     Comprehensive metabolic panel  -     Folate  -     PTH, intact  -     Vitamin A  -     Vitamin B1, whole blood  -     Vitamin B12  -     Vitamin D 25 hydroxy  -     Zinc  -     Fe+TIBC+Gonzalo    Bariatric surgery status  -     CBC; Future  -     Comprehensive metabolic panel; Future  -     Folate; Future  -     PTH, intact; Future  -     Vitamin A; Future  -     Vitamin  B1, whole blood; Future  -     Vitamin B12; Future  -     Vitamin D 25 hydroxy; Future  -     Zinc; Future  -     Fe+TIBC+Gonzalo; Future  -     CBC  -     Comprehensive metabolic panel  -     Folate  -     PTH, intact  -     Vitamin A  -     Vitamin B1, whole blood  -     Vitamin B12  -     Vitamin D 25 hydroxy  -     Zinc  -     Fe+TIBC+Gonzalo    Postsurgical malabsorption  -     CBC; Future  -     Comprehensive metabolic panel; Future  -     Folate; Future  -     PTH, intact; Future  -     Vitamin A; Future  -     Vitamin B1, whole blood; Future  -     Vitamin B12; Future  -     Vitamin D 25 hydroxy; Future  -     Zinc; Future  -     Fe+TIBC+Gonzalo; Future  -     CBC  -     Comprehensive metabolic panel  -     Folate  -     PTH, intact  -     Vitamin A  -     Vitamin B1, whole blood  -     Vitamin B12  -     Vitamin D 25 hydroxy  -     Zinc  -     Fe+TIBC+Gonzalo    BMI 21.0-21.9, adult  -     CBC; Future  -     Comprehensive metabolic panel; Future  -     Folate; Future  -     PTH, intact; Future  -     Vitamin A; Future  -     Vitamin B1, whole blood; Future  -     Vitamin B12; Future  -     Vitamin D 25 hydroxy; Future  -     Zinc; Future  -     Fe+TIBC+Gonzalo; Future  -     CBC  -     Comprehensive metabolic panel  -     Folate  -     PTH, intact  -     Vitamin A  -     Vitamin B1, whole blood  -     Vitamin B12  -     Vitamin D 25 hydroxy  -     Zinc  -     Fe+TIBC+Gonzalo    Gastritis    Unintentional weight loss         Subjective:      Patient ID: Lola Wade is a 64 y.o. female.    -s/p Conversion to RNYGB with Dr. Jeff Núñez on 09/28/2021. Presents to the office today for annual visit. Overall doing better - she got a  and was undergoing significant amount of stress. She developed abdominal pain and started on omeprazole and carafate which helped with her symptoms. She had followed with GI and had an EGD which showed gastritis. Currently on omeprazole for now and is tolerating this well without any issues. She denies  "any current abdominal pan, N/V/D/C, regurgitation, reflux or dysphagia. Taking her multivitamins daily. Had unintentional weight loss since her divorce. She has been working on increasing her caloric intake and has gained about 7 lbs since last visit.       Initial: 189 lbs  Current: 122 lbs  EWL: (Weight loss is ahead of schedule at this post surgical period.)  Charly: 115 lbs  Current BMI is Body mass index is 21.61 kg/m².    Tolerating a regular diet-yes  Eating at least 60 grams of protein per day-yes  Following 30/60 minute rule with liquids-yes  Drinking at least 64 ounces of fluid per day-yes  Drinking carbonated beverages-no  Sufficient exercise-yes - mindful of increasing movement  Using NSAIDs regularly-no  Using nicotine-no - but smokes medical marijuana but very occasionally   Using alcohol- NO   Supplements: Multivitamins, Iron, and Calcium  + vitamin D3     EWL is 123%, which places the patient ahead of schedule for expected post surgical weight loss at this time.     The following portions of the patient's history were reviewed and updated as appropriate: allergies, current medications, past family history, past medical history, past social history, past surgical history and problem list.    Review of Systems   Constitutional: Negative.    Respiratory: Negative.     Cardiovascular: Negative.    Gastrointestinal: Negative.    Musculoskeletal: Negative.    Neurological: Negative.    Psychiatric/Behavioral: Negative.           Objective:    BP 96/58 (Patient Position: Sitting, Cuff Size: Standard)   Pulse 63   Temp 97.5 °F (36.4 °C) (Tympanic)   Ht 5' 3\" (1.6 m)   Wt 55.3 kg (122 lb)   BMI 21.61 kg/m²      Physical Exam  Vitals and nursing note reviewed.   Constitutional:       Appearance: Normal appearance. She is obese.   Cardiovascular:      Rate and Rhythm: Normal rate and regular rhythm.      Pulses: Normal pulses.      Heart sounds: Normal heart sounds.   Pulmonary:      Effort: Pulmonary " effort is normal.      Breath sounds: Normal breath sounds.   Abdominal:      General: Bowel sounds are normal.      Palpations: Abdomen is soft.      Tenderness: There is no abdominal tenderness.   Musculoskeletal:         General: Normal range of motion.   Skin:     General: Skin is warm and dry.   Neurological:      General: No focal deficit present.      Mental Status: She is alert and oriented to person, place, and time.   Psychiatric:         Mood and Affect: Mood normal.         Behavior: Behavior normal.         Thought Content: Thought content normal.         Judgment: Judgment normal.

## 2025-02-03 NOTE — PROGRESS NOTES
Date of surgery: 9/28/2021  Procedure: RNY   Performing surgeon: Dr Jeff Núñez    Initial Weight - 225 lb  Current Weight -122 lb  Charly Weight - 119 lb  Total Body Weight Loss (EWL)- 103%  EWL% - 123%  TWB % -106%

## 2025-02-08 DIAGNOSIS — F41.9 ANXIETY: ICD-10-CM

## 2025-02-08 DIAGNOSIS — F33.41 RECURRENT MAJOR DEPRESSIVE DISORDER, IN PARTIAL REMISSION (HCC): ICD-10-CM

## 2025-02-09 RX ORDER — BUSPIRONE HYDROCHLORIDE 15 MG/1
15 TABLET ORAL 2 TIMES DAILY
Qty: 180 TABLET | Refills: 1 | Status: SHIPPED | OUTPATIENT
Start: 2025-02-09

## 2025-02-10 DIAGNOSIS — Z98.84 BARIATRIC SURGERY STATUS: ICD-10-CM

## 2025-02-10 DIAGNOSIS — F41.9 ANXIETY: ICD-10-CM

## 2025-02-10 DIAGNOSIS — K91.2 POSTSURGICAL MALABSORPTION: ICD-10-CM

## 2025-02-10 DIAGNOSIS — Z48.815 ENCOUNTER FOR SURGICAL AFTERCARE FOLLOWING SURGERY OF DIGESTIVE SYSTEM: ICD-10-CM

## 2025-02-10 NOTE — TELEPHONE ENCOUNTER
Reason for call:   [x] Refill   [] Prior Auth  [] Other:     Office:   [] PCP/Provider -   [x] Specialty/Provider - ASHTYN Murphy     Medication: omeprazole (PriLOSEC) 20 mg     Dose/Frequency: TAKE 1 CAPSULE BY MOUTH EVERY DAY     Quantity: 90    Pharmacy: [facility]    Does the patient have enough for 3 days?   [x] Yes   [] No - Send as HP to POD

## 2025-02-10 NOTE — TELEPHONE ENCOUNTER
Reason for call:   [x] Refill   [] Prior Auth  [] Other:     Office:   [x] PCP/Provider -   [] Specialty/Provider -     Medication: hydrOXYzine HCL (ATARAX) 10 mg tablet     Dose/Frequency: Take 1 tablet (10 mg total) by mouth every 8 (eight) hours as needed for anxiety     Quantity:  30 tablet     Pharmacy: [facility]/pharmacy #[id number] [address]      Does the patient have enough for 3 days?   [x] Yes   [] No - Send as HP to POD

## 2025-02-11 ENCOUNTER — TELEPHONE (OUTPATIENT)
Age: 65
End: 2025-02-11

## 2025-02-11 RX ORDER — HYDROXYZINE HYDROCHLORIDE 10 MG/1
10 TABLET, FILM COATED ORAL EVERY 8 HOURS PRN
Qty: 30 TABLET | Refills: 5 | Status: SHIPPED | OUTPATIENT
Start: 2025-02-11

## 2025-02-11 NOTE — TELEPHONE ENCOUNTER
PA for HydrOXYzine HCL (ATARAX) 10 mg tablet SUBMITTED to [facility]    via    [x]CMM-KEY: [id number]  []Surescripts-Case ID #   []Availity-Auth ID #  []Faxed to plan   []Other website   []Phone call Case ID #     [x]PA sent as URGENT    All office notes, labs and other pertaining documents and studies sent. Clinical questions answered. Awaiting determination from insurance company.     Turnaround time for your insurance to make a decision on your Prior Authorization can take 7-21 business days.

## 2025-02-13 NOTE — TELEPHONE ENCOUNTER
PA for HydrOXYzine HCL (ATARAX) 10 mg tablet APPROVED     Date(s) approved until 2/10/26    Case #: [id number]    Patient advised by      - Pt aware and med was picked up on 2/11    [][email or web address] Message  []Phone call   []LMOM  []L/M to call office as no active Communication consent on file  []Unable to leave detailed message as VM not approved on Communication consent       Pharmacy advised by    []Fax  [x]Phone call- Confirmed med was picked up    Approval letter scanned into Media Yes

## 2025-02-14 ENCOUNTER — TELEPHONE (OUTPATIENT)
Age: 65
End: 2025-02-14

## 2025-02-14 NOTE — TELEPHONE ENCOUNTER
Spoke to pt, she is going to call insurance to ask what alternatives they will cover and will call us back.

## 2025-02-14 NOTE — TELEPHONE ENCOUNTER
Pt stated she was told the medication is on their formulary even though it says it is not. Med requires a PA and quantity over ride request, with documentation that medication is medically necessary. Pt did not have phone/fax number for ins co.

## 2025-02-17 NOTE — TELEPHONE ENCOUNTER
PA for traMADol-acetaminophen (ULTRACET) 37.5-325 mg per tablet SUBMITTED to [employer]    via    [x]CMM-KEY: [id number]  []Surescripts-Case ID #   []Availity-Auth ID # NDC #  []Faxed to plan   []Other website   []Phone call Case ID #     [x]PA sent as URGENT    All office notes, labs and other pertaining documents and studies sent. Clinical questions answered. Awaiting determination from insurance company.     Turnaround time for your insurance to make a decision on your Prior Authorization can take 7-21 business days.

## 2025-02-18 NOTE — TELEPHONE ENCOUNTER
PA for traMADol-acetaminophen (ULTRACET) 37.5-325 mg per tablet  APPROVED     Date(s) approved 12/17/24-02/16/26    Case # n/a    Patient advised by          []"[employer]hart Message  []Phone call   [x]LMOM  []L/M to call office as no active Communication consent on file  []Unable to leave detailed message as VM not approved on Communication consent       Pharmacy advised by    [x]Fax  []Phone call       Approval letter scanned into Media Yes

## 2025-03-04 DIAGNOSIS — M51.369 DISC DEGENERATION, LUMBAR: ICD-10-CM

## 2025-03-04 NOTE — TELEPHONE ENCOUNTER
Pharmacy only gave patient 28 tablets on last script. Patient is asking for a new script to be sent.   Reason for call:   [x] Refill   [] Prior Auth  [] Other:     Office:   [x] PCP/Provider - [id number]  Authorized By: JANIE Macias    [] Specialty/Provider -     Medication: traMADol-acetaminophen (ULTRACET) 37.5-325 mg per tablet    Dose/Frequency: TAKE 1 TABLET BY MOUTH EVERY 6 HOURS AS NEEDED FOR MODERATE PAIN    Quantity: 120 tablet    Pharmacy: [facility]/pharmacy #[id number] [address]    Does the patient have enough for 3 days?   [] Yes   [x] No - Send as HP to POD

## 2025-03-17 ENCOUNTER — TELEPHONE (OUTPATIENT)
Dept: FAMILY MEDICINE CLINIC | Facility: HOSPITAL | Age: 65
End: 2025-03-17

## 2025-03-17 NOTE — TELEPHONE ENCOUNTER
Patient called to report PA needed for hydrOXYzine HCL (ATARAX) 10 mg   Forwarding to PA team for review.

## 2025-03-18 ENCOUNTER — HOSPITAL ENCOUNTER (OUTPATIENT)
Dept: RADIOLOGY | Age: 65
Discharge: HOME | End: 2025-03-18
Payer: COMMERCIAL

## 2025-03-18 VITALS — BODY MASS INDEX: 21.62 KG/M2 | WEIGHT: 122 LBS | HEIGHT: 63 IN

## 2025-03-18 DIAGNOSIS — R92.30 DENSE BREAST TISSUE ON MAMMOGRAM, UNSPECIFIED TYPE: ICD-10-CM

## 2025-03-18 DIAGNOSIS — Z12.39 SCREENING FOR BREAST CANCER USING NON-MAMMOGRAM MODALITY: ICD-10-CM

## 2025-03-18 PROCEDURE — 76641 ULTRASOUND BREAST COMPLETE: CPT

## 2025-03-18 NOTE — TELEPHONE ENCOUNTER
PA for hydrOXYzine HCL (ATARAX) 10 mg tablet  SUBMITTED to [employer]    via    [x]CMM-KEY: [id number]  []Surescripts-Case ID #   []Availity-Auth ID # NDC #   []Faxed to plan   []Other website   []Phone call Case ID #     [x]PA sent as URGENT    All office notes, labs and other pertaining documents and studies sent. Clinical questions answered. Awaiting determination from insurance company.     Turnaround time for your insurance to make a decision on your Prior Authorization can take 7-21 business days.

## 2025-03-18 NOTE — TELEPHONE ENCOUNTER
Call from Mi at [facility] regarding recent submittal of hydroxyzine prior authorization. States patient currently has active approval on file for 2/11/24-2/11/26, wondering if we would like to withdrawal current request or wish to submit new. Ref # [id number]. Phone #[phone]

## 2025-03-19 ENCOUNTER — RESULTS FOLLOW-UP (OUTPATIENT)
Dept: GYNECOLOGY | Facility: CLINIC | Age: 65
End: 2025-03-19

## 2025-03-19 NOTE — TELEPHONE ENCOUNTER
Called insurance to abort the new request since there is a current request that is approved on file until 02/11/26.

## 2025-03-20 ENCOUNTER — APPOINTMENT (OUTPATIENT)
Dept: LAB | Facility: CLINIC | Age: 65
End: 2025-03-20

## 2025-03-20 DIAGNOSIS — Z00.6 ENCOUNTER FOR EXAMINATION FOR NORMAL COMPARISON OR CONTROL IN CLINICAL RESEARCH PROGRAM: ICD-10-CM

## 2025-03-20 PROCEDURE — 36415 COLL VENOUS BLD VENIPUNCTURE: CPT

## 2025-03-26 ENCOUNTER — TELEPHONE (OUTPATIENT)
Dept: FAMILY MEDICINE CLINIC | Facility: HOSPITAL | Age: 65
End: 2025-03-26

## 2025-03-26 NOTE — TELEPHONE ENCOUNTER
Patient called requesting refill for Hydroxyzine. Patient made aware medication was refilled on 2/11/25 for 30 with 5 refills to [facility]  pharmacy. Patient instructed to contact the pharmacy to obtain refills of medication. Patient verbalized understanding.

## 2025-04-01 LAB
APOB+LDLR+PCSK9 GENE MUT ANL BLD/T: NOT DETECTED
BRCA1+BRCA2 DEL+DUP + FULL MUT ANL BLD/T: NOT DETECTED
MLH1+MSH2+MSH6+PMS2 GN DEL+DUP+FUL M: NOT DETECTED

## 2025-04-03 ENCOUNTER — APPOINTMENT (OUTPATIENT)
Dept: RADIOLOGY | Facility: CLINIC | Age: 65
End: 2025-04-03
Payer: COMMERCIAL

## 2025-04-03 ENCOUNTER — TELEPHONE (OUTPATIENT)
Age: 65
End: 2025-04-03

## 2025-04-03 ENCOUNTER — OFFICE VISIT (OUTPATIENT)
Dept: PAIN MEDICINE | Facility: CLINIC | Age: 65
End: 2025-04-03
Payer: COMMERCIAL

## 2025-04-03 VITALS
HEART RATE: 53 BPM | WEIGHT: 124 LBS | OXYGEN SATURATION: 93 % | TEMPERATURE: 97.8 F | HEIGHT: 63 IN | BODY MASS INDEX: 21.97 KG/M2

## 2025-04-03 DIAGNOSIS — M46.1 SACROILIITIS (HCC): ICD-10-CM

## 2025-04-03 DIAGNOSIS — M47.816 LUMBAR SPONDYLOSIS: Primary | ICD-10-CM

## 2025-04-03 DIAGNOSIS — M62.838 MUSCLE SPASM: ICD-10-CM

## 2025-04-03 DIAGNOSIS — M51.16 LUMBAR DISC DISEASE WITH RADICULOPATHY: ICD-10-CM

## 2025-04-03 DIAGNOSIS — M48.061 LUMBAR FORAMINAL STENOSIS: ICD-10-CM

## 2025-04-03 DIAGNOSIS — M47.816 LUMBAR SPONDYLOSIS: ICD-10-CM

## 2025-04-03 PROCEDURE — 72220 X-RAY EXAM SACRUM TAILBONE: CPT

## 2025-04-03 PROCEDURE — 72100 X-RAY EXAM L-S SPINE 2/3 VWS: CPT

## 2025-04-03 PROCEDURE — 99214 OFFICE O/P EST MOD 30 MIN: CPT | Performed by: PHYSICIAN ASSISTANT

## 2025-04-03 PROCEDURE — G2211 COMPLEX E/M VISIT ADD ON: HCPCS | Performed by: PHYSICIAN ASSISTANT

## 2025-04-03 RX ORDER — PREGABALIN 150 MG/1
150 CAPSULE ORAL 3 TIMES DAILY
Qty: 270 CAPSULE | Refills: 1 | Status: SHIPPED | OUTPATIENT
Start: 2025-04-03

## 2025-04-03 RX ORDER — METHYLPREDNISOLONE 4 MG/1
TABLET ORAL
Qty: 1 EACH | Refills: 0 | Status: SHIPPED | OUTPATIENT
Start: 2025-04-03

## 2025-04-03 NOTE — TELEPHONE ENCOUNTER
Contacted patient off of Medication Management  wait list to verify needs of services in attempts to update list with patient preferences. LVM for patient to contact intake dept  in regards to updating wait list.

## 2025-04-03 NOTE — PROGRESS NOTES
Assessment:  1. Lumbar spondylosis    2. Sacroiliitis (HCC)    3. Lumbar disc disease with radiculopathy    4. Lumbar foraminal stenosis    5. Muscle spasm        Plan:  While the patient was in the office today, I did have a thorough conversation regarding their chronic pain syndrome, medication management, and treatment plan options.    After discussing options, I have recommended x-rays of the lumbar spine, sacrum and coccyx given her recent fall and significant pain.    In the meantime, she may continue with pregabalin 150 mg 3 times daily, tizanidine 4 mg nightly as needed.  Patient is taking the medication as prescribed and without side effects.  I have also prescribed a Medrol Dosepak to be taken as directed to calm down the acute pain that she is currently experiencing to her fall.    The patient will follow-up in 6 months for medication prescription refill and reevaluation. The patient was advised to contact the office should their symptoms worsen in the interim. The patient was agreeable and verbalized an understanding.        History of Present Illness:    The patient is a 65 y.o. female last seen on 12/2/2024 who presents for a follow up office visit in regards to chronic low back pain secondary to lumbar spondylosis and lumbar foraminal stenosis with radiculopathy.  The patient currently reports a significant increase in low back pain, primarily left-sided that she currently rates an 8 out of 10.  Patient describes the pain as a constant sharp type of pain that is worse with sitting.  Unfortunately the patient fell 2 weeks ago and has noticed a significant increase in left lower sacral pain.  She states the radicular symptoms are intermittent.  She has been maintained on pregabalin 150 mg 3 times daily and tizanidine 4 mg nightly with partial relief and no side effects.    I have personally reviewed and/or updated the patient's past medical history, past surgical history, family history, social history,  current medications, allergies, and vital signs today.       Review of Systems:    Review of Systems   Respiratory:  Negative for shortness of breath.    Cardiovascular:  Negative for chest pain.   Gastrointestinal:  Negative for constipation, diarrhea, nausea and vomiting.   Musculoskeletal:  Positive for arthralgias. Negative for gait problem, joint swelling and myalgias.   Skin:  Negative for rash.   Neurological:  Positive for dizziness. Negative for seizures and weakness.   Psychiatric/Behavioral:  Negative for confusion and dysphoric mood.    All other systems reviewed and are negative.        Past Medical History:   Diagnosis Date   • Anxiety    • Disturbance of memory     last assessed: 2016    • GERD (gastroesophageal reflux disease)    • Hiatal hernia    • Hirsutism    • History of sleeve gastrectomy    • Postgastrectomy malabsorption    • Sclerosing adenosis of breast    • Symptomatic menopausal or female climacteric states        Past Surgical History:   Procedure Laterality Date   • APPENDECTOMY     • BREAST EXCISIONAL BIOPSY Left     Benign   • COLONOSCOPY      complete - 7 year repeat recommended - Resolved:     • DILATION AND CURETTAGE OF UTERUS     • FRACTURE SURGERY     • GASTRECTOMY SLEEVE LAPAROSCOPIC  2018   • GYNECOLOGIC CRYOSURGERY      Cervix - s for abnormal paps    • HYSTERECTOMY  2006   • INDUCED       x3   • LAPAROSCOPIC SALPINGOOPHERECTOMY Right    • LAPAROSCOPIC TOTAL HYSTERECTOMY      06, Laparoscopic hysterectomy with LSO with vaginal closure of the vaginal cuff    • MT REVJ GSTR/JJ ANAST W/RCNSTJ W/O VGTMY N/A 2021    Procedure: LAPAROSCOPIC REVISION CONVERSION  JENN-EN-Y GASTRIC BYPASS WITH ROBOTICS AND INTRAOPERATIVE EGD, PARAESOPHAGEAL HERNIA REPAIR;  Surgeon: Sheldon Núñez MD;  Location: [facility];  Service: Bariatrics   • SALPINGOOPHORECTOMY Left     Right ovary removed at age 15 and the left one was removed with uterus.    • STOMACH SURGERY  2014    for morbid obesity - Managed by: Sheldon Hendricks -    • TONSILLECTOMY AND ADENOIDECTOMY     • TOOTH EXTRACTION     • UPPER GASTROINTESTINAL ENDOSCOPY         Family History   Problem Relation Age of Onset   • Hypertension Mother    • Alzheimer's disease Mother    • Stroke Mother         several mini strokes   • Dementia Mother         Alzheimers   • Thyroid disease Mother    • Arthritis Mother    • Hyperlipidemia Father    • Diabetes Father         mellitus    • Prostate cancer Father 70   • Rectal cancer Father         80's   • Lung cancer Father 90   • Liver cancer Father 90   • Heart attack Father    • Colon cancer Father         80's   • Cancer Father         Rectal, metastisized   • Hearing loss Father    • Skin cancer Father         Multiple times-unknown age   • Hypertension Sister    • Colon polyps Sister    • No Known Problems Daughter    • Breast cancer Maternal Grandmother         Unknown age   • No Known Problems Maternal Grandfather    • No Known Problems Paternal Grandmother    • No Known Problems Paternal Grandfather    • Hyperlipidemia Brother         no more       Social History     Occupational History   • Not on file   Tobacco Use   • Smoking status: Former     Current packs/day: 0.00     Average packs/day: 0.5 packs/day for 17.8 years (8.9 ttl pk-yrs)     Types: Cigarettes     Start date: 3/4/1971     Quit date: 1989     Years since quittin.2     Passive exposure: Past   • Smokeless tobacco: Never   • Tobacco comments:     33 years smoke free!   Vaping Use   • Vaping status: Some Days   • Substances: THC, CBD   Substance and Sexual Activity   • Alcohol use: Yes     Comment: occasional   • Drug use: Yes     Types: Marijuana     Comment: medical   • Sexual activity: Not Currently     Partners: Male     Birth control/protection: Post-menopausal, Surgical, Female Sterilization, None         Current Outpatient Medications:   •  BIOTIN PO, Take by mouth Two  "tablets once day, Disp: , Rfl:   •  busPIRone (BUSPAR) 15 mg tablet, TAKE 1 TABLET BY MOUTH TWICE A DAY, Disp: 180 tablet, Rfl: 1  •  Cholecalciferol (VITAMIN D3) 2000 units TABS, Take 2 tablets by mouth daily, Disp: , Rfl:   •  famotidine (PEPCID) 20 mg tablet, Take 20 mg by mouth 2 (two) times a day as needed for heartburn, Disp: , Rfl:   •  hydrOXYzine HCL (ATARAX) 10 mg tablet, Take 1 tablet (10 mg total) by mouth every 8 (eight) hours as needed for anxiety, Disp: 30 tablet, Rfl: 5  •  Magnesium 200 MG TABS, Take 2 tablets by mouth daily, Disp: , Rfl:   •  methylPREDNISolone 4 MG tablet therapy pack, Use as directed on package, Disp: 1 each, Rfl: 0  •  Multiple Vitamins-Minerals (Bariatric Multivitamins/Iron) CAPS, Take 2 capsules by mouth in the morning, Disp: , Rfl:   •  omeprazole (PriLOSEC) 20 mg delayed release capsule, Take 1 capsule (20 mg total) by mouth daily, Disp: 90 capsule, Rfl: 1  •  pregabalin (LYRICA) 150 mg capsule, Take 1 capsule (150 mg total) by mouth 3 (three) times a day, Disp: 270 capsule, Rfl: 1  •  Synthroid 125 MCG tablet, Take 1 tablet (125 mcg total) by mouth daily, Disp: 30 tablet, Rfl: 2  •  tiZANidine (ZANAFLEX) 4 mg tablet, TAKE 1 TABLET BY MOUTH EVERY 12 HOURS IF NEEDED FOR MUSCLE SPASM OR PAIN, Disp: 180 tablet, Rfl: 1  •  traMADol-acetaminophen (ULTRACET) 37.5-325 mg per tablet, Take 1 tablet by mouth every 6 (six) hours as needed for moderate pain, Disp: 120 tablet, Rfl: 0  •  venlafaxine (EFFEXOR) 100 MG tablet, TAKE 2 TABLETS BY MOUTH EVERY MORNING AND 1 TABLET EVERY EVENING, Disp: 270 tablet, Rfl: 1  •  vitamin B-12 (CYANOCOBALAMIN) 50 MCG tablet, Take by mouth daily, Disp: , Rfl:   •  Calcium Carb-Cholecalciferol 500-10 MG-MCG TABS, Take by mouth (Patient not taking: Reported on 4/3/2025), Disp: , Rfl:     No Known Allergies    Physical Exam:    Pulse (!) 53   Temp 97.8 °F (36.6 °C)   Ht 5' 3\" (1.6 m)   Wt 56.2 kg (124 lb)   SpO2 93%   BMI 21.97 kg/m² "     Constitutional:normal, well developed, well nourished, alert, in no distress and non-toxic and no overt pain behavior.  Eyes:anicteric  HEENT:grossly intact  Neck:supple, symmetric, trachea midline and no masses   Pulmonary:even and unlabored  Cardiovascular:No edema or pitting edema present  Skin:Normal without rashes or lesions and well hydrated  Psychiatric:Mood and affect appropriate  Neurologic:Cranial Nerves II-XII grossly intact  Musculoskeletal: Tender left lower sacral region.  Gait is antalgic favoring the right side.      Imaging  No orders to display         Orders Placed This Encounter   Procedures   • XR spine lumbar 2 or 3 views injury   • XR sacrum and coccyx

## 2025-04-04 NOTE — TELEPHONE ENCOUNTER
Pt called back regarding missed call. Pt stated that she is still interested in med mgmt but is receiving services for talk therapy elsewhere. Pt has been removed from talk therapy wait list.     Med mgmt wait list has been updated. Pt will remain on wait list until available appt.

## 2025-04-07 ENCOUNTER — TELEPHONE (OUTPATIENT)
Age: 65
End: 2025-04-07

## 2025-04-07 NOTE — TELEPHONE ENCOUNTER
Marian Hatch PA-C to [facility]       4/7/25 10:14 AM  Result Note  Please let patient know that her x-rays do not reveal any fracture.  It does show that she has arthritic changes of the lumbar spine.

## 2025-04-07 NOTE — TELEPHONE ENCOUNTER
Caller: Lola     Doctor: Dr. Tavares    Reason for call: pt was seen 04/03 by Marian Hatch. She stated after the appointment. She has been experiencing numbness in left foot. Pt states it lasted all weekend. Is there anything she can do ? She is also asking for x ray results from 04/03 please advise pt.     Call back#: [phone]

## 2025-04-07 NOTE — TELEPHONE ENCOUNTER
S/w pt, advised of above. Pt stated that she had significant numbness and weakness  in her L foot on Saturday into Sunday which caused her to fall yesterday. Pt denied any injury to her L foot or ankle as a result of the fall. Stated that today, she continues with a little numbness in her L foot, increased strength / mobility. Pt stated that she sat for hours at a [employer] party on Saturday however she has sat in her car for prolonged periods of time with no issues with numbness / weakness. Advised pt, at this point, the writer would recommend rest, ice / heat, medication as directed or prescribed. The writer will d/w MG and cb to advise. Pt verbalized understanding and appreciation.

## 2025-04-08 NOTE — TELEPHONE ENCOUNTER
S/w pt, advised of above. Questioned if the pt requested a viola as her next appt is in the [place] office. Pt denied requesting a viola. Advised pt, the writer will fu. Anticipate  a cb from the  staff to address the 7/7/25 ov. Please cb if your symptoms persist - this office will pursue mri and neurosurgical cons. Pt verbalized understanding and appreciation.

## 2025-04-08 NOTE — TELEPHONE ENCOUNTER
Access center schedule ov in wrong office. Appt rescheduled to [facility] office same day & time with Marian Hatch

## 2025-04-08 NOTE — TELEPHONE ENCOUNTER
Last lumbar MRI 1/2024 showed a disc protrusion and encroachment along the left L5 nerve root which likely explains her current symptoms..  If weakness persists,consider new MRI and neurosurgical consult.

## 2025-04-15 DIAGNOSIS — E03.9 HYPOTHYROIDISM, UNSPECIFIED TYPE: ICD-10-CM

## 2025-04-15 RX ORDER — LEVOTHYROXINE SODIUM 125 MCG
125 TABLET ORAL DAILY
Qty: 30 TABLET | Refills: 5 | Status: SHIPPED | OUTPATIENT
Start: 2025-04-15

## 2025-04-18 LAB — TSH SERPL DL<=0.005 MIU/L-ACNC: 0.16 UIU/ML (ref 0.45–4.5)

## 2025-04-22 DIAGNOSIS — M51.369 DISC DEGENERATION, LUMBAR: ICD-10-CM

## 2025-04-23 ENCOUNTER — RESULTS FOLLOW-UP (OUTPATIENT)
Dept: BARIATRICS | Facility: CLINIC | Age: 65
End: 2025-04-23

## 2025-04-23 LAB
25(OH)D3+25(OH)D2 SERPL-MCNC: 40.7 NG/ML (ref 30–100)
ALBUMIN SERPL-MCNC: 4.4 G/DL (ref 3.9–4.9)
ALP SERPL-CCNC: 63 IU/L (ref 44–121)
ALT SERPL-CCNC: 29 IU/L (ref 0–32)
AST SERPL-CCNC: 27 IU/L (ref 0–40)
BILIRUB SERPL-MCNC: 0.7 MG/DL (ref 0–1.2)
BUN SERPL-MCNC: 18 MG/DL (ref 8–27)
BUN/CREAT SERPL: 29 (ref 12–28)
CALCIUM SERPL-MCNC: 9.8 MG/DL (ref 8.7–10.3)
CHLORIDE SERPL-SCNC: 102 MMOL/L (ref 96–106)
CO2 SERPL-SCNC: 28 MMOL/L (ref 20–29)
CREAT SERPL-MCNC: 0.63 MG/DL (ref 0.57–1)
EGFR: 97 ML/MIN/1.73
ERYTHROCYTE [DISTWIDTH] IN BLOOD BY AUTOMATED COUNT: 12.8 % (ref 11.7–15.4)
FERRITIN SERPL-MCNC: 127 NG/ML (ref 15–150)
FOLATE SERPL-MCNC: >20 NG/ML
GLOBULIN SER-MCNC: 2.1 G/DL (ref 1.5–4.5)
GLUCOSE SERPL-MCNC: 76 MG/DL (ref 70–99)
HCT VFR BLD AUTO: 39.4 % (ref 34–46.6)
HGB BLD-MCNC: 13.2 G/DL (ref 11.1–15.9)
IRON SATN MFR SERPL: 29 % (ref 15–55)
IRON SERPL-MCNC: 100 UG/DL (ref 27–139)
MCH RBC QN AUTO: 29.3 PG (ref 26.6–33)
MCHC RBC AUTO-ENTMCNC: 33.5 G/DL (ref 31.5–35.7)
MCV RBC AUTO: 87 FL (ref 79–97)
PLATELET # BLD AUTO: 344 X10E3/UL (ref 150–450)
POTASSIUM SERPL-SCNC: 4.2 MMOL/L (ref 3.5–5.2)
PROT SERPL-MCNC: 6.5 G/DL (ref 6–8.5)
PTH-INTACT SERPL-MCNC: 30 PG/ML (ref 15–65)
RBC # BLD AUTO: 4.51 X10E6/UL (ref 3.77–5.28)
SODIUM SERPL-SCNC: 144 MMOL/L (ref 134–144)
TIBC SERPL-MCNC: 341 UG/DL (ref 250–450)
UIBC SERPL-MCNC: 241 UG/DL (ref 118–369)
VIT A SERPL-MCNC: 64.6 UG/DL (ref 22–69.5)
VIT B1 BLD-SCNC: 116.3 NMOL/L (ref 66.5–200)
VIT B12 SERPL-MCNC: 1429 PG/ML (ref 232–1245)
WBC # BLD AUTO: 8.2 X10E3/UL (ref 3.4–10.8)
ZINC SERPL-MCNC: 67 UG/DL (ref 44–115)

## 2025-04-24 ENCOUNTER — OFFICE VISIT (OUTPATIENT)
Dept: FAMILY MEDICINE CLINIC | Facility: HOSPITAL | Age: 65
End: 2025-04-24
Payer: COMMERCIAL

## 2025-04-24 VITALS
TEMPERATURE: 98.5 F | HEIGHT: 63 IN | DIASTOLIC BLOOD PRESSURE: 60 MMHG | WEIGHT: 126.2 LBS | OXYGEN SATURATION: 98 % | SYSTOLIC BLOOD PRESSURE: 104 MMHG | BODY MASS INDEX: 22.36 KG/M2 | HEART RATE: 60 BPM

## 2025-04-24 DIAGNOSIS — F41.9 ANXIETY: ICD-10-CM

## 2025-04-24 DIAGNOSIS — Z98.84 HISTORY OF ROUX-EN-Y GASTRIC BYPASS: ICD-10-CM

## 2025-04-24 DIAGNOSIS — E78.5 DYSLIPIDEMIA: ICD-10-CM

## 2025-04-24 DIAGNOSIS — G89.29 CHRONIC BILATERAL LOW BACK PAIN WITHOUT SCIATICA: ICD-10-CM

## 2025-04-24 DIAGNOSIS — M54.50 CHRONIC BILATERAL LOW BACK PAIN WITHOUT SCIATICA: ICD-10-CM

## 2025-04-24 DIAGNOSIS — F33.41 RECURRENT MAJOR DEPRESSIVE DISORDER, IN PARTIAL REMISSION (HCC): Primary | ICD-10-CM

## 2025-04-24 PROCEDURE — 99214 OFFICE O/P EST MOD 30 MIN: CPT | Performed by: INTERNAL MEDICINE

## 2025-04-24 PROCEDURE — G2211 COMPLEX E/M VISIT ADD ON: HCPCS | Performed by: INTERNAL MEDICINE

## 2025-04-24 NOTE — ASSESSMENT & PLAN NOTE
Mood better with increase in Buspar, insurance would not cover brand ZOLOFT in the past but she wants to try that still - brand not covered and the generic did not work for her, cont current regimen for now, if ZOLOFT is covered will wean off Effexor and start Zoloft, will con't current Buspar and prn hydroxyzine, recheck in 6 mos or sooner if needed

## 2025-04-24 NOTE — ASSESSMENT & PLAN NOTE
Following with Bariatric office, just had vitamin labs and B12 high and Vit D low nml, con't tx and f/u as per specialist, will follow as well

## 2025-04-24 NOTE — PROGRESS NOTES
Name: Lola Wade      : 1960      MRN: [id number]  Encounter Provider: Lucia Lord DO  Encounter Date: 2025   Encounter department: [facility] [address]   :  Assessment & Plan  Recurrent major depressive disorder, in partial remission (HCC)  Mood better with increase in Buspar, insurance would not cover brand ZOLOFT in the past but she wants to try that still - brand not covered and the generic did not work for her, cont current regimen for now, if ZOLOFT is covered will wean off Effexor and start Zoloft, will con't current Buspar, recheck in 6 mos or sooner if needed     Anxiety  Mood better with increase in Buspar, insurance would not cover brand ZOLOFT in the past but she wants to try that still - brand not covered and the generic did not work for her, cont current regimen for now, if ZOLOFT is covered will wean off Effexor and start Zoloft, will con't current Buspar and prn hydroxyzine, recheck in 6 mos or sooner if needed       History of Lissette-en-Y gastric bypass  Following with Bariatric office, just had vitamin labs and B12 high and Vit D low nml, con't tx and f/u as per specialist, will follow as well       Chronic bilateral low back pain without sciatica  Following with Pain Mgt, doing well on current regimen, call with new/worse symptoms, will follow       Dyslipidemia  FLP due - order given, not on a statin as 10 yr ASCVD risk score 4.4%, con't to encourage healthy diet and start formal exercise, will follow  Orders:    Lipid panel         Colonoscopy 3/20 - 5 yrs - having it next week at [facility] with Dr. Vale office     Mammo     Dexa  - osteopenia    Pelvic exam with Dr. Yeh     Fasting BW 10/24 (A1C 5.0)  FLP   TSH       History of Present Illness   HPI Pt here for follow up appt     Last visit in  pts mood was still up and down and I subsequently increased her Buspar from 10 mg bid to 15 mg bid.  She was going to  "see if brand name ZOLOFT was covered by insurance.  She is here for a mood/med check. She had no SE with the increase in the Buspar. She has noted mood is better.  She has some down moments when she doesn't want to get out of bed.  She notes no significant anxiety.  Having a room mate helps keep her engaged.  She notes sleep is still not good.  She notes no recent use of her hydroxyzine.    Pt saw Bariatric NP (Bryan Crespo) in Feb for f/u RYGB history - OV note reviewed.  She was told to con't with high protein and diet and plenty of fluids.  She was told to con't MVI and f/u with RD and SW as needed. BW/vitamin levels ordered to be done prior to next appt.  Wgt remains down and current BMI 22.36.  She has had some epigastric pain the past few days from overeating all the leftover Easter food. She is taking her Sulcrafate and Omeprazole.      Pt saw Pain Mgt PA (Marian Hatch) in early April for f/u chronic back pain/lumbar disease - OV note reviewed.  Xrays of L-spine, sacrum and coccyx were ordered d/t pain with recent fall.  (Went to stand from sitting on floor and fell backwards and landed right on her bottom).  Xrays showed degenerative changes but no fractures.  She was told to con't Lyrica and Tizanidine and a rx for a Medrol dose pack was given for acute pain.  Acute pain is better. She con't to use her Tramadol-APAP bid most dose (rarely take a third middle of the day dose).  She notes no SE with the medications other then some \"foggy head\".     Fasting labs overdue. Diet/exercise reviewed.      Review of Systems   Constitutional:  Negative for chills and fever.   HENT:  Negative for congestion, sore throat and trouble swallowing.    Eyes:  Negative for pain and visual disturbance.   Respiratory:  Negative for cough, shortness of breath and wheezing.    Cardiovascular:  Negative for chest pain, palpitations and leg swelling.   Gastrointestinal:  Positive for abdominal pain. Negative for blood in stool, " "constipation, diarrhea, nausea and vomiting.   Genitourinary:  Negative for difficulty urinating and dysuria.   Musculoskeletal:  Positive for arthralgias and back pain. Negative for gait problem.   Skin:  Negative for rash and wound.   Neurological:  Positive for dizziness and headaches. Negative for syncope and light-headedness.   Hematological:  Does not bruise/bleed easily.   Psychiatric/Behavioral:  Positive for confusion. Negative for dysphoric mood. The patient is nervous/anxious.         Brain fog       Objective   /60 (BP Location: Left arm, Patient Position: Sitting, Cuff Size: Standard)   Pulse 60   Temp 98.5 °F (36.9 °C)   Ht 5' 3\" (1.6 m)   Wt 57.2 kg (126 lb 3.2 oz)   SpO2 98%   BMI 22.36 kg/m²      Physical Exam  Vitals and nursing note reviewed.   Constitutional:       General: She is not in acute distress.     Appearance: She is well-developed. She is not ill-appearing.   HENT:      Head: Normocephalic and atraumatic.      Right Ear: External ear normal.      Left Ear: External ear normal.   Eyes:      General:         Right eye: No discharge.         Left eye: No discharge.      Conjunctiva/sclera: Conjunctivae normal.   Neck:      Thyroid: No thyromegaly.      Trachea: No tracheal deviation.   Cardiovascular:      Rate and Rhythm: Normal rate and regular rhythm.      Heart sounds: Normal heart sounds. No murmur heard.  Pulmonary:      Effort: Pulmonary effort is normal. No respiratory distress.      Breath sounds: Normal breath sounds. No wheezing, rhonchi or rales.   Abdominal:      General: There is no distension.      Palpations: Abdomen is soft.      Tenderness: There is no abdominal tenderness. There is no guarding or rebound.   Musculoskeletal:         General: No signs of injury.      Cervical back: Neck supple.      Right lower leg: No edema.      Left lower leg: No edema.   Skin:     General: Skin is warm and dry.      Coloration: Skin is not pale.      Findings: No rash. "   Neurological:      General: No focal deficit present.      Mental Status: She is alert. Mental status is at baseline.      Motor: No abnormal muscle tone.      Gait: Gait normal.   Psychiatric:         Mood and Affect: Mood normal.         Behavior: Behavior normal.         Thought Content: Thought content normal.         Judgment: Judgment normal.

## 2025-04-24 NOTE — ASSESSMENT & PLAN NOTE
FLP due - order given, not on a statin as 10 yr ASCVD risk score 4.4%, con't to encourage healthy diet and start formal exercise, will follow  Orders:    Lipid panel

## 2025-04-24 NOTE — ASSESSMENT & PLAN NOTE
Following with Pain Mgt, doing well on current regimen, call with new/worse symptoms, will follow

## 2025-04-24 NOTE — ASSESSMENT & PLAN NOTE
Mood better with increase in Buspar, insurance would not cover brand ZOLOFT in the past but she wants to try that still - brand not covered and the generic did not work for her, cont current regimen for now, if ZOLOFT is covered will wean off Effexor and start Zoloft, will con't current Buspar, recheck in 6 mos or sooner if needed

## 2025-04-25 ENCOUNTER — NURSE TRIAGE (OUTPATIENT)
Age: 65
End: 2025-04-25

## 2025-04-25 NOTE — TELEPHONE ENCOUNTER
Note a bit confusing - what is the strength of pts current B12 (250 or 500 or 1000 mcg) and is she taking a full tab or 1/2 tab daily?

## 2025-04-25 NOTE — TELEPHONE ENCOUNTER
"FOLLOW UP: Patient realized she has been taking 1 tablet daily of Vitamin B-12, rather than 2. Was told at follow up yesterday to decrease to 1 tablet daily. States it has been awhile since she was taking one tablet, wondering if she should stop this tablet as well or how to proceed since B-12 level is elevated.    REASON FOR CONVERSATION: Medication Problem    SYMPTOMS: N/A    OTHER: N/A    DISPOSITION: Callback by PCP Today    Reason for Disposition   Caller has NON-URGENT medicine question about med that PCP or specialist prescribed and triager unable to answer question    Answer Assessment - Initial Assessment Questions  1. NAME of MEDICINE: \"What medicine(s) are you calling about?\"      Vitamin B-12  2. QUESTION: \"What is your question?\" (e.g., double dose of medicine, side effect)      Told to decrease to 1 tablet daily instead of 2, but released today she has been taking 1 tablet for quite some time now. Unsure if should stop completely or how to proceed  3. PRESCRIBER: \"Who prescribed the medicine?\" Reason: if prescribed by specialist, call should be referred to that group.      PCP  4. SYMPTOMS: \"Do you have any symptoms?\" If Yes, ask: \"What symptoms are you having?\"  \"How bad are the symptoms (e.g., mild, moderate, severe)      N/A  5. PREGNANCY:  \"Is there any chance that you are pregnant?\" \"When was your last menstrual period?\"      N/A    Protocols used: Medication Question Call-Adult-[place]    "

## 2025-05-08 DIAGNOSIS — G89.29 CHRONIC BILATERAL LOW BACK PAIN WITHOUT SCIATICA: ICD-10-CM

## 2025-05-08 DIAGNOSIS — M54.50 CHRONIC BILATERAL LOW BACK PAIN WITHOUT SCIATICA: ICD-10-CM

## 2025-05-22 NOTE — TELEPHONE ENCOUNTER
Pt left message  She is going away on a trip and would like to take elderberry, probiotics and zinc  She wanted to know if it is okay to take these supplements s/p bypass  I left message on her voice mail  All are okay to take  Caution with taking extra zinc for an extended period of time  Recommended that she check her bariatric vitamin to ensure that it contains 2 mg of copper, otherwise she should only use the additional zinc while on her trip or purchase copper  Name and contact information left  Vancomycin Dosing by Pharmacy- [facility]    Kevin Dc is a 58 y.o. year old male who Pharmacy has been consulted for vancomycin dosing for cellulitis/skin and soft tissue infection. Based on the patient's indication and renal status this patient will be dosed based on a target level of Other Indication: 15-20 mcg/mL    Patient is currently in [facility].    Initial Dosin mg x 1    Visit Vitals  /55   Pulse 54   Temp 36.9 °C (98.5 °F) (Oral)   Resp (!) 21           Lab Results   Component Value Date    CREATININE 2.64 (H) 2025    CREATININE 2.17 (H) 2024    CREATININE 1.91 (H) 2024    CREATININE 1.84 (H) 2024       No intake/output data recorded.    Lab Results   Component Value Date    PATIENTTEMP 37.0 2024          Assessment/Plan     Random level within 24 hours of first dose will be obtained on 25 at 05:00. May be obtained sooner if clinically indicated.   Will continue to monitor renal function daily while on vancomycin and order serum creatinine at least every 48 hours if not already ordered.  Follow for continued vancomycin needs, clinical response, and signs/symptoms of toxicity.     Louann Marin, [facility]

## 2025-05-30 DIAGNOSIS — R10.13 EPIGASTRIC PAIN: Primary | ICD-10-CM

## 2025-06-11 ENCOUNTER — TELEPHONE (OUTPATIENT)
Age: 65
End: 2025-06-11

## 2025-06-11 NOTE — TELEPHONE ENCOUNTER
Yolanda from weight management calling, Pt was on hold for her to try and R/S an appt with us but Pt disconnected. Yolanda provided her info and I called Pt back but got no answer.

## 2025-06-11 NOTE — TELEPHONE ENCOUNTER
Pt transferred over form another Department, she wants to R/S an EGD but specifically with Dr.El Núñez, transferred her to his department

## 2025-06-20 ENCOUNTER — HOSPITAL ENCOUNTER (OUTPATIENT)
Dept: RADIOLOGY | Facility: HOSPITAL | Age: 65
Discharge: HOME | End: 2025-06-20
Attending: NURSE PRACTITIONER
Payer: COMMERCIAL

## 2025-06-20 DIAGNOSIS — R10.13 EPIGASTRIC PAIN: ICD-10-CM

## 2025-06-20 PROCEDURE — 74240 X-RAY XM UPR GI TRC 1CNTRST: CPT

## 2025-07-02 DIAGNOSIS — M51.369 DISC DEGENERATION, LUMBAR: ICD-10-CM

## 2025-07-02 NOTE — TELEPHONE ENCOUNTER
Reason for call:   [x] Refill   [] Prior Auth  [] Other:     Office:   [x] PCP/Provider - [facility] [address] -  Lucia Lord DO   [] Specialty/Provider -     Medication: traMADol-acetaminophen (ULTRACET) 37.5-325 mg per tablet     Dose/Frequency:  TAKE 1 TABLET BY MOUTH EVERY 6 HOURS AS NEEDED FOR MODERATE PAIN     Quantity: 120 tablet    Pharmacy: [facility]/pharmacy #[id number] [place], [place] - [address] [place] [address] [phone]    Local Pharmacy   Does the patient have enough for 3 days?   [] Yes   [x] No - Send as HP to POD    Mail Away Pharmacy   Does the patient have enough for 10 days?   [] Yes   [] No - Send as HP to POD

## 2025-07-07 ENCOUNTER — OFFICE VISIT (OUTPATIENT)
Dept: PAIN MEDICINE | Facility: CLINIC | Age: 65
End: 2025-07-07
Payer: COMMERCIAL

## 2025-07-07 VITALS
HEART RATE: 65 BPM | OXYGEN SATURATION: 95 % | WEIGHT: 127 LBS | HEIGHT: 63 IN | TEMPERATURE: 98.1 F | BODY MASS INDEX: 22.5 KG/M2

## 2025-07-07 DIAGNOSIS — M51.16 LUMBAR DISC DISEASE WITH RADICULOPATHY: Primary | ICD-10-CM

## 2025-07-07 DIAGNOSIS — M25.561 CHRONIC PAIN OF RIGHT KNEE: ICD-10-CM

## 2025-07-07 DIAGNOSIS — M47.816 LUMBAR SPONDYLOSIS: ICD-10-CM

## 2025-07-07 DIAGNOSIS — G89.29 CHRONIC PAIN OF RIGHT KNEE: ICD-10-CM

## 2025-07-07 PROCEDURE — 99214 OFFICE O/P EST MOD 30 MIN: CPT | Performed by: PHYSICIAN ASSISTANT

## 2025-07-07 PROCEDURE — G2211 COMPLEX E/M VISIT ADD ON: HCPCS | Performed by: PHYSICIAN ASSISTANT

## 2025-07-07 RX ORDER — PREGABALIN 150 MG/1
150 CAPSULE ORAL 3 TIMES DAILY
Qty: 270 CAPSULE | Refills: 1 | Status: SHIPPED | OUTPATIENT
Start: 2025-07-07

## 2025-07-07 NOTE — ASSESSMENT & PLAN NOTE
While the patient was in the office today, I did have a thorough conversation regarding their chronic pain syndrome, medication management, and treatment plan options.    Patient remains clinically stable and moderately controlled with the current medication regimen of Lyrica 150 mg 3 times daily.  Patient is taking the medications as prescribed and without side effects.  On today's visit I have provided refills.    She will continue to monitor her left lower extremity radicular symptoms.  Currently she has numbness without any weakness.  If symptoms progress, I would recommend a new lumbar spine MRI and a neurosurgical consultation.    Follow-up is planned in 6 months or sooner as warranted. The patient was advised to contact the office should their symptoms worsen in the interim.      Orders:  •  pregabalin (LYRICA) 150 mg capsule; Take 1 capsule (150 mg total) by mouth 3 (three) times a day

## 2025-07-07 NOTE — PROGRESS NOTES
Name: Lola Wade      : 1960      MRN: [id number]  Encounter Provider: Marian Hatch PA-C  Encounter Date: 2025   [facility]  :  Assessment & Plan  Lumbar disc disease with radiculopathy  While the patient was in the office today, I did have a thorough conversation regarding their chronic pain syndrome, medication management, and treatment plan options.    Patient remains clinically stable and moderately controlled with the current medication regimen of Lyrica 150 mg 3 times daily.  Patient is taking the medications as prescribed and without side effects.  On today's visit I have provided refills.    She will continue to monitor her left lower extremity radicular symptoms.  Currently she has numbness without any weakness.  If symptoms progress, I would recommend a new lumbar spine MRI and a neurosurgical consultation.    Follow-up is planned in 6 months or sooner as warranted. The patient was advised to contact the office should their symptoms worsen in the interim.      Orders:  •  pregabalin (LYRICA) 150 mg capsule; Take 1 capsule (150 mg total) by mouth 3 (three) times a day    Lumbar spondylosis         Chronic pain of right knee  I have placed an order for an x-ray of the right knee on today's visit and have also placed a referral for orthopedic consultation.  Orders:  •  XR knee 3 vw right non injury; Future  •  Ambulatory Referral to Orthopedic Surgery; Future        y impressions and treatment recommendations were discussed in detail with the patient who verbalized understanding and had no further questions.  Discharge instructions were provided. I personally saw and examined the patient and I agree with the above discussed plan of care.    History of Present Illness     Lola Wade is a 65 y.o. female who presents for a follow up office visit in regards to Back Pain. The patient’s current symptoms include chronic low back pain secondary to  "lumbar disc disease/spondylosis with left lower extremity radiculopathy.  Pain level today is a 2 out of 10.  Patient describes her low back pain as a constant dull, aching, cramping type of pain.  She has intermittent sharp shooting pains in the left leg which have been well-controlled with Lyrica 150 mg 3 times daily.  Patient has constant numbness along the left lateral calf and lateral foot.  Patient denies weakness.  She has had injections without any significant improvement.  She also happens to mention increasing right knee pain.  She has seen orthopedics in the past.    Review of Systems   Respiratory:  Negative for shortness of breath.    Cardiovascular:  Negative for chest pain.   Gastrointestinal:  Negative for constipation, diarrhea, nausea and vomiting.   Musculoskeletal:  Positive for arthralgias and gait problem. Negative for joint swelling and myalgias.   Skin:  Negative for rash.   Neurological:  Negative for dizziness, seizures and weakness.   Psychiatric/Behavioral:  Negative for dysphoric mood.    All other systems reviewed and are negative.      Medical History Reviewed by provider this encounter:  Tobacco  Allergies  Meds  Problems  Med Hx  Surg Hx  Fam Hx     .  Objective   Pulse 65   Temp 98.1 °F (36.7 °C)   Ht 5' 3\" (1.6 m)   Wt 57.6 kg (127 lb)   SpO2 95%   BMI 22.50 kg/m²      Pain Score:   2  Physical Exam  Constitutional: normal, well developed, well nourished, alert, in no distress and non-toxic and no overt pain behavior.  Eyes: anicteric  HEENT: grossly intact  Neck: supple, symmetric, trachea midline and no masses   Pulmonary: even and unlabored  Cardiovascular: No edema or pitting edema present  Skin: Normal without rashes or lesions and well hydrated  Psychiatric: Mood and affect appropriate  Neurologic: Cranial Nerves II-XII grossly intact  Musculoskeletal: crepitus right knee       "

## 2025-07-15 ENCOUNTER — APPOINTMENT (OUTPATIENT)
Dept: RADIOLOGY | Facility: CLINIC | Age: 65
End: 2025-07-15
Attending: ORTHOPAEDIC SURGERY
Payer: COMMERCIAL

## 2025-07-15 ENCOUNTER — OFFICE VISIT (OUTPATIENT)
Dept: OBGYN CLINIC | Facility: CLINIC | Age: 65
End: 2025-07-15
Attending: PHYSICIAN ASSISTANT
Payer: COMMERCIAL

## 2025-07-15 VITALS — WEIGHT: 125 LBS | BODY MASS INDEX: 23.6 KG/M2 | HEIGHT: 61 IN

## 2025-07-15 DIAGNOSIS — M25.561 CHRONIC PAIN OF RIGHT KNEE: ICD-10-CM

## 2025-07-15 DIAGNOSIS — G89.29 CHRONIC PAIN OF RIGHT KNEE: ICD-10-CM

## 2025-07-15 DIAGNOSIS — M17.11 PRIMARY OSTEOARTHRITIS OF RIGHT KNEE: Primary | ICD-10-CM

## 2025-07-15 PROCEDURE — 99203 OFFICE O/P NEW LOW 30 MIN: CPT | Performed by: ORTHOPAEDIC SURGERY

## 2025-07-15 PROCEDURE — 73564 X-RAY EXAM KNEE 4 OR MORE: CPT

## 2025-07-26 ENCOUNTER — OFFICE VISIT (OUTPATIENT)
Dept: URGENT CARE | Facility: CLINIC | Age: 65
End: 2025-07-26
Payer: COMMERCIAL

## 2025-07-26 ENCOUNTER — APPOINTMENT (OUTPATIENT)
Dept: RADIOLOGY | Facility: CLINIC | Age: 65
End: 2025-07-26
Payer: COMMERCIAL

## 2025-07-26 VITALS
RESPIRATION RATE: 18 BRPM | OXYGEN SATURATION: 100 % | HEART RATE: 65 BPM | SYSTOLIC BLOOD PRESSURE: 92 MMHG | DIASTOLIC BLOOD PRESSURE: 60 MMHG | TEMPERATURE: 97 F

## 2025-07-26 DIAGNOSIS — M79.642 PAIN OF LEFT HAND: Primary | ICD-10-CM

## 2025-07-26 DIAGNOSIS — M79.642 PAIN OF LEFT HAND: ICD-10-CM

## 2025-07-26 PROCEDURE — 73140 X-RAY EXAM OF FINGER(S): CPT

## 2025-07-26 PROCEDURE — 99213 OFFICE O/P EST LOW 20 MIN: CPT

## 2025-07-26 NOTE — PROGRESS NOTES
Patient Name: Lola Wade      : 1960      MRN: [id number]  Encounter Provider: JANIE Pettit  Encounter Date: 2025  Encounter department: [facility]    Assessment & Plan  Pain of left hand    Orders:  •  XR finger left third digit-middle; Future        Patient Instructions:   Patient Instructions   Preliminary reading of left third finger X-ray: no acute fracture  Radiologist will have final reading- if that changes your plan of care, you will receive a phone call.    REST!  Ice to affected areas first 48 hours, heat afterwards. 15 minutes every 3 hours as needed throughout the day  Topical pain medication such as icy/hot, Biofreeze, Salon pas, etc.  Acetaminophen - 650 mg orally every 4-6 hours when required, maximum 3000 mg/day    Follow up with orthopedics if pain not improving on its own in 5 days.  Call [phone] to make an appointment      Subjective   Chief Complaint   Patient presents with   • Hand Pain     Fell yesterday. Tripped down a curb and landed on her back and thinks she jammed her left middle finger. Swelling present. States she is also having back pain. Unable to completely bend middle finger. Denies pain in wrist and hand.        History obtained from: patient  Lola Wade is a 65 y.o.female  Patient reports she tripped and landed yesterday on her back and believes she might have broken her left middle finger as it is swollen and is painful to move.    Hand Pain   Pertinent negatives include no chest pain or numbness.       Review of Systems   Respiratory:  Negative for shortness of breath.    Cardiovascular:  Negative for chest pain.   Gastrointestinal:  Negative for abdominal pain.   Musculoskeletal:  Positive for arthralgias, joint swelling and myalgias. Negative for gait problem.        Left middle finger   Skin:  Negative for color change.   Neurological:  Negative for dizziness, weakness, light-headedness and numbness.       Current  Medications[1]  Allergies as of 07/26/2025   • (No Known Allergies)     Past Medical History[1]  Past Surgical History[1]  Family History[1]     Objective   BP 92/60   Pulse 65   Temp (!) 97 °F (36.1 °C) (Tympanic)   Resp 18   SpO2 100%      Physical Exam    Musculoskeletal:        Hands:       Lumbar back: Tenderness present. No bony tenderness. Normal range of motion.        Back:               [1]    Current Outpatient Medications:   •  BIOTIN PO, Take by mouth Two tablets once day (Patient not taking: Reported on 7/7/2025), Disp: , Rfl:   •  busPIRone (BUSPAR) 15 mg tablet, TAKE 1 TABLET BY MOUTH TWICE A DAY, Disp: 180 tablet, Rfl: 1  •  Calcium Carb-Cholecalciferol 500-10 MG-MCG TABS, Take by mouth (Patient not taking: Reported on 4/3/2025), Disp: , Rfl:   •  Cholecalciferol (VITAMIN D3) 2000 units TABS, Take 2 tablets by mouth in the morning., Disp: , Rfl:   •  famotidine (PEPCID) 20 mg tablet, Take 20 mg by mouth every morning, Disp: , Rfl:   •  hydrOXYzine HCL (ATARAX) 10 mg tablet, Take 1 tablet (10 mg total) by mouth every 8 (eight) hours as needed for anxiety, Disp: 30 tablet, Rfl: 5  •  Magnesium 200 MG TABS, Take 2 tablets by mouth in the morning., Disp: , Rfl:   •  Multiple Vitamins-Minerals (Bariatric Multivitamins/Iron) CAPS, Take 2 capsules by mouth in the morning, Disp: , Rfl:   •  omeprazole (PriLOSEC) 20 mg delayed release capsule, Take 1 capsule (20 mg total) by mouth daily, Disp: 90 capsule, Rfl: 1  •  pregabalin (LYRICA) 150 mg capsule, Take 1 capsule (150 mg total) by mouth 3 (three) times a day, Disp: 270 capsule, Rfl: 1  •  Synthroid 125 MCG tablet, TAKE 1 TABLET BY MOUTH EVERY DAY, Disp: 30 tablet, Rfl: 5  •  tiZANidine (ZANAFLEX) 4 mg tablet, TAKE 1 TABLET BY MOUTH EVERY 12 HOURS IF NEEDED FOR MUSCLE SPASM OR PAIN, Disp: 180 tablet, Rfl: 0  •  traMADol-acetaminophen (ULTRACET) 37.5-325 mg per tablet, Take 1 tablet by mouth every 6 (six) hours as needed for moderate pain, Disp: 120  tablet, Rfl: 0  •  venlafaxine (EFFEXOR) 100 MG tablet, TAKE 2 TABLETS BY MOUTH EVERY MORNING AND 1 TABLET EVERY EVENING, Disp: 270 tablet, Rfl: 1  •  vitamin B-12 (CYANOCOBALAMIN) 50 MCG tablet, Take by mouth in the morning., Disp: , Rfl: [1]  Past Medical History:  Diagnosis Date   • Anxiety    • Disturbance of memory     last assessed: 2016    • GERD (gastroesophageal reflux disease)    • Hiatal hernia    • Hirsutism    • History of sleeve gastrectomy    • Postgastrectomy malabsorption    • Sclerosing adenosis of breast    • Symptomatic menopausal or female climacteric states    [1]  Past Surgical History:  Procedure Laterality Date   • APPENDECTOMY     • BREAST EXCISIONAL BIOPSY Left     Benign   • COLONOSCOPY      complete - 7 year repeat recommended - Resolved:     • DILATION AND CURETTAGE OF UTERUS     • FRACTURE SURGERY     • GASTRECTOMY SLEEVE LAPAROSCOPIC  2018   • GYNECOLOGIC CRYOSURGERY      Cervix - s for abnormal paps    • HYSTERECTOMY  2006   • INDUCED       x3   • LAPAROSCOPIC SALPINGOOPHERECTOMY Right    • LAPAROSCOPIC TOTAL HYSTERECTOMY      06, Laparoscopic hysterectomy with LSO with vaginal closure of the vaginal cuff    • VT REVJ GSTR/JJ ANAST W/RCNSTJ W/O VGTMY N/A 2021    Procedure: LAPAROSCOPIC REVISION CONVERSION  JENN-EN-Y GASTRIC BYPASS WITH ROBOTICS AND INTRAOPERATIVE EGD, PARAESOPHAGEAL HERNIA REPAIR;  Surgeon: Sheldon Núñez MD;  Location: [facility] OR;  Service: Bariatrics   • SALPINGOOPHORECTOMY Left     Right ovary removed at age 15 and the left one was removed with uterus.   • STOMACH SURGERY  2014    for morbid obesity - Managed by: Sheldon Hendricks -    • TONSILLECTOMY AND ADENOIDECTOMY     • TOOTH EXTRACTION     • UPPER GASTROINTESTINAL ENDOSCOPY     [1]  Family History  Problem Relation Name Age of Onset   • Hypertension Mother Mother    • Alzheimer's disease Mother Mother    • Stroke Mother Mother         several mini  strokes   • Dementia Mother Mother         Alzheimers   • Thyroid disease Mother Mother    • Arthritis Mother Mother    • Hyperlipidemia Father Father    • Diabetes Father Father         mellitus    • Prostate cancer Father Father 70   • Rectal cancer Father Father         80's   • Lung cancer Father Father 90   • Liver cancer Father Father 90   • Heart attack Father Father    • Colon cancer Father Father         80's   • Cancer Father Father         Rectal, metastisized   • Hearing loss Father Father    • Skin cancer Father Father         Multiple times-unknown age   • Hypertension Sister Madhuri    • Colon polyps Sister Madhuri    • No Known Problems Daughter     • Breast cancer Maternal Grandmother Grandmother         Unknown age   • No Known Problems Maternal Grandfather     • No Known Problems Paternal Grandmother     • No Known Problems Paternal Grandfather     • Hyperlipidemia Brother Wood         no more

## 2025-07-26 NOTE — PATIENT INSTRUCTIONS
Preliminary reading of left third finger X-ray: no acute fracture  Radiologist will have final reading- if that changes your plan of care, you will receive a phone call.    REST!  Ice to affected areas first 48 hours, heat afterwards. 15 minutes every 3 hours as needed throughout the day  Topical pain medication such as icy/hot, Biofreeze, Salon pas, etc.  Acetaminophen - 650 mg orally every 4-6 hours when required, maximum 3000 mg/day    Follow up with orthopedics if pain not improving on its own in 5 days.  Call [phone] to make an appointment

## 2025-08-05 DIAGNOSIS — Z48.815 ENCOUNTER FOR SURGICAL AFTERCARE FOLLOWING SURGERY OF DIGESTIVE SYSTEM: ICD-10-CM

## 2025-08-05 DIAGNOSIS — Z98.84 BARIATRIC SURGERY STATUS: ICD-10-CM

## 2025-08-05 DIAGNOSIS — G89.29 CHRONIC BILATERAL LOW BACK PAIN WITHOUT SCIATICA: ICD-10-CM

## 2025-08-05 DIAGNOSIS — M54.50 CHRONIC BILATERAL LOW BACK PAIN WITHOUT SCIATICA: ICD-10-CM

## 2025-08-05 DIAGNOSIS — K91.2 POSTSURGICAL MALABSORPTION: ICD-10-CM

## 2025-08-06 ENCOUNTER — EVALUATION (OUTPATIENT)
Dept: PHYSICAL THERAPY | Facility: CLINIC | Age: 65
End: 2025-08-06
Attending: ORTHOPAEDIC SURGERY
Payer: COMMERCIAL

## 2025-08-06 ENCOUNTER — TELEPHONE (OUTPATIENT)
Age: 65
End: 2025-08-06

## 2025-08-06 DIAGNOSIS — M17.11 PRIMARY OSTEOARTHRITIS OF RIGHT KNEE: Primary | ICD-10-CM

## 2025-08-06 DIAGNOSIS — E03.9 HYPOTHYROIDISM, UNSPECIFIED TYPE: Primary | ICD-10-CM

## 2025-08-06 PROCEDURE — 97161 PT EVAL LOW COMPLEX 20 MIN: CPT | Performed by: PHYSICAL THERAPIST

## 2025-08-06 PROCEDURE — 97110 THERAPEUTIC EXERCISES: CPT | Performed by: PHYSICAL THERAPIST

## 2025-08-06 RX ORDER — OMEPRAZOLE 20 MG/1
20 CAPSULE, DELAYED RELEASE ORAL DAILY
Qty: 90 CAPSULE | Refills: 1 | Status: SHIPPED | OUTPATIENT
Start: 2025-08-06

## 2025-08-08 ENCOUNTER — TELEPHONE (OUTPATIENT)
Age: 65
End: 2025-08-08

## 2025-08-13 ENCOUNTER — OFFICE VISIT (OUTPATIENT)
Dept: PHYSICAL THERAPY | Facility: CLINIC | Age: 65
End: 2025-08-13
Attending: ORTHOPAEDIC SURGERY
Payer: COMMERCIAL

## 2025-08-18 ENCOUNTER — OFFICE VISIT (OUTPATIENT)
Dept: PHYSICAL THERAPY | Facility: CLINIC | Age: 65
End: 2025-08-18
Attending: ORTHOPAEDIC SURGERY
Payer: COMMERCIAL

## 2025-08-18 DIAGNOSIS — M17.11 PRIMARY OSTEOARTHRITIS OF RIGHT KNEE: Primary | ICD-10-CM

## 2025-08-18 PROCEDURE — 97110 THERAPEUTIC EXERCISES: CPT

## 2025-08-18 PROCEDURE — 97140 MANUAL THERAPY 1/> REGIONS: CPT

## (undated) DEVICE — COLUMN DRAPE

## (undated) DEVICE — STAPLER 60 RELOAD GREEN: Brand: SUREFORM

## (undated) DEVICE — VESSEL SEALER EXTEND: Brand: ENDOWRIST

## (undated) DEVICE — MONOPOLAR CURVED SCISSORS: Brand: ENDOWRIST

## (undated) DEVICE — STAPLER 60 RELOAD WHITE: Brand: SUREFORM

## (undated) DEVICE — CANNULA SEAL

## (undated) DEVICE — 10 MM BABCOCKS WITH RATCHET HANDLES: Brand: ENDOPATH

## (undated) DEVICE — TROCAR: Brand: KII FIOS FIRST ENTRY

## (undated) DEVICE — TIP COVER ACCESSORY

## (undated) DEVICE — SEAL

## (undated) DEVICE — PBT NON ABSORBABLE WOUND CLOSURE DEVICE: Brand: V-LOC

## (undated) DEVICE — SUT MONOCRYL 4-0 PS-2 27 IN Y426H

## (undated) DEVICE — ARM DRAPE

## (undated) DEVICE — CADIERE FORCEPS: Brand: ENDOWRIST

## (undated) DEVICE — VIOLET BRAIDED (POLYGLACTIN 910), SYNTHETIC ABSORBABLE SUTURE: Brand: COATED VICRYL

## (undated) DEVICE — ENDOPATH XCEL BLADELESS TROCARS WITH STABILITY SLEEVES: Brand: ENDOPATH XCEL

## (undated) DEVICE — SUT ETHIBOND 0 CT-1 30 IN X424H

## (undated) DEVICE — [HIGH FLOW INSUFFLATOR,  DO NOT USE IF PACKAGE IS DAMAGED,  KEEP DRY,  KEEP AWAY FROM SUNLIGHT,  PROTECT FROM HEAT AND RADIOACTIVE SOURCES.]: Brand: PNEUMOSURE

## (undated) DEVICE — GLOVE SRG BIOGEL 7.5

## (undated) DEVICE — STAPLER CANNULA SEAL: Brand: ENDOWRIST

## (undated) DEVICE — STAPLER 60 RELOAD BLUE: Brand: SUREFORM

## (undated) DEVICE — BLADELESS OBTURATOR: Brand: WECK VISTA

## (undated) DEVICE — BAG DECANTER

## (undated) DEVICE — 2000CC GUARDIAN II: Brand: GUARDIAN

## (undated) DEVICE — PLUMEPEN PRO 10FT

## (undated) DEVICE — DRAPE EQUIPMENT RF WAND

## (undated) DEVICE — TRAVELKIT CONTAINS FIRST STEP KIT (200ML EP-4 KIT) AND SOILED SCOPE BAG - 1 KIT: Brand: TRAVELKIT CONTAINS FIRST STEP KIT AND SOILED SCOPE BAG

## (undated) DEVICE — BLUE HEAT SCOPE WARMER

## (undated) DEVICE — ABSORBABLE WOUND CLOSURE DEVICE: Brand: V-LOC 90

## (undated) DEVICE — VISIGI 3D®  CALIBRATION SYSTEM  SIZE 36FR BYPASS/SHORT: Brand: BOEHRINGER® VISIGI 3D®  CALIBRATION SYSTEM  SIZE 36FR BYPASS/SHORT

## (undated) DEVICE — SCD SEQUENTIAL COMPRESSION COMFORT SLEEVE MEDIUM KNEE LENGTH: Brand: KENDALL SCD

## (undated) DEVICE — MEGA SUTURECUT ND: Brand: ENDOWRIST

## (undated) DEVICE — WEBRIL 6 IN UNSTERILE

## (undated) DEVICE — REDUCER: Brand: ENDOWRIST

## (undated) DEVICE — URETERAL CATHETER ADAPTOR TIP

## (undated) DEVICE — KIT, ROBOTIC BARIATRIC: Brand: CARDINAL HEALTH

## (undated) DEVICE — 3000CC GUARDIAN II: Brand: GUARDIAN

## (undated) DEVICE — Device: Brand: DEFENDO AIR/WATER/SUCTION AND BIOPSY VALVE

## (undated) DEVICE — ASTOUND STANDARD SURGICAL GOWN, XL: Brand: CONVERTORS